# Patient Record
Sex: FEMALE | Race: WHITE | NOT HISPANIC OR LATINO | Employment: OTHER | URBAN - METROPOLITAN AREA
[De-identification: names, ages, dates, MRNs, and addresses within clinical notes are randomized per-mention and may not be internally consistent; named-entity substitution may affect disease eponyms.]

---

## 2017-02-14 ENCOUNTER — APPOINTMENT (OUTPATIENT)
Dept: MEDICAL GROUP | Age: 82
End: 2017-02-14
Payer: MEDICARE

## 2017-02-28 ENCOUNTER — HOSPITAL ENCOUNTER (OUTPATIENT)
Facility: MEDICAL CENTER | Age: 82
End: 2017-02-28
Attending: NURSE PRACTITIONER
Payer: MEDICARE

## 2017-02-28 LAB
APPEARANCE UR: CLEAR
BILIRUB UR QL STRIP.AUTO: NEGATIVE
COLOR UR AUTO: YELLOW
CULTURE IF INDICATED INDCX: NO UA CULTURE
GLUCOSE UR STRIP.AUTO-MCNC: NEGATIVE MG/DL
KETONES UR STRIP.AUTO-MCNC: NEGATIVE MG/DL
LEUKOCYTE ESTERASE UR QL STRIP.AUTO: NEGATIVE
MICRO URNS: NORMAL
NITRITE UR QL STRIP.AUTO: NEGATIVE
PH UR: 6 [PH]
PROT UR QL STRIP: NEGATIVE MG/DL
RBC UR QL AUTO: NEGATIVE
SP GR UR STRIP.AUTO: 1.01

## 2017-02-28 PROCEDURE — 81003 URINALYSIS AUTO W/O SCOPE: CPT

## 2017-03-01 ENCOUNTER — HOSPITAL ENCOUNTER (OUTPATIENT)
Facility: MEDICAL CENTER | Age: 82
End: 2017-03-01
Attending: NURSE PRACTITIONER
Payer: MEDICARE

## 2017-03-01 LAB
G LAMBLIA+C PARVUM AG STL QL RAPID: NORMAL
SIGNIFICANT IND 70042: NORMAL
SOURCE SOURCE: NORMAL

## 2017-03-01 PROCEDURE — 87328 CRYPTOSPORIDIUM AG IA: CPT

## 2017-03-01 PROCEDURE — 87329 GIARDIA AG IA: CPT

## 2017-03-05 ENCOUNTER — RESOLUTE PROFESSIONAL BILLING HOSPITAL PROF FEE (OUTPATIENT)
Dept: HOSPITALIST | Facility: MEDICAL CENTER | Age: 82
End: 2017-03-05
Payer: MEDICARE

## 2017-03-05 ENCOUNTER — HOSPITAL ENCOUNTER (INPATIENT)
Facility: MEDICAL CENTER | Age: 82
LOS: 3 days | DRG: 690 | End: 2017-03-11
Attending: EMERGENCY MEDICINE | Admitting: INTERNAL MEDICINE
Payer: MEDICARE

## 2017-03-05 DIAGNOSIS — N31.9 NEUROGENIC BLADDER: ICD-10-CM

## 2017-03-05 DIAGNOSIS — N39.0 URINARY TRACT INFECTION, SITE UNSPECIFIED: ICD-10-CM

## 2017-03-05 LAB
ALBUMIN SERPL BCP-MCNC: 3 G/DL (ref 3.2–4.9)
ALBUMIN/GLOB SERPL: 1.1 G/DL
ALP SERPL-CCNC: 74 U/L (ref 30–99)
ALT SERPL-CCNC: 20 U/L (ref 2–50)
ANION GAP SERPL CALC-SCNC: 7 MMOL/L (ref 0–11.9)
APPEARANCE UR: ABNORMAL
AST SERPL-CCNC: 24 U/L (ref 12–45)
BACTERIA #/AREA URNS HPF: ABNORMAL /HPF
BASOPHILS # BLD AUTO: 1.3 % (ref 0–1.8)
BASOPHILS # BLD: 0.11 K/UL (ref 0–0.12)
BILIRUB SERPL-MCNC: 0.7 MG/DL (ref 0.1–1.5)
BILIRUB UR QL STRIP.AUTO: NEGATIVE
BUN SERPL-MCNC: 30 MG/DL (ref 8–22)
CALCIUM SERPL-MCNC: 9.1 MG/DL (ref 8.4–10.2)
CHLORIDE SERPL-SCNC: 98 MMOL/L (ref 96–112)
CK MB SERPL-MCNC: 5 NG/ML (ref 0–5)
CO2 SERPL-SCNC: 23 MMOL/L (ref 20–33)
COLOR UR: YELLOW
CREAT SERPL-MCNC: 0.88 MG/DL (ref 0.5–1.4)
CULTURE IF INDICATED INDCX: YES UA CULTURE
EOSINOPHIL # BLD AUTO: 0.09 K/UL (ref 0–0.51)
EOSINOPHIL NFR BLD: 1 % (ref 0–6.9)
EPI CELLS #/AREA URNS HPF: ABNORMAL /HPF
ERYTHROCYTE [DISTWIDTH] IN BLOOD BY AUTOMATED COUNT: 45.9 FL (ref 35.9–50)
GFR SERPL CREATININE-BSD FRML MDRD: >60 ML/MIN/1.73 M 2
GLOBULIN SER CALC-MCNC: 2.8 G/DL (ref 1.9–3.5)
GLUCOSE SERPL-MCNC: 105 MG/DL (ref 65–99)
GLUCOSE UR STRIP.AUTO-MCNC: NEGATIVE MG/DL
HCT VFR BLD AUTO: 41.9 % (ref 37–47)
HGB BLD-MCNC: 14.2 G/DL (ref 12–16)
IMM GRANULOCYTES # BLD AUTO: 0.03 K/UL (ref 0–0.11)
IMM GRANULOCYTES NFR BLD AUTO: 0.3 % (ref 0–0.9)
KETONES UR STRIP.AUTO-MCNC: NEGATIVE MG/DL
LACTATE BLD-SCNC: 1.14 MMOL/L (ref 0.5–2)
LEUKOCYTE ESTERASE UR QL STRIP.AUTO: ABNORMAL
LYMPHOCYTES # BLD AUTO: 1.83 K/UL (ref 1–4.8)
LYMPHOCYTES NFR BLD: 21.1 % (ref 22–41)
MCH RBC QN AUTO: 32.8 PG (ref 27–33)
MCHC RBC AUTO-ENTMCNC: 33.9 G/DL (ref 33.6–35)
MCV RBC AUTO: 96.8 FL (ref 81.4–97.8)
MICRO URNS: ABNORMAL
MONOCYTES # BLD AUTO: 1.06 K/UL (ref 0–0.85)
MONOCYTES NFR BLD AUTO: 12.2 % (ref 0–13.4)
NEUTROPHILS # BLD AUTO: 5.57 K/UL (ref 2–7.15)
NEUTROPHILS NFR BLD: 64.1 % (ref 44–72)
NITRITE UR QL STRIP.AUTO: NEGATIVE
NRBC # BLD AUTO: 0 K/UL
NRBC BLD AUTO-RTO: 0 /100 WBC
PH UR STRIP.AUTO: 6 [PH]
PLATELET # BLD AUTO: 216 K/UL (ref 164–446)
PMV BLD AUTO: 9.3 FL (ref 9–12.9)
POTASSIUM SERPL-SCNC: 3.8 MMOL/L (ref 3.6–5.5)
PROT SERPL-MCNC: 5.8 G/DL (ref 6–8.2)
PROT UR QL STRIP: NEGATIVE MG/DL
RBC # BLD AUTO: 4.33 M/UL (ref 4.2–5.4)
RBC # URNS HPF: ABNORMAL /HPF
RBC UR QL AUTO: ABNORMAL
SODIUM SERPL-SCNC: 128 MMOL/L (ref 135–145)
SP GR UR STRIP.AUTO: 1.01
SPECIMEN DRAWN FROM PATIENT: NORMAL
WBC # BLD AUTO: 8.7 K/UL (ref 4.8–10.8)
WBC #/AREA URNS HPF: ABNORMAL /HPF

## 2017-03-05 PROCEDURE — 87086 URINE CULTURE/COLONY COUNT: CPT

## 2017-03-05 PROCEDURE — 51701 INSERT BLADDER CATHETER: CPT

## 2017-03-05 PROCEDURE — G0378 HOSPITAL OBSERVATION PER HR: HCPCS

## 2017-03-05 PROCEDURE — 99220 PR INITIAL OBSERVATION CARE,LEVL III: CPT | Performed by: INTERNAL MEDICINE

## 2017-03-05 PROCEDURE — 81001 URINALYSIS AUTO W/SCOPE: CPT

## 2017-03-05 PROCEDURE — 87186 SC STD MICRODIL/AGAR DIL: CPT

## 2017-03-05 PROCEDURE — 99285 EMERGENCY DEPT VISIT HI MDM: CPT

## 2017-03-05 PROCEDURE — 96365 THER/PROPH/DIAG IV INF INIT: CPT

## 2017-03-05 PROCEDURE — 700102 HCHG RX REV CODE 250 W/ 637 OVERRIDE(OP): Performed by: INTERNAL MEDICINE

## 2017-03-05 PROCEDURE — 700111 HCHG RX REV CODE 636 W/ 250 OVERRIDE (IP): Performed by: EMERGENCY MEDICINE

## 2017-03-05 PROCEDURE — 83605 ASSAY OF LACTIC ACID: CPT

## 2017-03-05 PROCEDURE — 80053 COMPREHEN METABOLIC PANEL: CPT

## 2017-03-05 PROCEDURE — 87077 CULTURE AEROBIC IDENTIFY: CPT

## 2017-03-05 PROCEDURE — 36415 COLL VENOUS BLD VENIPUNCTURE: CPT

## 2017-03-05 PROCEDURE — 85025 COMPLETE CBC W/AUTO DIFF WBC: CPT

## 2017-03-05 PROCEDURE — 82553 CREATINE MB FRACTION: CPT

## 2017-03-05 PROCEDURE — 700105 HCHG RX REV CODE 258: Performed by: INTERNAL MEDICINE

## 2017-03-05 PROCEDURE — A9270 NON-COVERED ITEM OR SERVICE: HCPCS | Performed by: INTERNAL MEDICINE

## 2017-03-05 RX ORDER — BISACODYL 10 MG
10 SUPPOSITORY, RECTAL RECTAL
Status: DISCONTINUED | OUTPATIENT
Start: 2017-03-05 | End: 2017-03-11 | Stop reason: HOSPADM

## 2017-03-05 RX ORDER — ONDANSETRON 4 MG/1
4 TABLET, ORALLY DISINTEGRATING ORAL EVERY 4 HOURS PRN
Status: DISCONTINUED | OUTPATIENT
Start: 2017-03-05 | End: 2017-03-11 | Stop reason: HOSPADM

## 2017-03-05 RX ORDER — FLUOXETINE HYDROCHLORIDE 20 MG/1
40 CAPSULE ORAL DAILY
Status: DISCONTINUED | OUTPATIENT
Start: 2017-03-06 | End: 2017-03-11 | Stop reason: HOSPADM

## 2017-03-05 RX ORDER — IBUPROFEN 200 MG
400 TABLET ORAL EVERY 6 HOURS PRN
Status: DISCONTINUED | OUTPATIENT
Start: 2017-03-05 | End: 2017-03-11 | Stop reason: HOSPADM

## 2017-03-05 RX ORDER — SODIUM CHLORIDE 9 MG/ML
INJECTION, SOLUTION INTRAVENOUS CONTINUOUS
Status: DISCONTINUED | OUTPATIENT
Start: 2017-03-05 | End: 2017-03-10

## 2017-03-05 RX ORDER — ACETAMINOPHEN 325 MG/1
650 TABLET ORAL EVERY 6 HOURS PRN
Status: DISCONTINUED | OUTPATIENT
Start: 2017-03-05 | End: 2017-03-11 | Stop reason: HOSPADM

## 2017-03-05 RX ORDER — POLYETHYLENE GLYCOL 3350 17 G/17G
1 POWDER, FOR SOLUTION ORAL
Status: DISCONTINUED | OUTPATIENT
Start: 2017-03-05 | End: 2017-03-11 | Stop reason: HOSPADM

## 2017-03-05 RX ORDER — ONDANSETRON 2 MG/ML
4 INJECTION INTRAMUSCULAR; INTRAVENOUS EVERY 4 HOURS PRN
Status: DISCONTINUED | OUTPATIENT
Start: 2017-03-05 | End: 2017-03-11 | Stop reason: HOSPADM

## 2017-03-05 RX ORDER — LABETALOL HYDROCHLORIDE 5 MG/ML
10 INJECTION, SOLUTION INTRAVENOUS EVERY 4 HOURS PRN
Status: DISCONTINUED | OUTPATIENT
Start: 2017-03-05 | End: 2017-03-11 | Stop reason: HOSPADM

## 2017-03-05 RX ORDER — LISINOPRIL 20 MG/1
20 TABLET ORAL DAILY
Status: DISCONTINUED | OUTPATIENT
Start: 2017-03-06 | End: 2017-03-11 | Stop reason: HOSPADM

## 2017-03-05 RX ORDER — AMOXICILLIN 250 MG
2 CAPSULE ORAL 2 TIMES DAILY
Status: DISCONTINUED | OUTPATIENT
Start: 2017-03-05 | End: 2017-03-11 | Stop reason: HOSPADM

## 2017-03-05 RX ORDER — TRAMADOL HYDROCHLORIDE 50 MG/1
50 TABLET ORAL EVERY 6 HOURS PRN
Status: DISCONTINUED | OUTPATIENT
Start: 2017-03-05 | End: 2017-03-11 | Stop reason: HOSPADM

## 2017-03-05 RX ORDER — CEFTRIAXONE 1 G/1
1 INJECTION, POWDER, FOR SOLUTION INTRAMUSCULAR; INTRAVENOUS ONCE
Status: COMPLETED | OUTPATIENT
Start: 2017-03-05 | End: 2017-03-05

## 2017-03-05 RX ORDER — L. ACIDOPHILUS/L.BULGARICUS 100MM CELL
1 GRANULES IN PACKET (EA) ORAL DAILY
Status: DISCONTINUED | OUTPATIENT
Start: 2017-03-06 | End: 2017-03-11 | Stop reason: HOSPADM

## 2017-03-05 RX ADMIN — TRAMADOL HYDROCHLORIDE 50 MG: 50 TABLET, FILM COATED ORAL at 20:44

## 2017-03-05 RX ADMIN — SENNOSIDES AND DOCUSATE SODIUM 2 TABLET: 8.6; 5 TABLET ORAL at 20:44

## 2017-03-05 RX ADMIN — SODIUM CHLORIDE: 9 INJECTION, SOLUTION INTRAVENOUS at 20:44

## 2017-03-05 RX ADMIN — CEFTRIAXONE 1 G: 1 INJECTION, POWDER, FOR SOLUTION INTRAMUSCULAR; INTRAVENOUS at 17:35

## 2017-03-05 ASSESSMENT — LIFESTYLE VARIABLES
HAVE YOU EVER FELT YOU SHOULD CUT DOWN ON YOUR DRINKING: NO
TOTAL SCORE: 0
HOW MANY TIMES IN THE PAST YEAR HAVE YOU HAD 5 OR MORE DRINKS IN A DAY: 0
CONSUMPTION TOTAL: NEGATIVE
EVER FELT BAD OR GUILTY ABOUT YOUR DRINKING: NO
EVER_SMOKED: NEVER
TOTAL SCORE: 0
DO YOU DRINK ALCOHOL: NO
TOTAL SCORE: 0
EVER HAD A DRINK FIRST THING IN THE MORNING TO STEADY YOUR NERVES TO GET RID OF A HANGOVER: NO
ON A TYPICAL DAY WHEN YOU DRINK ALCOHOL HOW MANY DRINKS DO YOU HAVE: 1
AVERAGE NUMBER OF DAYS PER WEEK YOU HAVE A DRINK CONTAINING ALCOHOL: 4
HAVE PEOPLE ANNOYED YOU BY CRITICIZING YOUR DRINKING: NO
ALCOHOL_USE: YES

## 2017-03-05 ASSESSMENT — PAIN SCALES - GENERAL
PAINLEVEL_OUTOF10: 0
PAINLEVEL_OUTOF10: 9
PAINLEVEL_OUTOF10: 7
PAINLEVEL_OUTOF10: 4
PAINLEVEL_OUTOF10: 0

## 2017-03-05 NOTE — ED NOTES
Assumed care of pt at this specific time; no acute exacerbations in condition are noted since last evaluation.  Call light is within reach and pt is strongly encouraged to call for any assistance.   Family members are at bedside.

## 2017-03-05 NOTE — ED NOTES
Expected safety measures such as locked and lowest setting gurney, side-rails up, accessible call light have been properly implemented.  Pt verbalizes understanding of these precautions and is able to identify potential needs.

## 2017-03-05 NOTE — IP AVS SNAPSHOT
VFA Access Code: Activation code not generated  Current VFA Status: Active    [employer]  A secure, online tool to manage your health information     [employer]’s VFA® is a secure, online tool that connects you to your personalized health information from the privacy of your home -- day or night - making it very easy for you to manage your healthcare. Once the activation process is completed, you can even access your medical information using the VFA tess, which is available for free in the Apple Tess store or Google Play store.     VFA provides the following levels of access (as shown below):   My Chart Features   [facility] Primary Care Doctor [facility]  Specialists [facility]  Urgent  Care Non-[facility]  Primary Care  Doctor   Email your healthcare team securely and privately 24/7 X X X X   Manage appointments: schedule your next appointment; view details of past/upcoming appointments X      Request prescription refills. X      View recent personal medical records, including lab and immunizations X X X X   View health record, including health history, allergies, medications X X X X   Read reports about your outpatient visits, procedures, consult and ER notes X X X X   See your discharge summary, which is a recap of your hospital and/or ER visit that includes your diagnosis, lab results, and care plan. X X       How to register for VFA:  1. Go to  [email or web address].  2. Click on the Sign Up Now box, which takes you to the New Member Sign Up page. You will need to provide the following information:  a. Enter your VFA Access Code exactly as it appears at the top of this page. (You will not need to use this code after you’ve completed the sign-up process. If you do not sign up before the expiration date, you must request a new code.)   b. Enter your date of birth.   c. Enter your home email address.   d. Click Submit, and follow the next screen’s instructions.  3. Create a VFA ID. This will  be your [employer] login ID and cannot be changed, so think of one that is secure and easy to remember.  4. Create a [employer] password. You can change your password at any time.  5. Enter your Password Reset Question and Answer. This can be used at a later time if you forget your password.   6. Enter your e-mail address. This allows you to receive e-mail notifications when new information is available in [employer].  7. Click Sign Up. You can now view your health information.    For assistance activating your [employer] account, call [phone]

## 2017-03-05 NOTE — IP AVS SNAPSHOT
3/11/2017          Meron Rice  [address]  [place] [address]    Dear Meron Worley:    [facility] wants to ensure your discharge home is safe and you or your loved ones have had all your questions answered regarding your care after you leave the hospital.    You may receive a telephone call within two days of your discharge.  This call is to make certain you understand your discharge instructions as well as ensure we provided you with the best care possible during your stay with us.     The call will only last approximately 3-5 minutes and will be done by a nurse.    Once again, we want to ensure your discharge home is safe and that you have a clear understanding of any next steps in your care.  If you have any questions or concerns, please do not hesitate to contact us, we are here for you.  Thank you for choosing [facility] for your healthcare needs.    Sincerely,    Jeff Olivia    [facility]

## 2017-03-05 NOTE — IP AVS SNAPSHOT
" Home Care Instructions                                                                                                                  Name:Meron Rice  Medical Record Number:[id number]  CSN: [id number]    YOB: 1932   Age: 84 y.o.  Sex: female  HT:1.753 m (5' 9\") WT: 79.5 kg (175 lb 4.3 oz)          Admit Date: 3/5/2017     Discharge Date:   Today's Date: 3/11/2017  Attending Doctor:  Stanislav Medrano M.D.                  Allergies:  Alendronate-butylpar-propylpar-saccharin            Discharge Instructions       Discharge Instructions    Discharged to group home by medical transportation with escort. Discharged via wheelchair, hospital escort: Yes.  Special equipment needed: Not Applicable    Be sure to schedule a follow-up appointment with your primary care doctor or any specialists as instructed.     Discharge Plan:   Diet Plan: Discussed  Activity Level: Discussed  Confirmed Follow up Appointment: Appointment Scheduled  Confirmed Symptoms Management: Discussed  Medication Reconciliation Updated: Yes  Influenza Vaccine Indication: Patient Refuses    I understand that a diet low in cholesterol, fat, and sodium is recommended for good health. Unless I have been given specific instructions below for another diet, I accept this instruction as my diet prescription.   Other diet: Regular diet    Special Instructions: None    · Is patient discharged on Warfarin / Coumadin?   No     · Is patient Post Blood Transfusion?  No    Depression / Suicide Risk    As you are discharged from this [facility] facility, it is important to learn how to keep safe from harming yourself.    Recognize the warning signs:  · Abrupt changes in personality, positive or negative- including increase in energy   · Giving away possessions  · Change in eating patterns- significant weight changes-  positive or negative  · Change in sleeping patterns- unable to sleep or sleeping all the time   · Unwillingness or inability to " communicate  · Depression  · Unusual sadness, discouragement and loneliness  · Talk of wanting to die  · Neglect of personal appearance   · Rebelliousness- reckless behavior  · Withdrawal from people/activities they love  · Confusion- inability to concentrate     If you or a loved one observes any of these behaviors or has concerns about self-harm, here's what you can do:  · Talk about it- your feelings and reasons for harming yourself  · Remove any means that you might use to hurt yourself (examples: pills, rope, extension cords, firearm)  · Get professional help from the community (Mental Health, Substance Abuse, psychological counseling)  · Do not be alone:Call your Safe Contact- someone whom you trust who will be there for you.  · Call your local CRISIS HOTLINE 008-8859 or 092-871-5334  · Call your local Children's Mobile Crisis Response Team [place] (307) 608-1564 or www.ZenDay  · Call the toll free National Suicide Prevention Hotlines   · National Suicide Prevention Lifeline 743-672-OGXM (2969)  · [place] Hope Line Network 800-SUICIDE (556-3271)    Urinary Tract Infection  Urinary tract infections (UTIs) can develop anywhere along your urinary tract. Your urinary tract is your body's drainage system for removing wastes and extra water. Your urinary tract includes two kidneys, two ureters, a bladder, and a urethra. Your kidneys are a pair of bean-shaped organs. Each kidney is about the size of your fist. They are located below your ribs, one on each side of your spine.  CAUSES  Infections are caused by microbes, which are microscopic organisms, including fungi, viruses, and bacteria. These organisms are so small that they can only be seen through a microscope. Bacteria are the microbes that most commonly cause UTIs.  SYMPTOMS   Symptoms of UTIs may vary by age and gender of the patient and by the location of the infection. Symptoms in young women typically include a frequent and intense urge to  urinate and a painful, burning feeling in the bladder or urethra during urination. Older women and men are more likely to be tired, shaky, and weak and have muscle aches and abdominal pain. A fever may mean the infection is in your kidneys. Other symptoms of a kidney infection include pain in your back or sides below the ribs, nausea, and vomiting.  DIAGNOSIS  To diagnose a UTI, your caregiver will ask you about your symptoms. Your caregiver also will ask to provide a urine sample. The urine sample will be tested for bacteria and white blood cells. White blood cells are made by your body to help fight infection.  TREATMENT   Typically, UTIs can be treated with medication. Because most UTIs are caused by a bacterial infection, they usually can be treated with the use of antibiotics. The choice of antibiotic and length of treatment depend on your symptoms and the type of bacteria causing your infection.  HOME CARE INSTRUCTIONS  · If you were prescribed antibiotics, take them exactly as your caregiver instructs you. Finish the medication even if you feel better after you have only taken some of the medication.  · Drink enough water and fluids to keep your urine clear or pale yellow.  · Avoid caffeine, tea, and carbonated beverages. They tend to irritate your bladder.  · Empty your bladder often. Avoid holding urine for long periods of time.  · Empty your bladder before and after sexual intercourse.  · After a bowel movement, women should cleanse from front to back. Use each tissue only once.  SEEK MEDICAL CARE IF:   2. You have back pain.  3. You develop a fever.  4. Your symptoms do not begin to resolve within 3 days.  SEEK IMMEDIATE MEDICAL CARE IF:   2. You have severe back pain or lower abdominal pain.  3. You develop chills.  4. You have nausea or vomiting.  5. You have continued burning or discomfort with urination.  MAKE SURE YOU:   2. Understand these instructions.  3. Will watch your condition.  4. Will get  help right away if you are not doing well or get worse.     This information is not intended to replace advice given to you by your health care provider. Make sure you discuss any questions you have with your health care provider.     Document Released: 09/27/2006 Document Revised: 01/08/2016 Document Reviewed: 01/25/2013  [employer] Interactive Patient Education ©2016 [employer]      Your appointments     Mar 21, 2017  9:00 AM   New Patient with Sergio Hall PA-C   [facility]    [address]  [address]  [place] [address]   [phone]           Please bring Photo ID, Insurance Cards, All Medication Bottles and copies of any legal documents (such as Living Will, Power of ) If speaking a language besides English please bring an adult . Please arrive 30 minutes prior for check in and registration. You will be receiving a confirmation call a few days before your appointment from our automated call confirmation system.                 Discharge Medication Instructions:    Below are the medications your physician expects you to take upon discharge:    Review all your home medications and newly ordered medications with your doctor and/or pharmacist. Follow medication instructions as directed by your doctor and/or pharmacist.    Please keep your medication list with you and share with your physician.               Medication List      START taking these medications        Instructions    bethanechol 10 MG Tabs   Last time this was given:  20 mg on 3/11/2017  8:48 AM   Commonly known as:  URECHOLINE    Take 1 Tab by mouth 3 times a day.   Dose:  10 mg       sulfamethoxazole-trimethoprim 800-160 MG tablet   Commonly known as:  BACTRIM DS    Take 1 Tab by mouth 2 times a day.   Dose:  1 Tab       tamsulosin 0.4 MG capsule   Last time this was given:  0.4 mg on 3/11/2017  8:47 AM   Commonly known as:  FLOMAX    Take 1 Cap by mouth ONE-HALF HOUR  AFTER BREAKFAST.   Dose:  0.4 mg       tramadol 50 MG Tabs   Last time this was given:  50 mg on 3/11/2017  6:12 AM   Commonly known as:  ULTRAM    Take 1 Tab by mouth every 6 hours as needed for Severe Pain.   Dose:  50 mg         CONTINUE taking these medications        Instructions    aspirin EC 81 MG Tbec   Last time this was given:  81 mg on 3/11/2017  8:49 AM   Commonly known as:  ECOTRIN    Take 1 Tab by mouth every day.   Dose:  81 mg       fluoxetine 40 MG capsule   Last time this was given:  40 mg on 3/11/2017  8:47 AM   Commonly known as:  PROZAC    Doctor's comments:  Replaces 20 mg dose sent earlier today   Take 1 Cap by mouth every day.   Dose:  40 mg       lisinopril 20 MG Tabs   Last time this was given:  20 mg on 3/11/2017  8:48 AM   Commonly known as:  PRINIVIL    Take 1 Tab by mouth every day.   Dose:  20 mg       PROBIOTIC PO    Take 1 Cap by mouth every day.   Dose:  1 Cap               Instructions           Diet / Nutrition:    Follow any diet instructions given to you by your doctor or the dietician, including how much salt (sodium) you are allowed each day.    If you are overweight, talk to your doctor about a weight reduction plan.    Activity:    Remain physically active following your doctor's instructions about exercise and activity.    Rest often.     Any time you become even a little tired or short of breath, SIT DOWN and rest.    Worsening Symptoms:    Report any of the following signs and symptoms to the doctor's office immediately:    *Pain of jaw, arm, or neck  *Chest pain not relieved by medication                               *Dizziness or loss of consciousness  *Difficulty breathing even when at rest   *More tired than usual                                       *Bleeding drainage or swelling of surgical site  *Swelling of feet, ankles, legs or stomach                 *Fever (>100ºF)  *Pink or blood tinged sputum  *Weight gain (3lbs/day or 5lbs /week)           *Shock from  internal defibrillator (if applicable)  *Palpitations or irregular heartbeats                *Cool and/or numb extremities    Stroke Awareness    Common Risk Factors for Stroke include:    Age  Atrial Fibrillation  Carotid Artery Stenosis  Diabetes Mellitus  Excessive alcohol consumption  High blood pressure  Overweight   Physical inactivity  Smoking    Warning signs and symptoms of a stroke include:    *Sudden numbness or weakness of the face, arm or leg (especially on one side of the body).  *Sudden confusion, trouble speaking or understanding.  *Sudden trouble seeing in one or both eyes.  *Sudden trouble walking, dizziness, loss of balance or coordination.Sudden severe headache with no known cause.    It is very important to get treatment quickly when a stroke occurs. If you experience any of the above warning signs, call 911 immediately.                   Disclaimer         Quit Smoking / Tobacco Use:    I understand the use of any tobacco products increases my chance of suffering from future heart disease or stroke and could cause other illnesses which may shorten my life. Quitting the use of tobacco products is the single most important thing I can do to improve my health. For further information on smoking / tobacco cessation call a Toll Free Quit Line at 1-912.754.8558 (*National Cancer [place]) or 1-530.133.6300 (American Lung Association) or you can access the web based program at www.lungusa.org.    [place] Tobacco Users Help Line:  (392) 871-8228       Toll Free: 1-794.624.5341  Quit Tobacco Program [facility] (155)927-5480    Crisis Hotline:    [place] Crisis Hotline:  4-489-NBZECAB or 1-562.276.7498    [place] Crisis Hotline:    1-941.310.9037 or 147-609-6719    Discharge Survey:   Thank you for choosing [facility]. We hope we did everything we could to make your hospital stay a pleasant one. You may be receiving a phone survey and we would appreciate your time and  participation in answering the questions. Your input is very valuable to us in our efforts to improve our service to our patients and their families.        My signature on this form indicates that:    1. I have reviewed and understand the above information.  2. My questions regarding this information have been answered to my satisfaction.  3. I have formulated a plan with my discharge nurse to obtain my prescribed medications for home.                  Disclaimer         __________________________________                     __________       ________                       Patient Signature                                                 Date                    Time

## 2017-03-05 NOTE — ED NOTES
C/o low abdominal pain and urinary frequency x 1 month, saw pmd and had UA done here 2/28/17, has not heard from pmd yet

## 2017-03-05 NOTE — IP AVS SNAPSHOT
" <p align=\"LEFT\"><IMG SRC=\"//[email or web address]$/[email or web address]\" alt=\"Image\" WIDTH=\"50%\" HEIGHT=\"200\" BORDER=\"\"></p>                   Name:Meron Rice  Medical Record Number:[id number]  CSN: [id number]    YOB: 1932   Age: 84 y.o.  Sex: female  HT:1.753 m (5' 9\") WT: 79.5 kg (175 lb 4.3 oz)          Admit Date: 3/5/2017     Discharge Date:   Today's Date: 3/11/2017  Attending Doctor:  Stanislav Medrano M.D.                  Allergies:  Alendronate-butylpar-propylpar-saccharin          Your appointments     Mar 21, 2017  9:00 AM   New Patient with Sergio Hall PA-C   [facility]    [address]  [address]  [place] [id number] [phone]           Please bring Photo ID, Insurance Cards, All Medication Bottles and copies of any legal documents (such as Living Will, Power of ) If speaking a language besides English please bring an adult . Please arrive 30 minutes prior for check in and registration. You will be receiving a confirmation call a few days before your appointment from our automated call confirmation system.                 Medication List      Take these Medications        Instructions    aspirin EC 81 MG Tbec   Commonly known as:  ECOTRIN    Take 1 Tab by mouth every day.   Dose:  81 mg       bethanechol 10 MG Tabs   Commonly known as:  URECHOLINE    Take 1 Tab by mouth 3 times a day.   Dose:  10 mg       fluoxetine 40 MG capsule   Commonly known as:  PROZAC    Doctor's comments:  Replaces 20 mg dose sent earlier today   Take 1 Cap by mouth every day.   Dose:  40 mg       lisinopril 20 MG Tabs   Commonly known as:  PRINIVIL    Take 1 Tab by mouth every day.   Dose:  20 mg       PROBIOTIC PO    Take 1 Cap by mouth every day.   Dose:  1 Cap       sulfamethoxazole-trimethoprim 800-160 MG tablet   Commonly known as:  BACTRIM DS    Take 1 Tab by mouth 2 times a day.   Dose:  1 Tab       tamsulosin 0.4 MG capsule   Commonly " known as:  FLOMAX    Take 1 Cap by mouth ONE-HALF HOUR AFTER BREAKFAST.   Dose:  0.4 mg       tramadol 50 MG Tabs   Commonly known as:  ULTRAM    Take 1 Tab by mouth every 6 hours as needed for Severe Pain.   Dose:  50 mg

## 2017-03-05 NOTE — LETTER
[facility] - [id number]  EHR eReferral Notification Requirements    To be sent by secure email to [email or web address] or   by fax to [phone]       FIELDS ARE REQUIRED TO BE COMPLETED     Patient Name:  Meron Rice  MRN:  [id number]   Account Number: [id number]                YOB: 1932    Date Roomed in ED:    3/5/2017   3:19 PM  Date First Observation Order Placed: 3/5/2017   6:06 PM  Date First Inpatient Order Placed: 3/8/2017   12:09 PM    Date of Previous Admission (Needed For Readmission Reviews Only):     Discharge Date and Time (if applicable): No discharge date for patient encounter.     PLEASE CHECK OFF TYPE OF REVIEW & CURRENT ADMISSION STATUS FROM LISTS BELOW  Type of Review:  Admission review    Dates to be Reviewed: 3/8, 3/9, 3/10        Current Admission Status:  Inpatient    / Contact Number for EHR outcome/recommendation call:    Neela Yoon RN  [phone]     Attending Physician/ Contact Number (if not the same as in electronic record):  Dr. Medrano [phone]     Comments:  none      Additional Information being Emailed or Faxed:  no       Fax Handwritten Supporting Documents to EHR at [phone]      [address] [address]  [phone]  [email or web address]    Updated December 17, 2014

## 2017-03-06 ENCOUNTER — PATIENT OUTREACH (OUTPATIENT)
Dept: HEALTH INFORMATION MANAGEMENT | Facility: OTHER | Age: 82
End: 2017-03-06

## 2017-03-06 ENCOUNTER — APPOINTMENT (OUTPATIENT)
Dept: RADIOLOGY | Facility: MEDICAL CENTER | Age: 82
DRG: 690 | End: 2017-03-06
Attending: INTERNAL MEDICINE
Payer: MEDICARE

## 2017-03-06 PROBLEM — N31.9 NEUROGENIC BLADDER: Status: ACTIVE | Noted: 2017-03-06

## 2017-03-06 LAB
ANION GAP SERPL CALC-SCNC: 6 MMOL/L (ref 0–11.9)
BASOPHILS # BLD AUTO: 1.4 % (ref 0–1.8)
BASOPHILS # BLD: 0.1 K/UL (ref 0–0.12)
BUN SERPL-MCNC: 22 MG/DL (ref 8–22)
CALCIUM SERPL-MCNC: 8.8 MG/DL (ref 8.4–10.2)
CHLORIDE SERPL-SCNC: 105 MMOL/L (ref 96–112)
CO2 SERPL-SCNC: 24 MMOL/L (ref 20–33)
CREAT SERPL-MCNC: 0.75 MG/DL (ref 0.5–1.4)
EOSINOPHIL # BLD AUTO: 0.22 K/UL (ref 0–0.51)
EOSINOPHIL NFR BLD: 3.1 % (ref 0–6.9)
ERYTHROCYTE [DISTWIDTH] IN BLOOD BY AUTOMATED COUNT: 44.5 FL (ref 35.9–50)
GFR SERPL CREATININE-BSD FRML MDRD: >60 ML/MIN/1.73 M 2
GLUCOSE SERPL-MCNC: 108 MG/DL (ref 65–99)
HCT VFR BLD AUTO: 37.3 % (ref 37–47)
HGB BLD-MCNC: 12.8 G/DL (ref 12–16)
IMM GRANULOCYTES # BLD AUTO: 0.01 K/UL (ref 0–0.11)
IMM GRANULOCYTES NFR BLD AUTO: 0.1 % (ref 0–0.9)
LYMPHOCYTES # BLD AUTO: 2.24 K/UL (ref 1–4.8)
LYMPHOCYTES NFR BLD: 31.6 % (ref 22–41)
MCH RBC QN AUTO: 32.2 PG (ref 27–33)
MCHC RBC AUTO-ENTMCNC: 34.3 G/DL (ref 33.6–35)
MCV RBC AUTO: 94 FL (ref 81.4–97.8)
MONOCYTES # BLD AUTO: 0.98 K/UL (ref 0–0.85)
MONOCYTES NFR BLD AUTO: 13.8 % (ref 0–13.4)
NEUTROPHILS # BLD AUTO: 3.53 K/UL (ref 2–7.15)
NEUTROPHILS NFR BLD: 50 % (ref 44–72)
NRBC # BLD AUTO: 0 K/UL
NRBC BLD AUTO-RTO: 0 /100 WBC
PLATELET # BLD AUTO: 197 K/UL (ref 164–446)
PMV BLD AUTO: 9.7 FL (ref 9–12.9)
POTASSIUM SERPL-SCNC: 3.6 MMOL/L (ref 3.6–5.5)
RBC # BLD AUTO: 3.97 M/UL (ref 4.2–5.4)
SODIUM SERPL-SCNC: 135 MMOL/L (ref 135–145)
WBC # BLD AUTO: 7.1 K/UL (ref 4.8–10.8)

## 2017-03-06 PROCEDURE — 700105 HCHG RX REV CODE 258: Performed by: INTERNAL MEDICINE

## 2017-03-06 PROCEDURE — G8987 SELF CARE CURRENT STATUS: HCPCS | Mod: CK

## 2017-03-06 PROCEDURE — G0378 HOSPITAL OBSERVATION PER HR: HCPCS

## 2017-03-06 PROCEDURE — 99226 PR SUBSEQUENT OBSERVATION CARE,LEVEL III: CPT | Performed by: INTERNAL MEDICINE

## 2017-03-06 PROCEDURE — G8988 SELF CARE GOAL STATUS: HCPCS | Mod: CJ

## 2017-03-06 PROCEDURE — 700102 HCHG RX REV CODE 250 W/ 637 OVERRIDE(OP): Performed by: INTERNAL MEDICINE

## 2017-03-06 PROCEDURE — 97162 PT EVAL MOD COMPLEX 30 MIN: CPT

## 2017-03-06 PROCEDURE — 97535 SELF CARE MNGMENT TRAINING: CPT

## 2017-03-06 PROCEDURE — 80048 BASIC METABOLIC PNL TOTAL CA: CPT

## 2017-03-06 PROCEDURE — 97166 OT EVAL MOD COMPLEX 45 MIN: CPT | Mod: XE

## 2017-03-06 PROCEDURE — 74176 CT ABD & PELVIS W/O CONTRAST: CPT

## 2017-03-06 PROCEDURE — G8979 MOBILITY GOAL STATUS: HCPCS | Mod: CI

## 2017-03-06 PROCEDURE — 85025 COMPLETE CBC W/AUTO DIFF WBC: CPT

## 2017-03-06 PROCEDURE — G8978 MOBILITY CURRENT STATUS: HCPCS | Mod: CK

## 2017-03-06 PROCEDURE — 700111 HCHG RX REV CODE 636 W/ 250 OVERRIDE (IP): Performed by: INTERNAL MEDICINE

## 2017-03-06 PROCEDURE — 87086 URINE CULTURE/COLONY COUNT: CPT

## 2017-03-06 PROCEDURE — A9270 NON-COVERED ITEM OR SERVICE: HCPCS | Performed by: INTERNAL MEDICINE

## 2017-03-06 RX ADMIN — TRAMADOL HYDROCHLORIDE 50 MG: 50 TABLET, FILM COATED ORAL at 21:36

## 2017-03-06 RX ADMIN — IBUPROFEN 400 MG: 200 TABLET, FILM COATED ORAL at 14:40

## 2017-03-06 RX ADMIN — TRAMADOL HYDROCHLORIDE 50 MG: 50 TABLET, FILM COATED ORAL at 08:38

## 2017-03-06 RX ADMIN — CEFTRIAXONE SODIUM 1 G: 1 INJECTION, POWDER, FOR SOLUTION INTRAMUSCULAR; INTRAVENOUS at 08:31

## 2017-03-06 RX ADMIN — ASPIRIN 81 MG: 81 TABLET, COATED ORAL at 08:32

## 2017-03-06 RX ADMIN — ENOXAPARIN SODIUM 40 MG: 100 INJECTION SUBCUTANEOUS at 08:31

## 2017-03-06 RX ADMIN — LISINOPRIL 20 MG: 20 TABLET ORAL at 08:32

## 2017-03-06 RX ADMIN — TRAMADOL HYDROCHLORIDE 50 MG: 50 TABLET, FILM COATED ORAL at 15:37

## 2017-03-06 RX ADMIN — IBUPROFEN 400 MG: 200 TABLET, FILM COATED ORAL at 21:36

## 2017-03-06 RX ADMIN — FLUOXETINE 40 MG: 20 CAPSULE ORAL at 08:32

## 2017-03-06 RX ADMIN — SODIUM CHLORIDE: 9 INJECTION, SOLUTION INTRAVENOUS at 08:33

## 2017-03-06 RX ADMIN — IBUPROFEN 400 MG: 200 TABLET, FILM COATED ORAL at 01:11

## 2017-03-06 RX ADMIN — SENNOSIDES AND DOCUSATE SODIUM 2 TABLET: 8.6; 5 TABLET ORAL at 08:32

## 2017-03-06 RX ADMIN — SODIUM CHLORIDE: 9 INJECTION, SOLUTION INTRAVENOUS at 21:41

## 2017-03-06 RX ADMIN — LACTOBACILLUS ACIDOPHILUS / LACTOBACILLUS BULGARICUS 1 PACKET: 100 MILLION CFU STRENGTH GRANULES at 08:31

## 2017-03-06 ASSESSMENT — GAIT ASSESSMENTS
DEVIATION: ANTALGIC;SHUFFLED GAIT
ASSISTIVE DEVICE: FRONT WHEEL WALKER
DISTANCE (FEET): 15
GAIT LEVEL OF ASSIST: MINIMAL ASSIST

## 2017-03-06 ASSESSMENT — PAIN SCALES - GENERAL
PAINLEVEL_OUTOF10: 8
PAINLEVEL_OUTOF10: 7
PAINLEVEL_OUTOF10: 7
PAINLEVEL_OUTOF10: 8

## 2017-03-06 ASSESSMENT — ACTIVITIES OF DAILY LIVING (ADL): TOILETING: INDEPENDENT

## 2017-03-06 NOTE — THERAPY
"Physical Therapy Evaluation completed.   Bed Mobility:  Supine to Sit: Contact Guard Assist  Transfers: Sit to Stand: Contact Guard Assist  Gait: Level Of Assist: Minimal Assist with Front-Wheel Walker 15 ft      Plan of Care: Will benefit from Physical Therapy 7 times per week  Discharge Recommendations: Equipment: Front Wheeled Walker, Post-acute therapy recommended before discharged home, recommend intensive post acute therapy for this pt who has significant weakness and gait disturbance, has a history of L foot drop and currently does not have an AFO that fits properly. Pt is a high fall risk and do not recommend pt ambulate at this time alone until AFO is re-adjusted and pt has further gait training with PT.     See \"Rehab Therapy-Acute\" Patient Summary Report for complete documentation.     "

## 2017-03-06 NOTE — DISCHARGE PLANNING
Patient discussed in morning rounds.  Patient reportedly lives at home with an adult child and the discharge plan is for her to return home when medically able. No current SS needs noted.

## 2017-03-06 NOTE — ED NOTES
Pt has been medicated as prescribed and adhering to strict ED protocols.  Aseptic technique has been observed.

## 2017-03-06 NOTE — PROGRESS NOTES
Assisted to bathroom ,right side weakness and pain, had BM and urinated 600 ml clear and yellow, POC discussed.

## 2017-03-06 NOTE — H&P
CHIEF COMPLAINT:  Abdominal pain and urinary frequency.    HISTORY OF PRESENT ILLNESS:  This is an 84-year-old female with the history of   hypertension and neurogenic bladder who does self catheterization twice a   day, but still urinates on her own.  She had a history of recurrent UTI, with   cultures dating back to 2015 growing E. coli, sensitive to ceftriaxone, in   2013 grew Klebsiella pneumonia, sensitive to ceftriaxone as well and   Enterococcus faecalis.  She also has history of venous thromboembolism during   her childbearing years, along with history of cancer of the uterus and the   left breast in 2009 and 2010.    She was doing well until about several weeks back, when she started noticing   urinary frequency, and foul smelling urine, and noticed her urine to be   cloudy.  She also started to feel weaker than usual.  Last Tuesday, she had a   mechanical fall at home when she fell backwards without any associated   syncope.  She went to her primary care physician and her urinalysis was   checked, but at that time it was negative.  Bone x-rays were also done, which were   negative.  However, the patient continued to  have urinary symptoms, and subsequently   developed hypogastric pain, which she rated at 6/10 in severity, worse with urination.    She denied any nausea, vomiting, fevers, or chills.  She does have chronic diarrhea in the   last 1 month, but did improve with Imodium, probiotics, and fibers, and she did not   have any diarrhea lately.    As she was going to her son's wedding in [place] on Tuesday, she decided to go to   the ED today for further evaluation and management.    EMERGENCY DEPARTMENT COURSE:  The patient was initially evaluated in the   emergency department, was maintaining good hemodynamics, saturating well on   room air, and was afebrile.  Initial blood workup showed impressive CBC   without any leukocytosis.  Urinalysis was packed with wbc, with small leukocyte   esterase without  any nitrite, with moderate bacteria.  Patient was given   ceftriaxone in the ED.  She was subsequently admitted to the hospital service   for further evaluation and management.    REVIEW OF SYSTEMS  A complete review of system was done. All other systems were negative.    PMH/PSH/FMH: I personally reviewed all ancillary histories as noted.    PAST MEDICAL HISTORY:  Past Medical History   Diagnosis Date   • Foot-drop    • Hypertension    • Unspecified urinary incontinence      lazy bladder; doesn't empty completely   • Personal history of venous thrombosis and embolism      in leg during child-bearing years   • Cancer (CMS-[facility]) [id number]     uterus/left breast   • Other specified symptom associated with female genital organs 1998     fibroids   • Unspecified hemorrhagic conditions (CMS-HCC)      post op hyst   • Chronic low back pain    • Heart burn    • Indigestion    • Arthritis      generalized   • ASTHMA      has not needed an inhaler since 2010   • CATARACT      beginning       PAST SURGICAL HISTORY:  Past Surgical History   Procedure Laterality Date   • Hysterectomy, total abdominal  2000   • Vein ligat & strip  1972, 1966     x2 bilateral   • Axillary node dissection  9/15/2009     Performed by DIPAK VARELA at SURGERY SAME DAY [facility] ORS   • Mastectomy  9/30/2009     left   • Other orthopedic surgery  2002     laminectomy   • Gyn surgery  1998     excision of fibroids   • Cath placement  10/28/2009     Performed by DIPAK VARELA at SURGERY SAME DAY [facility] ORS   • Mastectomy  12/15/2010     right   • Cath removal  12/15/2010     Performed by DIPAK VARELA at SURGERY [facility] ORS   • Breast reconstruction  12/15/2010     Performed by JAK ROSALES at SURGERY [facility] ORS   • Latissimus flap  12/15/2010     Performed by JAK ROSALES at SURGERY [facility]   • Appendectomy  1968   • Dilation and curettage  1961   • Breast implant revision  7/11/2011     Performed by JAK ROSALES  at SURGERY [facility]   • Nipple reconstruction  7/11/2011     Performed by JAK MEZA at SURGERY [facility]   • Hip hemiarthroplasty  5/14/2012     left; Performed by KEITH COWART at SURGERY [facility]   • Other surgical procedure  2004     bladder repair   • Breast reconstruction  11/26/2012     Performed by Jak Meza M.D. at SURGERY [facility]   • Wound closure general  4/10/2013     Performed by Nano Riley M.D. at SURGERY SAME DAY [facility]   • Breast implant removal  4/10/2013     Performed by Jak Meza M.D. at SURGERY SAME DAY [facility]   • Femur nailing intramedullary Right 6/3/2015     Procedure: FEMUR NAILING INTRAMEDULLARY;  Surgeon: Deshaun Denis M.D.;  Location: SURGERY [facility];  Service:        PERSONAL/SOCIAL HISTORY:  Social History   Substance Use Topics   • Smoking status: Former Smoker -- 0.10 packs/day for 1 years     Types: Cigarettes     Quit date: 01/01/1964   • Smokeless tobacco: Never Used      Comment: 50 years ago in 1960  SOCIAL    • Alcohol Use: Yes      Comment: 0-1 glasses wine  per day       FAMILY MEDICAL HISTORY:  Family History   Problem Relation Age of Onset   • Diabetes     • Heart Disease     • Stroke     • Cancer     • Hypertension     • Diabetes Mother    • Heart Disease Mother    • Heart Disease Father    • Stroke Father    • Hypertension Father    • Diabetes Sister    • Heart Disease Sister    • Cancer Brother      lung   • Diabetes Other    • Diabetes Child    • Psychiatry Child        ALLERGIES:  Alendronate-butylpar-propylpar-saccharin    HOME MEDICATIONS:  Medication Sig   Probiotic Product (PROBIOTIC PO) Take 1 Cap by mouth every day.   lisinopril (PRINIVIL) 20 MG Tab Take 1 Tab by mouth every day.   fluoxetine (PROZAC) 40 MG capsule Take 1 Cap by mouth every day.   aspirin EC (ECOTRIN) 81 MG TBEC Take 1 Tab by mouth every day.           PHYSICAL EXAMINATION:  VITAL SIGNS:  Blood pressure  139/58, heart rate 68, respiratory rate 18,   oxygen saturation 96% on room air, and temperature is 36.7 degrees Celsius.  CONSTITUTIONAL: (-) diaphoresis, (-) distress  HENT: Normocephalic, atraumatic, (-) tonsillopharyngal congestion or exudate.  EYES: PERRLA, pink conjuctivae, (-) icteric sclerae  NECK: (-) cervical lymphadenopathy, (-) neck rigidity  CARDIOVASCULAR: Distinct heart sounds, RRR, (-) murmurs, rubs, gallops, (-) LE edema  RESPIRATORY: Equal chest expansion, clear breath sounds, (-) crackles/wheezes/rales/rhonchi  GASTROINTESTINAL:  Normoactive bowel sounds, soft, (+) hypogastric tenderness   without any guarding or rebound.  MUSCULOSKELETAL: (-) joint swelling/tenderness, (-) joint deformities, (-) muscle tenderness,   (-) gross limitation of movement of 4 extremities  SKIN: (-) erythema, warmth, rashes, ulcers, open wounds  PSYCHIATRIC: mood, affect, and thought content WNL, behavior age appropriate  NEUROLOGIC: Non-focal, moves all 4 extremities, sensory grossly intact      PERTINENT DIAGNOSTIC RESULTS:  Reviewed, and as mentioned above. Please refer to ED course.      ASSESSMENT:  1.  Complicated urinary tract infection.  2.  Weakness secondary to urinary tract infection.  3.  Neurogenic bladder with self catheterization.  4.  Chronic hypertension.    PLAN:  --- I will admit her to the medical unit.  I will continue her on ceftriaxone   IV for her urinary tract infection, and we will follow the urine cultures sent   in the ED.  We will do a bladder scan every 6 hours and do straight   catheterization if residual is greater than 500 mL.  I will also continue her on gentle   IV fluids - normal saline at 75 mL per hour.  --- Resume her home dose lisinopril, and start her on p.r.n. IV labetalol for   significant hypertension while she is in house.  I will resume her home dose aspirin.  --- For her weakness, I will get PT and OT evaluation to help with discharge planning.      DVT prophylaxis -  Lovenox subcutaneously.  GI prophylaxis - not indicated.  Code status - full code.       ____________________________________     LAW Islas / EVELYN    DD:  03/05/2017 18:12:13  DT:  03/05/2017 18:50:52    D#:  [id number]  Job#:  [id number]

## 2017-03-06 NOTE — PROGRESS NOTES
0100--Pt medicated for pain in right leg. Repositioned with pillows. Ice pack applied. Pt up to commode x2, pt voiding and had one BM.   0200--Bladder scan 551.  0300--Straight cath completed. 700 yellow urine out. Pt repositioned in bed, ice pack applied. Pt states no other needs, will CTM. CLIP. Bed alarm on.

## 2017-03-06 NOTE — CARE PLAN
Problem: Safety  Goal: Will remain free from injury  Outcome: PROGRESSING AS EXPECTED  Intervention: Provide assistance with mobility  Generalized weakness and pain right lower leg, 2 person assist uses walker, call light within reach, bed alarm on at all times.      Problem: Pain Management  Goal: Pain level will decrease to patient’s comfort goal  Outcome: PROGRESSING AS EXPECTED  Intervention: Educate and implement non-pharmacologic comfort measures. Examples: relaxation, distration, play therapy, activity therapy, massage, etc.  Pain assessment q 2 hours, medicated as needed,comfort measures provided.

## 2017-03-06 NOTE — ED NOTES
Pt will be admitted to our facility for further evaluation and management.  Admission orders and room assignment are pending.

## 2017-03-06 NOTE — CARE PLAN
Problem: Safety  Goal: Will remain free from injury  Outcome: PROGRESSING AS EXPECTED  Safety precautions in place. Bed alarm on, in low position. CLIP. Belongings in reach. Pt verbalizes understanding of safety instructions.    Problem: Pain Management  Goal: Pain level will decrease to patient’s comfort goal  Outcome: PROGRESSING AS EXPECTED  Pt uses 0-10 scale appropriately. C/o pain in leg. Medicated per MAR, repositioned in bed. Pt verbalizes understanding of pain management, both pharmacological and non pharmacological comfort measures.

## 2017-03-06 NOTE — ED PROVIDER NOTES
ED Provider Note    CHIEF COMPLAINT  Chief Complaint   Patient presents with   • Abdominal Pain   • Urinary Frequency       HPI  Meron Rice is a 84 y.o. female who presents for evaluation of lower abdominal pain. The patient states she has had urinary frequency for the past month. Currently she has a history of neurogenic bladder and has been using self-catheterization for number of years. She has no vomiting nausea or fever reported. She has had multiple infections of the urine and feels like this is recurrence. Her past medical history is extensive and is reviewed as below. She denies chest pain shortness of breath fever chills    REVIEW OF SYSTEMS  See HPI for further details. All other systems reviewed and negative except as noted above    PAST MEDICAL HISTORY  Past Medical History   Diagnosis Date   • Foot-drop    • Hypertension    • Unspecified urinary incontinence      lazy bladder; doesn't empty completely   • Personal history of venous thrombosis and embolism      in leg during child-bearing years   • Cancer (CMS-HCC) [id number]     uterus/left breast   • Other specified symptom associated with female genital organs 1998     fibroids   • Unspecified hemorrhagic conditions (CMS-HCC)      post op hyst   • Chronic low back pain    • Heart burn    • Indigestion    • Arthritis      generalized   • ASTHMA      has not needed an inhaler since 2010   • CATARACT      beginning       FAMILY HISTORY  Family History   Problem Relation Age of Onset   • Diabetes     • Heart Disease     • Stroke     • Cancer     • Hypertension     • Diabetes Mother    • Heart Disease Mother    • Heart Disease Father    • Stroke Father    • Hypertension Father    • Diabetes Sister    • Heart Disease Sister    • Cancer Brother      lung   • Diabetes Other    • Diabetes Child    • Psychiatry Child        SOCIAL HISTORY  Social History     Social History   • Marital Status:      Spouse Name: N/A   • Number of Children: N/A   •  Years of Education: N/A     Social History Main Topics   • Smoking status: Former Smoker -- 0.10 packs/day for 1 years     Types: Cigarettes     Quit date: 01/01/1964   • Smokeless tobacco: Never Used      Comment: 50 years ago in 1960  SOCIAL    • Alcohol Use: Yes      Comment: 0-1 glasses wine  per day   • Drug Use: No   • Sexual Activity: Not Currently     Other Topics Concern   • None     Social History Narrative       SURGICAL HISTORY  Past Surgical History   Procedure Laterality Date   • Hysterectomy, total abdominal  2000   • Vein ligat & strip  1972, 1966     x2 bilateral   • Axillary node dissection  9/15/2009     Performed by NANO VARELA at SURGERY SAME DAY [facility] ORS   • Mastectomy  9/30/2009     left   • Other orthopedic surgery  2002     laminectomy   • Gyn surgery  1998     excision of fibroids   • Cath placement  10/28/2009     Performed by NANO VARELA at SURGERY SAME DAY [facility] ORS   • Mastectomy  12/15/2010     right   • Cath removal  12/15/2010     Performed by NANO VARELA at SURGERY [facility]   • Breast reconstruction  12/15/2010     Performed by JAK MEZA at SURGERY [facility] ORS   • Latissimus flap  12/15/2010     Performed by JAK MEZA at SURGERY [facility] ORS   • Appendectomy  1968   • Dilation and curettage  1961   • Breast implant revision  7/11/2011     Performed by JAK MEZA at SURGERY [facility]   • Nipple reconstruction  7/11/2011     Performed by JAK MEZA at SURGERY [facility]   • Hip hemiarthroplasty  5/14/2012     left; Performed by KEITH COWART at SURGERY [facility]   • Other surgical procedure  2004     bladder repair   • Breast reconstruction  11/26/2012     Performed by Jak Meza M.D. at SURGERY [facility]   • Wound closure general  4/10/2013     Performed by Nano Varela M.D. at SURGERY SAME DAY [facility]S   • Breast implant removal  4/10/2013     Performed by Jak HACKETT  "LAW Meza at SURGERY SAME DAY [facility] ORS   • Femur nailing intramedullary Right 6/3/2015     Procedure: FEMUR NAILING INTRAMEDULLARY;  Surgeon: Deshaun Denis M.D.;  Location: SURGERY [facility];  Service:        CURRENT MEDICATIONS  Home Medications     Reviewed by Annette Mendez (Pharmacy Tech) on 03/05/17 at 1609  Med List Status: Complete    Medication Last Dose Status    aspirin EC (ECOTRIN) 81 MG TBEC 3/5/2017 Active    fluoxetine (PROZAC) 40 MG capsule 3/5/2017 Active    lisinopril (PRINIVIL) 20 MG Tab 3/5/2017 Active    Probiotic Product (PROBIOTIC PO) 3/5/2017 Active                 ALLERGIES  Allergies   Allergen Reactions   • Alendronate-Butylpar-Propylpar-Saccharin [Fosamax] Diarrhea     .       PHYSICAL EXAM  VITAL SIGNS: /58 mmHg  Pulse 68  Temp(Src) 36.7 °C (98 °F)  Resp 18  Ht 1.753 m (5' 9\")  Wt 77.111 kg (170 lb)  BMI 25.09 kg/m2  SpO2 96%  Constitutional :  Well developed, Well nourished, No acute distress, Non-toxic appearance.   HENT: Head is atraumatic normocephalic moist mucous membranes  Eyes: Normal-appearing nonicteric  Neck: Normal range of motion, No tenderness, Supple, No stridor.   Lymphatic: No cervical adenopathy.   Cardiovascular: Normal heart rate, Normal rhythm, No murmurs, No rubs, No gallops.   Thorax & Lungs: Equal breath sounds bilaterally no rales rhonchi  Skin: Warm, Dry, No erythema, No rash.   Her abdomen is soft nontender no distention no flank tenderness no rebound or guarding is noted  Neurological the patient is awake alert without focal neurological findings  Extremities-no cyanosis or edema   psychiatric the appropriate mood and affect    Results for orders placed or performed during the hospital encounter of 03/05/17   CBC WITH DIFFERENTIAL   Result Value Ref Range    WBC 8.7 4.8 - 10.8 K/uL    RBC 4.33 4.20 - 5.40 M/uL    Hemoglobin 14.2 12.0 - 16.0 g/dL    Hematocrit 41.9 37.0 - 47.0 %    MCV 96.8 81.4 - 97.8 fL    MCH 32.8 27.0 - " 33.0 pg    MCHC 33.9 33.6 - 35.0 g/dL    RDW 45.9 35.9 - 50.0 fL    Platelet Count 216 164 - 446 K/uL    MPV 9.3 9.0 - 12.9 fL    Neutrophils-Polys 64.10 44.00 - 72.00 %    Lymphocytes 21.10 (L) 22.00 - 41.00 %    Monocytes 12.20 0.00 - 13.40 %    Eosinophils 1.00 0.00 - 6.90 %    Basophils 1.30 0.00 - 1.80 %    Immature Granulocytes 0.30 0.00 - 0.90 %    Nucleated RBC 0.00 /100 WBC    Neutrophils (Absolute) 5.57 2.00 - 7.15 K/uL    Lymphs (Absolute) 1.83 1.00 - 4.80 K/uL    Monos (Absolute) 1.06 (H) 0.00 - 0.85 K/uL    Eos (Absolute) 0.09 0.00 - 0.51 K/uL    Baso (Absolute) 0.11 0.00 - 0.12 K/uL    Immature Granulocytes (abs) 0.03 0.00 - 0.11 K/uL    NRBC (Absolute) 0.00 K/uL   CKMB   Result Value Ref Range    CK-Mb 5.0 0.0 - 5.0 ng/mL   URINALYSIS,CULTURE IF INDICATED   Result Value Ref Range    Micro Urine Req Microscopic     Color Yellow     Character Hazy (A)     Specific Gravity 1.010 <1.035    Ph 6.0 5.0-8.0    Glucose Negative Negative mg/dL    Ketones Negative Negative mg/dL    Protein Negative Negative mg/dL    Bilirubin Negative Negative    Nitrite Negative Negative    Leukocyte Esterase Small (A) Negative    Occult Blood Trace (A) Negative    Culture Indicated Yes UA Culture   URINE MICROSCOPIC (W/UA)   Result Value Ref Range    WBC Packed (A) /hpf    RBC 2-5 (A) /hpf    Bacteria Moderate (A) None /hpf    Epithelial Cells Rare Few /hpf   COURSE & MEDICAL DECISION MAKING  Pertinent Labs & Imaging studies reviewed. (See chart for details)  The patient is presenting with lower abdominal pain and is found to have evidence of acute urinary tract infection with packed white cells. Urine culture is pending she has no evidence of sepsis at this time but I'm concerned she may have a resistant organism requiring IV antibiotics. Given her age she is at risk for urosepsis. I discussed case the hospitalist will be admitting. She has no evidence of acute abdominal findings requiring CT imaging at this time.    FINAL  IMPRESSION  1. Acute urinary tract infection  2.   3.      Electronically signed by: Vincent Olmos, 3/5/2017

## 2017-03-06 NOTE — PROGRESS NOTES
No changes in pt status. Bedside report given to Li FOREMAN. POC discussed. IVF infusing. Pt sleeping during report, respirations even and unlabored, safety precautions in place.

## 2017-03-06 NOTE — THERAPY
"Occupational Therapy Evaluation completed.   Functional Status:  A&Ox4. 10/10 pain RLE. L foot drop present. Sup to sit with CGA. STS with Jenelle. ADL transfers with CGA/Jenelle, FWW. Toileting with ModA. LE dressing with ModA. Seated grooming with SBA.   Plan of Care: Will benefit from Occupational Therapy 3 times per week  Discharge Recommendations:  Equipment: TBD.  FWW, RTS with arms, Life Line.  Recommend intensive inpt post-acute therapy before discharged home.  Lives with daughter and grandson. Pt is often home alone. High risk for falls. Needing 24/7 Sup at this time.  See \"Rehab Therapy-Acute\" Patient Summary Report for complete documentation.    "

## 2017-03-06 NOTE — PROGRESS NOTES
Pt up to floor via bridgetrlolita @ 1950. Pt to bathroom, x1 assist with FWW. Back to bed safely. Assessment and admission profile completed @ 2030. Pt medicated per MAR. Pt c/o pain 9/10 in right leg r/t recent fall. Medicated per MAR. Pt repositioned in bed. Provided pt with snacks/fluids. IVF infusing without difficulty. Skin intact. Bladder scan @ 2100 was 459 ml. Will recheck Q6h per order, straight cath >500ml. Pt now resting comfortably in bed, no complaints at this time. Will monitor.

## 2017-03-06 NOTE — ED NOTES
Report has been called to receiving RN.  No changes to address acutely.  Pt is ready to transfer.

## 2017-03-07 PROCEDURE — 700102 HCHG RX REV CODE 250 W/ 637 OVERRIDE(OP): Performed by: INTERNAL MEDICINE

## 2017-03-07 PROCEDURE — 700111 HCHG RX REV CODE 636 W/ 250 OVERRIDE (IP): Performed by: INTERNAL MEDICINE

## 2017-03-07 PROCEDURE — 700105 HCHG RX REV CODE 258: Performed by: INTERNAL MEDICINE

## 2017-03-07 PROCEDURE — 99226 PR SUBSEQUENT OBSERVATION CARE,LEVEL III: CPT | Performed by: INTERNAL MEDICINE

## 2017-03-07 PROCEDURE — 97530 THERAPEUTIC ACTIVITIES: CPT

## 2017-03-07 PROCEDURE — A9270 NON-COVERED ITEM OR SERVICE: HCPCS | Performed by: INTERNAL MEDICINE

## 2017-03-07 PROCEDURE — 97116 GAIT TRAINING THERAPY: CPT | Mod: XU

## 2017-03-07 PROCEDURE — G0378 HOSPITAL OBSERVATION PER HR: HCPCS

## 2017-03-07 RX ORDER — BETHANECHOL CHLORIDE 10 MG/1
10 TABLET ORAL 3 TIMES DAILY
Status: DISCONTINUED | OUTPATIENT
Start: 2017-03-07 | End: 2017-03-10

## 2017-03-07 RX ORDER — TAMSULOSIN HYDROCHLORIDE 0.4 MG/1
0.4 CAPSULE ORAL
Status: DISCONTINUED | OUTPATIENT
Start: 2017-03-07 | End: 2017-03-11 | Stop reason: HOSPADM

## 2017-03-07 RX ADMIN — BETHANECHOL CHLORIDE 10 MG: 10 TABLET ORAL at 20:21

## 2017-03-07 RX ADMIN — IBUPROFEN 400 MG: 200 TABLET, FILM COATED ORAL at 18:09

## 2017-03-07 RX ADMIN — FLUOXETINE 40 MG: 20 CAPSULE ORAL at 10:26

## 2017-03-07 RX ADMIN — CEFTRIAXONE SODIUM 1 G: 1 INJECTION, POWDER, FOR SOLUTION INTRAMUSCULAR; INTRAVENOUS at 10:34

## 2017-03-07 RX ADMIN — TAMSULOSIN HYDROCHLORIDE 0.4 MG: 0.4 CAPSULE ORAL at 14:01

## 2017-03-07 RX ADMIN — LISINOPRIL 20 MG: 20 TABLET ORAL at 10:26

## 2017-03-07 RX ADMIN — LACTOBACILLUS ACIDOPHILUS / LACTOBACILLUS BULGARICUS 1 PACKET: 100 MILLION CFU STRENGTH GRANULES at 10:26

## 2017-03-07 RX ADMIN — BETHANECHOL CHLORIDE 10 MG: 10 TABLET ORAL at 15:44

## 2017-03-07 RX ADMIN — ENOXAPARIN SODIUM 40 MG: 100 INJECTION SUBCUTANEOUS at 10:26

## 2017-03-07 RX ADMIN — ASPIRIN 81 MG: 81 TABLET, COATED ORAL at 10:26

## 2017-03-07 RX ADMIN — IBUPROFEN 400 MG: 200 TABLET, FILM COATED ORAL at 10:51

## 2017-03-07 RX ADMIN — TRAMADOL HYDROCHLORIDE 50 MG: 50 TABLET, FILM COATED ORAL at 10:51

## 2017-03-07 RX ADMIN — TRAMADOL HYDROCHLORIDE 50 MG: 50 TABLET, FILM COATED ORAL at 18:09

## 2017-03-07 RX ADMIN — SODIUM CHLORIDE: 9 INJECTION, SOLUTION INTRAVENOUS at 10:37

## 2017-03-07 ASSESSMENT — ENCOUNTER SYMPTOMS
WEAKNESS: 1
EYES NEGATIVE: 1
COUGH: 0
CARDIOVASCULAR NEGATIVE: 1
MYALGIAS: 1
PSYCHIATRIC NEGATIVE: 1
BRUISES/BLEEDS EASILY: 0
HEADACHES: 0
FEVER: 0
RESPIRATORY NEGATIVE: 1
GASTROINTESTINAL NEGATIVE: 1
BLURRED VISION: 0
DIZZINESS: 0
DEPRESSION: 0
HEARTBURN: 0
NECK PAIN: 0

## 2017-03-07 ASSESSMENT — GAIT ASSESSMENTS
ASSISTIVE DEVICE: FRONT WHEEL WALKER
DISTANCE (FEET): 15
DEVIATION: BRADYKINETIC;DECREASED HEEL STRIKE;DECREASED TOE OFF
GAIT LEVEL OF ASSIST: CONTACT GUARD ASSIST

## 2017-03-07 ASSESSMENT — LIFESTYLE VARIABLES
TOTAL SCORE: 0
EVER HAD A DRINK FIRST THING IN THE MORNING TO STEADY YOUR NERVES TO GET RID OF A HANGOVER: NO
TOTAL SCORE: 0
CONSUMPTION TOTAL: NEGATIVE
EVER FELT BAD OR GUILTY ABOUT YOUR DRINKING: NO
HAVE YOU EVER FELT YOU SHOULD CUT DOWN ON YOUR DRINKING: NO
HAVE PEOPLE ANNOYED YOU BY CRITICIZING YOUR DRINKING: NO
TOTAL SCORE: 0
HOW MANY TIMES IN THE PAST YEAR HAVE YOU HAD 5 OR MORE DRINKS IN A DAY: 0
DO YOU DRINK ALCOHOL: YES
AVERAGE NUMBER OF DAYS PER WEEK YOU HAVE A DRINK CONTAINING ALCOHOL: 4
ON A TYPICAL DAY WHEN YOU DRINK ALCOHOL HOW MANY DRINKS DO YOU HAVE: 1

## 2017-03-07 ASSESSMENT — PAIN SCALES - GENERAL
PAINLEVEL_OUTOF10: 2
PAINLEVEL_OUTOF10: 8

## 2017-03-07 NOTE — PROGRESS NOTES
Assumed care at 0700, bedside report from NOC shift RN. Patient is A&O x 4 with no signs of distress.inital assessment completed, orders reviewed, call light is with in reach, and hourly rounding initiated. POC addressed with patient, no additional questions at this time.

## 2017-03-07 NOTE — PROGRESS NOTES
Pt up to commode to void, back to bed safely. Full linen change completed. Pt repositioned in bed. No other needs/complaints. Bedside report given to Maryjo FOREMAN. POC discussed. Pt on commode, CNA at bedside. IVF infusing. Safety precautions in place.

## 2017-03-07 NOTE — PROGRESS NOTES
2100--Assessment completed. Pt up to commode with CNA, back to bed safely. BMx1. Pt refused senna. Post void bladder scan 452. Pt c/o pain 7/10 in right hip/leg. Pt repositioned, ice pack applied. Pt declines other interventions. Will reassess. No other needs at this time. Will monitor.  2145--Pt medicated for pain per MAR. Provided with snack. No other needs. CLIP. Will CTM.  2245--Pt sleeping, respirations even and unlabored, no apparent distress. Will CTM.

## 2017-03-07 NOTE — PROGRESS NOTES
Bedside report received from Li FOREMAN. Assumed care. POC discussed. IVF infusing. Pt resting in bed, safety precautions in place.

## 2017-03-07 NOTE — PROGRESS NOTES
Hospital Medicine Progress Note, Adult, Complex               Author: Stanislav Medrano Date & Time created: 3/7/2017  2:12 PM     Interval History:  Significant retention, as high as 700cc, PVF of 400.  ?safety of her self cathing? Home with polk?  Long course of this, followed by Dr. Red for years.    Review of Systems:  Review of Systems   Constitutional: Negative for fever.   HENT: Negative.    Eyes: Negative.  Negative for blurred vision.   Respiratory: Negative.  Negative for cough.    Cardiovascular: Negative.  Negative for chest pain.   Gastrointestinal: Negative.  Negative for heartburn.   Genitourinary: Negative.  Negative for dysuria.   Musculoskeletal: Positive for myalgias. Negative for neck pain.   Skin: Negative.  Negative for rash.   Neurological: Positive for weakness. Negative for dizziness and headaches.   Endo/Heme/Allergies: Negative.  Does not bruise/bleed easily.   Psychiatric/Behavioral: Negative.  Negative for depression.       Physical Exam:  Physical Exam  A&Ox3 tired  PERRL EOMI mmm  Supple no jvd bruit or magalie  RRR no mrg  CTAB RAJ  Soft abd ntnd bs+  No edema    Labs:  Recent Labs      03/05/17 1745   ISTATSPEC  Venous     Recent Labs      03/05/17 1536   CKMB  5.0     Recent Labs      03/05/17 1745 03/06/17 0519   SODIUM  128*  135   POTASSIUM  3.8  3.6   CHLORIDE  98  105   CO2  23  24   BUN  30*  22   CREATININE  0.88  0.75   CALCIUM  9.1  8.8     Recent Labs      03/05/17 1745 03/06/17 0519   ALTSGPT  20   --    ASTSGOT  24   --    ALKPHOSPHAT  74   --    TBILIRUBIN  0.7   --    GLUCOSE  105*  108*     Recent Labs      03/05/17 1536 03/06/17 0519   RBC  4.33  3.97*   HEMOGLOBIN  14.2  12.8   HEMATOCRIT  41.9  37.3   PLATELETCT  216  197     Recent Labs      03/05/17 1536 03/05/17 1745 03/06/17 0519   WBC  8.7   --   7.1   NEUTSPOLYS  64.10   --   50.00   LYMPHOCYTES  21.10*   --   31.60   MONOCYTES  12.20   --   13.80*   EOSINOPHILS  1.00   --   3.10    BASOPHILS  1.30   --   1.40   ASTSGOT   --   24   --    ALTSGPT   --   20   --    ALKPHOSPHAT   --   74   --    TBILIRUBIN   --   0.7   --            Hemodynamics:  Temp (24hrs), Av.4 °C (97.6 °F), Min:36.2 °C (97.2 °F), Max:36.6 °C (97.9 °F)  Temperature: 36.6 °C (97.9 °F)  Pulse  Av.9  Min: 65  Max: 88   Blood Pressure : 114/91 mmHg     Respiratory:    Respiration: 18, Pulse Oximetry: 94 %           Fluids:    Intake/Output Summary (Last 24 hours) at 17 1412  Last data filed at 17 0950   Gross per 24 hour   Intake   2250 ml   Output   2780 ml   Net   -530 ml        GI/Nutrition:  Orders Placed This Encounter   Procedures   • Diet Order     Standing Status: Standing      Number of Occurrences: 1      Standing Expiration Date:      Order Specific Question:  Diet:     Answer:  Regular [1]     Medical Decision Making, by Problem:  Active Hospital Problems    Diagnosis   • *UTI (urinary tract infection) [N39.0] - LFGNR, rocephin, IVF, follow closely   • Essential hypertension [I10] - lisinopril   • Neurogenic bladder [N31.9] - bethanachol, flomax   Depression - prozac    EKG reviewed, Labs reviewed, Medications reviewed and Radiology images reviewed  Presley catheter: No Presley      DVT Prophylaxis: Enoxaparin (Lovenox)      Antibiotics: Treating active infection/contamination beyond 24 hours perioperative coverage

## 2017-03-07 NOTE — PROGRESS NOTES
"HOSPITALIST PROGRESS NOTE (SOAP)   Date of Service  3/6  CHIEF COMPLAINT  84 y.o.yo female presented 3/5/2017 with Abdominal Pain and Urinary Frequency    SUBJECTIVE  Chart reviewed. Had reported fever last night. She wants to go home by Wednesday as she has a flight to [place] at 2pm. UCx pending     PHYSICAL EXAM  Filed Vitals:    03/05/17 1909 03/05/17 2000 03/06/17 0212 03/06/17 1048   BP: 134/63 136/78 139/54 144/51   Pulse: 66 68 67 72   Temp: 36.9 °C (98.4 °F) 36.4 °C (97.5 °F) 36.4 °C (97.5 °F) 36.4 °C (97.6 °F)   Resp: 18 18 18 18   Height:  1.753 m (5' 9\")     Weight:  79.5 kg (175 lb 4.3 oz)     SpO2:  98% 94% 94%       Intake/Output Summary (Last 24 hours) at 03/06/17 1801  Last data filed at 03/06/17 1700   Gross per 24 hour   Intake   2305 ml   Output   2750 ml   Net   -445 ml   Vitals reviewed  General/Constitutional: No acute distress.   Head: Normocephalic, atraumatic  ENT: Oral mucosa is moist. No obvious pharyngeal exudates  Eyes: Pink conjunctiva, no scleral icterus  Neck: Supple, no lymphadenopathy  Cardiovascular: Normal rate and regular rhythm. S1,2 noted. No murmurs, gallops or rubs.  Pulmonary: Clear to auscultation bilaterally. No wheezes, rales or rhonchi  Abdominal: Soft, nontender, not distended, bowel sounds normoactive.  Musculoskeletal: No tenderness to palpation of chest wall.  Neurologic: Grossly nonfocal, moving all extremities.  Genitourinary: No gross hematuria  Skin: No obvious rash.  Psychiatric: Pleasant, cooperative.    LABS  Lab Results   Component Value Date/Time    WBC 7.1 03/06/2017 05:19 AM    RBC 3.97* 03/06/2017 05:19 AM    HEMOGLOBIN 12.8 03/06/2017 05:19 AM    HEMATOCRIT 37.3 03/06/2017 05:19 AM    MCV 94.0 03/06/2017 05:19 AM    MCH 32.2 03/06/2017 05:19 AM    MCHC 34.3 03/06/2017 05:19 AM    MPV 9.7 03/06/2017 05:19 AM    NEUTROPHILS-POLYS 50.00 03/06/2017 05:19 AM    LYMPHOCYTES 31.60 03/06/2017 05:19 AM    MONOCYTES 13.80* 03/06/2017 05:19 AM    EOSINOPHILS 3.10 " 03/06/2017 05:19 AM    BASOPHILS 1.40 03/06/2017 05:19 AM    HYPOCHROMIA 1+ 11/19/2012 03:25 PM      Lab Results   Component Value Date/Time    SODIUM 135 03/06/2017 05:19 AM    POTASSIUM 3.6 03/06/2017 05:19 AM    CHLORIDE 105 03/06/2017 05:19 AM    CO2 24 03/06/2017 05:19 AM    GLUCOSE 108* 03/06/2017 05:19 AM    BUN 22 03/06/2017 05:19 AM    CREATININE 0.75 03/06/2017 05:19 AM      Lab Results   Component Value Date/Time    PT 13.8 07/02/2015 03:49 PM    INR 1.06 07/02/2015 03:49 PM      BLOOD CULTURE   Date Value Ref Range Status   07/02/2015 No growth after 5 days of incubation.  Final     Labs reviewed  Imaging reviewed  ASSESSMENT and PLAN    Principal Problem:    UTI (urinary tract infection)  Active Problems:    Neurogenic bladder    Ordered CT renal and that was negative for hydronephrosis, pyelo, stone or abscess. UCx pending. Can discharge tomorrow if she remains afebrile (report was fever last night) and culture data obtained. Follow up with primary care physician and she may have to be referred to Urology    I spent 35 minutes, reviewing the chart, notes, vitals, labs, imaging, ordering labs, evaluating Meron Meir Rice for assessment, enacting the plan above and writing this note. 50% of the time was spent in counseling Meron Rice and answering questions.

## 2017-03-07 NOTE — CARE PLAN
Problem: Safety  Goal: Will remain free from falls  Outcome: PROGRESSING AS EXPECTED  Safety precautions in place. Bed alarm on. CLIP. Pt calls appropriately for assistance and verbalizes understanding of safety instructions/fall precautions.    Problem: Mobility  Goal: Risk for activity intolerance will decrease  Outcome: PROGRESSING AS EXPECTED  PT/OT working with pt. Pt is x1 assist with FWW. Pt provided with rest periods during mobilization. Pt encouraged to ambulate/mobilize with assistance, as tolerated. Pt verbalizes understanding of mobility POC.

## 2017-03-07 NOTE — PROGRESS NOTES
Pt Q6h bladder scan >500. Pt up to commode to void with CNA, back to bed safely. Post void residual still>500. Straight cath performed per MD order, 550 yellow urine out. Perineal care performed. Barrier cream applied. Pt repositioned in bed, ice pack applied to right hip. Pt states no other needs. Will CTM. CLIP. IVF infusing.

## 2017-03-07 NOTE — DISCHARGE PLANNING
Medical Social Work    Referral: Pt discussed at IDT rounds this AM.    Intervention: Per flowsheet, pt lives with spouse and expects to d.c home.  NNAMDI Whitney stated that HH was requested.      Plan: SW met with pt to obtain HH choice ([facility]) and then expressed concerns over missing her grandson's wedding this weekend.  NNAMDI brought pt concerns to RN Manager lGendy.  Later, [facility] RN Johanna notified NNAMDI that SNF might be needed instead and would let SW know.  NNAMDI informed [facility].

## 2017-03-07 NOTE — THERAPY
"Physical Therapy Treatment completed.   Bed Mobility:  Supine to Sit: Contact Guard Assist (with bedrail)  Transfers: Sit to Stand: Contact Guard Assist  Gait: Level Of Assist: Contact Guard Assist with Front-Wheel Walker x15 ft, becomes SOB quickly, fatigues easily, c/o R LE pain     Plan of Care: Will benefit from Physical Therapy 7 times per week  Discharge Recommendations: Equipment: Will Continue to Assess for Equipment Needs.  Intensive post-acute therapy recommended before discharged home.     See \"Rehab Therapy-Acute\" Patient Summary Report for complete documentation.       "

## 2017-03-08 PROBLEM — R33.8 ACUTE URINARY RETENTION: Status: ACTIVE | Noted: 2017-03-08

## 2017-03-08 LAB
BACTERIA UR CULT: ABNORMAL
BACTERIA UR CULT: ABNORMAL
SIGNIFICANT IND 70042: ABNORMAL
SITE SITE: ABNORMAL
SOURCE SOURCE: ABNORMAL

## 2017-03-08 PROCEDURE — 99233 SBSQ HOSP IP/OBS HIGH 50: CPT | Performed by: INTERNAL MEDICINE

## 2017-03-08 PROCEDURE — 700111 HCHG RX REV CODE 636 W/ 250 OVERRIDE (IP): Performed by: INTERNAL MEDICINE

## 2017-03-08 PROCEDURE — 770006 HCHG ROOM/CARE - MED/SURG/GYN SEMI*

## 2017-03-08 PROCEDURE — A9270 NON-COVERED ITEM OR SERVICE: HCPCS | Performed by: INTERNAL MEDICINE

## 2017-03-08 PROCEDURE — 700102 HCHG RX REV CODE 250 W/ 637 OVERRIDE(OP): Performed by: INTERNAL MEDICINE

## 2017-03-08 PROCEDURE — 700105 HCHG RX REV CODE 258: Performed by: INTERNAL MEDICINE

## 2017-03-08 RX ADMIN — TAMSULOSIN HYDROCHLORIDE 0.4 MG: 0.4 CAPSULE ORAL at 09:50

## 2017-03-08 RX ADMIN — SODIUM CHLORIDE: 9 INJECTION, SOLUTION INTRAVENOUS at 04:37

## 2017-03-08 RX ADMIN — CEFTRIAXONE SODIUM 1 G: 1 INJECTION, POWDER, FOR SOLUTION INTRAMUSCULAR; INTRAVENOUS at 09:53

## 2017-03-08 RX ADMIN — LISINOPRIL 20 MG: 20 TABLET ORAL at 09:50

## 2017-03-08 RX ADMIN — BETHANECHOL CHLORIDE 10 MG: 10 TABLET ORAL at 20:51

## 2017-03-08 RX ADMIN — ASPIRIN 81 MG: 81 TABLET, COATED ORAL at 09:50

## 2017-03-08 RX ADMIN — IBUPROFEN 400 MG: 200 TABLET, FILM COATED ORAL at 04:39

## 2017-03-08 RX ADMIN — IBUPROFEN 400 MG: 200 TABLET, FILM COATED ORAL at 18:33

## 2017-03-08 RX ADMIN — TRAMADOL HYDROCHLORIDE 50 MG: 50 TABLET, FILM COATED ORAL at 04:39

## 2017-03-08 RX ADMIN — BETHANECHOL CHLORIDE 10 MG: 10 TABLET ORAL at 09:50

## 2017-03-08 RX ADMIN — LACTOBACILLUS ACIDOPHILUS / LACTOBACILLUS BULGARICUS 1 PACKET: 100 MILLION CFU STRENGTH GRANULES at 09:50

## 2017-03-08 RX ADMIN — IBUPROFEN 400 MG: 200 TABLET, FILM COATED ORAL at 11:17

## 2017-03-08 RX ADMIN — ENOXAPARIN SODIUM 40 MG: 100 INJECTION SUBCUTANEOUS at 09:52

## 2017-03-08 RX ADMIN — SODIUM CHLORIDE: 9 INJECTION, SOLUTION INTRAVENOUS at 20:52

## 2017-03-08 RX ADMIN — TRAMADOL HYDROCHLORIDE 50 MG: 50 TABLET, FILM COATED ORAL at 11:17

## 2017-03-08 RX ADMIN — BETHANECHOL CHLORIDE 10 MG: 10 TABLET ORAL at 15:51

## 2017-03-08 RX ADMIN — TRAMADOL HYDROCHLORIDE 50 MG: 50 TABLET, FILM COATED ORAL at 18:34

## 2017-03-08 RX ADMIN — FLUOXETINE 40 MG: 20 CAPSULE ORAL at 09:50

## 2017-03-08 ASSESSMENT — ENCOUNTER SYMPTOMS
FEVER: 0
WEAKNESS: 1
GASTROINTESTINAL NEGATIVE: 1
PSYCHIATRIC NEGATIVE: 1
EYES NEGATIVE: 1
CARDIOVASCULAR NEGATIVE: 1
COUGH: 0
HEADACHES: 0
BLURRED VISION: 0
RESPIRATORY NEGATIVE: 1
DEPRESSION: 0
NECK PAIN: 0
MYALGIAS: 1
HEARTBURN: 0
BRUISES/BLEEDS EASILY: 0
DIZZINESS: 0

## 2017-03-08 ASSESSMENT — PAIN SCALES - GENERAL
PAINLEVEL_OUTOF10: 4
PAINLEVEL_OUTOF10: 5
PAINLEVEL_OUTOF10: 7
PAINLEVEL_OUTOF10: 6
PAINLEVEL_OUTOF10: 4

## 2017-03-08 NOTE — DISCHARGE PLANNING
Medical Social Work    Referral: Pt discussed at IDT rounds this AM.    Intervention: Per flowsheet, pt lives alone and expects to d.c home.  SW has obtained HH and DME choice if pt d.c home.    Plan: NNAMDI notified after rounds that pt is requesting SNF.  At 1315, Rgoelio with Rehab contacted SW stating that pt is not a good candidate for Rehab but SNF instead.  SW informed [facility].  SW will obtain SNF choice today but will need 3 midnight qualifying stay first.

## 2017-03-08 NOTE — PROGRESS NOTES
Assessment completed--see doc flowsheet. A&Ox4. Meds provided. Up to BSC; voided 400 ml yellow urine. Post void bladder scan done; 450 ml removed via straight cath using sterile procedure. POC discussed, verbalized understanding. Call light and personal possessions within reach, bed in low position, encouraged to call for assistance.

## 2017-03-08 NOTE — PROGRESS NOTES
Bedside report received from Maryjo FOREMAN. Assumed care. POC discussed. Pt resting in bed, IVF infusing. Safety precautions in place.

## 2017-03-08 NOTE — PROGRESS NOTES
Assessment and med pass completed--see doc flowsheet. Pt refused senna. Pt c/o pain 2/10, ice pack applied to right hip. Pt states no needs at this time. IVF infusing. Will monitor.

## 2017-03-08 NOTE — CARE PLAN
Problem: Safety  Goal: Will remain free from injury  Outcome: PROGRESSING AS EXPECTED  Call light and personal possessions within reach. Pt encouraged to call for assistance. Bed in low position, bed alarm in use. Treaded slipper socks on pt. Appropriate signs in place.    Problem: Mobility  Goal: Risk for activity intolerance will decrease  Outcome: PROGRESSING AS EXPECTED  Assess mobility status. PT/OT order placed. Encourage OOB and ambulate and ROM when possible. Medicate for pain prn.

## 2017-03-08 NOTE — PROGRESS NOTES
Bedside report given to Nano FOREMAN. POC discussed. Pt resting comfortably in bed. Safety precautions in place.

## 2017-03-08 NOTE — CARE PLAN
Problem: Safety  Goal: Will remain free from injury  Outcome: PROGRESSING AS EXPECTED  Safety precautions in place. Bed alarm on. CLIP. Belongings in reach. x1 assist with FWW. Pt verbalizes understanding of safety instructions.    Problem: Pain Management  Goal: Pain level will decrease to patient’s comfort goal  Outcome: PROGRESSING AS EXPECTED  Pt reports improvement in pain. Pt medicated per MAR. Ice pack applied to area. Pt repositioned in bed for comfort. Pt verbalizes understanding of pain management.

## 2017-03-08 NOTE — CARE PLAN
Problem: Safety  Goal: Will remain free from injury  Outcome: PROGRESSING AS EXPECTED  Patient agreeable with fall safety measures.    Problem: Mobility  Goal: Risk for activity intolerance will decrease  Outcome: PROGRESSING AS EXPECTED  Patient is active with PT and OT.

## 2017-03-08 NOTE — PROGRESS NOTES
No changes in pt status. Pt sleeping throughout the night, no s/s of distress. Pt up to commode with RN, back to bed safely. Safety precautions in place. Will continue to monitor.

## 2017-03-08 NOTE — PROGRESS NOTES
Pt sleeping throughout the night. Pt up to commode with CNA. Back to bed safely. Bladder scan 415 ml @ 2200. Straight cath not indicated at this time per MD order. No needs/complaints overnight, will CTM.

## 2017-03-08 NOTE — PROGRESS NOTES
Hospital Medicine Progress Note, Adult, Complex               Author: Stanislav Medrano Date & Time created: 3/8/2017  12:08 PM     Interval History:  Long talk.  She is too weak to go home, will assess for rehab. Tolerating abx, but not safe on her feet.    Review of Systems:  Review of Systems   Constitutional: Negative for fever.   HENT: Negative.    Eyes: Negative.  Negative for blurred vision.   Respiratory: Negative.  Negative for cough.    Cardiovascular: Negative.  Negative for chest pain.   Gastrointestinal: Negative.  Negative for heartburn.   Genitourinary: Negative.  Negative for dysuria.   Musculoskeletal: Positive for myalgias. Negative for neck pain.   Skin: Negative.  Negative for rash.   Neurological: Positive for weakness. Negative for dizziness and headaches.   Endo/Heme/Allergies: Negative.  Does not bruise/bleed easily.   Psychiatric/Behavioral: Negative.  Negative for depression.       Physical Exam:  Physical Exam  A&Ox3 tired  PERRL EOMI mmm  Supple no jvd bruit or magalie  RRR no mrg  CTAB RAJ  Soft abd ntnd bs+  No edema    Labs:  Recent Labs      03/05/17 1745   ISTATSPEC  Venous     Recent Labs      03/05/17 1536   CKMB  5.0     Recent Labs      03/05/17 1745 03/06/17 0519   SODIUM  128*  135   POTASSIUM  3.8  3.6   CHLORIDE  98  105   CO2  23  24   BUN  30*  22   CREATININE  0.88  0.75   CALCIUM  9.1  8.8     Recent Labs      03/05/17 1745 03/06/17 0519   ALTSGPT  20   --    ASTSGOT  24   --    ALKPHOSPHAT  74   --    TBILIRUBIN  0.7   --    GLUCOSE  105*  108*     Recent Labs      03/05/17 1536 03/06/17   0519   RBC  4.33  3.97*   HEMOGLOBIN  14.2  12.8   HEMATOCRIT  41.9  37.3   PLATELETCT  216  197     Recent Labs      03/05/17 1536 03/05/17 1745 03/06/17 0519   WBC  8.7   --   7.1   NEUTSPOLYS  64.10   --   50.00   LYMPHOCYTES  21.10*   --   31.60   MONOCYTES  12.20   --   13.80*   EOSINOPHILS  1.00   --   3.10   BASOPHILS  1.30   --   1.40   ASTSGOT   --   24   --     ALTSGPT   --   20   --    ALKPHOSPHAT   --   74   --    TBILIRUBIN   --   0.7   --            Hemodynamics:  Temp (24hrs), Av.6 °C (97.9 °F), Min:36.5 °C (97.7 °F), Max:36.8 °C (98.3 °F)  Temperature: 36.5 °C (97.7 °F)  Pulse  Av.2  Min: 65  Max: 88   Blood Pressure : 128/89 mmHg     Respiratory:    Respiration: 17, Pulse Oximetry: 99 %           Fluids:    Intake/Output Summary (Last 24 hours) at 17 1208  Last data filed at 17 1000   Gross per 24 hour   Intake   1260 ml   Output   2850 ml   Net  -1590 ml        GI/Nutrition:  Orders Placed This Encounter   Procedures   • Diet Order     Standing Status: Standing      Number of Occurrences: 1      Standing Expiration Date:      Order Specific Question:  Diet:     Answer:  Regular [1]     Medical Decision Making, by Problem:  Active Hospital Problems    Diagnosis   • *UTI (urinary tract infection) [N39.0] - ecoli, rocephin, IVF, follow closely   • Essential hypertension [I10] - lisinopril   • Neurogenic bladder [N31.9] - bethanachol, flomax   Depression - prozac  Debility - SNF vs rehab, place consult    EKG reviewed, Labs reviewed, Medications reviewed and Radiology images reviewed  Presley catheter: No Presley      DVT Prophylaxis: Enoxaparin (Lovenox)      Antibiotics: Treating active infection/contamination beyond 24 hours perioperative coverage

## 2017-03-08 NOTE — DISCHARGE PLANNING
IP Consult for physiatry from Dr. Mitchell Davis with acute UTI ongoing IV antibiotic therapy history of urinary retention self cath program at home. CGA for mobility and self care anticipate home with home health versus skilled nursing when medically cleared to discharge.  Current documentation does not support CMS criteria for IRF  Limited medical need therapy need would be barriers to IRF progam. SW aware.   Thank you for the opportunity to participate in this patients care as she prepares to transition to post acute services.

## 2017-03-09 LAB
BACTERIA UR CULT: NORMAL
SIGNIFICANT IND 70042: NORMAL
SITE SITE: NORMAL
SOURCE SOURCE: NORMAL

## 2017-03-09 PROCEDURE — 99232 SBSQ HOSP IP/OBS MODERATE 35: CPT | Performed by: INTERNAL MEDICINE

## 2017-03-09 PROCEDURE — 770006 HCHG ROOM/CARE - MED/SURG/GYN SEMI*

## 2017-03-09 PROCEDURE — A9270 NON-COVERED ITEM OR SERVICE: HCPCS | Performed by: INTERNAL MEDICINE

## 2017-03-09 PROCEDURE — 97535 SELF CARE MNGMENT TRAINING: CPT | Mod: XE

## 2017-03-09 PROCEDURE — 97110 THERAPEUTIC EXERCISES: CPT

## 2017-03-09 PROCEDURE — 97112 NEUROMUSCULAR REEDUCATION: CPT

## 2017-03-09 PROCEDURE — 700102 HCHG RX REV CODE 250 W/ 637 OVERRIDE(OP): Performed by: INTERNAL MEDICINE

## 2017-03-09 PROCEDURE — 97530 THERAPEUTIC ACTIVITIES: CPT

## 2017-03-09 PROCEDURE — 700105 HCHG RX REV CODE 258: Performed by: INTERNAL MEDICINE

## 2017-03-09 PROCEDURE — 700111 HCHG RX REV CODE 636 W/ 250 OVERRIDE (IP): Performed by: INTERNAL MEDICINE

## 2017-03-09 RX ADMIN — SODIUM CHLORIDE: 9 INJECTION, SOLUTION INTRAVENOUS at 09:55

## 2017-03-09 RX ADMIN — ENOXAPARIN SODIUM 40 MG: 100 INJECTION SUBCUTANEOUS at 09:56

## 2017-03-09 RX ADMIN — IBUPROFEN 400 MG: 200 TABLET, FILM COATED ORAL at 14:08

## 2017-03-09 RX ADMIN — FLUOXETINE 40 MG: 20 CAPSULE ORAL at 09:55

## 2017-03-09 RX ADMIN — TRAMADOL HYDROCHLORIDE 50 MG: 50 TABLET, FILM COATED ORAL at 20:10

## 2017-03-09 RX ADMIN — LACTOBACILLUS ACIDOPHILUS / LACTOBACILLUS BULGARICUS 1 PACKET: 100 MILLION CFU STRENGTH GRANULES at 09:56

## 2017-03-09 RX ADMIN — ACETAMINOPHEN 650 MG: 325 TABLET, FILM COATED ORAL at 20:10

## 2017-03-09 RX ADMIN — CEFTRIAXONE SODIUM 1 G: 1 INJECTION, POWDER, FOR SOLUTION INTRAMUSCULAR; INTRAVENOUS at 09:56

## 2017-03-09 RX ADMIN — TRAMADOL HYDROCHLORIDE 50 MG: 50 TABLET, FILM COATED ORAL at 14:08

## 2017-03-09 RX ADMIN — BETHANECHOL CHLORIDE 10 MG: 10 TABLET ORAL at 09:56

## 2017-03-09 RX ADMIN — BETHANECHOL CHLORIDE 10 MG: 10 TABLET ORAL at 20:10

## 2017-03-09 RX ADMIN — BETHANECHOL CHLORIDE 10 MG: 10 TABLET ORAL at 14:08

## 2017-03-09 RX ADMIN — ASPIRIN 81 MG: 81 TABLET, COATED ORAL at 09:56

## 2017-03-09 RX ADMIN — TAMSULOSIN HYDROCHLORIDE 0.4 MG: 0.4 CAPSULE ORAL at 09:55

## 2017-03-09 RX ADMIN — LISINOPRIL 20 MG: 20 TABLET ORAL at 09:55

## 2017-03-09 RX ADMIN — TRAMADOL HYDROCHLORIDE 50 MG: 50 TABLET, FILM COATED ORAL at 03:11

## 2017-03-09 RX ADMIN — IBUPROFEN 200 MG: 200 TABLET, FILM COATED ORAL at 03:11

## 2017-03-09 ASSESSMENT — GAIT ASSESSMENTS
ASSISTIVE DEVICE: FRONT WHEEL WALKER
DISTANCE (FEET): 15
GAIT LEVEL OF ASSIST: CONTACT GUARD ASSIST
DEVIATION: STEP TO;OTHER (COMMENT)

## 2017-03-09 ASSESSMENT — ENCOUNTER SYMPTOMS
RESPIRATORY NEGATIVE: 1
HEADACHES: 0
EYES NEGATIVE: 1
DEPRESSION: 0
GASTROINTESTINAL NEGATIVE: 1
WEAKNESS: 1
MYALGIAS: 1
BLURRED VISION: 0
DIZZINESS: 0
PSYCHIATRIC NEGATIVE: 1
BRUISES/BLEEDS EASILY: 0
NECK PAIN: 0
HEARTBURN: 0
CARDIOVASCULAR NEGATIVE: 1
COUGH: 0
FEVER: 0

## 2017-03-09 ASSESSMENT — PAIN SCALES - GENERAL
PAINLEVEL_OUTOF10: 7
PAINLEVEL_OUTOF10: 7
PAINLEVEL_OUTOF10: 10
PAINLEVEL_OUTOF10: 4
PAINLEVEL_OUTOF10: 3

## 2017-03-09 NOTE — PROGRESS NOTES
Report received. Care assumed. Resting in bed. Alert and oriented. Respirations even and unlabored. Reports relief from pain medication. No c/o at this time. Call light in reach.

## 2017-03-09 NOTE — PROGRESS NOTES
Assessment completed--see doc flowsheet. A&Ox4. Meds provided. POC discussed, verbalized understanding. Call light and personal possessions within reach, bed in low position, encouraged to call for assistance.

## 2017-03-09 NOTE — PROGRESS NOTES
Report given to NOC nurse, Peace FOREMAN. Pt remains calm and cooperative. All lines remain patent. Safety precautions remain in place. POC discussed with pt and RN at the bedside.

## 2017-03-09 NOTE — CARE PLAN
Problem: Mobility  Goal: Risk for activity intolerance will decrease  Outcome: PROGRESSING AS EXPECTED  PT/OT involved. Pt encouraged OOB and ambulate to BR. Provide rest periods as necessary.    Problem: Pain Management  Goal: Pain level will decrease to patient’s comfort goal  Outcome: PROGRESSING AS EXPECTED  Pt encouraged to verbalize experiencing pain. 0-10 pain scale explained, verbalized understanding. Administer pain meds as ordered.

## 2017-03-09 NOTE — PROGRESS NOTES
Bedside report received from [facility] nurse, Peace FOREMAN. Assumed care. Pt resting in bed.

## 2017-03-09 NOTE — PROGRESS NOTES
Due medications given without issue. Respirations even and unlabored. Reports good relief from ice pack, pain now 2/10. 1 person contact assist with FWW to BSC. Pivot transfer well, gait steady. Call light in reach.

## 2017-03-09 NOTE — PROGRESS NOTES
Hospital Medicine Progress Note, Adult, Complex               Author: Stanislav Medrano Date & Time created: 3/9/2017  12:11 PM     Interval History:  Long talk.  Awaiting assessment for rehab. Tolerating abx, but not safe on her feet.    Review of Systems:  Review of Systems   Constitutional: Negative for fever.   HENT: Negative.    Eyes: Negative.  Negative for blurred vision.   Respiratory: Negative.  Negative for cough.    Cardiovascular: Negative.  Negative for chest pain.   Gastrointestinal: Negative.  Negative for heartburn.   Genitourinary: Negative.  Negative for dysuria.   Musculoskeletal: Positive for myalgias. Negative for neck pain.   Skin: Negative.  Negative for rash.   Neurological: Positive for weakness. Negative for dizziness and headaches.   Endo/Heme/Allergies: Negative.  Does not bruise/bleed easily.   Psychiatric/Behavioral: Negative.  Negative for depression.       Physical Exam:  Physical Exam  A&Ox3 tired  PERRL EOMI mmm  Supple no jvd bruit or magalie  RRR no mrg  CTAB RAJ  Soft abd ntnd bs+  No edema    Labs:        Invalid input(s): [id number]      No results for input(s): SODIUM, POTASSIUM, CHLORIDE, CO2, BUN, CREATININE, MAGNESIUM, PHOSPHORUS, CALCIUM in the last 72 hours.  No results for input(s): ALTSGPT, ASTSGOT, ALKPHOSPHAT, TBILIRUBIN, DBILIRUBIN, GAMMAGT, AMYLASE, LIPASE, ALB, PREALBUMIN, GLUCOSE in the last 72 hours.  No results for input(s): RBC, HEMOGLOBIN, HEMATOCRIT, PLATELETCT, PROTHROMBTM, APTT, INR, IRON, FERRITIN, TOTIRONBC in the last 72 hours.            Hemodynamics:  Temp (24hrs), Av.3 °C (97.4 °F), Min:36.2 °C (97.2 °F), Max:36.5 °C (97.7 °F)  Temperature: 36.2 °C (97.2 °F)  Pulse  Av.7  Min: 65  Max: 88   Blood Pressure : 158/88 mmHg     Respiratory:    Respiration: 16, Pulse Oximetry: 92 %        RUL Breath Sounds: Clear, RML Breath Sounds: Clear, RLL Breath Sounds: Diminished, SUE Breath Sounds: Clear, LLL Breath Sounds: Diminished  Fluids:    Intake/Output  Summary (Last 24 hours) at 03/09/17 1211  Last data filed at 03/09/17 1029   Gross per 24 hour   Intake   2130 ml   Output   3400 ml   Net  -1270 ml        GI/Nutrition:  Orders Placed This Encounter   Procedures   • Diet Order     Standing Status: Standing      Number of Occurrences: 1      Standing Expiration Date:      Order Specific Question:  Diet:     Answer:  Regular [1]     Medical Decision Making, by Problem:  Active Hospital Problems    Diagnosis   • *UTI (urinary tract infection) [N39.0] - ecoli, rocephin, IVF, follow closely   • Essential hypertension [I10] - lisinopril   • Neurogenic bladder [N31.9] - bethanachol, flomax   Depression - prozac  Debility - SNF vs rehab, placed consult    EKG reviewed, Labs reviewed, Medications reviewed and Radiology images reviewed  Presley catheter: No Presley      DVT Prophylaxis: Enoxaparin (Lovenox)      Antibiotics: Treating active infection/contamination beyond 24 hours perioperative coverage

## 2017-03-09 NOTE — PROGRESS NOTES
Up to BR x1 assist w/FWW. Pt had large, loose, brown BM. Back to bed safely. Additional bladder scan performed w/325 ml in bladder at this time. Orders for straight cath >500.

## 2017-03-09 NOTE — CARE PLAN
Problem: Knowledge Deficit  Goal: Knowledge of disease process/condition, treatment plan, diagnostic tests, and medications will improve  Outcome: PROGRESSING AS EXPECTED  Plan of care discussed with patient. Opportunity given for questions. States understanding.     Problem: Pain Management  Goal: Pain level will decrease to patient’s comfort goal  Outcome: PROGRESSING AS EXPECTED  Uses 0-10 scale appropriately. Pharmacologic and nonpharmacologic interventions in place.

## 2017-03-09 NOTE — PROGRESS NOTES
Continues to rest. Respirations even and unlabored. Repositions self. IV infusing without issue. Calls as needed for assist to BSC. Call light in reach.

## 2017-03-09 NOTE — PROGRESS NOTES
Bladder scan showed 506ml post void, discussed straight cath, states she would like to try to void again prior to scan and cath if remain over 500ml. Will continue to monitor.

## 2017-03-09 NOTE — PROGRESS NOTES
Resting with eyes closed. Respirations even and unlabored. IV infusing without issue. No further c/o pain. Calls as needed for assist to BSC. Call light in reach.

## 2017-03-09 NOTE — DISCHARGE PLANNING
NNAMDI Whitney met with pt to obtain SNF choice ([facility]) and faxed to [facility] for referral.  Pt needs 3 midnights to qualify.

## 2017-03-09 NOTE — PROGRESS NOTES
Bladder scan post void show 415ml, straight cath not indicated at this time. Resting on and off. Easily aroused to sound or touch. Medicated for right hip and leg pain per MAR, ice pack provided. Call light in reach.

## 2017-03-09 NOTE — DISCHARGE PLANNING
[facility] received a choice form per the choice the referral has been sent to [facility].

## 2017-03-09 NOTE — DISCHARGE PLANNING
Medical Social Work    Referral: Pt discussed at IDT rounds this AM.    Intervention: Referral pending with [facility].    Plan: SW available for any assistance with d.c planning.

## 2017-03-10 PROCEDURE — 97110 THERAPEUTIC EXERCISES: CPT

## 2017-03-10 PROCEDURE — 700105 HCHG RX REV CODE 258: Performed by: INTERNAL MEDICINE

## 2017-03-10 PROCEDURE — A9270 NON-COVERED ITEM OR SERVICE: HCPCS | Performed by: INTERNAL MEDICINE

## 2017-03-10 PROCEDURE — 700102 HCHG RX REV CODE 250 W/ 637 OVERRIDE(OP): Performed by: INTERNAL MEDICINE

## 2017-03-10 PROCEDURE — 770006 HCHG ROOM/CARE - MED/SURG/GYN SEMI*

## 2017-03-10 PROCEDURE — 99239 HOSP IP/OBS DSCHRG MGMT >30: CPT | Performed by: INTERNAL MEDICINE

## 2017-03-10 PROCEDURE — 700111 HCHG RX REV CODE 636 W/ 250 OVERRIDE (IP): Performed by: INTERNAL MEDICINE

## 2017-03-10 PROCEDURE — 97530 THERAPEUTIC ACTIVITIES: CPT

## 2017-03-10 RX ORDER — TAMSULOSIN HYDROCHLORIDE 0.4 MG/1
0.4 CAPSULE ORAL
Qty: 30 CAP | Status: SHIPPED | DISCHARGE
Start: 2017-03-10 | End: 2017-08-15

## 2017-03-10 RX ORDER — BETHANECHOL CHLORIDE 10 MG/1
10 TABLET ORAL 3 TIMES DAILY
Qty: 90 TAB | Status: SHIPPED | DISCHARGE
Start: 2017-03-10 | End: 2017-06-22 | Stop reason: SDUPTHER

## 2017-03-10 RX ORDER — BETHANECHOL CHLORIDE 10 MG/1
20 TABLET ORAL 3 TIMES DAILY
Status: DISCONTINUED | OUTPATIENT
Start: 2017-03-10 | End: 2017-03-11 | Stop reason: HOSPADM

## 2017-03-10 RX ORDER — SULFAMETHOXAZOLE AND TRIMETHOPRIM 800; 160 MG/1; MG/1
1 TABLET ORAL 2 TIMES DAILY
Refills: 0 | Status: SHIPPED | DISCHARGE
Start: 2017-03-10 | End: 2017-05-18

## 2017-03-10 RX ORDER — TRAMADOL HYDROCHLORIDE 50 MG/1
50 TABLET ORAL EVERY 6 HOURS PRN
Qty: 30 TAB | Refills: 0 | Status: SHIPPED | DISCHARGE
Start: 2017-03-10 | End: 2017-08-15

## 2017-03-10 RX ADMIN — BETHANECHOL CHLORIDE 20 MG: 10 TABLET ORAL at 20:13

## 2017-03-10 RX ADMIN — LISINOPRIL 20 MG: 20 TABLET ORAL at 08:32

## 2017-03-10 RX ADMIN — FLUOXETINE 40 MG: 20 CAPSULE ORAL at 08:31

## 2017-03-10 RX ADMIN — TRAMADOL HYDROCHLORIDE 50 MG: 50 TABLET, FILM COATED ORAL at 18:14

## 2017-03-10 RX ADMIN — BETHANECHOL CHLORIDE 20 MG: 10 TABLET ORAL at 15:06

## 2017-03-10 RX ADMIN — CEFTRIAXONE SODIUM 1 G: 1 INJECTION, POWDER, FOR SOLUTION INTRAMUSCULAR; INTRAVENOUS at 08:33

## 2017-03-10 RX ADMIN — TRAMADOL HYDROCHLORIDE 50 MG: 50 TABLET, FILM COATED ORAL at 08:52

## 2017-03-10 RX ADMIN — LACTOBACILLUS ACIDOPHILUS / LACTOBACILLUS BULGARICUS 1 PACKET: 100 MILLION CFU STRENGTH GRANULES at 08:30

## 2017-03-10 RX ADMIN — ASPIRIN 81 MG: 81 TABLET, COATED ORAL at 08:32

## 2017-03-10 RX ADMIN — IBUPROFEN 400 MG: 200 TABLET, FILM COATED ORAL at 08:52

## 2017-03-10 RX ADMIN — BETHANECHOL CHLORIDE 10 MG: 10 TABLET ORAL at 08:31

## 2017-03-10 RX ADMIN — TRAMADOL HYDROCHLORIDE 50 MG: 50 TABLET, FILM COATED ORAL at 02:27

## 2017-03-10 RX ADMIN — IBUPROFEN 400 MG: 200 TABLET, FILM COATED ORAL at 18:14

## 2017-03-10 RX ADMIN — ACETAMINOPHEN 650 MG: 325 TABLET, FILM COATED ORAL at 02:27

## 2017-03-10 RX ADMIN — TAMSULOSIN HYDROCHLORIDE 0.4 MG: 0.4 CAPSULE ORAL at 08:31

## 2017-03-10 RX ADMIN — ENOXAPARIN SODIUM 40 MG: 100 INJECTION SUBCUTANEOUS at 08:33

## 2017-03-10 ASSESSMENT — ENCOUNTER SYMPTOMS
PSYCHIATRIC NEGATIVE: 1
WEAKNESS: 1
HEARTBURN: 0
FEVER: 0
EYES NEGATIVE: 1
BLURRED VISION: 0
CARDIOVASCULAR NEGATIVE: 1
COUGH: 0
BRUISES/BLEEDS EASILY: 0
DIZZINESS: 0
HEADACHES: 0
DEPRESSION: 0
RESPIRATORY NEGATIVE: 1
NECK PAIN: 0
GASTROINTESTINAL NEGATIVE: 1
MYALGIAS: 1

## 2017-03-10 ASSESSMENT — GAIT ASSESSMENTS
GAIT LEVEL OF ASSIST: MINIMAL ASSIST
ASSISTIVE DEVICE: FRONT WHEEL WALKER
DEVIATION: STEP TO
DISTANCE (FEET): 10

## 2017-03-10 ASSESSMENT — PAIN SCALES - GENERAL
PAINLEVEL_OUTOF10: 7
PAINLEVEL_OUTOF10: 7
PAINLEVEL_OUTOF10: 2
PAINLEVEL_OUTOF10: 6

## 2017-03-10 NOTE — PROGRESS NOTES
Resting with eyes closed. Respirations even and unlabored, Repositions self. No c/o. IV infusing without difficulty. Calls as needed for assist to BSC. Call light in reach.

## 2017-03-10 NOTE — DISCHARGE PLANNING
NNAMDI Whitney set up transportation to [facility]  at 1000.  NNAMDI notified [facility] who will inform [facility].    At 1545, NNAMDI called pt's daughter, Nyasia to let her know that pt is going to be picked up at 1000.  Nyasia has an iPad and can do Facetime at [facility] with pt as [facility] has an iPad as well.

## 2017-03-10 NOTE — PROGRESS NOTES
Called for assist to BSC, voided 900 ml. Assist back to bed. Medicated per MAR for 6/10 hip and leg pain. IV infusing without issue. Call light in reach.

## 2017-03-10 NOTE — PROGRESS NOTES
Ambulate to BSC to void, 800 ml out. Bladder scan post void showed 400ml. No c/o. IV infusing without issue. Call light in reach.

## 2017-03-10 NOTE — DISCHARGE SUMMARY
FINAL DIAGNOSES:  1.  Marked debility, requiring skilled nursing and rehabilitation.  2.  Urinary tract infection secondary to Escherichia coli, sensitive to   cephalosporins and fluoroquinolones as well as Macrobid and Bactrim, resistant   to ampicillin.  3.  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 Red, urologist.    Patient subsequently self-catheterizes at home, which likely led to the   urinary tract infection.  4.  History of asthma.  5.  Acid reflux.  6.  Chronic back pain.  7.  History of uterine cancer status post hysterectomy.  8.  Remote history of DVT, many years ago during pregnancy.  9.  Hypertension.  10.  Depression.  11.  Prior bilateral hip surgeries.    HISTORY OF PRESENT ILLNESS:  The patient is an 84-year-old female that   self-catheterizes due to atonic bladder.  She presents with significant   abdominal pain, marked dysuria and weakness.  She took a fall at home and   hence came to the emergency room.    HOSPITAL COURSE:  Urine cultures are growing E. coli, sensitive to Rocephin.    This has improved.  However, she is still quite weak and will require skilled   nursing rehabilitation.  This has been arranged for today.  Of note, the   patient is reluctant to have an indwelling Presley catheter.  She has had   significant residuals noted here during the hospital stay.  Again the risks   and benefits of chronic indwelling Presley catheter versus self-catheterization   at her age were discussed.  At this point, she is still preferring   intermittent catheterization.  Bethanechol and Flomax have been added.    DISCHARGE CONDITION:  Guarded, but stable for skilled nursing rehabilitation.    DISCHARGE DIET:  Heart healthy.    DISCHARGE ACTIVITY:  Per the rehab facility.    DISCHARGE MEDICATIONS:  1.  Aspirin enteric coated 81 mg daily.  2.  Prozac 40 mg daily.  3.  Lisinopril 20 mg daily.  4.  Culturelle 1  tablet daily.  5.  Bethanechol 10 mg 3 times daily.  6.  Flomax 0.4 mg daily.  7.  Ultram 50 mg every 6 hours as needed for pain.  8.  Bactrim double strength 1 tablet twice daily for 4 additional days.    LABORATORY DATA:  Urine cultures growing E. coli, sensitivity as described.    Urinalysis itself is pamela pyuria, it is nitrite negative.  Blood counts   within normal limits.  Sodium initially low at 128, this has normalized at   135.  Otherwise, electrolytes, liver function tests within normal limits.    STUDIES:  CT renal colic notes diverticulosis, atherosclerotic changes, prior   left hip replacement and right hip pinning.    CONSULTS:  None.    PROCEDURES:  None.    FOLLOWUP:  She will need to follow up with Dr. Reina Red's partners   in the next month.    Discharge time today is 35 minutes.       ____________________________________     MD SYED MUSE / EVELYN    DD:  03/10/2017 08:02:34  DT:  03/10/2017 08:57:59    D#:  [id number]  Job#:  [id number]    cc: MAXIM WALLACE MD, PATTI ESQUEDA MD

## 2017-03-10 NOTE — CARE PLAN
Problem: Venous Thromboembolism (VTW)/Deep Vein Thrombosis (DVT) Prevention:  Goal: Patient will participate in Venous Thrombosis (VTE)/Deep Vein Thrombosis (DVT)Prevention Measures  Outcome: PROGRESSING AS EXPECTED  Pt educated on DVT/VTE prophylaxis. Lovenox in use per MAR. Understands importance of early and safe ambulation.        Problem: Pain Management  Goal: Pain level will decrease to patient’s comfort goal  Outcome: PROGRESSING AS EXPECTED  Pt encouraged to verbalize experiencing pain. 0-10 pain scale explained, verbalized understanding. Non-pharm methods of pain relief in use (i.e.-cold pack). Administer prn pain meds as ordered.

## 2017-03-10 NOTE — PROGRESS NOTES
Due medications given without issue. Ice pack and pain medication provided for 6/10 right hip/leg pain per MAR. No other c/o. IV infusing without issue. Call light in reach.

## 2017-03-10 NOTE — THERAPY
"Physical Therapy Treatment completed.   Bed Mobility:  Supine to Sit: Stand by Assist  Transfers: Sit to Stand: Minimal Assist  Gait: Level Of Assist: Minimal Assist with Front-Wheel Walker 8 ft, twice.      Plan of Care: Will benefit from Physical Therapy 5 times per week  Discharge Recommendations: Equipment: Will Continue to Assess for Equipment Needs. Post-acute therapy recommended before discharged home.    Increased R hip pain today resulting in decreased balance during mobility with 1 significant LOB during ambulation w/ PT required mod A to correct. Ice packs applied post PT. Pt rates pain 7/10 at rest and 9-10/10 w/ ambulation. Pt tolerated ther ex well. PT discussed w/ RN and patient possibility of further imaging of R hip, if indicated. Anticipate transfer to SNF tomorrow.      See \"Rehab Therapy-Acute\" Patient Summary Report for complete documentation.       "

## 2017-03-10 NOTE — PROGRESS NOTES
Bedside report received from [facility] nurse, Peace FOREMAN. Assumed care. Pt resting in bed.

## 2017-03-10 NOTE — PROGRESS NOTES
Resting with eyes closed. Respirations even and unlabored. IV infusing without issue. Call light in reach.

## 2017-03-10 NOTE — THERAPY
"Physical Therapy Treatment completed.   Bed Mobility:  Supine to Sit: Stand by Assist  Transfers: Sit to Stand: Contact Guard Assist  Gait: Level Of Assist: Contact Guard Assist with Front-Wheel Walker 15 ft with steppage gait due to L foot drop. Able to clear LLE appropriately.        Plan of Care: Will benefit from Physical Therapy 5 times per week  Discharge Recommendations: Equipment: Will Continue to Assess for Equipment Needs. Post-acute therapy recommended before discharged home.    Will benefit from AFO use on LLE during mobility (has one at home in storage but states poor fitting). Limited by fatigue today after OT earlier and shower task w/ CNA assist but able to ambulate a short distance to hallway. Continue to recommend post acute therapy prior to return to home w/ daughter. RN and CNA updated.      See \"Rehab Therapy-Acute\" Patient Summary Report for complete documentation.       "

## 2017-03-10 NOTE — CARE PLAN
Problem: Discharge Barriers/Planning  Goal: Patient’s continuum of care needs will be met  Outcome: PROGRESSING AS EXPECTED  Discharge plan discussed. Aware of anticipated discharge to skilled facility.     Problem: Fluid Volume:  Goal: Will maintain balanced intake and output  Outcome: PROGRESSING AS EXPECTED  Intake and output monitored every shift. Bladder scanned as needed to determine retention.

## 2017-03-10 NOTE — DISCHARGE PLANNING
NNAMDI Whitney left voicemail for Nyasia Lazcano at [phone] RE: helping pt see wedding live online if they are streaming it.

## 2017-03-10 NOTE — PROGRESS NOTES
Hospital Medicine Progress Note, Adult, Complex               Author: Stanislav Medrano Date & Time created: 3/10/2017  9:28 AM     Interval History:  Working with staff. Tolerating abx.  Long talk.  Still significant urinary retention, will increase bethanachol.     Review of Systems:  Review of Systems   Constitutional: Negative for fever.   HENT: Negative.    Eyes: Negative.  Negative for blurred vision.   Respiratory: Negative.  Negative for cough.    Cardiovascular: Negative.  Negative for chest pain.   Gastrointestinal: Negative.  Negative for heartburn.   Genitourinary: Negative.  Negative for dysuria.   Musculoskeletal: Positive for myalgias. Negative for neck pain.   Skin: Negative.  Negative for rash.   Neurological: Positive for weakness. Negative for dizziness and headaches.   Endo/Heme/Allergies: Negative.  Does not bruise/bleed easily.   Psychiatric/Behavioral: Negative.  Negative for depression.       Physical Exam:  Physical Exam  A&Ox3 tired  PERRL EOMI mmm  Supple no jvd bruit or magalie  RRR no mrg  CTAB RAJ  Soft abd ntnd bs+  No edema    Labs:        Invalid input(s): [id number]      No results for input(s): SODIUM, POTASSIUM, CHLORIDE, CO2, BUN, CREATININE, MAGNESIUM, PHOSPHORUS, CALCIUM in the last 72 hours.  No results for input(s): ALTSGPT, ASTSGOT, ALKPHOSPHAT, TBILIRUBIN, DBILIRUBIN, GAMMAGT, AMYLASE, LIPASE, ALB, PREALBUMIN, GLUCOSE in the last 72 hours.  No results for input(s): RBC, HEMOGLOBIN, HEMATOCRIT, PLATELETCT, PROTHROMBTM, APTT, INR, IRON, FERRITIN, TOTIRONBC in the last 72 hours.            Hemodynamics:  Temp (24hrs), Av.2 °C (97.2 °F), Min:36.2 °C (97.1 °F), Max:36.3 °C (97.4 °F)  Temperature: 36.2 °C (97.1 °F)  Pulse  Av.7  Min: 63  Max: 88   Blood Pressure : (!) 178/64 mmHg (RN aware)     Respiratory:    Respiration: 18, Pulse Oximetry: 95 %        RUL Breath Sounds: Clear, RML Breath Sounds: Clear, RLL Breath Sounds: Diminished, SUE Breath Sounds: Clear, LLL Breath  Sounds: Diminished  Fluids:    Intake/Output Summary (Last 24 hours) at 03/10/17 0928  Last data filed at 03/10/17 0850   Gross per 24 hour   Intake   4270 ml   Output   5550 ml   Net  -1280 ml        GI/Nutrition:  Orders Placed This Encounter   Procedures   • Diet Order     Standing Status: Standing      Number of Occurrences: 1      Standing Expiration Date:      Order Specific Question:  Diet:     Answer:  Regular [1]     Medical Decision Making, by Problem:  Active Hospital Problems    Diagnosis   • *UTI (urinary tract infection) [N39.0] - ecoli, rocephin, IVF, follow closely   • Essential hypertension [I10] - lisinopril   • Neurogenic bladder [N31.9] - increase bethanachol, flomax   Depression - prozac  Debility - SNF vs rehab, placed consult    EKG reviewed, Labs reviewed, Medications reviewed and Radiology images reviewed  Presley catheter: No Presley      DVT Prophylaxis: Enoxaparin (Lovenox)      Antibiotics: Treating active infection/contamination beyond 24 hours perioperative coverage

## 2017-03-10 NOTE — PROGRESS NOTES
Report received. Care assumed. Resting in bed. Alert and oriented. Calls as needed for assist to bathroom. Bladder scan shows 316ml post void. Call light in reach.

## 2017-03-11 VITALS
HEART RATE: 64 BPM | TEMPERATURE: 97.4 F | BODY MASS INDEX: 25.96 KG/M2 | RESPIRATION RATE: 16 BRPM | HEIGHT: 69 IN | OXYGEN SATURATION: 93 % | WEIGHT: 175.27 LBS | SYSTOLIC BLOOD PRESSURE: 159 MMHG | DIASTOLIC BLOOD PRESSURE: 64 MMHG

## 2017-03-11 PROCEDURE — A9270 NON-COVERED ITEM OR SERVICE: HCPCS | Performed by: INTERNAL MEDICINE

## 2017-03-11 PROCEDURE — 700102 HCHG RX REV CODE 250 W/ 637 OVERRIDE(OP): Performed by: INTERNAL MEDICINE

## 2017-03-11 PROCEDURE — 700105 HCHG RX REV CODE 258: Performed by: INTERNAL MEDICINE

## 2017-03-11 PROCEDURE — 700111 HCHG RX REV CODE 636 W/ 250 OVERRIDE (IP): Performed by: INTERNAL MEDICINE

## 2017-03-11 RX ADMIN — CEFTRIAXONE SODIUM 1 G: 1 INJECTION, POWDER, FOR SOLUTION INTRAMUSCULAR; INTRAVENOUS at 08:54

## 2017-03-11 RX ADMIN — ACETAMINOPHEN 650 MG: 325 TABLET, FILM COATED ORAL at 00:09

## 2017-03-11 RX ADMIN — SENNOSIDES AND DOCUSATE SODIUM 2 TABLET: 8.6; 5 TABLET ORAL at 08:47

## 2017-03-11 RX ADMIN — TAMSULOSIN HYDROCHLORIDE 0.4 MG: 0.4 CAPSULE ORAL at 08:47

## 2017-03-11 RX ADMIN — ENOXAPARIN SODIUM 40 MG: 100 INJECTION SUBCUTANEOUS at 08:47

## 2017-03-11 RX ADMIN — BETHANECHOL CHLORIDE 20 MG: 10 TABLET ORAL at 08:48

## 2017-03-11 RX ADMIN — TRAMADOL HYDROCHLORIDE 50 MG: 50 TABLET, FILM COATED ORAL at 00:09

## 2017-03-11 RX ADMIN — LISINOPRIL 20 MG: 20 TABLET ORAL at 08:48

## 2017-03-11 RX ADMIN — ASPIRIN 81 MG: 81 TABLET, COATED ORAL at 08:49

## 2017-03-11 RX ADMIN — LACTOBACILLUS ACIDOPHILUS / LACTOBACILLUS BULGARICUS 1 PACKET: 100 MILLION CFU STRENGTH GRANULES at 08:47

## 2017-03-11 RX ADMIN — TRAMADOL HYDROCHLORIDE 50 MG: 50 TABLET, FILM COATED ORAL at 06:12

## 2017-03-11 RX ADMIN — ACETAMINOPHEN 650 MG: 325 TABLET, FILM COATED ORAL at 06:12

## 2017-03-11 RX ADMIN — FLUOXETINE 40 MG: 20 CAPSULE ORAL at 08:47

## 2017-03-11 ASSESSMENT — LIFESTYLE VARIABLES: EVER_SMOKED: NEVER

## 2017-03-11 ASSESSMENT — PAIN SCALES - GENERAL: PAINLEVEL_OUTOF10: 2

## 2017-03-11 NOTE — PROGRESS NOTES
Resting with eyes closed. Respirations even and unlabored. Repositions self. Call light in reach.

## 2017-03-11 NOTE — CARE PLAN
Problem: Mobility  Goal: Risk for activity intolerance will decrease  Outcome: PROGRESSING AS EXPECTED  Encouraged ambulation as tolerated with staff. Physical therapy working with patient daily.     Problem: Pain Management  Goal: Pain level will decrease to patient’s comfort goal  Outcome: PROGRESSING AS EXPECTED  Uses 0-10 scale appropriately. Pharmacologic and nonpharmacologic interventions in place.

## 2017-03-11 NOTE — PROGRESS NOTES
Bladder scan post void showed over 500ml retained, straight cath, tolerated well. Medicated for right hip and leg pain per MAR.

## 2017-03-11 NOTE — PROGRESS NOTES
Medicated for right hip and leg pain per MAR. Respirations even and unlabored. Resting on and off. Call light in reach.

## 2017-03-11 NOTE — PROGRESS NOTES
Due medication given without issue. Dozing on and off. Reports good relief from pain medication, now 2/10 pain. No other c/o. Assessment completed. Call light in reach.

## 2017-03-11 NOTE — DISCHARGE PLANNING
Medical Social Work    Referral: Pt discussed at IDT rounds this AM.    Intervention: Pt to go to [facility] at 1000, family and pt notified and COBRA on chart.      Plan: 1122 - SW called pt's daughter Nyasia and left a voicemail asking what time the wedding is on Sunday as well as maybe do a test run on Facetime today with [facility] iPad.  SW left [facility] numbers on message as well as SW.    Care Transition Team Assessment    Information Source  Orientation : Oriented x 4  Information Given By: Patient    Readmission Evaluation  Is this a readmission?: No    Elopement Risk  Legal Hold: No  Ambulatory or Self Mobile in Wheelchair: No-Not an Elopement Risk  Elopement Risk: Not at Risk for Elopement    Interdisciplinary Discharge Planning  Does Admitting Nurse Feel This Could be a Complex Discharge?: No  Primary Care Physician: Dr. Dobbs  Lives with - Patient's Self Care Capacity: Adult Children  Patient or legal guardian wants to designate a caregiver (see row info): No  Support Systems: Family Member(s), Children  Housing / Facility: [address]  Do You Take your Prescribed Medications Regularly: Yes  Able to Return to Previous ADL's: Future Time w/Therapy  Mobility Issues: Yes  Prior Services: Meals on Wheels, Skilled Home Health Services  Patient Expects to be Discharged to:: Home  Assistance Needed: Unknown at this Time  Durable Medical Equipment: Walker, Commode, Other - Specify (wheelchair)  DME Provider / Phone: pt unsure    Discharge Preparedness  What is your plan after discharge?: Skilled nursing facility  What are your discharge supports?: Child    Vision / Hearing Impairment  Vision Impairment : Yes  Right Eye Vision: Impaired, Wears Glasses  Left Eye Vision: Impaired, Wears Glasses  Hearing Impairment : No    Values / Beliefs / Concerns  Values / Beliefs Concerns : No  Special Hospitalization Concerns: n/a    Advance Directive  Advance Directive?: DPOA for Health Care, Living Will  Durable Power of   Name and Contact : Nyasia Lazcano [phone]    Domestic Abuse  Have you ever been the victim of abuse or violence?: No    Anticipated Discharge Information  Anticipated discharge disposition: SNF  Discharge Address: [address]   Discharge Contact Phone Number: [phone]

## 2017-03-11 NOTE — DISCHARGE INSTRUCTIONS
Discharge Instructions    Discharged to group home by medical transportation with escort. Discharged via wheelchair, hospital escort: Yes.  Special equipment needed: Not Applicable    Be sure to schedule a follow-up appointment with your primary care doctor or any specialists as instructed.     Discharge Plan:   Diet Plan: Discussed  Activity Level: Discussed  Confirmed Follow up Appointment: Appointment Scheduled  Confirmed Symptoms Management: Discussed  Medication Reconciliation Updated: Yes  Influenza Vaccine Indication: Patient Refuses    I understand that a diet low in cholesterol, fat, and sodium is recommended for good health. Unless I have been given specific instructions below for another diet, I accept this instruction as my diet prescription.   Other diet: Regular diet    Special Instructions: None    · Is patient discharged on Warfarin / Coumadin?   No     · Is patient Post Blood Transfusion?  No    Depression / Suicide Risk    As you are discharged from this [facility], it is important to learn how to keep safe from harming yourself.    Recognize the warning signs:  · Abrupt changes in personality, positive or negative- including increase in energy   · Giving away possessions  · Change in eating patterns- significant weight changes-  positive or negative  · Change in sleeping patterns- unable to sleep or sleeping all the time   · Unwillingness or inability to communicate  · Depression  · Unusual sadness, discouragement and loneliness  · Talk of wanting to die  · Neglect of personal appearance   · Rebelliousness- reckless behavior  · Withdrawal from people/activities they love  · Confusion- inability to concentrate     If you or a loved one observes any of these behaviors or has concerns about self-harm, here's what you can do:  · Talk about it- your feelings and reasons for harming yourself  · Remove any means that you might use to hurt yourself (examples: pills, rope, extension cords,  firearm)  · Get professional help from the community (Mental Health, Substance Abuse, psychological counseling)  · Do not be alone:Call your Safe Contact- someone whom you trust who will be there for you.  · Call your local CRISIS HOTLINE 784-0338 or 892-108-9023  · Call your local Children's Mobile Crisis Response Team [place] (156) 268-8175 or www.[employer]  · Call the toll free National Suicide Prevention Hotlines   · National Suicide Prevention Lifeline 558-448-QMJZ (1764)  · [employer] Line Network 800-SUICIDE (013-3538)    Urinary Tract Infection  Urinary tract infections (UTIs) can develop anywhere along your urinary tract. Your urinary tract is your body's drainage system for removing wastes and extra water. Your urinary tract includes two kidneys, two ureters, a bladder, and a urethra. Your kidneys are a pair of bean-shaped organs. Each kidney is about the size of your fist. They are located below your ribs, one on each side of your spine.  CAUSES  Infections are caused by microbes, which are microscopic organisms, including fungi, viruses, and bacteria. These organisms are so small that they can only be seen through a microscope. Bacteria are the microbes that most commonly cause UTIs.  SYMPTOMS   Symptoms of UTIs may vary by age and gender of the patient and by the location of the infection. Symptoms in young women typically include a frequent and intense urge to urinate and a painful, burning feeling in the bladder or urethra during urination. Older women and men are more likely to be tired, shaky, and weak and have muscle aches and abdominal pain. A fever may mean the infection is in your kidneys. Other symptoms of a kidney infection include pain in your back or sides below the ribs, nausea, and vomiting.  DIAGNOSIS  To diagnose a UTI, your caregiver will ask you about your symptoms. Your caregiver also will ask to provide a urine sample. The urine sample will be tested for bacteria and  white blood cells. White blood cells are made by your body to help fight infection.  TREATMENT   Typically, UTIs can be treated with medication. Because most UTIs are caused by a bacterial infection, they usually can be treated with the use of antibiotics. The choice of antibiotic and length of treatment depend on your symptoms and the type of bacteria causing your infection.  HOME CARE INSTRUCTIONS  · If you were prescribed antibiotics, take them exactly as your caregiver instructs you. Finish the medication even if you feel better after you have only taken some of the medication.  · Drink enough water and fluids to keep your urine clear or pale yellow.  · Avoid caffeine, tea, and carbonated beverages. They tend to irritate your bladder.  · Empty your bladder often. Avoid holding urine for long periods of time.  · Empty your bladder before and after sexual intercourse.  · After a bowel movement, women should cleanse from front to back. Use each tissue only once.  SEEK MEDICAL CARE IF:   2. You have back pain.  3. You develop a fever.  4. Your symptoms do not begin to resolve within 3 days.  SEEK IMMEDIATE MEDICAL CARE IF:   2. You have severe back pain or lower abdominal pain.  3. You develop chills.  4. You have nausea or vomiting.  5. You have continued burning or discomfort with urination.  MAKE SURE YOU:   2. Understand these instructions.  3. Will watch your condition.  4. Will get help right away if you are not doing well or get worse.     This information is not intended to replace advice given to you by your health care provider. Make sure you discuss any questions you have with your health care provider.     Document Released: 09/27/2006 Document Revised: 01/08/2016 Document Reviewed: 01/25/2013  [employer] Patient Education ©2016 [employer]

## 2017-05-12 ENCOUNTER — HOSPITAL ENCOUNTER (OUTPATIENT)
Facility: MEDICAL CENTER | Age: 82
End: 2017-05-12
Attending: INTERNAL MEDICINE
Payer: MEDICARE

## 2017-05-12 LAB
AMORPH CRY #/AREA URNS HPF: PRESENT /HPF
APPEARANCE UR: ABNORMAL
BACTERIA #/AREA URNS HPF: ABNORMAL /HPF
BILIRUB UR QL STRIP.AUTO: NEGATIVE
CAOX CRY #/AREA URNS HPF: ABNORMAL /HPF
COLOR UR: YELLOW
EPI CELLS #/AREA URNS HPF: ABNORMAL /HPF
GLUCOSE UR STRIP.AUTO-MCNC: NEGATIVE MG/DL
KETONES UR STRIP.AUTO-MCNC: NEGATIVE MG/DL
LEUKOCYTE ESTERASE UR QL STRIP.AUTO: ABNORMAL
MICRO URNS: ABNORMAL
NITRITE UR QL STRIP.AUTO: NEGATIVE
PH UR STRIP.AUTO: 7.5 [PH]
PROT UR QL STRIP: NEGATIVE MG/DL
RBC UR QL AUTO: NEGATIVE
SP GR UR STRIP.AUTO: 1.01
TRI-PHOS CRY #/AREA URNS HPF: ABNORMAL /HPF
WBC #/AREA URNS HPF: ABNORMAL /HPF

## 2017-05-12 PROCEDURE — 87186 SC STD MICRODIL/AGAR DIL: CPT

## 2017-05-12 PROCEDURE — 81001 URINALYSIS AUTO W/SCOPE: CPT

## 2017-05-12 PROCEDURE — 87086 URINE CULTURE/COLONY COUNT: CPT

## 2017-05-12 PROCEDURE — 87077 CULTURE AEROBIC IDENTIFY: CPT

## 2017-05-14 LAB
BACTERIA UR CULT: ABNORMAL
BACTERIA UR CULT: ABNORMAL
SIGNIFICANT IND 70042: ABNORMAL
SOURCE SOURCE: ABNORMAL

## 2017-05-15 DIAGNOSIS — N39.0 URINARY TRACT INFECTION WITHOUT HEMATURIA, SITE UNSPECIFIED: ICD-10-CM

## 2017-05-15 RX ORDER — CIPROFLOXACIN 500 MG/1
500 TABLET, FILM COATED ORAL 2 TIMES DAILY
Qty: 20 TAB | Refills: 0 | Status: ON HOLD | OUTPATIENT
Start: 2017-05-15 | End: 2017-05-20

## 2017-05-15 NOTE — PROGRESS NOTES
Phone Number Called: [phone] (home)     Message: Pt notified of her Urine results and that her Rx was sent to the pharmacy.    Left Message for patient to call back: no

## 2017-05-15 NOTE — PROGRESS NOTES
I received urine culture results today showing a UTI due to strep viridans, sensitive to Cipro but resistant to Septra DS and ampicillin. I cannot tell from the chart who ordered the urine culture since there is no encounter now with reference to that. And the order form does not specify.    Nevertheless I have sent a prescription for Cipro 500 mg twice a day for 10 days to her local pharmacy. She'll be contacted regarding this by one of our medical assistants.

## 2017-05-18 ENCOUNTER — APPOINTMENT (OUTPATIENT)
Dept: RADIOLOGY | Facility: MEDICAL CENTER | Age: 82
DRG: 309 | End: 2017-05-18
Attending: EMERGENCY MEDICINE
Payer: MEDICARE

## 2017-05-18 ENCOUNTER — HOSPITAL ENCOUNTER (INPATIENT)
Facility: MEDICAL CENTER | Age: 82
LOS: 2 days | DRG: 309 | End: 2017-05-20
Attending: EMERGENCY MEDICINE | Admitting: FAMILY MEDICINE
Payer: MEDICARE

## 2017-05-18 ENCOUNTER — RESOLUTE PROFESSIONAL BILLING HOSPITAL PROF FEE (OUTPATIENT)
Dept: HOSPITALIST | Facility: MEDICAL CENTER | Age: 82
End: 2017-05-18
Payer: MEDICARE

## 2017-05-18 DIAGNOSIS — G89.29 CHRONIC BILATERAL LOW BACK PAIN WITH SCIATICA, SCIATICA LATERALITY UNSPECIFIED: ICD-10-CM

## 2017-05-18 DIAGNOSIS — N39.0 RECURRENT UTI: ICD-10-CM

## 2017-05-18 DIAGNOSIS — R00.2 PALPITATIONS: ICD-10-CM

## 2017-05-18 DIAGNOSIS — I48.91 ATRIAL FIBRILLATION WITH RVR (HCC): ICD-10-CM

## 2017-05-18 DIAGNOSIS — R00.0 TACHYCARDIA: ICD-10-CM

## 2017-05-18 DIAGNOSIS — M54.40 CHRONIC BILATERAL LOW BACK PAIN WITH SCIATICA, SCIATICA LATERALITY UNSPECIFIED: ICD-10-CM

## 2017-05-18 LAB
ALBUMIN SERPL BCP-MCNC: 3.9 G/DL (ref 3.2–4.9)
ALBUMIN/GLOB SERPL: 1.2 G/DL
ALP SERPL-CCNC: 143 U/L (ref 30–99)
ALT SERPL-CCNC: 20 U/L (ref 2–50)
ANION GAP SERPL CALC-SCNC: 11 MMOL/L (ref 0–11.9)
APTT PPP: 27.1 SEC (ref 24.7–36)
AST SERPL-CCNC: 25 U/L (ref 12–45)
BASOPHILS # BLD AUTO: 1.4 % (ref 0–1.8)
BASOPHILS # BLD: 0.1 K/UL (ref 0–0.12)
BILIRUB SERPL-MCNC: 1 MG/DL (ref 0.1–1.5)
BNP SERPL-MCNC: 165 PG/ML (ref 0–100)
BUN SERPL-MCNC: 19 MG/DL (ref 8–22)
CALCIUM SERPL-MCNC: 9.4 MG/DL (ref 8.4–10.2)
CHLORIDE SERPL-SCNC: 101 MMOL/L (ref 96–112)
CO2 SERPL-SCNC: 21 MMOL/L (ref 20–33)
CREAT SERPL-MCNC: 0.93 MG/DL (ref 0.5–1.4)
EOSINOPHIL # BLD AUTO: 0.19 K/UL (ref 0–0.51)
EOSINOPHIL NFR BLD: 2.7 % (ref 0–6.9)
ERYTHROCYTE [DISTWIDTH] IN BLOOD BY AUTOMATED COUNT: 47.8 FL (ref 35.9–50)
GFR SERPL CREATININE-BSD FRML MDRD: 57 ML/MIN/1.73 M 2
GLOBULIN SER CALC-MCNC: 3.3 G/DL (ref 1.9–3.5)
GLUCOSE SERPL-MCNC: 107 MG/DL (ref 65–99)
HCT VFR BLD AUTO: 45.8 % (ref 37–47)
HGB BLD-MCNC: 15.2 G/DL (ref 12–16)
IMM GRANULOCYTES # BLD AUTO: 0.01 K/UL (ref 0–0.11)
IMM GRANULOCYTES NFR BLD AUTO: 0.1 % (ref 0–0.9)
INR PPP: 1.06 (ref 0.87–1.13)
LIPASE SERPL-CCNC: 16 U/L (ref 7–58)
LYMPHOCYTES # BLD AUTO: 2.34 K/UL (ref 1–4.8)
LYMPHOCYTES NFR BLD: 33 % (ref 22–41)
MCH RBC QN AUTO: 30.8 PG (ref 27–33)
MCHC RBC AUTO-ENTMCNC: 33.2 G/DL (ref 33.6–35)
MCV RBC AUTO: 92.9 FL (ref 81.4–97.8)
MONOCYTES # BLD AUTO: 0.97 K/UL (ref 0–0.85)
MONOCYTES NFR BLD AUTO: 13.7 % (ref 0–13.4)
NEUTROPHILS # BLD AUTO: 3.49 K/UL (ref 2–7.15)
NEUTROPHILS NFR BLD: 49.1 % (ref 44–72)
NRBC # BLD AUTO: 0 K/UL
NRBC BLD AUTO-RTO: 0 /100 WBC
PLATELET # BLD AUTO: 230 K/UL (ref 164–446)
PMV BLD AUTO: 9.2 FL (ref 9–12.9)
POTASSIUM SERPL-SCNC: 3.9 MMOL/L (ref 3.6–5.5)
PROT SERPL-MCNC: 7.2 G/DL (ref 6–8.2)
PROTHROMBIN TIME: 13.6 SEC (ref 12–14.6)
RBC # BLD AUTO: 4.93 M/UL (ref 4.2–5.4)
SODIUM SERPL-SCNC: 133 MMOL/L (ref 135–145)
TROPONIN I SERPL-MCNC: <0.02 NG/ML (ref 0–0.04)
TSH SERPL DL<=0.005 MIU/L-ACNC: 2.59 UIU/ML (ref 0.35–5.5)
WBC # BLD AUTO: 7.1 K/UL (ref 4.8–10.8)

## 2017-05-18 PROCEDURE — 83880 ASSAY OF NATRIURETIC PEPTIDE: CPT

## 2017-05-18 PROCEDURE — 83690 ASSAY OF LIPASE: CPT

## 2017-05-18 PROCEDURE — 85025 COMPLETE CBC W/AUTO DIFF WBC: CPT

## 2017-05-18 PROCEDURE — 85730 THROMBOPLASTIN TIME PARTIAL: CPT

## 2017-05-18 PROCEDURE — 85610 PROTHROMBIN TIME: CPT

## 2017-05-18 PROCEDURE — 84443 ASSAY THYROID STIM HORMONE: CPT

## 2017-05-18 PROCEDURE — 71010 DX-CHEST-PORTABLE (1 VIEW): CPT

## 2017-05-18 PROCEDURE — 36415 COLL VENOUS BLD VENIPUNCTURE: CPT

## 2017-05-18 PROCEDURE — 700105 HCHG RX REV CODE 258: Performed by: FAMILY MEDICINE

## 2017-05-18 PROCEDURE — 770020 HCHG ROOM/CARE - TELE (206)

## 2017-05-18 PROCEDURE — 700105 HCHG RX REV CODE 258: Performed by: EMERGENCY MEDICINE

## 2017-05-18 PROCEDURE — 80053 COMPREHEN METABOLIC PANEL: CPT

## 2017-05-18 PROCEDURE — 84484 ASSAY OF TROPONIN QUANT: CPT

## 2017-05-18 PROCEDURE — 99223 1ST HOSP IP/OBS HIGH 75: CPT | Mod: AI | Performed by: FAMILY MEDICINE

## 2017-05-18 PROCEDURE — 93005 ELECTROCARDIOGRAM TRACING: CPT | Performed by: EMERGENCY MEDICINE

## 2017-05-18 PROCEDURE — A9270 NON-COVERED ITEM OR SERVICE: HCPCS | Performed by: FAMILY MEDICINE

## 2017-05-18 PROCEDURE — 93306 TTE W/DOPPLER COMPLETE: CPT | Mod: 26 | Performed by: INTERNAL MEDICINE

## 2017-05-18 PROCEDURE — 700102 HCHG RX REV CODE 250 W/ 637 OVERRIDE(OP): Performed by: FAMILY MEDICINE

## 2017-05-18 PROCEDURE — 700111 HCHG RX REV CODE 636 W/ 250 OVERRIDE (IP): Performed by: FAMILY MEDICINE

## 2017-05-18 PROCEDURE — 99285 EMERGENCY DEPT VISIT HI MDM: CPT

## 2017-05-18 RX ORDER — AMOXICILLIN 250 MG
2 CAPSULE ORAL 2 TIMES DAILY
Status: DISCONTINUED | OUTPATIENT
Start: 2017-05-18 | End: 2017-05-20 | Stop reason: HOSPADM

## 2017-05-18 RX ORDER — TAMSULOSIN HYDROCHLORIDE 0.4 MG/1
0.4 CAPSULE ORAL
Status: DISCONTINUED | OUTPATIENT
Start: 2017-05-18 | End: 2017-05-20 | Stop reason: HOSPADM

## 2017-05-18 RX ORDER — SODIUM CHLORIDE 9 MG/ML
INJECTION, SOLUTION INTRAVENOUS CONTINUOUS
Status: DISCONTINUED | OUTPATIENT
Start: 2017-05-18 | End: 2017-05-18

## 2017-05-18 RX ORDER — CEFDINIR 300 MG/1
300 CAPSULE ORAL EVERY 12 HOURS
Status: DISCONTINUED | OUTPATIENT
Start: 2017-05-18 | End: 2017-05-20 | Stop reason: HOSPADM

## 2017-05-18 RX ORDER — FLUOXETINE HYDROCHLORIDE 20 MG/1
40 CAPSULE ORAL DAILY
Status: DISCONTINUED | OUTPATIENT
Start: 2017-05-18 | End: 2017-05-20 | Stop reason: HOSPADM

## 2017-05-18 RX ORDER — ONDANSETRON 2 MG/ML
4 INJECTION INTRAMUSCULAR; INTRAVENOUS EVERY 4 HOURS PRN
Status: DISCONTINUED | OUTPATIENT
Start: 2017-05-18 | End: 2017-05-20 | Stop reason: HOSPADM

## 2017-05-18 RX ORDER — TRAMADOL HYDROCHLORIDE 50 MG/1
50 TABLET ORAL EVERY 6 HOURS PRN
Status: DISCONTINUED | OUTPATIENT
Start: 2017-05-18 | End: 2017-05-20 | Stop reason: HOSPADM

## 2017-05-18 RX ORDER — L. ACIDOPHILUS/L.BULGARICUS 100MM CELL
1 GRANULES IN PACKET (EA) ORAL
Status: DISCONTINUED | OUTPATIENT
Start: 2017-05-18 | End: 2017-05-20 | Stop reason: HOSPADM

## 2017-05-18 RX ORDER — POLYETHYLENE GLYCOL 3350 17 G/17G
1 POWDER, FOR SOLUTION ORAL
Status: DISCONTINUED | OUTPATIENT
Start: 2017-05-18 | End: 2017-05-20 | Stop reason: HOSPADM

## 2017-05-18 RX ORDER — BETHANECHOL CHLORIDE 10 MG/1
10 TABLET ORAL 3 TIMES DAILY
Status: DISCONTINUED | OUTPATIENT
Start: 2017-05-18 | End: 2017-05-20 | Stop reason: HOSPADM

## 2017-05-18 RX ORDER — MELOXICAM 7.5 MG/1
7.5 TABLET ORAL DAILY
COMMUNITY
End: 2017-06-22 | Stop reason: SDUPTHER

## 2017-05-18 RX ORDER — CIPROFLOXACIN 500 MG/1
500 TABLET, FILM COATED ORAL 2 TIMES DAILY
Status: DISCONTINUED | OUTPATIENT
Start: 2017-05-18 | End: 2017-05-18

## 2017-05-18 RX ORDER — METOPROLOL TARTRATE 50 MG/1
25 TABLET, FILM COATED ORAL TWICE DAILY
Status: DISCONTINUED | OUTPATIENT
Start: 2017-05-18 | End: 2017-05-18

## 2017-05-18 RX ORDER — ACETAMINOPHEN 325 MG/1
650 TABLET ORAL EVERY 6 HOURS PRN
Status: DISCONTINUED | OUTPATIENT
Start: 2017-05-18 | End: 2017-05-20 | Stop reason: HOSPADM

## 2017-05-18 RX ORDER — BISACODYL 10 MG
10 SUPPOSITORY, RECTAL RECTAL
Status: DISCONTINUED | OUTPATIENT
Start: 2017-05-18 | End: 2017-05-20 | Stop reason: HOSPADM

## 2017-05-18 RX ORDER — ONDANSETRON 4 MG/1
4 TABLET, ORALLY DISINTEGRATING ORAL EVERY 4 HOURS PRN
Status: DISCONTINUED | OUTPATIENT
Start: 2017-05-18 | End: 2017-05-20 | Stop reason: HOSPADM

## 2017-05-18 RX ADMIN — SODIUM CHLORIDE: 9 INJECTION, SOLUTION INTRAVENOUS at 16:56

## 2017-05-18 RX ADMIN — FLUOXETINE 40 MG: 20 CAPSULE ORAL at 15:52

## 2017-05-18 RX ADMIN — TAMSULOSIN HYDROCHLORIDE 0.4 MG: 0.4 CAPSULE ORAL at 15:52

## 2017-05-18 RX ADMIN — BETHANECHOL CHLORIDE 10 MG: 10 TABLET ORAL at 15:52

## 2017-05-18 RX ADMIN — BETHANECHOL CHLORIDE 10 MG: 10 TABLET ORAL at 20:48

## 2017-05-18 RX ADMIN — DOCUSATE SODIUM AND SENNOSIDES 2 TABLET: 8.6; 5 TABLET, FILM COATED ORAL at 15:52

## 2017-05-18 RX ADMIN — METOPROLOL TARTRATE 25 MG: 50 TABLET ORAL at 15:52

## 2017-05-18 RX ADMIN — SODIUM CHLORIDE 1000 ML: 9 INJECTION, SOLUTION INTRAVENOUS at 12:27

## 2017-05-18 RX ADMIN — LACTOBACILLUS ACIDOPHILUS / LACTOBACILLUS BULGARICUS 1 PACKET: 100 MILLION CFU STRENGTH GRANULES at 18:00

## 2017-05-18 RX ADMIN — ASPIRIN 81 MG: 81 TABLET, COATED ORAL at 15:52

## 2017-05-18 RX ADMIN — ENOXAPARIN SODIUM 80 MG: 100 INJECTION SUBCUTANEOUS at 15:53

## 2017-05-18 RX ADMIN — TRAMADOL HYDROCHLORIDE 50 MG: 50 TABLET, FILM COATED ORAL at 20:44

## 2017-05-18 RX ADMIN — CEFDINIR 300 MG: 300 CAPSULE ORAL at 15:52

## 2017-05-18 ASSESSMENT — LIFESTYLE VARIABLES
ON A TYPICAL DAY WHEN YOU DRINK ALCOHOL HOW MANY DRINKS DO YOU HAVE: 2
TOTAL SCORE: 0
ALCOHOL_USE: YES
EVER HAD A DRINK FIRST THING IN THE MORNING TO STEADY YOUR NERVES TO GET RID OF A HANGOVER: NO
EVER_SMOKED: NEVER
HOW MANY TIMES IN THE PAST YEAR HAVE YOU HAD 5 OR MORE DRINKS IN A DAY: 0
CONSUMPTION TOTAL: NEGATIVE
AVERAGE NUMBER OF DAYS PER WEEK YOU HAVE A DRINK CONTAINING ALCOHOL: 3
HAVE PEOPLE ANNOYED YOU BY CRITICIZING YOUR DRINKING: NO
TOTAL SCORE: 0
HAVE YOU EVER FELT YOU SHOULD CUT DOWN ON YOUR DRINKING: NO
EVER FELT BAD OR GUILTY ABOUT YOUR DRINKING: NO
TOTAL SCORE: 0

## 2017-05-18 ASSESSMENT — PAIN SCALES - GENERAL
PAINLEVEL_OUTOF10: 0
PAINLEVEL_OUTOF10: 5

## 2017-05-18 NOTE — ED PROVIDER NOTES
ED Provider Note    CHIEF COMPLAINT  Chief Complaint   Patient presents with   • Irregular Heart Beat     x 2 weeks   • Shortness of Breath       HPI  Meron Rice is a 84 y.o. female who presents with elevated heart rate, for the last 2 weeks, no chest pain. No nausea no vomiting and occasional diarrhea but none in the last week. No fever no chills does have increasing shortness of breath no PND and orthopnea but does have some dyspnea on exertion. No chest pain or heaviness but is aware of her heart beating fast and occasionally irregular.. There has been no chest pain.    REVIEW OF SYSTEMS  See HPI for further details. History of hip fracture ORIF hypertension breast cancer and uterine cancer and mastectomy. Cardiac stress test 20 years ago that was negative history of urinary tract infection recently, admitted here for a week for the urinary tract infection history of arthritis cataracts footdrop DVT Denies other G.I., G.U.. endrocine, cardiovascular, respriatory or neurological problems. All other systems are negative.     PAST MEDICAL HISTORY  Past Medical History   Diagnosis Date   • Foot-drop    • Hypertension    • Unspecified urinary incontinence      lazy bladder; doesn't empty completely   • Personal history of venous thrombosis and embolism      in leg during child-bearing years   • Cancer (CMS-HCC) [id number]     uterus/left breast   • Other specified symptom associated with female genital organs 1998     fibroids   • Unspecified hemorrhagic conditions      post op hyst   • Chronic low back pain    • Heart burn    • Indigestion    • Arthritis      generalized   • ASTHMA      has not needed an inhaler since 2010   • CATARACT      beginning       FAMILY HISTORY  Family History   Problem Relation Age of Onset   • Diabetes     • Heart Disease     • Stroke     • Cancer     • Hypertension     • Diabetes Mother    • Heart Disease Mother    • Heart Disease Father    • Stroke Father    • Hypertension Father     • Diabetes Sister    • Heart Disease Sister    • Cancer Brother      lung   • Diabetes Other    • Diabetes Child    • Psychiatry Child        SOCIAL HISTORY  Social History     Social History   • Marital Status:      Spouse Name: N/A   • Number of Children: N/A   • Years of Education: N/A     Social History Main Topics   • Smoking status: Former Smoker -- 0.10 packs/day for 1 years     Types: Cigarettes     Quit date: 01/01/1964   • Smokeless tobacco: Never Used      Comment: 50 years ago in 1960  SOCIAL    • Alcohol Use: Yes   • Drug Use: No   • Sexual Activity: Not Currently     Other Topics Concern   • None     Social History Narrative       SURGICAL HISTORY  Past Surgical History   Procedure Laterality Date   • Hysterectomy, total abdominal  2000   • Vein ligat & strip  1972, 1966     x2 bilateral   • Axillary node dissection  9/15/2009     Performed by DIPAK VARELA at SURGERY SAME DAY [facility] ORS   • Mastectomy  9/30/2009     left   • Other orthopedic surgery  2002     laminectomy   • Gyn surgery  1998     excision of fibroids   • Cath placement  10/28/2009     Performed by DIPAK VARELA at SURGERY SAME DAY [facility] ORS   • Mastectomy  12/15/2010     right   • Cath removal  12/15/2010     Performed by DIPAK VARELA at SURGERY [facility] ORS   • Breast reconstruction  12/15/2010     Performed by JAK ROSALES at SURGERY [facility] ORS   • Latissimus flap  12/15/2010     Performed by JAK ROSALES at SURGERY [facility] ORS   • Appendectomy  1968   • Dilation and curettage  1961   • Breast implant revision  7/11/2011     Performed by JAK ROSALES at SURGERY [facility] ORS   • Nipple reconstruction  7/11/2011     Performed by JAK ROSALES at SURGERY [facility] ORS   • Hip hemiarthroplasty  5/14/2012     left; Performed by KEITH COWART at SURGERY [facility] ORS   • Other surgical procedure  2004     bladder repair   • Breast reconstruction  11/26/2012     Performed  "by Ele Meza M.D. at SURGERY [facility] ORS   • Wound closure general  4/10/2013     Performed by Nano Riley M.D. at SURGERY SAME DAY [facility] ORS   • Breast implant removal  4/10/2013     Performed by Ele Meza M.D. at SURGERY SAME DAY [facility] ORS   • Femur nailing intramedullary Right 6/3/2015     Procedure: FEMUR NAILING INTRAMEDULLARY;  Surgeon: Deshaun Denis M.D.;  Location: SURGERY [facility] ORS;  Service:        CURRENT MEDICATIONS  Home Medications     **Home medications have not yet been reviewed for this encounter**          ALLERGIES  Allergies   Allergen Reactions   • Alendronate-Butylpar-Propylpar-Saccharin [Fosamax] Diarrhea     .       PHYSICAL EXAM  VITAL SIGNS: /91 mmHg  Pulse 106  Temp(Src) 36.6 °C (97.9 °F)  Ht 1.727 m (5' 7.99\")  Wt 79.7 kg (175 lb 11.3 oz)  BMI 26.72 kg/m2  SpO2 97%  Constitutional: Well developed, Well nourished, No acute distress, Non-toxic appearance.   HENT: Normocephalic, Atraumatic, Bilateral external ears normal, Oropharynx moist, No oral exudates, Nose normal.   Eyes: PERRL, EOMI, Conjunctiva normal, No discharge.   Neck: Normal range of motion, No tenderness, Supple, No stridor.   Lymphatic: No lymphadenopathy noted.   Cardiovascular: Heart rate regular with occasional irregularity, rapid rate No murmurs, No rubs, No gallops.   Thorax & Lungs: Normal breath sounds, No respiratory distress, No wheezing, No chest tenderness.   Abdomen:  No tenderness, no guarding no rigidity and the abdomen is soft.  No masses, No pulsatile masses.  Skin: Warm, Dry, No erythema, No rash.   Back: No tenderness, No CVA tenderness.   Extremities: Intact distal pulses, No edema, No tenderness, No cyanosis, No clubbing.   Musculoskeletal: Good range of motion in all major joints. No tenderness to palpation or major deformities noted.   Neurologic: Alert & oriented x 3, Normal motor function, Normal sensory function, No focal deficits noted. "   Psychiatric: Affect normal, Judgment normal, Mood normal.   EKG Interpretation    Interpreted by me    Rhythm: Atrial flutter  Rate: 125     Axis: -20 Ectopy: none  Conduction: normal  ST Segments: ST depression in inferior, anterior and lateral leads. T Waves: no acute change  Q Waves: none    Clinical Impression: I do an old EKG to compare to. Previous EKG demonstrates sinus rhythm, rate of 56, the ST depression is new as is the atrial flutter.    RADIOLOGY/PROCEDURES  DX-CHEST-PORTABLE (1 VIEW)   Final Result      No radiographic evidence of acute cardiopulmonary process.            COURSE & MEDICAL DECISION MAKING  Pertinent Labs & Imaging studies reviewed. (See chart for details) BNP, normal, electrolytes normal, renal function and liver function, lipase normal troponin normal white count and hematocrit. Platelets are normal      Concerta today complaining of rapid heart rate, feeling off for the last 2 weeks. EKG demonstrates possible atrial flutter. She is given IV normal saline for hydration.  Heart rate goes anywhere from 106 up to 130. Possibly atrial flutter at 106 hour that would be an unusual right. I will talk to cardiology about consultation hospitalist about admission.   I will talk with cardiology about further disposition  FINAL IMPRESSION  1.   1. Tachycardia    2. Palpitations      2.   3.     Disposition    Electronically signed by: Timmy Power, 5/18/2017 11:37 AM

## 2017-05-18 NOTE — ED NOTES
Med Rec completed per patient  Allergies reviewed    Patient is currently on Cipro for a UTI she in on day 4  Was discharged from [facility] 2 weeks ago

## 2017-05-18 NOTE — IP AVS SNAPSHOT
" <p align=\"LEFT\"><IMG SRC=\"//[email or web address]$/[email or web address]\" alt=\"Image\" WIDTH=\"50%\" HEIGHT=\"200\" BORDER=\"\"></p>                   Name:Meron Rice  Medical Record Number:[id number]  CSN: [id number]    YOB: 1932   Age: 84 y.o.  Sex: female  HT:1.727 m (5' 7.99\") WT: 81 kg (178 lb 9.2 oz)          Admit Date: 5/18/2017     Discharge Date:   Today's Date: 5/20/2017  Attending Doctor:  Diony Weston M.D.                  Allergies:  Alendronate-butylpar-propylpar-saccharin          Your appointments     May 25, 2017 10:45 AM   [facility]ham MD,[facility]    [address]  [address]  [place] [id number] [phone]            Sep 21, 2017  2:00 PM   New Patient with Estella Larkin M.D.   [facility] (--)    [address]  [address]  [place] [id number] [phone]           Please bring Photo ID, Insurance Cards, All Medication Bottles and copies of any legal documents (such as Living Will, Power of ) If speaking a language besides English please bring an adult . Please arrive 30 minutes prior for check in and registration. You will be receiving a confirmation call a few days before your appointment from our automated call confirmation system.                 Medication List      Take these Medications        Instructions    apixaban 5mg Tabs   Commonly known as:  ELIQUIS    Take 1 Tab by mouth 2 Times a Day.   Dose:  5 mg       aspirin EC 81 MG Tbec   Commonly known as:  ECOTRIN    Take 1 Tab by mouth every day.   Dose:  81 mg       bethanechol 10 MG Tabs   Commonly known as:  URECHOLINE    Take 1 Tab by mouth 3 times a day.   Dose:  10 mg       cefdinir 300 MG Caps   Commonly known as:  OMNICEF    Take 1 Cap by mouth every 12 hours for 5 days.   Dose:  300 mg       digoxin 125 MCG Tabs   Commonly known as:  LANOXIN    Take 1 Tab by mouth every day " at 6 PM.   Dose:  125 mcg       fluoxetine 40 MG capsule   Commonly known as:  PROZAC    Doctor's comments:  Replaces 20 mg dose sent earlier today   Take 1 Cap by mouth every day.   Dose:  40 mg       lactobacillus granules Pack    Take 1 Packet by mouth 3 times a day, with meals for 5 days.   Dose:  1 Packet       meloxicam 7.5 MG Tabs   Commonly known as:  MOBIC    Take 7.5 mg by mouth every day.   Dose:  7.5 mg       metoprolol 50 MG Tabs   Commonly known as:  LOPRESSOR    Take 1 Tab by mouth 2 Times a Day.   Dose:  50 mg       PROBIOTIC PO    Take 1 Cap by mouth every day.   Dose:  1 Cap       tamsulosin 0.4 MG capsule   Commonly known as:  FLOMAX    Take 1 Cap by mouth ONE-HALF HOUR AFTER BREAKFAST.   Dose:  0.4 mg       tramadol 50 MG Tabs   Commonly known as:  ULTRAM    Take 1 Tab by mouth every 6 hours as needed for Severe Pain.   Dose:  50 mg

## 2017-05-18 NOTE — IP AVS SNAPSHOT
5/20/2017    Meron Rice  [address]  [place] [address]    Dear Meron Worley:    [facility] wants to ensure your discharge home is safe and you or your loved ones have had all of your questions answered regarding your care after you leave the hospital.    Below is a list of resources and contact information should you have any questions regarding your hospital stay, follow-up instructions, or active medical symptoms.    Questions or Concerns Regarding… Contact   Medical Questions Related to Your Discharge  (7 days a week, 8am-5pm) Contact a Nurse Care Coordinator   [phone]   Medical Questions Not Related to Your Discharge  (24 hours a day / 7 days a week)  Contact the Nurse Health Line   [phone]    Medications or Discharge Instructions Refer to your discharge packet   or contact your [facility] Primary Care Provider   [phone]   Follow-up Appointment(s) Schedule your appointment via [employer]   or contact Scheduling [phone]   Billing Review your statement via [employer]  or contact Billing [phone]   Medical Records Review your records via [employer]   or contact Medical Records [phone]     You may receive a telephone call within two days of discharge. This call is to make certain you understand your discharge instructions and have the opportunity to have any questions answered. You can also easily access your medical information, test results and upcoming appointments via the [employer] free online health management tool. You can learn more and sign up at [employer]. For assistance setting up your [employer] account, please call [phone].    Once again, we want to ensure your discharge home is safe and that you have a clear understanding of any next steps in your care. If you have any questions or concerns, please do not hesitate to contact us, we are here for you. Thank you for choosing [facility] for your healthcare needs.    Sincerely,    Your [facility] Healthcare Team

## 2017-05-18 NOTE — ED NOTES
Seen and evaluated by erp-orders recvd. Iv started, bld to lab, aware of pending cxr. In no apparent distress, despite hr 100's-120 on moniter

## 2017-05-18 NOTE — PROGRESS NOTES
Pt sitting in bed, semi-fowlers, eating lunch tray, no s/s of acute distress, safety precautions in place.

## 2017-05-18 NOTE — IP AVS SNAPSHOT
" Home Care Instructions                                                                                                                  Name:Meron Rice  Medical Record Number:[id number]  CSN: [id number]    YOB: 1932   Age: 84 y.o.  Sex: female  HT:1.727 m (5' 7.99\") WT: 81 kg (178 lb 9.2 oz)          Admit Date: 5/18/2017     Discharge Date:   Today's Date: 5/20/2017  Attending Doctor:  Diony Weston M.D.                  Allergies:  Alendronate-butylpar-propylpar-saccharin            Discharge Instructions       Discharge Instructions    Discharged to home by car with relative. Discharged via wheelchair, hospital escort: Yes.  Special equipment needed: Walker    Be sure to schedule a follow-up appointment with your primary care doctor or any specialists as instructed.     Discharge Plan:   Influenza Vaccine Indication: Not indicated: Previously immunized this influenza season and > 8 years of age    I understand that a diet low in cholesterol, fat, and sodium is recommended for good health. Unless I have been given specific instructions below for another diet, I accept this instruction as my diet prescription.   Other diet: None     Special Instructions: Cardiac     · Is patient discharged on Warfarin / Coumadin?   No     · Is patient Post Blood Transfusion?  No    Depression / Suicide Risk    As you are discharged from this [facility] facility, it is important to learn how to keep safe from harming yourself.    Recognize the warning signs:  · Abrupt changes in personality, positive or negative- including increase in energy   · Giving away possessions  · Change in eating patterns- significant weight changes-  positive or negative  · Change in sleeping patterns- unable to sleep or sleeping all the time   · Unwillingness or inability to communicate  · Depression  · Unusual sadness, discouragement and loneliness  · Talk of wanting to die  · Neglect of personal appearance   · Rebelliousness- " reckless behavior  · Withdrawal from people/activities they love  · Confusion- inability to concentrate     If you or a loved one observes any of these behaviors or has concerns about self-harm, here's what you can do:  · Talk about it- your feelings and reasons for harming yourself  · Remove any means that you might use to hurt yourself (examples: pills, rope, extension cords, firearm)  · Get professional help from the community (Mental Health, Substance Abuse, psychological counseling)  · Do not be alone:Call your Safe Contact- someone whom you trust who will be there for you.  · Call your local CRISIS HOTLINE 849-4371 or 788-259-0689  · Call your local Children's Mobile Crisis Response Team [place] (997) 690-4482 or www.Mindflash  · Call the toll free National Suicide Prevention Hotlines   · National Suicide Prevention Lifeline 332-751-KKMX (7227)  · [employer] Line Network 800-KNPSJWA (692-5892)    Atrial Fibrillation  Atrial fibrillation is a type of irregular heart rhythm (arrhythmia). During atrial fibrillation, the upper chambers of the heart (atria) quiver continuously in a chaotic pattern. This causes an irregular and often rapid heart rate.   Atrial fibrillation is the result of the heart becoming overloaded with disorganized signals that tell it to beat. These signals are normally released one at a time by a part of the right atrium called the sinoatrial node. They then travel from the atria to the lower chambers of the heart (ventricles), causing the atria and ventricles to contract and pump blood as they pass. In atrial fibrillation, parts of the atria outside of the sinoatrial node also release these signals. This results in two problems. First, the atria receive so many signals that they do not have time to fully contract. Second, the ventricles, which can only receive one signal at a time, beat irregularly and out of rhythm with the atria.   There are three types of atrial  fibrillation:   · Paroxysmal. Paroxysmal atrial fibrillation starts suddenly and stops on its own within a week.  · Persistent. Persistent atrial fibrillation lasts for more than a week. It may stop on its own or with treatment.  · Permanent. Permanent atrial fibrillation does not go away. Episodes of atrial fibrillation may lead to permanent atrial fibrillation.  Atrial fibrillation can prevent your heart from pumping blood normally. It increases your risk of stroke and can lead to heart failure.   CAUSES   · Heart conditions, including a heart attack, heart failure, coronary artery disease, and heart valve conditions.    · Inflammation of the sac that surrounds the heart (pericarditis).  · Blockage of an artery in the lungs (pulmonary embolism).  · Pneumonia or other infections.  · Chronic lung disease.  · Thyroid problems, especially if the thyroid is overactive (hyperthyroidism).  · Caffeine, excessive alcohol use, and use of some illegal drugs.    · Use of some medicines, including certain decongestants and diet pills.  · Heart surgery.    · Birth defects.    Sometimes, no cause can be found. When this happens, the atrial fibrillation is called lone atrial fibrillation. The risk of complications from atrial fibrillation increases if you have lone atrial fibrillation and you are age 60 years or older.  RISK FACTORS  · Heart failure.  · Coronary artery disease.  · Diabetes mellitus.    · High blood pressure (hypertension).    · Obesity.    · Other arrhythmias.    · Increased age.  SIGNS AND SYMPTOMS   · A feeling that your heart is beating rapidly or irregularly.    · A feeling of discomfort or pain in your chest.    · Shortness of breath.    · Sudden light-headedness or weakness.    · Getting tired easily when exercising.    · Urinating more often than normal (mainly when atrial fibrillation first begins).    In paroxysmal atrial fibrillation, symptoms may start and suddenly stop.  DIAGNOSIS   Your health care  provider may be able to detect atrial fibrillation when taking your pulse. Your health care provider may have you take a test called an ambulatory electrocardiogram (ECG). An ECG records your heartbeat patterns over a 24-hour period. You may also have other tests, such as:  · Transthoracic echocardiogram (TTE). During echocardiography, sound waves are used to evaluate how blood flows through your heart.  · Transesophageal echocardiogram (UNA).  · Stress test. There is more than one type of stress test. If a stress test is needed, ask your health care provider about which type is best for you.  · Chest X-ray exam.  · Blood tests.  · Computed tomography (CT).  TREATMENT   Treatment may include:  · Treating any underlying conditions. For example, if you have an overactive thyroid, treating the condition may correct atrial fibrillation.  · Taking medicine. Medicines may be given to control a rapid heart rate or to prevent blood clots, heart failure, or a stroke.  · Having a procedure to correct the rhythm of the heart:  ¨ Electrical cardioversion. During electrical cardioversion, a controlled, low-energy shock is delivered to the heart through your skin. If you have chest pain, very low blood pressure, or sudden heart failure, this procedure may need to be done as an emergency.  ¨ Catheter ablation. During this procedure, heart tissues that send the signals that cause atrial fibrillation are destroyed.  ¨ Surgical ablation. During this surgery, thin lines of heart tissue that carry the abnormal signals are destroyed. This procedure can either be an open-heart surgery or a minimally invasive surgery. With the minimally invasive surgery, small cuts are made to access the heart instead of a large opening.  ¨ Pulmonary venous isolation. During this surgery, tissue around the veins that carry blood from the lungs (pulmonary veins) is destroyed. This tissue is thought to carry the abnormal signals.  HOME CARE INSTRUCTIONS    · Take medicines only as directed by your health care provider. Some medicines can make atrial fibrillation worse or recur.  · If blood thinners were prescribed by your health care provider, take them exactly as directed. Too much blood-thinning medicine can cause bleeding. If you take too little, you will not have the needed protection against stroke and other problems.  · Perform blood tests at home if directed by your health care provider. Perform blood tests exactly as directed.  · Quit smoking if you smoke.  · Do not drink alcohol.  · Do not drink caffeinated beverages such as coffee, soda, and some teas. You may drink decaffeinated coffee, soda, or tea.    · Maintain a healthy weight. Do not use diet pills unless your health care provider approves. They may make heart problems worse.    · Follow diet instructions as directed by your health care provider.  · Exercise regularly as directed by your health care provider.  · Keep all follow-up visits as directed by your health care provider. This is important.  PREVENTION   The following substances can cause atrial fibrillation to recur:   · Caffeinated beverages.  · Alcohol.  · Certain medicines, especially those used for breathing problems.  · Certain herbs and herbal medicines, such as those containing ephedra or ginseng.  · Illegal drugs, such as cocaine and amphetamines.  Sometimes medicines are given to prevent atrial fibrillation from recurring. Proper treatment of any underlying condition is also important in helping prevent recurrence.   SEEK MEDICAL CARE IF:  · You notice a change in the rate, rhythm, or strength of your heartbeat.  · You suddenly begin urinating more frequently.  · You tire more easily when exerting yourself or exercising.  SEEK IMMEDIATE MEDICAL CARE IF:   · You have chest pain, abdominal pain, sweating, or weakness.  · You feel nauseous.  · You have shortness of breath.  · You suddenly have swollen feet and ankles.  · You feel  dizzy.  · Your face or limbs feel numb or weak.  · You have a change in your vision or speech.  MAKE SURE YOU:   · Understand these instructions.  · Will watch your condition.  · Will get help right away if you are not doing well or get worse.     This information is not intended to replace advice given to you by your health care provider. Make sure you discuss any questions you have with your health care provider.     Document Released: 12/18/2006 Document Revised: 01/08/2016 Document Reviewed: 04/13/2016  [employer] Interactive Patient Education ©2016 Elsevier Inc.    Apixaban oral tablets  What is this medicine?  APIXABAN is an anticoagulant (blood thinner). It is used to lower the chance of stroke in people with a medical condition called atrial fibrillation. It is also used after knee or hip surgeries to prevent blood clots.  This medicine may be used for other purposes; ask your health care provider or pharmacist if you have questions.  COMMON BRAND NAME(S): Sherrylázaro  What should I tell my health care provider before I take this medicine?  They need to know if you have any of these conditions:  -bleeding disorders  -bleeding in the brain  -blood in your stools (black or tarry stools) or if you have blood in your vomit  -history of stomach bleeding  -kidney disease  -liver disease  -mechanical heart valve  -an unusual or allergic reaction to apixaban, other medicines, foods, dyes, or preservatives  -pregnant or trying to get pregnant  -breast-feeding  How should I use this medicine?  Take this medicine by mouth with a glass of water. Follow the directions on the prescription label. You can take it with or without food. If it upsets your stomach, take it with food. Take your medicine at regular intervals. Do not take it more often than directed. Do not stop taking except on your doctor's advice.  Talk to your pediatrician regarding the use of this medicine in children. Special care may be needed.  Overdosage: If  you think you've taken too much of this medicine contact a poison control center or emergency room at once.  Overdosage: If you think you have taken too much of this medicine contact a poison control center or emergency room at once.  NOTE: This medicine is only for you. Do not share this medicine with others.  What if I miss a dose?  If you miss a dose, take it as soon as you can. If it is almost time for your next dose, take only that dose. Do not take double or extra doses.  What may interact with this medicine?  This medicine may interact with the following:  -aspirin and aspirin-like medicines  -certain medicines for fungal infections like ketoconazole and itraconazole  -certain medicines for seizures like carbamazepine and phenytoin  -certain medicines that treat or prevent blood clots like warfarin, enoxaparin, and dalteparin  -clarithromycin  -NSAIDs, medicines for pain and inflammation, like ibuprofen or naproxen  -rifampin  -ritonavir  -[place] wort  This list may not describe all possible interactions. Give your health care provider a list of all the medicines, herbs, non-prescription drugs, or dietary supplements you use. Also tell them if you smoke, drink alcohol, or use illegal drugs. Some items may interact with your medicine.  What should I watch for while using this medicine?  Do not stop taking this medicine without first talking to your doctor. Stopping this medicine may increase your risk of having a stroke. Be sure to refill your prescription before you run out of medicine.  This medicine may increase your risk to bruise or bleed. Call your doctor or health care professional if you notice any unusual bleeding.  Be careful brushing and flossing your teeth or using a toothpick because you may bleed more easily. If you have any dental work done, tell your dentist you are receiving this medicine.  What side effects may I notice from receiving this medicine?  Side effects that you should report to  your doctor or health care professional as soon as possible:  -allergic reactions like skin rash, itching or hives, swelling of the face, lips, or tongue  -bloody or black, tarry stools  -changes in vision  -confusion, trouble speaking or understanding  -red or dark-brown urine  -red spots on the skin  -severe headaches  -spitting up blood or brown material that looks like coffee grounds  -sudden numbness or weakness of the face, arm or leg  -trouble walking, dizziness, loss of balance or coordination  -unusual bruising or bleeding from the eye, gums, or nose  This list may not describe all possible side effects. Call your doctor for medical advice about side effects. You may report side effects to FDA at 0-280-FLG-8746.  Where should I keep my medicine?  Keep out of the reach of children.  Store at room temperature between 20 and 25 degrees C (68 and 77 degrees F). Throw away any unused medicine after the expiration date.  NOTE: This sheet is a summary. It may not cover all possible information. If you have questions about this medicine, talk to your doctor, pharmacist, or health care provider.  © 2014, Elsevier/Gold Standard. (3/17/2014 3:30:42 PM)          Your appointments     May 25, 2017 10:45 AM   [facility] ([facility]    [address]  [address]  [facility] [id number] [phone]            Sep 21, 2017  2:00 PM   New Patient with Estella Larkin M.D.   [facility] (--)    [address]  [address]  [facility] [address] [phone]           Please bring Photo ID, Insurance Cards, All Medication Bottles and copies of any legal documents (such as Living Will, Power of ) If speaking a language besides English please bring an adult . Please arrive 30 minutes prior for check in and registration. You will be receiving a confirmation call a few days before  your appointment from our automated call confirmation system.                 Discharge Medication Instructions:    Below are the medications your physician expects you to take upon discharge:    Review all your home medications and newly ordered medications with your doctor and/or pharmacist. Follow medication instructions as directed by your doctor and/or pharmacist.    Please keep your medication list with you and share with your physician.               Medication List      START taking these medications        Instructions    Morning Afternoon Evening Bedtime    apixaban 5mg Tabs   Last time this was given:  5 mg on 5/20/2017  8:00 AM   Commonly known as:  ELIQUIS        Take 1 Tab by mouth 2 Times a Day.   Dose:  5 mg                        cefdinir 300 MG Caps   Last time this was given:  300 mg on 5/20/2017  8:01 AM   Commonly known as:  OMNICEF        Take 1 Cap by mouth every 12 hours for 5 days.   Dose:  300 mg                        digoxin 125 MCG Tabs   Last time this was given:  125 mcg on 5/20/2017  6:27 PM   Commonly known as:  LANOXIN        Take 1 Tab by mouth every day at 6 PM.   Dose:  125 mcg                        lactobacillus granules Pack   Last time this was given:  1 Packet on 5/20/2017  6:26 PM        Take 1 Packet by mouth 3 times a day, with meals for 5 days.   Dose:  1 Packet                        metoprolol 50 MG Tabs   Last time this was given:  50 mg on 5/20/2017  8:02 AM   Commonly known as:  LOPRESSOR        Take 1 Tab by mouth 2 Times a Day.   Dose:  50 mg                          CONTINUE taking these medications        Instructions    Morning Afternoon Evening Bedtime    aspirin EC 81 MG Tbec   Last time this was given:  81 mg on 5/18/2017  3:52 PM   Commonly known as:  ECOTRIN        Take 1 Tab by mouth every day.   Dose:  81 mg                        bethanechol 10 MG Tabs   Last time this was given:  10 mg on 5/20/2017  4:11 PM   Commonly known as:  URECHOLINE        Take  1 Tab by mouth 3 times a day.   Dose:  10 mg                        fluoxetine 40 MG capsule   Last time this was given:  40 mg on 5/20/2017  8:00 AM   Commonly known as:  PROZAC        Doctor's comments:  Replaces 20 mg dose sent earlier today   Take 1 Cap by mouth every day.   Dose:  40 mg                        meloxicam 7.5 MG Tabs   Commonly known as:  MOBIC        Take 7.5 mg by mouth every day.   Dose:  7.5 mg                        PROBIOTIC PO        Take 1 Cap by mouth every day.   Dose:  1 Cap                        tamsulosin 0.4 MG capsule   Last time this was given:  0.4 mg on 5/20/2017  8:02 AM   Commonly known as:  FLOMAX        Take 1 Cap by mouth ONE-HALF HOUR AFTER BREAKFAST.   Dose:  0.4 mg                        tramadol 50 MG Tabs   Last time this was given:  50 mg on 5/20/2017  4:11 PM   Commonly known as:  ULTRAM        Take 1 Tab by mouth every 6 hours as needed for Severe Pain.   Dose:  50 mg                          STOP taking these medications     ciprofloxacin 500 MG Tabs   Commonly known as:  CIPRO               lisinopril 20 MG Tabs   Commonly known as:  PRINIVIL                    Where to Get Your Medications      These medications were sent to [facility] [address] [place] [address] (), [place] [address]  [address] [place] [address]) [place] [address]     Phone:  [phone]    - apixaban 5mg Tabs  - cefdinir 300 MG Caps  - digoxin 125 MCG Tabs  - lactobacillus granules Pack  - metoprolol 50 MG Tabs            Orders for after discharge     REFERRAL TO HOME HEALTH    Complete by:  As directed    Home health will create and establish a plan of care             Instructions           Diet / Nutrition:    Follow any diet instructions given to you by your doctor or the dietician, including how much salt (sodium) you are allowed each day.    If you are overweight, talk to your doctor about a weight reduction plan.    Activity:    Remain physically active following your doctor's instructions  about exercise and activity.    Rest often.     Any time you become even a little tired or short of breath, SIT DOWN and rest.    Worsening Symptoms:    Report any of the following signs and symptoms to the doctor's office immediately:    *Pain of jaw, arm, or neck  *Chest pain not relieved by medication                               *Dizziness or loss of consciousness  *Difficulty breathing even when at rest   *More tired than usual                                       *Bleeding drainage or swelling of surgical site  *Swelling of feet, ankles, legs or stomach                 *Fever (>100ºF)  *Pink or blood tinged sputum  *Weight gain (3lbs/day or 5lbs /week)           *Shock from internal defibrillator (if applicable)  *Palpitations or irregular heartbeats                *Cool and/or numb extremities    Stroke Awareness    Common Risk Factors for Stroke include:    Age  Atrial Fibrillation  Carotid Artery Stenosis  Diabetes Mellitus  Excessive alcohol consumption  High blood pressure  Overweight   Physical inactivity  Smoking    Warning signs and symptoms of a stroke include:    *Sudden numbness or weakness of the face, arm or leg (especially on one side of the body).  *Sudden confusion, trouble speaking or understanding.  *Sudden trouble seeing in one or both eyes.  *Sudden trouble walking, dizziness, loss of balance or coordination.Sudden severe headache with no known cause.    It is very important to get treatment quickly when a stroke occurs. If you experience any of the above warning signs, call 911 immediately.                   Disclaimer         Quit Smoking / Tobacco Use:    I understand the use of any tobacco products increases my chance of suffering from future heart disease or stroke and could cause other illnesses which may shorten my life. Quitting the use of tobacco products is the single most important thing I can do to improve my health. For further information on smoking / tobacco cessation call  a Toll Free Quit Line at 1-690.981.2237 (*National [employer]) or 1-598.886.6619 (American Lung Association) or you can access the web based program at www.lungusa.org.    [place] Tobacco Users Help Line:  (270) 691-5491       Toll Free: 1-196.430.7516  Quit Tobacco Program [facility] (021)912-5428    Crisis Hotline:    [place] Crisis Hotline:  8-721-UDZQOHH or 1-899.675.1811    [place] Crisis Hotline:    1-280.108.1839 or 986-868-8824    Discharge Survey:   Thank you for choosing [facility]. We hope we did everything we could to make your hospital stay a pleasant one. You may be receiving a phone survey and we would appreciate your time and participation in answering the questions. Your input is very valuable to us in our efforts to improve our service to our patients and their families.        My signature on this form indicates that:    1. I have reviewed and understand the above information.  2. My questions regarding this information have been answered to my satisfaction.  3. I have formulated a plan with my discharge nurse to obtain my prescribed medications for home.                  Disclaimer         __________________________________                     __________       ________                       Patient Signature                                                 Date                    Time

## 2017-05-18 NOTE — PROGRESS NOTES
2 RN skin assessment done; skin not WDL. See Wound flowsheet.    BLE edema, skin is red/tight, multiple dry scabs (no drainage) on r leg (shin), red buttocks w/some blanching.   No need for wnd care.    2nd RN check done by AHSAN Ortiz.

## 2017-05-18 NOTE — ED NOTES
"Chief Complaint   Patient presents with   • Irregular Heart Beat     x 2 weeks   • Shortness of Breath     Ht 1.727 m (5' 7.99\")  Wt 79.7 kg (175 lb 11.3 oz)  BMI 26.72 kg/m2    Pt currently denies c/o CP, abd pain or back pain, states has felt \"heart fluttering for about two weeks,\" c/o feeling increasingly short of breath worse w/ activity  "

## 2017-05-18 NOTE — IP AVS SNAPSHOT
"[employer] Access Code: Activation code not generated  Current "[employer] Status: Active    [employer]  A secure, online tool to manage your health information     [employer]’s "[employer]® is a secure, online tool that connects you to your personalized health information from the privacy of your home -- day or night - making it very easy for you to manage your healthcare. Once the activation process is completed, you can even access your medical information using the "[employer]." tess, which is available for free in the Apple Tess store or Google Play store.     "[employer] provides the following levels of access (as shown below):   My Chart Features   [facility] Primary Care Doctor [facility]  Specialists [facility]  Urgent  Care Non-[facility]  Primary Care  Doctor   Email your healthcare team securely and privately 24/7 X X X X   Manage appointments: schedule your next appointment; view details of past/upcoming appointments X      Request prescription refills. X      View recent personal medical records, including lab and immunizations X X X X   View health record, including health history, allergies, medications X X X X   Read reports about your outpatient visits, procedures, consult and ER notes X X X X   See your discharge summary, which is a recap of your hospital and/or ER visit that includes your diagnosis, lab results, and care plan. X X       How to register for "[employer]:  1. Go to  [email or web address].  2. Click on the Sign Up Now box, which takes you to the New Member Sign Up page. You will need to provide the following information:  a. Enter your "[employer] Access Code exactly as it appears at the top of this page. (You will not need to use this code after you’ve completed the sign-up process. If you do not sign up before the expiration date, you must request a new code.)   b. Enter your date of birth.   c. Enter your home email address.   d. Click Submit, and follow the next screen’s instructions.  3. Create a "[employer]." ID. This will  be your [employer] login ID and cannot be changed, so think of one that is secure and easy to remember.  4. Create a [employer] password. You can change your password at any time.  5. Enter your Password Reset Question and Answer. This can be used at a later time if you forget your password.   6. Enter your e-mail address. This allows you to receive e-mail notifications when new information is available in [employer].  7. Click Sign Up. You can now view your health information.    For assistance activating your [employer] account, call [phone]

## 2017-05-18 NOTE — H&P
PRIMARY CARE PHYSICIAN:  Lorenzo Allen MD    CHIEF COMPLAINT:  Palpitations.    HISTORY OF PRESENT ILLNESS:  Patient is an 84-year-old white female who states   for the past 2 weeks she has been having palpitations, she currently has home   health after being discharged from a skilled nursing facility.  Apparently,   she has not been able to get physical therapy, occupational therapy now for   the past 2 weeks secondary to the palpitations and tachycardia.  She was   scheduled to see cardiologist; however, with her increasing palpitations, she   proceeded to the emergency room.  She denied having any chest pains or   shortness of breath or diaphoresis.  Denies any recent fever, chills, cough,   congestion, abdominal pain, nausea, vomiting, diarrhea, or dysuria.  She has   history of a neurogenic bladder and again has another urinary tract infection.    Her urine culture on 5/12/2017, shows Streptococcus viridans as well as E.   coli.  She was then started again on Cipro, which she only started today.    Heart rate is in the 130s initially was found to be in atrial flutter.  She   could be coming in and out of it.  She appears to may be now more in atrial   fibrillation with rapid ventricular rate.  Cardiology has been consulted by ER   physician.  Patient will be admitted for further evaluation and management.    PAST MEDICAL HISTORY:  1.  Recurrent urinary tract infections.  2.  Neurogenic bladder.  3.  History of breast cancer.  4.  History of uterine cancer.  5.  Chronic back pain.  6.  Chronic hip pain.  7.  Foot drop.  8.  History of deep venous thrombosis.  9.  Depression.  10.  Hypertension.    PAST SURGICAL HISTORY:  1.  D and C.  2.  Breast implant revision.  3.  Breast implant removal.  4.  Breast reconstruction.  5.  Hip hemiarthroplasty.  6.  Status post IM nailing of right femur.  7.  Hysterectomy.    FAMILY HISTORY:  Mother with history of diabetes and heart disease.  Father   with history of stroke  and hypertension.    SOCIAL HISTORY:  Patient is a previous smoker only 3-4 years, quit back in   1964, occasional alcohol drinker, no illicit drug use.    ALLERGIES:  ALENDRONATE.    CURRENT MEDICATIONS:  1.  Aspirin 81 mg per day.  2.  Urecholine 1 tablet two times a day.  3.  Ciprofloxacin 500 mg twice a day.  4.  Prozac 40 mg per day.  5.  Lisinopril 20 mg per day.  6.  Probiotic 1 tab per day.  7.  Flomax 0.4 mg a day.  8.  Ultram 50 mg every 6 hours as needed for pain.    REVIEW OF SYSTEMS:  Negative except for those stated above, reviewed per AMA   criteria.    PHYSICAL EXAMINATION:  VITAL SIGNS:  Blood pressure of 140/90, pulse rate of 116, respiratory rate   20, temperature of 97.9 degrees Fahrenheit, O2 sat of 98% on room air.  GENERAL:  Elderly white female lying in bed in no acute distress, alert and   oriented x3.  HEENT:  Normocephalic, atraumatic.   Pupils equally reactive, responds to   light.  EOMs intact.  Oropharynx is moist and clear.  NECK:  Shows no thyromegaly, lymphadenopathy, or carotid bruits.  CHEST:  Symmetrical chest expansion.  Clear to auscultation bilaterally.  CARDIOVASCULAR:  Tachycardic, irregular rate and rhythm.  S1, S2 distinct.    There is no S3, no murmurs appreciated.  ABDOMEN:  Normoactive bowel sounds, soft, nontender, nondistended.  No rebound   or rigidity.  EXTREMITIES:  Show no clubbing, cyanosis, or edema.  Pulses +2.  NEUROLOGIC:  Cranial nerves II-XII intact.  No gross motor or sensory   deficits.    LABORATORY DATA:  WBC 7.1, hemoglobin 15.2, hematocrit 45.8, platelet count   230.  Sodium 130, potassium 3.9, glucose 107, creatinine 0.93.  Troponin 0.02.    Lipase 216.  BNP is 165.    IMAGING:  Chest x-ray is clear.  EKG shows atrial fibrillation, rapid   ventricular rate.    ASSESSMENT AND PLAN:  1.  Atrial fibrillation with rapid ventricular rate.  Place the patient on a   beta blocker which is metoprolol.  Also give IV metoprolol if staying heart   rate more than  130, we will place the patient on Lovenox.  She does not want   to be on anticoagulation with Coumadin, but would prefer the newer agents.  We   will check an echocardiogram.  Cardiology, Dr. Bradshaw has been consulted by   the ER physician.  2.  Urinary tract infection.  I reviewed the urine culture results with strep   Viridans as well as E. coli.  We will place on Omnicef.  3.  Renal insufficiency.  We will cautiously hydrate patient.  4.  Elevated BNP.  Again, we will check an echocardiogram.  5.  Hypertension.  We will change the lisinopril to metoprolol for rate   control as well as blood pressure control.  7.  Chronic hip and back pain.  Continue patient's tramadol.  8.  Depression.  Continue patient's Prozac.  9.  Neurogenic bladder.  Continue patient's urecholine as well as Flomax.  9.  Prophylaxis.  Patient is already on Lovenox.  10.  Code status is full.       ____________________________________     MD GIBSON SHINE / EVELYN    DD:  05/18/2017 13:40:26  DT:  05/18/2017 16:55:45    D#:  [id number]  Job#:  [id number]

## 2017-05-19 PROBLEM — F32.A DEPRESSION: Status: ACTIVE | Noted: 2017-05-19

## 2017-05-19 PROBLEM — I10 HYPERTENSION: Status: ACTIVE | Noted: 2017-05-19

## 2017-05-19 PROBLEM — M25.559 CHRONIC HIP PAIN: Status: ACTIVE | Noted: 2017-05-19

## 2017-05-19 PROBLEM — G89.29 CHRONIC HIP PAIN: Status: ACTIVE | Noted: 2017-05-19

## 2017-05-19 PROBLEM — N39.0 RECURRENT UTI: Status: ACTIVE | Noted: 2017-05-19

## 2017-05-19 PROBLEM — E87.70 HYPERVOLEMIA: Status: ACTIVE | Noted: 2017-05-19

## 2017-05-19 PROBLEM — N17.9 AKI (ACUTE KIDNEY INJURY) (HCC): Status: ACTIVE | Noted: 2017-05-19

## 2017-05-19 LAB
ALBUMIN SERPL BCP-MCNC: 3 G/DL (ref 3.2–4.9)
ALBUMIN/GLOB SERPL: 1.1 G/DL
ALP SERPL-CCNC: 119 U/L (ref 30–99)
ALT SERPL-CCNC: 23 U/L (ref 2–50)
ANION GAP SERPL CALC-SCNC: 7 MMOL/L (ref 0–11.9)
AST SERPL-CCNC: 29 U/L (ref 12–45)
BASOPHILS # BLD AUTO: 1.8 % (ref 0–1.8)
BASOPHILS # BLD: 0.12 K/UL (ref 0–0.12)
BILIRUB SERPL-MCNC: 0.7 MG/DL (ref 0.1–1.5)
BUN SERPL-MCNC: 18 MG/DL (ref 8–22)
CALCIUM SERPL-MCNC: 8.7 MG/DL (ref 8.4–10.2)
CHLORIDE SERPL-SCNC: 103 MMOL/L (ref 96–112)
CO2 SERPL-SCNC: 20 MMOL/L (ref 20–33)
CREAT SERPL-MCNC: 0.9 MG/DL (ref 0.5–1.4)
EOSINOPHIL # BLD AUTO: 0.52 K/UL (ref 0–0.51)
EOSINOPHIL NFR BLD: 7.8 % (ref 0–6.9)
ERYTHROCYTE [DISTWIDTH] IN BLOOD BY AUTOMATED COUNT: 47.1 FL (ref 35.9–50)
GFR SERPL CREATININE-BSD FRML MDRD: 60 ML/MIN/1.73 M 2
GLOBULIN SER CALC-MCNC: 2.8 G/DL (ref 1.9–3.5)
GLUCOSE SERPL-MCNC: 117 MG/DL (ref 65–99)
HCT VFR BLD AUTO: 39.1 % (ref 37–47)
HGB BLD-MCNC: 13.2 G/DL (ref 12–16)
IMM GRANULOCYTES # BLD AUTO: 0.02 K/UL (ref 0–0.11)
IMM GRANULOCYTES NFR BLD AUTO: 0.3 % (ref 0–0.9)
LV EJECT FRACT  99904: 55
LV EJECT FRACT MOD 2C 99903: 56.93
LV EJECT FRACT MOD 4C 99902: 59.06
LV EJECT FRACT MOD BP 99901: 59.86
LYMPHOCYTES # BLD AUTO: 2.52 K/UL (ref 1–4.8)
LYMPHOCYTES NFR BLD: 37.7 % (ref 22–41)
MCH RBC QN AUTO: 30.4 PG (ref 27–33)
MCHC RBC AUTO-ENTMCNC: 33.8 G/DL (ref 33.6–35)
MCV RBC AUTO: 90.1 FL (ref 81.4–97.8)
MONOCYTES # BLD AUTO: 0.94 K/UL (ref 0–0.85)
MONOCYTES NFR BLD AUTO: 14.1 % (ref 0–13.4)
NEUTROPHILS # BLD AUTO: 2.57 K/UL (ref 2–7.15)
NEUTROPHILS NFR BLD: 38.3 % (ref 44–72)
NRBC # BLD AUTO: 0 K/UL
NRBC BLD AUTO-RTO: 0 /100 WBC
PLATELET # BLD AUTO: 213 K/UL (ref 164–446)
PMV BLD AUTO: 9.7 FL (ref 9–12.9)
POTASSIUM SERPL-SCNC: 3.8 MMOL/L (ref 3.6–5.5)
PROT SERPL-MCNC: 5.8 G/DL (ref 6–8.2)
RBC # BLD AUTO: 4.34 M/UL (ref 4.2–5.4)
SODIUM SERPL-SCNC: 130 MMOL/L (ref 135–145)
WBC # BLD AUTO: 6.7 K/UL (ref 4.8–10.8)

## 2017-05-19 PROCEDURE — A9270 NON-COVERED ITEM OR SERVICE: HCPCS | Performed by: FAMILY MEDICINE

## 2017-05-19 PROCEDURE — A9270 NON-COVERED ITEM OR SERVICE: HCPCS | Performed by: INTERNAL MEDICINE

## 2017-05-19 PROCEDURE — 700102 HCHG RX REV CODE 250 W/ 637 OVERRIDE(OP): Performed by: FAMILY MEDICINE

## 2017-05-19 PROCEDURE — 99233 SBSQ HOSP IP/OBS HIGH 50: CPT | Performed by: INTERNAL MEDICINE

## 2017-05-19 PROCEDURE — 700102 HCHG RX REV CODE 250 W/ 637 OVERRIDE(OP): Performed by: INTERNAL MEDICINE

## 2017-05-19 PROCEDURE — 93306 TTE W/DOPPLER COMPLETE: CPT

## 2017-05-19 PROCEDURE — 80053 COMPREHEN METABOLIC PANEL: CPT

## 2017-05-19 PROCEDURE — 770020 HCHG ROOM/CARE - TELE (206)

## 2017-05-19 PROCEDURE — 85025 COMPLETE CBC W/AUTO DIFF WBC: CPT

## 2017-05-19 RX ORDER — METOPROLOL TARTRATE 50 MG/1
50 TABLET, FILM COATED ORAL TWICE DAILY
Status: DISCONTINUED | OUTPATIENT
Start: 2017-05-19 | End: 2017-05-20 | Stop reason: HOSPADM

## 2017-05-19 RX ADMIN — APIXABAN 5 MG: 5 TABLET, FILM COATED ORAL at 10:37

## 2017-05-19 RX ADMIN — BETHANECHOL CHLORIDE 10 MG: 10 TABLET ORAL at 20:43

## 2017-05-19 RX ADMIN — METOPROLOL TARTRATE 50 MG: 50 TABLET ORAL at 20:43

## 2017-05-19 RX ADMIN — CEFDINIR 300 MG: 300 CAPSULE ORAL at 20:43

## 2017-05-19 RX ADMIN — TRAMADOL HYDROCHLORIDE 50 MG: 50 TABLET, FILM COATED ORAL at 08:31

## 2017-05-19 RX ADMIN — LACTOBACILLUS ACIDOPHILUS / LACTOBACILLUS BULGARICUS 1 PACKET: 100 MILLION CFU STRENGTH GRANULES at 12:27

## 2017-05-19 RX ADMIN — BETHANECHOL CHLORIDE 10 MG: 10 TABLET ORAL at 14:15

## 2017-05-19 RX ADMIN — BETHANECHOL CHLORIDE 10 MG: 10 TABLET ORAL at 08:31

## 2017-05-19 RX ADMIN — CEFDINIR 300 MG: 300 CAPSULE ORAL at 08:30

## 2017-05-19 RX ADMIN — METOPROLOL TARTRATE 25 MG: 25 TABLET ORAL at 08:31

## 2017-05-19 RX ADMIN — TAMSULOSIN HYDROCHLORIDE 0.4 MG: 0.4 CAPSULE ORAL at 08:30

## 2017-05-19 RX ADMIN — FLUOXETINE 40 MG: 20 CAPSULE ORAL at 08:30

## 2017-05-19 RX ADMIN — LACTOBACILLUS ACIDOPHILUS / LACTOBACILLUS BULGARICUS 1 PACKET: 100 MILLION CFU STRENGTH GRANULES at 18:14

## 2017-05-19 RX ADMIN — LACTOBACILLUS ACIDOPHILUS / LACTOBACILLUS BULGARICUS 1 PACKET: 100 MILLION CFU STRENGTH GRANULES at 08:30

## 2017-05-19 RX ADMIN — TRAMADOL HYDROCHLORIDE 50 MG: 50 TABLET, FILM COATED ORAL at 20:42

## 2017-05-19 RX ADMIN — APIXABAN 5 MG: 5 TABLET, FILM COATED ORAL at 20:43

## 2017-05-19 ASSESSMENT — PAIN SCALES - GENERAL
PAINLEVEL_OUTOF10: 6
PAINLEVEL_OUTOF10: 3

## 2017-05-19 ASSESSMENT — ENCOUNTER SYMPTOMS
CARDIOVASCULAR NEGATIVE: 1
MUSCULOSKELETAL NEGATIVE: 1
NEUROLOGICAL NEGATIVE: 1
EYES NEGATIVE: 1
GASTROINTESTINAL NEGATIVE: 1
RESPIRATORY NEGATIVE: 1
CONSTITUTIONAL NEGATIVE: 1

## 2017-05-19 NOTE — PROGRESS NOTES
Pt's tele summary: A-Fib/A-Flutter w/occasional PVC's.            SC interval --  QRS complex 0.08  QT interval ---

## 2017-05-19 NOTE — PROGRESS NOTES
Received bedside report from NOC nurseNanette.  Discussed POC.  Pt resting in bed, semi-fowlers, no s/s of acute distress, calm and cooperative, personal belongings w/in reach, safety precautions in place, assumed pt care, will CTM.

## 2017-05-19 NOTE — PROGRESS NOTES
"Interval History:  Not able to feel if in A Flutter; Aims for rate control for now; Currently on BB; Rate  (with activity)  Long discussion about strategy for treatment and anticoagulation  Echo is pending    Physical Exam   Blood pressure 152/97, pulse 109, temperature 36.3 °C (97.4 °F), resp. rate 18, height 1.727 m (5' 7.99\"), weight 81 kg (178 lb 9.2 oz), SpO2 96 %.    Constitutional:  SHe appears well-developed.   HENT: Normocephalic and atraumatic. No scleral icterus.   Neck: No JVD present.   Cardiovascular: Normal rate. IRRR; Exam reveals no gallop and no friction rub. No murmur heard.   Pulmonary/Chest: CTAB coarse   Abdominal: S/NT/ND BS+   Musculoskeletal: He exhibits no edema. Pulses present.  Skin: Skin is warm and dry.       Intake/Output Summary (Last 24 hours) at 05/19/17 0739  Last data filed at 05/18/17 1800   Gross per 24 hour   Intake    660 ml   Output      0 ml   Net    660 ml       LABS:  Lab Results   Component Value Date/Time    CHOLESTEROL, 02/06/2013 12:44 PM    * 02/06/2013 12:44 PM    HDL 58 02/06/2013 12:44 PM    TRIGLYCERIDES 91 02/06/2013 12:44 PM       Lab Results   Component Value Date/Time    WBC 6.7 05/19/2017 04:14 AM    RBC 4.34 05/19/2017 04:14 AM    HEMOGLOBIN 13.2 05/19/2017 04:14 AM    HEMATOCRIT 39.1 05/19/2017 04:14 AM    MCV 90.1 05/19/2017 04:14 AM    NEUTROPHILS-POLYS 38.30* 05/19/2017 04:14 AM    LYMPHOCYTES 37.70 05/19/2017 04:14 AM    MONOCYTES 14.10* 05/19/2017 04:14 AM    EOSINOPHILS 7.80* 05/19/2017 04:14 AM    BASOPHILS 1.80 05/19/2017 04:14 AM    HYPOCHROMIA 1+ 11/19/2012 03:25 PM     Lab Results   Component Value Date/Time    SODIUM 130* 05/19/2017 04:14 AM    POTASSIUM 3.8 05/19/2017 04:14 AM    CHLORIDE 103 05/19/2017 04:14 AM    CO2 20 05/19/2017 04:14 AM    GLUCOSE 117* 05/19/2017 04:14 AM    BUN 18 05/19/2017 04:14 AM    CREATININE 0.90 05/19/2017 04:14 AM         Lab Results   Component Value Date/Time    ALKALINE PHOSPHATASE 119* " 05/19/2017 04:14 AM    AST(SGOT) 29 05/19/2017 04:14 AM    ALT(SGPT) 23 05/19/2017 04:14 AM    TOTAL BILIRUBIN 0.7 05/19/2017 04:14 AM      Lab Results   Component Value Date/Time    B NATRIURETIC PEPTIDE 165* 05/18/2017 11:52 AM      No results found for: TSH  Lab Results   Component Value Date/Time    PT 13.6 05/18/2017 11:52 AM    INR 1.06 05/18/2017 11:52 AM        Medications reviewed    Imaging reviewed    ECHO(pdg):    Impressions:  A Flutter with RVR  HTN    Recommendations:  Change to Eliquis from Lovenox  She may go home with BB for rate control, pdg Echo result today  Appt in 2 wks as outpatient to arrnge for elective cardioversion  Case discussed with attending  Thx

## 2017-05-19 NOTE — PROGRESS NOTES
Hospital Medicine Progress Note, Adult, Complex               Author: Diony Weston Date & Time created: 5/19/2017  8:18 AM     ID: 83yo F with PMHx of recurrent UTI with neurogenic bladder and HTN was admitted for atrial fibrillation/flutter with RVR.     Interval History:  Pt denies any palpitations, SOB this morning.    Review of Systems:  Review of Systems   Constitutional: Negative.    HENT: Negative.    Eyes: Negative.    Respiratory: Negative.    Cardiovascular: Negative.    Gastrointestinal: Negative.    Musculoskeletal: Negative.    Skin: Negative.    Neurological: Negative.        Physical Exam:  Physical Exam   Constitutional: She is oriented to person, place, and time. She appears well-developed and well-nourished. No distress.   HENT:   Head: Normocephalic and atraumatic.   Right Ear: External ear normal.   Left Ear: External ear normal.   Nose: Nose normal.   Mouth/Throat: No oropharyngeal exudate.   Eyes: Conjunctivae and EOM are normal. Pupils are equal, round, and reactive to light. No scleral icterus.   Neck: Normal range of motion. Neck supple.   Cardiovascular: An irregularly irregular rhythm present. Tachycardia present.  Exam reveals no gallop and no friction rub.    No murmur heard.  Pulmonary/Chest: Effort normal and breath sounds normal. No respiratory distress.   Abdominal: Soft. Bowel sounds are normal. She exhibits no distension. There is no tenderness.   Neurological: She is alert and oriented to person, place, and time. She has normal reflexes.   Skin: Skin is warm and dry. She is not diaphoretic.   Psychiatric: She has a normal mood and affect.   Nursing note and vitals reviewed.      Labs:        Invalid input(s): [id number]  Recent Labs      05/18/17   1152   TROPONINI  <0.02   BNPBTYPENAT  165*     Recent Labs      05/18/17   1152  05/19/17   0414   SODIUM  133*  130*   POTASSIUM  3.9  3.8   CHLORIDE  101  103   CO2  21  20   BUN  19  18   CREATININE  0.93  0.90   CALCIUM  9.4   8.7     Recent Labs      17   1152  17   0414   ALTSGPT  20  23   ASTSGOT  25  29   ALKPHOSPHAT  143*  119*   TBILIRUBIN  1.0  0.7   LIPASE  16   --    GLUCOSE  107*  117*     Recent Labs      17   1152  17   0414   RBC  4.93  4.34   HEMOGLOBIN  15.2  13.2   HEMATOCRIT  45.8  39.1   PLATELETCT  230  213   PROTHROMBTM  13.6   --    APTT  27.1   --    INR  1.06   --      Recent Labs      17   1152  17   0414   WBC  7.1  6.7   NEUTSPOLYS  49.10  38.30*   LYMPHOCYTES  33.00  37.70   MONOCYTES  13.70*  14.10*   EOSINOPHILS  2.70  7.80*   BASOPHILS  1.40  1.80   ASTSGOT  25  29   ALTSGPT  20  23   ALKPHOSPHAT  143*  119*   TBILIRUBIN  1.0  0.7           Hemodynamics:  Temp (24hrs), Av.4 °C (97.5 °F), Min:35.7 °C (96.2 °F), Max:36.9 °C (98.4 °F)  Temperature: 36.3 °C (97.4 °F)  Pulse  Av.6  Min: 90  Max: 129Heart Rate (Monitored): (!) 105  Blood Pressure : 152/97 mmHg, NIBP: 158/97 mmHg     Respiratory:    Respiration: 18, Pulse Oximetry: 96 %        RUL Breath Sounds: Clear, RML Breath Sounds: Diminished, RLL Breath Sounds: Diminished, SUE Breath Sounds: Clear, LLL Breath Sounds: Diminished  Fluids:    Intake/Output Summary (Last 24 hours) at 17 0818  Last data filed at 17 1800   Gross per 24 hour   Intake    660 ml   Output      0 ml   Net    660 ml     Weight: 81 kg (178 lb 9.2 oz)  GI/Nutrition:  Orders Placed This Encounter   Procedures   • Diet Order     Standing Status: Standing      Number of Occurrences: 1      Standing Expiration Date:      Order Specific Question:  Diet:     Answer:  Regular [1]     Medical Decision Making, by Problem:  Active Hospital Problems    Diagnosis   • *Atrial fibrillation with RVR (CMS-HCC) [I48.91]  - started on Metoprolol and wt based Lovenox  - Cards recs appreciated; increased frequency of Metoprolol; switched Lovenox to Eliquis  - will change Metoprolol to BID dosing and monitor  - ECHO pending; TSH wnl; appreciate Cards  recs     • Hypervolemia [E87.70]  - holding IVFs  - awaiting ECHO results; mild BNP elevation noted     • Chronic hip pain [M25.559, G89.29]   • Depression [F32.9]   • Chronic back pain [M54.9, G89.29]  - cont outpt meds and monitor     • Recurrent UTI [N39.0]  - on Omnicef with lactinex ppx     • Hypertension [I10]  - d/c'ed Lisinopril; on Metoprolol as above  - monitoring     • JAMES (acute kidney injury) (CMS-HCC) [N17.9]  - Cr stable; monitoring         Core Measures

## 2017-05-19 NOTE — DISCHARGE PLANNING
Care Transition Team Assessment    Met with pt at bedside. Pt currently on services with [facility] health. Pt states she will discharge with continued services. Pt lives with daughter.     Information Source  Orientation : Oriented x 4  Information Given By: Patient  Informant's Name: Meron Rice  Who is responsible for making decisions for patient? : Patient    Readmission Evaluation  Is this a readmission?: No    Elopement Risk  Legal Hold: No  Ambulatory or Self Mobile in Wheelchair: Yes  Disoriented: No  Psychiatric Symptoms: None  History of Wandering: No  Elopement this Admit: No  Vocalizing Wanting to Leave: No  Displays Behaviors, Body Language Wanting to Leave: No-Not at Risk for Elopement  Elopement Risk: Not at Risk for Elopement    Interdisciplinary Discharge Planning  Does Admitting Nurse Feel This Could be a Complex Discharge?: No  Primary Care Physician: Lorenzo Stephenson  Lives with - Patient's Self Care Capacity: Adult Children  Patient or legal guardian wants to designate a caregiver (see row info): No  Support Systems: Family Member(s)  Housing / Facility: [address]  Do You Take your Prescribed Medications Regularly: Yes  Able to Return to Previous ADL's: Yes  Mobility Issues: Yes (walker, wheelchair)  Patient Expects to be Discharged to:: home    Discharge Preparedness  What is your plan after discharge?: Home with help, Home health care  What are your discharge supports?: Child, Other (comment) (Grandson)  Prior Functional Level: Ambulatory, Independent with Activities of Daily Living, Independent with Medication Management  Difficulity with ADLs: None  Difficulity with IADLs: Driving  Difficulity with IADL Comments: Pt not currently driving, daughter drives her    Functional Assesment  Prior Functional Level: Ambulatory, Independent with Activities of Daily Living, Independent with Medication Management    Finances  Financial Barriers to Discharge: No  Prescription Coverage: Yes  (Pharmacy: [facility], [address])    Vision / Hearing Impairment  Vision Impairment : Yes  Right Eye Vision: Impaired, Wears Glasses  Left Eye Vision: Impaired, Wears Glasses  Hearing Impairment : No    Values / Beliefs / Concerns  Values / Beliefs Concerns : No    Advance Directive  Advance Directive?: DPOA for Health Care  Durable Power of  Name and Contact : Nyasia Lazcano [phone]    Domestic Abuse  Have you ever been the victim of abuse or violence?: No    Psychological Assessment  History of Substance Abuse: None  History of Psychiatric Problems: No  Non-compliant with Treatment: No  Newly Diagnosed Illness: No    Discharge Risks or Barriers  Discharge risks or barriers?: No    Anticipated Discharge Information  Anticipated discharge disposition: [facility], Home  Discharge Address: [address] [place] [address] [place] [address]

## 2017-05-19 NOTE — PROGRESS NOTES
Report received. Pt sitting up in bed, content at time of shift change. Pt later reports arthritic pain in her left shoulder, tramadol provided. Per AISHA Stack, pt is A-fib/A-flutter, HR 70s-90s. 2100 dose of metoprolol held due to significant decrease in BP (see flow sheets) following previous administration, HR controlled at this time. Needs met at this time.

## 2017-05-19 NOTE — CONSULTS
DATE OF SERVICE:  05/18/2017    HISTORY OF PRESENT ILLNESS:  This is an 84-year-old lady seen at the request   of Dr. Timmy Power for atrial flutter.  This patient was hospitalized on   March 5th for a ground-level fall and bladder infection and was treated and   improved for a while she was in a skilled nursing unit and has been getting   home physical therapy.  For the last 2 weeks, physical therapist and nurse   have been telling her that her heart is very fast.  The patient is somewhat   unaware, but she gets a little short of breath with what activity she can do,   but she sleeps well and now without orthopnea or PND.  She has had no edema.    She has no chest pressure.  She is not particularly aware of rapid heart rate.    Here in the emergency room earlier today, there was evidence of atrial   flutter with predominantly 2:1 AV block with no other EKG abnormalities.  TSH   is normal.  There is no prior history of this except for about 20 years ago   she tried to get blood and was told her heart rate was too fast and irregular.    She states she saw a cardiologist and a full evaluation was done and   reportedly nothing of any serious nature was found.    There is no history to suggest lung disease.    PAST MEDICAL HISTORY:  Significant that she has had bilateral hip fractures   requiring surgery.  She has had breast cancer in the left breast and uterine   cancer.  Chronic footdrop left leg, hypertension, recurrent bladder infections   requiring self-catheterization for presumptively neurogenic or atonic   bladder.     OUTPATIENT MEDICATIONS:  Lisinopril, fluoxetine and enteric-coated aspirin 81   mg today.    ALLERGIES:  SHE IS ALLERGIC TO ALENDRONATE, ____ FOSAMAX.    FAMILY HISTORY:  Negative for premature coronary heart disease.    SOCIAL HISTORY:  She apparently lives alone.  Quit tobacco many years ago.    Infrequent alcohol.  No illicit drugs.  She uses a walker to ambulate.    REVIEW OF SYSTEMS:  She  has never had a stroke, dizziness, TIAs or seizures.    No history of heart attack or hypertension or palpitations except as noted   above.  No history of thyroid disease and TSH here today is normal.  No   history of pulmonary disease.  Apparently appetite good.  No change in bowel   habits, weight stable.  Significant arthralgias every day with left footdrop.    Review of systems otherwise negative.    PHYSICAL EXAMINATION:  GENERAL:  Reveals a very pleasant vivacious lady in no distress.  VITAL SIGNS:  Blood pressure is 150/100, heart rate is at times 150, sometimes   lower depending on AV block, but in general above 100.  SKIN:  Warm and dry.  HEENT:  Extraocular muscles are normal.  No exophthalmus.  NECK:  Veins appear to be definitely elevated at 30-40 degrees up to the angle   of the jaw.  No carotid bruits.  LUNGS:  However clear to auscultation and no evidence of respiratory distress.  HEART:  Variable S1, single S2.  No murmurs, rubs or gallops.  PMI not   palpable.  ABDOMEN:  Without mass, tenderness or organomegaly.  EXTREMITIES:  Reveal footdrop, left foot.  She has barely palpable pedal   pulses and good radial pulses as mentioned.    As mentioned EKG demonstrates atrial flutter with predominantly 2:1 AV block   with no other abnormalities.    DIAGNOSTIC DATA:  Chest x-ray is read as unremarkable; however, on my review,   there appears to be some increased pulmonary vascular markings; however,   beta-natriuretic peptide is normal.    LABORATORY DATA:  BUN and creatinine appeared normal.  Based on her data I did   do a creatinine clearance which is 45.  Electrolytes are normal except for   potassium of 133.  CBC normal.  Normal platelet counts.  TSH normal.    IMPRESSION:  1.  Atrial flutter of 2 weeks duration.  2.  Some evidence of volume overload.  3.  Recurrent bladder infections.  4.  Hypertension.    RECOMMENDATIONS:  I am going to withhold the IV, which has been running at 150   mL an hour.  As  I am worried she has some degree of mild volume overload at   this time, I am going to increase the frequency of metoprolol and await   results of echocardiogram.  She has been placed on full-dose subcutaneous   Lovenox, certainly could be switched to Eliquis tomorrow.  I would probably   use Eliquis rather than Xarelto based on creatinine clearance of 45.    Thank you for this consult.       ____________________________________     MD DAKOTAH Herron / EVELYN    DD:  05/18/2017 17:35:16  DT:  05/18/2017 21:52:46    D#:  [id number]  Job#:  [id number]

## 2017-05-19 NOTE — DISCHARGE PLANNING
NNAMDI Whitney called [facility] pharmacy at [phone] and spoke to Ezekiel.  NNAMDI was informed that the cost is $306.64.  NNAMDI Whitney informed bs RN Veronika of cost.      NNAMDI checked to see if there were any Eliquis discount cards in NNAMDI office but there aren't.

## 2017-05-19 NOTE — PROGRESS NOTES
Pt's tele summary: A-Flutter/tachy w/frequent PVC's, frequent couplets, and occasional trigeminy.          AK interval --  QRS complex 0.08  QT interval ---      Pt is asymptomatic for tachy, will CTM.

## 2017-05-19 NOTE — CARE PLAN
Problem: Safety  Goal: Will remain free from falls  Outcome: PROGRESSING AS EXPECTED  Pt remains free of accidental falls.  Pt uses call light approprietly.  Safety precautions in place: anti slip socks on, bed, in low position, personal belongings/call light w/in reach, hourly rounding.    Problem: Knowledge Deficit  Goal: Knowledge of disease process/condition, treatment plan, diagnostic tests, and medications will improve  Outcome: PROGRESSING AS EXPECTED  Discussed POC, tx, and meds.  Pt verbalizes understanding of disease process.  Encouraged pt to ask questions.

## 2017-05-19 NOTE — DISCHARGE PLANNING
NNAMDI Whitney made a copy of Eliquis RX and faxed to [facility] pharmacy on [address].  NNAMDI will call [phone] by [phone] to obtain cost or if prior auth is needed.

## 2017-05-19 NOTE — DISCHARGE PLANNING
Medical Social Work    Referral: Pt discussed at IDT rounds this AM.    Intervention: Per flowsheet, pt lives with adult daughter and expects to d.c home.  Pt started on Eliquis so SW requested RX so she can run it through pt's pharmacy for prior auth.    Plan: SW will run coverage of Eliquis once RX has been received.

## 2017-05-20 ENCOUNTER — TELEPHONE (OUTPATIENT)
Dept: CARDIOLOGY | Facility: MEDICAL CENTER | Age: 82
End: 2017-05-20

## 2017-05-20 VITALS
BODY MASS INDEX: 27.06 KG/M2 | OXYGEN SATURATION: 99 % | TEMPERATURE: 97.6 F | WEIGHT: 178.57 LBS | SYSTOLIC BLOOD PRESSURE: 112 MMHG | RESPIRATION RATE: 18 BRPM | DIASTOLIC BLOOD PRESSURE: 85 MMHG | HEIGHT: 68 IN | HEART RATE: 90 BPM

## 2017-05-20 PROBLEM — N17.9 AKI (ACUTE KIDNEY INJURY) (HCC): Status: RESOLVED | Noted: 2017-05-19 | Resolved: 2017-05-20

## 2017-05-20 PROBLEM — E87.70 HYPERVOLEMIA: Status: RESOLVED | Noted: 2017-05-19 | Resolved: 2017-05-20

## 2017-05-20 PROBLEM — I48.91 ATRIAL FIBRILLATION WITH RVR (HCC): Status: RESOLVED | Noted: 2017-05-18 | Resolved: 2017-05-20

## 2017-05-20 PROCEDURE — 700102 HCHG RX REV CODE 250 W/ 637 OVERRIDE(OP): Performed by: INTERNAL MEDICINE

## 2017-05-20 PROCEDURE — 700102 HCHG RX REV CODE 250 W/ 637 OVERRIDE(OP): Performed by: FAMILY MEDICINE

## 2017-05-20 PROCEDURE — A9270 NON-COVERED ITEM OR SERVICE: HCPCS | Performed by: INTERNAL MEDICINE

## 2017-05-20 PROCEDURE — A9270 NON-COVERED ITEM OR SERVICE: HCPCS | Performed by: FAMILY MEDICINE

## 2017-05-20 PROCEDURE — 99239 HOSP IP/OBS DSCHRG MGMT >30: CPT | Performed by: INTERNAL MEDICINE

## 2017-05-20 RX ORDER — DIGOXIN 125 MCG
125 TABLET ORAL DAILY
Status: DISCONTINUED | OUTPATIENT
Start: 2017-05-20 | End: 2017-05-20 | Stop reason: HOSPADM

## 2017-05-20 RX ORDER — L. ACIDOPHILUS/L.BULGARICUS 100MM CELL
1 GRANULES IN PACKET (EA) ORAL
Qty: 15 PACKET | Refills: 0 | Status: SHIPPED | OUTPATIENT
Start: 2017-05-20 | End: 2017-05-25

## 2017-05-20 RX ORDER — DIGOXIN 125 MCG
125 TABLET ORAL DAILY
Qty: 30 TAB | Refills: 0 | Status: SHIPPED | OUTPATIENT
Start: 2017-05-20 | End: 2017-05-31 | Stop reason: SDUPTHER

## 2017-05-20 RX ORDER — METOPROLOL TARTRATE 50 MG/1
50 TABLET, FILM COATED ORAL 2 TIMES DAILY
Qty: 60 TAB | Refills: 0 | Status: SHIPPED | OUTPATIENT
Start: 2017-05-20 | End: 2017-06-05 | Stop reason: SDUPTHER

## 2017-05-20 RX ORDER — CEFDINIR 300 MG/1
300 CAPSULE ORAL EVERY 12 HOURS
Qty: 10 CAP | Refills: 0 | Status: SHIPPED | OUTPATIENT
Start: 2017-05-20 | End: 2017-05-25

## 2017-05-20 RX ADMIN — LACTOBACILLUS ACIDOPHILUS / LACTOBACILLUS BULGARICUS 1 PACKET: 100 MILLION CFU STRENGTH GRANULES at 18:26

## 2017-05-20 RX ADMIN — BETHANECHOL CHLORIDE 10 MG: 10 TABLET ORAL at 08:02

## 2017-05-20 RX ADMIN — TRAMADOL HYDROCHLORIDE 50 MG: 50 TABLET, FILM COATED ORAL at 16:11

## 2017-05-20 RX ADMIN — BETHANECHOL CHLORIDE 10 MG: 10 TABLET ORAL at 16:11

## 2017-05-20 RX ADMIN — FLUOXETINE 40 MG: 20 CAPSULE ORAL at 08:00

## 2017-05-20 RX ADMIN — LACTOBACILLUS ACIDOPHILUS / LACTOBACILLUS BULGARICUS 1 PACKET: 100 MILLION CFU STRENGTH GRANULES at 11:56

## 2017-05-20 RX ADMIN — DIGOXIN 125 MCG: 125 TABLET ORAL at 18:27

## 2017-05-20 RX ADMIN — METOPROLOL TARTRATE 50 MG: 50 TABLET ORAL at 08:02

## 2017-05-20 RX ADMIN — LACTOBACILLUS ACIDOPHILUS / LACTOBACILLUS BULGARICUS 1 PACKET: 100 MILLION CFU STRENGTH GRANULES at 07:58

## 2017-05-20 RX ADMIN — APIXABAN 5 MG: 5 TABLET, FILM COATED ORAL at 19:39

## 2017-05-20 RX ADMIN — TRAMADOL HYDROCHLORIDE 50 MG: 50 TABLET, FILM COATED ORAL at 07:57

## 2017-05-20 RX ADMIN — CEFDINIR 300 MG: 300 CAPSULE ORAL at 19:39

## 2017-05-20 RX ADMIN — TAMSULOSIN HYDROCHLORIDE 0.4 MG: 0.4 CAPSULE ORAL at 08:02

## 2017-05-20 RX ADMIN — CEFDINIR 300 MG: 300 CAPSULE ORAL at 08:01

## 2017-05-20 RX ADMIN — METOPROLOL TARTRATE 50 MG: 50 TABLET ORAL at 19:39

## 2017-05-20 RX ADMIN — APIXABAN 5 MG: 5 TABLET, FILM COATED ORAL at 08:00

## 2017-05-20 ASSESSMENT — CHA2DS2 SCORE
AGE 75 OR GREATER: YES
SEX: FEMALE
CHF OR LEFT VENTRICULAR DYSFUNCTION: NO
AGE 65 TO 74: NO
VASCULAR DISEASE: NO
PRIOR STROKE OR TIA OR THROMBOEMBOLISM: NO
DIABETES: NO
CHA2DS2 VASC SCORE: 4
HYPERTENSION: YES

## 2017-05-20 ASSESSMENT — PAIN SCALES - GENERAL: PAINLEVEL_OUTOF10: 6

## 2017-05-20 NOTE — PROGRESS NOTES
Tele Note    Rhythm aflutter/afib  Rate   TN   QRS 0.08  QT 0.36  Ectopy frequent pvc, rare big/coup

## 2017-05-20 NOTE — DISCHARGE PLANNING
NNAMDI Whitney received a call from Kal stating he has the key to the pt's home but is in [facility] flying standby.  He will call SW at 1500 if he does NOT get the standby flight.  If he does get on the flight, he will not call SW and will call the RN station when he lands and will  pt between 7:30 - 8:30.  NNAMDI informed bs AHSAN Mariee.

## 2017-05-20 NOTE — DISCHARGE SUMMARY
DISCHARGE SUMMARY     ADMIT DATE:  5/18/2017         DISCHARGE DATE:  5/20/2017    PATIENT ID:  Name: Meron Rice     YOB: 1932  Age: 84 y.o. female   MRN: [id number]  Address: [address] [place] [address]  [place] [address]  Phone: [phone] (home)    DISCHARGE DIAGNOSIS:  Atrial Fibrillation with RVR  Hypervolemia  Recurrent UTI  Chronic Hip and Back Pain  Depression  Hypertension  Acute Kidney Injury    CONSULTANTS:  Cardiology ([facility])    CONDITION:Stable    DISPOSITION:Home    DIET: Regular    ACTIVITY: As tolerated     HPI/HOSPITAL COURSE:  Please see H&P for details regarding initial hospital presentation.  In summary, 83yo F with PMHx of chronic back pain, HTN, recurrent UTI with neurogenic bladder was admitted for atrial fibrillation with RVR.  Patient was started on oral metoprolol and anticoagulated with Eliquis with no difficulty.  Patient refused to be anticoagulated with Warfarin.  Cardiology was also consulted and added Digoxin for control of heart rate.  Patient had no complications with heart rate control.  ECHO was performed and evaluated by Cardiology.  ECHO results revealed severe TR, moderate MR with bilateral atrial enlargement.  Cardiology recommended outpatient follow up to attempt cardioversion which will be scheduled by them.  Patient had evidence of UTI and was started on Cefdinir.  She will continue antibiotics for 5 days.  JAMES was stabilized with IVFs upon admission.  All other chronic conditions remained stable.  Patient will be discharged home with home health care.       Physical exam:  Filed Vitals:    05/19/17 2335 05/20/17 0433 05/20/17 0725 05/20/17 1143   BP: 126/84 124/93 121/82 114/65   Pulse: 80 49 96 100   Temp: 37.1 °C (98.7 °F) 36.5 °C (97.7 °F) 36.4 °C (97.6 °F) 36.3 °C (97.3 °F)   Resp: 18 18 18 18   Height:       Weight:       SpO2: 92% 93% 91% 95%     Weight/BMI: Body mass index is 27.16 kg/(m^2).  Pulse Oximetry: 95 %, O2 (LPM): 0, O2 Delivery: None  (Room Air)     Constitutional: She is oriented to person, place, and time. She appears well-developed and well-nourished. No distress.   HENT:    Head: Normocephalic and atraumatic.   Right Ear: External ear normal.   Left Ear: External ear normal.    Nose: Nose normal.    Mouth/Throat: No oropharyngeal exudate.   Eyes: Conjunctivae and EOM are normal. Pupils are equal, round, and reactive to light. No scleral icterus.   Neck: Normal range of motion. Neck supple.   Cardiovascular: An irregularly irregular rhythm present.  Exam reveals no gallop and no friction rub.     No murmur heard.  Pulmonary/Chest: Effort normal and breath sounds normal. No respiratory distress.   Abdominal: Soft. Bowel sounds are normal. She exhibits no distension. There is no tenderness.   Neurological: She is alert and oriented to person, place, and time. She has normal reflexes.   Skin: Skin is warm and dry. She is not diaphoretic.   Psychiatric: She has a normal mood and affect.     PROCEDURES:  NONE    RADIOLOGY:  ECHOCARDIOGRAM COMP W/O CONT   Final Result      DX-CHEST-PORTABLE (1 VIEW)   Final Result      No radiographic evidence of acute cardiopulmonary process.          DISCHARGE LABS:    Recent Labs      05/18/17   1152  05/19/17   0414   WBC  7.1  6.7   RBC  4.93  4.34   HEMOGLOBIN  15.2  13.2   HEMATOCRIT  45.8  39.1   MCV  92.9  90.1   MCH  30.8  30.4   RDW  47.8  47.1   PLATELETCT  230  213   MPV  9.2  9.7   NEUTSPOLYS  49.10  38.30*   LYMPHOCYTES  33.00  37.70   MONOCYTES  13.70*  14.10*   EOSINOPHILS  2.70  7.80*   BASOPHILS  1.40  1.80     Lab Results   Component Value Date    GRVEQSKJ29 608 02/06/2013    WKVXGNTP75 483 04/30/2012    FOLATE >24.0 02/06/2013    FERRITIN 52.2 02/06/2013    IRON 60 02/06/2013    TOTIRONBC 287 02/06/2013       Estimated GFR/CRCL = Estimated Creatinine Clearance: 51.9 mL/min (by C-G formula based on Cr of 0.9).  Recent Labs      05/18/17   1152  05/19/17   0414   SODIUM  133*  130*   POTASSIUM   3.9  3.8   CHLORIDE  101  103   CO2  21  20   BUN  19  18   CREATININE  0.93  0.90   CALCIUM  9.4  8.7   ALBUMIN  3.9  3.0*       Recent Labs      05/18/17   1152  05/19/17   0414   ALTSGPT  20  23   ASTSGOT  25  29   ALKPHOSPHAT  143*  119*   TBILIRUBIN  1.0  0.7   LIPASE  16   --    ALBUMIN  3.9  3.0*   GLOBULIN  3.3  2.8   INR  1.06   --        @PNLABNT(GLUCOSE:3,POCGLUCOSE:3)  )  Lab Results   Component Value Date    HBA1C 5.6 02/06/2013    HBA1C 6.0 05/09/2009    FREET4 1.06 02/06/2013    FREET4 1.00 04/30/2012    FREET4 1.11 05/08/2009       DISCHARGE MEDICATIONS:  (X)  Medication Reconciliation Completed   Meron Rice   Home Medication Instructions JOSE MANUEL:[id number]    Printed on:05/20/17 1223   Medication Information                      apixaban (ELIQUIS) 5mg Tab  Take 1 Tab by mouth 2 Times a Day.             aspirin EC (ECOTRIN) 81 MG TBEC  Take 1 Tab by mouth every day.             bethanechol (URECHOLINE) 10 MG Tab  Take 1 Tab by mouth 3 times a day.             cefdinir (OMNICEF) 300 MG Cap  Take 1 Cap by mouth every 12 hours for 5 days.             digoxin (LANOXIN) 125 MCG Tab  Take 1 Tab by mouth every day at 6 PM.             fluoxetine (PROZAC) 40 MG capsule  Take 1 Cap by mouth every day.             lactobacillus granules (LACTINEX/FLORANEX) Pack  Take 1 Packet by mouth 3 times a day, with meals for 5 days.             lisinopril (PRINIVIL) 20 MG Tab  Take 1 Tab by mouth every day.             meloxicam (MOBIC) 7.5 MG Tab  Take 7.5 mg by mouth every day.             metoprolol (LOPRESSOR) 50 MG Tab  Take 1 Tab by mouth 2 Times a Day.             Probiotic Product (PROBIOTIC PO)  Take 1 Cap by mouth every day.             tamsulosin (FLOMAX) 0.4 MG capsule  Take 1 Cap by mouth ONE-HALF HOUR AFTER BREAKFAST.             tramadol (ULTRAM) 50 MG Tab  Take 1 Tab by mouth every 6 hours as needed for Severe Pain.                 INSTRUCTIONS:   The patient was instructed to return to the ER in the  event of worsening symptoms. I have counseled the patient on the importance of compliance and the patient has agreed to proceed with all medical recommendations and follow up plan indicated above.   The patient understands that all medications come with benefits and risks. Risks may include permanent injury or death and these risks can be minimized with close reassessment and monitoring.        Follow up appointment details :     F/U with Cardiology ([facility]) in 1-2 weeks.  [facility] to schedule appointment  F/U with PCP in one week    PENDING STUDIES:  NONE    Primary Care Provider: Lorenzo Allen MD      Time spent on discharge day patient visit, preparing discharge paperwork and arranging for patient follow up 43 mins.

## 2017-05-20 NOTE — DISCHARGE PLANNING
NNAMDI Whitney spoke to bs RN Jaylene.  Since NNAMDI Whitney did not hear from Kal Rice, the plan is that he is on a flight back to [place] and will call the nurse's station when he lands.  NNAMDI gave him pt's room number and nurse's station number.  [facility] is pending but they are closed on the weekends so that shouldn't be a reason to delay discharge.  Kal has keys to the clients home and will get her home safe.  He should be here to  pt between 7:30 pm and 8:30 pm.  His number is [phone].

## 2017-05-20 NOTE — PROGRESS NOTES
Due medications given without issue. Assist to bathroom using walker, gait unsteady at times. Calls for staff assist, bed alarm on for patient safety. C/o generalized arthritis pain, medicated per MAR. No other c/o. Call light in reach.

## 2017-05-20 NOTE — DISCHARGE PLANNING
NNAMDI Whitney faxed choice form for [facility] to [facility] for referral.  Plan is for pt to d.c today.

## 2017-05-20 NOTE — PROGRESS NOTES
Assessment completed, medicated per MAR. Medicated for pain, (See MAR). Denies nausea at this time. Plan of care discussed. Fall protocol in place. CLIP.

## 2017-05-20 NOTE — DISCHARGE INSTRUCTIONS
Discharge Instructions    Discharged to home by car with relative. Discharged via wheelchair, hospital escort: Yes.  Special equipment needed: Walker    Be sure to schedule a follow-up appointment with your primary care doctor or any specialists as instructed.     Discharge Plan:   Influenza Vaccine Indication: Not indicated: Previously immunized this influenza season and > 8 years of age    I understand that a diet low in cholesterol, fat, and sodium is recommended for good health. Unless I have been given specific instructions below for another diet, I accept this instruction as my diet prescription.   Other diet: None     Special Instructions: Cardiac     · Is patient discharged on Warfarin / Coumadin?   No     · Is patient Post Blood Transfusion?  No    Depression / Suicide Risk    As you are discharged from this [facility] facility, it is important to learn how to keep safe from harming yourself.    Recognize the warning signs:  · Abrupt changes in personality, positive or negative- including increase in energy   · Giving away possessions  · Change in eating patterns- significant weight changes-  positive or negative  · Change in sleeping patterns- unable to sleep or sleeping all the time   · Unwillingness or inability to communicate  · Depression  · Unusual sadness, discouragement and loneliness  · Talk of wanting to die  · Neglect of personal appearance   · Rebelliousness- reckless behavior  · Withdrawal from people/activities they love  · Confusion- inability to concentrate     If you or a loved one observes any of these behaviors or has concerns about self-harm, here's what you can do:  · Talk about it- your feelings and reasons for harming yourself  · Remove any means that you might use to hurt yourself (examples: pills, rope, extension cords, firearm)  · Get professional help from the community (Mental Health, Substance Abuse, psychological counseling)  · Do not be alone:Call your Safe Contact- someone  whom you trust who will be there for you.  · Call your local CRISIS HOTLINE 707-7432 or 343-238-9425  · Call your local Children's Mobile Crisis Response Team [place] (213) 439-1452 or www.[employer]  · Call the toll free National Suicide Prevention Hotlines   · National Suicide Prevention Lifeline 515-831-ATPR (1861)  · [employer] Hope Line Network 800-SUICIDE (939-6118)    Atrial Fibrillation  Atrial fibrillation is a type of irregular heart rhythm (arrhythmia). During atrial fibrillation, the upper chambers of the heart (atria) quiver continuously in a chaotic pattern. This causes an irregular and often rapid heart rate.   Atrial fibrillation is the result of the heart becoming overloaded with disorganized signals that tell it to beat. These signals are normally released one at a time by a part of the right atrium called the sinoatrial node. They then travel from the atria to the lower chambers of the heart (ventricles), causing the atria and ventricles to contract and pump blood as they pass. In atrial fibrillation, parts of the atria outside of the sinoatrial node also release these signals. This results in two problems. First, the atria receive so many signals that they do not have time to fully contract. Second, the ventricles, which can only receive one signal at a time, beat irregularly and out of rhythm with the atria.   There are three types of atrial fibrillation:   · Paroxysmal. Paroxysmal atrial fibrillation starts suddenly and stops on its own within a week.  · Persistent. Persistent atrial fibrillation lasts for more than a week. It may stop on its own or with treatment.  · Permanent. Permanent atrial fibrillation does not go away. Episodes of atrial fibrillation may lead to permanent atrial fibrillation.  Atrial fibrillation can prevent your heart from pumping blood normally. It increases your risk of stroke and can lead to heart failure.   CAUSES   · Heart conditions, including a heart  attack, heart failure, coronary artery disease, and heart valve conditions.    · Inflammation of the sac that surrounds the heart (pericarditis).  · Blockage of an artery in the lungs (pulmonary embolism).  · Pneumonia or other infections.  · Chronic lung disease.  · Thyroid problems, especially if the thyroid is overactive (hyperthyroidism).  · Caffeine, excessive alcohol use, and use of some illegal drugs.    · Use of some medicines, including certain decongestants and diet pills.  · Heart surgery.    · Birth defects.    Sometimes, no cause can be found. When this happens, the atrial fibrillation is called lone atrial fibrillation. The risk of complications from atrial fibrillation increases if you have lone atrial fibrillation and you are age 60 years or older.  RISK FACTORS  · Heart failure.  · Coronary artery disease.  · Diabetes mellitus.    · High blood pressure (hypertension).    · Obesity.    · Other arrhythmias.    · Increased age.  SIGNS AND SYMPTOMS   · A feeling that your heart is beating rapidly or irregularly.    · A feeling of discomfort or pain in your chest.    · Shortness of breath.    · Sudden light-headedness or weakness.    · Getting tired easily when exercising.    · Urinating more often than normal (mainly when atrial fibrillation first begins).    In paroxysmal atrial fibrillation, symptoms may start and suddenly stop.  DIAGNOSIS   Your health care provider may be able to detect atrial fibrillation when taking your pulse. Your health care provider may have you take a test called an ambulatory electrocardiogram (ECG). An ECG records your heartbeat patterns over a 24-hour period. You may also have other tests, such as:  · Transthoracic echocardiogram (TTE). During echocardiography, sound waves are used to evaluate how blood flows through your heart.  · Transesophageal echocardiogram (UNA).  · Stress test. There is more than one type of stress test. If a stress test is needed, ask your health  care provider about which type is best for you.  · Chest X-ray exam.  · Blood tests.  · Computed tomography (CT).  TREATMENT   Treatment may include:  · Treating any underlying conditions. For example, if you have an overactive thyroid, treating the condition may correct atrial fibrillation.  · Taking medicine. Medicines may be given to control a rapid heart rate or to prevent blood clots, heart failure, or a stroke.  · Having a procedure to correct the rhythm of the heart:  ¨ Electrical cardioversion. During electrical cardioversion, a controlled, low-energy shock is delivered to the heart through your skin. If you have chest pain, very low blood pressure, or sudden heart failure, this procedure may need to be done as an emergency.  ¨ Catheter ablation. During this procedure, heart tissues that send the signals that cause atrial fibrillation are destroyed.  ¨ Surgical ablation. During this surgery, thin lines of heart tissue that carry the abnormal signals are destroyed. This procedure can either be an open-heart surgery or a minimally invasive surgery. With the minimally invasive surgery, small cuts are made to access the heart instead of a large opening.  ¨ Pulmonary venous isolation. During this surgery, tissue around the veins that carry blood from the lungs (pulmonary veins) is destroyed. This tissue is thought to carry the abnormal signals.  HOME CARE INSTRUCTIONS   · Take medicines only as directed by your health care provider. Some medicines can make atrial fibrillation worse or recur.  · If blood thinners were prescribed by your health care provider, take them exactly as directed. Too much blood-thinning medicine can cause bleeding. If you take too little, you will not have the needed protection against stroke and other problems.  · Perform blood tests at home if directed by your health care provider. Perform blood tests exactly as directed.  · Quit smoking if you smoke.  · Do not drink alcohol.  · Do not  drink caffeinated beverages such as coffee, soda, and some teas. You may drink decaffeinated coffee, soda, or tea.    · Maintain a healthy weight. Do not use diet pills unless your health care provider approves. They may make heart problems worse.    · Follow diet instructions as directed by your health care provider.  · Exercise regularly as directed by your health care provider.  · Keep all follow-up visits as directed by your health care provider. This is important.  PREVENTION   The following substances can cause atrial fibrillation to recur:   · Caffeinated beverages.  · Alcohol.  · Certain medicines, especially those used for breathing problems.  · Certain herbs and herbal medicines, such as those containing ephedra or ginseng.  · Illegal drugs, such as cocaine and amphetamines.  Sometimes medicines are given to prevent atrial fibrillation from recurring. Proper treatment of any underlying condition is also important in helping prevent recurrence.   SEEK MEDICAL CARE IF:  · You notice a change in the rate, rhythm, or strength of your heartbeat.  · You suddenly begin urinating more frequently.  · You tire more easily when exerting yourself or exercising.  SEEK IMMEDIATE MEDICAL CARE IF:   · You have chest pain, abdominal pain, sweating, or weakness.  · You feel nauseous.  · You have shortness of breath.  · You suddenly have swollen feet and ankles.  · You feel dizzy.  · Your face or limbs feel numb or weak.  · You have a change in your vision or speech.  MAKE SURE YOU:   · Understand these instructions.  · Will watch your condition.  · Will get help right away if you are not doing well or get worse.     This information is not intended to replace advice given to you by your health care provider. Make sure you discuss any questions you have with your health care provider.     Document Released: 12/18/2006 Document Revised: 01/08/2016 Document Reviewed: 04/13/2016  Elsevier Interactive Patient Education ©2016  Elsevier Inc.    Apixaban oral tablets  What is this medicine?  APIXABAN is an anticoagulant (blood thinner). It is used to lower the chance of stroke in people with a medical condition called atrial fibrillation. It is also used after knee or hip surgeries to prevent blood clots.  This medicine may be used for other purposes; ask your health care provider or pharmacist if you have questions.  COMMON BRAND NAME(S): Eliquis  What should I tell my health care provider before I take this medicine?  They need to know if you have any of these conditions:  -bleeding disorders  -bleeding in the brain  -blood in your stools (black or tarry stools) or if you have blood in your vomit  -history of stomach bleeding  -kidney disease  -liver disease  -mechanical heart valve  -an unusual or allergic reaction to apixaban, other medicines, foods, dyes, or preservatives  -pregnant or trying to get pregnant  -breast-feeding  How should I use this medicine?  Take this medicine by mouth with a glass of water. Follow the directions on the prescription label. You can take it with or without food. If it upsets your stomach, take it with food. Take your medicine at regular intervals. Do not take it more often than directed. Do not stop taking except on your doctor's advice.  Talk to your pediatrician regarding the use of this medicine in children. Special care may be needed.  Overdosage: If you think you've taken too much of this medicine contact a poison control center or emergency room at once.  Overdosage: If you think you have taken too much of this medicine contact a poison control center or emergency room at once.  NOTE: This medicine is only for you. Do not share this medicine with others.  What if I miss a dose?  If you miss a dose, take it as soon as you can. If it is almost time for your next dose, take only that dose. Do not take double or extra doses.  What may interact with this medicine?  This medicine may interact with the  following:  -aspirin and aspirin-like medicines  -certain medicines for fungal infections like ketoconazole and itraconazole  -certain medicines for seizures like carbamazepine and phenytoin  -certain medicines that treat or prevent blood clots like warfarin, enoxaparin, and dalteparin  -clarithromycin  -NSAIDs, medicines for pain and inflammation, like ibuprofen or naproxen  -rifampin  -ritonavir  -Khushboo's wort  This list may not describe all possible interactions. Give your health care provider a list of all the medicines, herbs, non-prescription drugs, or dietary supplements you use. Also tell them if you smoke, drink alcohol, or use illegal drugs. Some items may interact with your medicine.  What should I watch for while using this medicine?  Do not stop taking this medicine without first talking to your doctor. Stopping this medicine may increase your risk of having a stroke. Be sure to refill your prescription before you run out of medicine.  This medicine may increase your risk to bruise or bleed. Call your doctor or health care professional if you notice any unusual bleeding.  Be careful brushing and flossing your teeth or using a toothpick because you may bleed more easily. If you have any dental work done, tell your dentist you are receiving this medicine.  What side effects may I notice from receiving this medicine?  Side effects that you should report to your doctor or health care professional as soon as possible:  -allergic reactions like skin rash, itching or hives, swelling of the face, lips, or tongue  -bloody or black, tarry stools  -changes in vision  -confusion, trouble speaking or understanding  -red or dark-brown urine  -red spots on the skin  -severe headaches  -spitting up blood or brown material that looks like coffee grounds  -sudden numbness or weakness of the face, arm or leg  -trouble walking, dizziness, loss of balance or coordination  -unusual bruising or bleeding from the eye, gums,  or nose  This list may not describe all possible side effects. Call your doctor for medical advice about side effects. You may report side effects to FDA at 9-362-RMB-5209.  Where should I keep my medicine?  Keep out of the reach of children.  Store at room temperature between 20 and 25 degrees C (68 and 77 degrees F). Throw away any unused medicine after the expiration date.  NOTE: This sheet is a summary. It may not cover all possible information. If you have questions about this medicine, talk to your doctor, pharmacist, or health care provider.  © 2014, Elsevier/Gold Standard. (3/17/2014 3:30:42 PM)

## 2017-05-20 NOTE — PROGRESS NOTES
Pt resting in bed with eyes closed, respirations even and unlabored. Bed in low position, bed alarm on. CLIP.

## 2017-05-20 NOTE — CARE PLAN
Problem: Safety  Goal: Will remain free from falls  Intervention: Implement fall precautions    05/19/17 0830 05/20/17 0800   OTHER   Environmental Precautions --  Treaded Slipper Socks on Patient;Personal Belongings, Wastebasket, Call Bell etc. in Easy Reach;Transferred to Stronger Side;Report Given to Other Health Care Providers Regarding Fall Risk;Bed in Low Position;Communication Sign for Patients & Families;Mobility Assessed & Appropriate Sign Placed   IV Pole on Same Side of Bed as Bathroom (SL) --    Bedrails --  Bedrails Closest to Bathroom Down   Chair/Bed Strip Alarm --  Yes - Alarm On   Bed Alarm (Built in - for ICU ONLY) Yes - Alarm On --      Pt uses front wheel walker to get up with one person assist.       Problem: Pain Management  Goal: Pain level will decrease to patient’s comfort goal  Outcome: PROGRESSING AS EXPECTED    05/19/17 0830 05/20/17 0612 05/20/17 0800   OTHER   Nurse Pain Scale 0 - 10  --  --  6   Non Verbal Scale  --  Calm;Unlabored Breathing --    Comfort Goal Comfort at Rest;Comfort with Movement;Sleep Comfortably --  --      Pt medicated per MAR, resting in bed with eyes closed at this time.

## 2017-05-20 NOTE — PROGRESS NOTES
Resting with eyes closed. Respirations even and unlabored. Repositions self. Calls as needed for assist to bathroom. Call light in reach.

## 2017-05-20 NOTE — TELEPHONE ENCOUNTER
Please call her to schedule  A flutter RFA in next one to two weeks with [facility]NA, hold eliquis one dose.

## 2017-05-20 NOTE — FACE TO FACE
Face to Face Supporting Documentation - Home Health    The encounter with this patient was in whole or in part the primary reason for home health admission.    Date of encounter:   Patient:                    MRN:                       YOB: 2017  Meron Rice  [id number]  11/18/1932     Home health to see patient for:  Skilled Nursing care for assessment, interventions & education and Home health aide    Skilled need for:  Medication Management Atrial Fibrillation, UTI    Skilled nursing interventions to include:  Comment: medication management    Homebound status evidenced by:  Needs the assistance of another person in order to leave the home. Leaving home requires a considerable and taxing effort. There is a normal inability to leave the home.    Community Physician to provide follow up care: Lorenzo Allen M.D.     Optional Interventions? No      I certify the face to face encounter for this home health care referral meets the CMS requirements and the encounter/clinical assessment with the patient was, in whole, or in part, for the medical condition(s) listed above, which is the primary reason for home health care. Based on my clinical findings: the service(s) are medically necessary, support the need for home health care, and the homebound criteria are met.  I certify that this patient has had a face to face encounter by myself.  Diony Weston M.D. - NPI: [id number]

## 2017-05-20 NOTE — CARE PLAN
Problem: Safety  Goal: Will remain free from injury  Outcome: PROGRESSING AS EXPECTED  Oriented to room and equipment. Call light instructions given, in reach at all times. Slipper socks in place. Floor dry and uncluttered. Bed locked and in lowest position.

## 2017-05-20 NOTE — PROGRESS NOTES
SW in room with pt, pt resting in bed at this time. Respirations even and unlabored. CLIP. Fall protocol in place.

## 2017-05-20 NOTE — PROGRESS NOTES
Cardiology Progress Note               Author: Stanislav Garrett Date & Time created: 2017  7:36 AM     Interval History:  Patient with new onset flutter. Overall healthy. Some mild difficulty with rate control. No chest pain or SOB. No prvious MI or CHF. Nl EF on echo.    ROS    Physical Exam   Constitutional: She is oriented to person, place, and time. She appears well-developed and well-nourished. No distress.   HENT:   Mouth/Throat: Oropharynx is clear and moist.   Eyes: Conjunctivae and EOM are normal.   Neck: Neck supple. No JVD present. No thyroid mass present.   Cardiovascular: Normal rate, S1 normal, S2 normal and normal pulses.  A regularly irregular rhythm present. PMI is not displaced.  Exam reveals no gallop.    No murmur heard.  Pulses:       Carotid pulses are 2+ on the right side, and 2+ on the left side.       Radial pulses are 2+ on the right side, and 2+ on the left side.        Femoral pulses are 2+ on the right side, and 2+ on the left side.       Dorsalis pedis pulses are 2+ on the right side, and 2+ on the left side.   No peripheral edema.   Pulmonary/Chest: Effort normal and breath sounds normal.   Abdominal: Soft. Normal appearance. She exhibits no abdominal bruit and no mass. There is no hepatosplenomegaly. There is no tenderness.   Musculoskeletal: Normal range of motion. She exhibits no edema.        Lumbar back: She exhibits no tenderness and no spasm.   Neurological: She is alert and oriented to person, place, and time. She has normal strength.   Skin: Skin is warm and dry. No rash noted. No cyanosis. Nails show no clubbing.   Psychiatric: She has a normal mood and affect.       Hemodynamics:  Temp (24hrs), Av.6 °C (97.8 °F), Min:35.7 °C (96.2 °F), Max:37.1 °C (98.7 °F)  Temperature: 36.5 °C (97.7 °F)  Pulse  Av.8  Min: 39  Max: 129  Blood Pressure : 124/93 mmHg     Respiratory:    Respiration: 18, Pulse Oximetry: 93 %        RUL Breath Sounds: Clear, RML Breath Sounds:  Diminished, RLL Breath Sounds: Diminished, SUE Breath Sounds: Clear, LLL Breath Sounds: Diminished  Fluids:        GI/Nutrition:  Orders Placed This Encounter   Procedures   • Diet Order     Standing Status: Standing      Number of Occurrences: 1      Standing Expiration Date:      Order Specific Question:  Diet:     Answer:  Regular [1]     Lab Results:  Recent Labs      05/18/17   1152  05/19/17   0414   WBC  7.1  6.7   RBC  4.93  4.34   HEMOGLOBIN  15.2  13.2   HEMATOCRIT  45.8  39.1   MCV  92.9  90.1   MCH  30.8  30.4   MCHC  33.2*  33.8   RDW  47.8  47.1   PLATELETCT  230  213   MPV  9.2  9.7     Recent Labs      05/18/17   1152  05/19/17   0414   SODIUM  133*  130*   POTASSIUM  3.9  3.8   CHLORIDE  101  103   CO2  21  20   GLUCOSE  107*  117*   BUN  19  18   CREATININE  0.93  0.90   CALCIUM  9.4  8.7     Recent Labs      05/18/17   1152   APTT  27.1   INR  1.06     Recent Labs      05/18/17   1152   BNPBTYPENAT  165*     Recent Labs      05/18/17   1152   TROPONINI  <0.02   BNPBTYPENAT  165*             Medical Decision Making, by Problem:  Active Hospital Problems    Diagnosis   • *Atrial fibrillation with RVR (CMS-[facility]) [I48.91]   • Hypervolemia [E87.70]   • Chronic hip pain [M25.559, G89.29]   • Depression [F32.9]   • Chronic back pain [M54.9, G89.29]   • Recurrent UTI [N39.0]   • Hypertension [I10]   • JAMES (acute kidney injury) (CMS-[facility]) [N17.9]       Plan:  1. Atrial flutter ok for DC home. I will call her to schedule ablation  on Monday. Echo with severe TR, moderate MR, ANA ROSA preserved LV function.  2. Continue rate control.  3. Continue anticoagulation.  The risks, benefits, and alternatives to atrial flutter ablation were discussed in great detail, specific risks mentioned including bleeding, infection, arteriovenous fistula related to  sheath placement, cardiac perforation with possible tamponade requiring pericardiocentesis or possibly open heart surgery. In addition, pneumothorax and hemothorax were  mentioned with right internal jugular venous access. I also discussed the  possibility of permanent pacemaker placement due to to inadvertent AV block. In addition the risk of death, stroke and myocardial infarction were discussed. The patient verbalized understanding of these potential complications and wishes to proceed with this procedure.      Core Measures

## 2017-05-20 NOTE — CARE PLAN
Problem: Discharge Barriers/Planning  Goal: Patient’s continuum of care needs will be met  Outcome: PROGRESSING AS EXPECTED  Plan of care discussed with patient. Opportunity given for questions. States understanding.

## 2017-05-20 NOTE — PROGRESS NOTES
Resting with eyes closed. Respirations even and unlabored. No c/o. Repositions self. Calls as needed for assist to bathroom. Call light in reach.

## 2017-05-20 NOTE — PROGRESS NOTES
Continues to rest. Respirations even and unlabored. Bed alarm on for patient safety. Repositions self. Call light in reach.     Tele Report:   A Fib/ Flutter  Frequent PVC/ Occ Couplet  HR   WV -  QRS 0.08  QT -

## 2017-05-20 NOTE — PROGRESS NOTES
Received bedside report from Peace FOREMAN. Assumed care of pt who is resting in bed RR even/unlabored. Fall protocol in place. CLIP. Will continue to monitor.

## 2017-05-20 NOTE — DISCHARGE PLANNING
Pt stated that her daughter is on a flight to [place] and has the house keys.  Pt states she has several friends that we can call Paula Aldana [phone] which keeps giving busy signal.  NNAMDI then called Kal Rice a relative [phone] and left a voicemail.  NNAMDI then called pt's daughter, Nyasia [phone] and left voicemail.  Pt also wishes to resume with [facility].  NNAMDI notified Dr. Weston and requested HH order.  NNAMDI will fax choice form once HH order is received.

## 2017-05-20 NOTE — PROGRESS NOTES
Gave bedside report to NOC nursePeace.  Discussed POC.  Pt resting in bed, semi-fowlers, no s/s of acute distress, personal belongings w/in reach, safety precautions in place.

## 2017-05-21 LAB — EKG IMPRESSION: NORMAL

## 2017-05-21 NOTE — PROGRESS NOTES
Report received. Pt is to be discharged, waiting for her son, Kal, to transfer her home. RN Ivette Millard assisted pt with personal hygiene and packing. Medications administered per MAR. Pt A-flutter on the monitor, HR 90s. Discharge instructions discussed with pt while her son was in the room. IV and telemetry monitor removed. FRED Romeo assisted pt downstairs in her own wheelchair with all of her belongings.

## 2017-05-23 NOTE — TELEPHONE ENCOUNTER
Spoke with patient. She would like to wait until her daughter gets home to schedule the ablation. She will call me tomorrow to schedule.

## 2017-05-24 ENCOUNTER — PATIENT OUTREACH (OUTPATIENT)
Dept: HEALTH INFORMATION MANAGEMENT | Facility: OTHER | Age: 82
End: 2017-05-24

## 2017-05-25 NOTE — TELEPHONE ENCOUNTER
Called and spoke with patient. She would like to schedule an appointment to talk to Dr. Garrett in the office before scheduling the ablation. She feels her and her daughter have some question that they would like answered before. Patient scheduled to see Dr. Garrett on 6-16-17.

## 2017-05-31 DIAGNOSIS — I48.92 ATRIAL FLUTTER, UNSPECIFIED TYPE (HCC): ICD-10-CM

## 2017-05-31 DIAGNOSIS — Z01.812 PRE-OPERATIVE LABORATORY EXAMINATION: ICD-10-CM

## 2017-05-31 LAB
ALBUMIN SERPL BCP-MCNC: 3.9 G/DL (ref 3.2–4.9)
ALBUMIN/GLOB SERPL: 1.1 G/DL
ALP SERPL-CCNC: 134 U/L (ref 30–99)
ALT SERPL-CCNC: 32 U/L (ref 2–50)
ANION GAP SERPL CALC-SCNC: 9 MMOL/L (ref 0–11.9)
AST SERPL-CCNC: 29 U/L (ref 12–45)
BASOPHILS # BLD AUTO: 1.6 % (ref 0–1.8)
BASOPHILS # BLD: 0.12 K/UL (ref 0–0.12)
BILIRUB SERPL-MCNC: 0.7 MG/DL (ref 0.1–1.5)
BUN SERPL-MCNC: 16 MG/DL (ref 8–22)
CALCIUM SERPL-MCNC: 9.6 MG/DL (ref 8.5–10.5)
CHLORIDE SERPL-SCNC: 99 MMOL/L (ref 96–112)
CO2 SERPL-SCNC: 23 MMOL/L (ref 20–33)
CREAT SERPL-MCNC: 0.79 MG/DL (ref 0.5–1.4)
EOSINOPHIL # BLD AUTO: 0.14 K/UL (ref 0–0.51)
EOSINOPHIL NFR BLD: 1.9 % (ref 0–6.9)
ERYTHROCYTE [DISTWIDTH] IN BLOOD BY AUTOMATED COUNT: 46.1 FL (ref 35.9–50)
GFR SERPL CREATININE-BSD FRML MDRD: >60 ML/MIN/1.73 M 2
GLOBULIN SER CALC-MCNC: 3.5 G/DL (ref 1.9–3.5)
GLUCOSE SERPL-MCNC: 106 MG/DL (ref 65–99)
HCT VFR BLD AUTO: 46.3 % (ref 37–47)
HGB BLD-MCNC: 15.1 G/DL (ref 12–16)
IMM GRANULOCYTES # BLD AUTO: 0.02 K/UL (ref 0–0.11)
IMM GRANULOCYTES NFR BLD AUTO: 0.3 % (ref 0–0.9)
INR PPP: 1.32 (ref 0.87–1.13)
LYMPHOCYTES # BLD AUTO: 2.32 K/UL (ref 1–4.8)
LYMPHOCYTES NFR BLD: 30.7 % (ref 22–41)
MCH RBC QN AUTO: 29.7 PG (ref 27–33)
MCHC RBC AUTO-ENTMCNC: 32.6 G/DL (ref 33.6–35)
MCV RBC AUTO: 91.1 FL (ref 81.4–97.8)
MONOCYTES # BLD AUTO: 0.79 K/UL (ref 0–0.85)
MONOCYTES NFR BLD AUTO: 10.5 % (ref 0–13.4)
NEUTROPHILS # BLD AUTO: 4.16 K/UL (ref 2–7.15)
NEUTROPHILS NFR BLD: 55 % (ref 44–72)
NRBC # BLD AUTO: 0 K/UL
NRBC BLD AUTO-RTO: 0 /100 WBC
PLATELET # BLD AUTO: 268 K/UL (ref 164–446)
PMV BLD AUTO: 8.9 FL (ref 9–12.9)
POTASSIUM SERPL-SCNC: 4 MMOL/L (ref 3.6–5.5)
PROT SERPL-MCNC: 7.4 G/DL (ref 6–8.2)
PROTHROMBIN TIME: 16.8 SEC (ref 12–14.6)
RBC # BLD AUTO: 5.08 M/UL (ref 4.2–5.4)
SODIUM SERPL-SCNC: 131 MMOL/L (ref 135–145)
WBC # BLD AUTO: 7.6 K/UL (ref 4.8–10.8)

## 2017-05-31 PROCEDURE — 80053 COMPREHEN METABOLIC PANEL: CPT

## 2017-05-31 PROCEDURE — 85610 PROTHROMBIN TIME: CPT

## 2017-05-31 PROCEDURE — 36415 COLL VENOUS BLD VENIPUNCTURE: CPT

## 2017-05-31 PROCEDURE — 85025 COMPLETE CBC W/AUTO DIFF WBC: CPT

## 2017-05-31 RX ORDER — DIGOXIN 125 MCG
125 TABLET ORAL DAILY
Qty: 30 TAB | Refills: 3 | Status: ON HOLD | OUTPATIENT
Start: 2017-05-31 | End: 2017-06-02

## 2017-06-01 ENCOUNTER — APPOINTMENT (OUTPATIENT)
Dept: RADIOLOGY | Facility: MEDICAL CENTER | Age: 82
End: 2017-06-01
Attending: INTERNAL MEDICINE
Payer: MEDICARE

## 2017-06-01 ENCOUNTER — HOSPITAL ENCOUNTER (OUTPATIENT)
Facility: MEDICAL CENTER | Age: 82
End: 2017-06-02
Attending: INTERNAL MEDICINE | Admitting: INTERNAL MEDICINE
Payer: MEDICARE

## 2017-06-01 PROBLEM — I48.92 ATRIAL FLUTTER (HCC): Status: ACTIVE | Noted: 2017-06-01

## 2017-06-01 LAB
EKG IMPRESSION: NORMAL
EKG IMPRESSION: NORMAL

## 2017-06-01 PROCEDURE — 93005 ELECTROCARDIOGRAM TRACING: CPT | Performed by: INTERNAL MEDICINE

## 2017-06-01 PROCEDURE — 93312 ECHO TRANSESOPHAGEAL: CPT | Mod: XU

## 2017-06-01 PROCEDURE — C1892 INTRO/SHEATH,FIXED,PEEL-AWAY: HCPCS

## 2017-06-01 PROCEDURE — 93613 INTRACARDIAC EPHYS 3D MAPG: CPT

## 2017-06-01 PROCEDURE — 304952 HCHG R 2 PADS

## 2017-06-01 PROCEDURE — 93010 ELECTROCARDIOGRAM REPORT: CPT | Mod: 76 | Performed by: INTERNAL MEDICINE

## 2017-06-01 PROCEDURE — C1732 CATH, EP, DIAG/ABL, 3D/VECT: HCPCS

## 2017-06-01 PROCEDURE — 700111 HCHG RX REV CODE 636 W/ 250 OVERRIDE (IP)

## 2017-06-01 PROCEDURE — C1730 CATH, EP, 19 OR FEW ELECT: HCPCS

## 2017-06-01 PROCEDURE — 92960 CARDIOVERSION ELECTRIC EXT: CPT | Mod: XU

## 2017-06-01 PROCEDURE — 305387 HCHG SUTURES

## 2017-06-01 PROCEDURE — 33208 INSRT HEART PM ATRIAL & VENT: CPT

## 2017-06-01 PROCEDURE — 700101 HCHG RX REV CODE 250: Mod: JW

## 2017-06-01 PROCEDURE — A9270 NON-COVERED ITEM OR SERVICE: HCPCS | Performed by: INTERNAL MEDICINE

## 2017-06-01 PROCEDURE — 700101 HCHG RX REV CODE 250

## 2017-06-01 PROCEDURE — 71010 DX-CHEST-PORTABLE (1 VIEW): CPT

## 2017-06-01 PROCEDURE — C1785 PMKR, DUAL, RATE-RESP: HCPCS

## 2017-06-01 PROCEDURE — 700111 HCHG RX REV CODE 636 W/ 250 OVERRIDE (IP): Performed by: INTERNAL MEDICINE

## 2017-06-01 PROCEDURE — 304853 HCHG PPM TEST CABLE

## 2017-06-01 PROCEDURE — 93653 COMPRE EP EVAL TX SVT: CPT

## 2017-06-01 PROCEDURE — 00537 ANES CARDIAC EP PROCEDURES: CPT

## 2017-06-01 PROCEDURE — 305383 HCHG CARTO3 REF PATCH

## 2017-06-01 PROCEDURE — 700102 HCHG RX REV CODE 250 W/ 637 OVERRIDE(OP): Performed by: INTERNAL MEDICINE

## 2017-06-01 PROCEDURE — C1894 INTRO/SHEATH, NON-LASER: HCPCS

## 2017-06-01 PROCEDURE — G0378 HOSPITAL OBSERVATION PER HR: HCPCS

## 2017-06-01 PROCEDURE — 00530 ANES PERM TRANSVNS PM INSJ: CPT

## 2017-06-01 PROCEDURE — C1731 CATH, EP, 20 OR MORE ELEC: HCPCS

## 2017-06-01 PROCEDURE — 700105 HCHG RX REV CODE 258: Performed by: INTERNAL MEDICINE

## 2017-06-01 PROCEDURE — C1898 LEAD, PMKR, OTHER THAN TRANS: HCPCS

## 2017-06-01 PROCEDURE — 93325 DOPPLER ECHO COLOR FLOW MAPG: CPT

## 2017-06-01 RX ORDER — METOPROLOL TARTRATE 50 MG/1
50 TABLET, FILM COATED ORAL 2 TIMES DAILY
Status: DISCONTINUED | OUTPATIENT
Start: 2017-06-01 | End: 2017-06-02 | Stop reason: HOSPADM

## 2017-06-01 RX ORDER — TRAMADOL HYDROCHLORIDE 50 MG/1
50 TABLET ORAL EVERY 6 HOURS PRN
Status: DISCONTINUED | OUTPATIENT
Start: 2017-06-01 | End: 2017-06-02 | Stop reason: HOSPADM

## 2017-06-01 RX ORDER — LIDOCAINE HYDROCHLORIDE 20 MG/ML
INJECTION, SOLUTION INFILTRATION; PERINEURAL
Status: COMPLETED
Start: 2017-06-01 | End: 2017-06-01

## 2017-06-01 RX ORDER — OXYCODONE HYDROCHLORIDE AND ACETAMINOPHEN 5; 325 MG/1; MG/1
1-2 TABLET ORAL EVERY 4 HOURS PRN
Status: DISCONTINUED | OUTPATIENT
Start: 2017-06-01 | End: 2017-06-02 | Stop reason: HOSPADM

## 2017-06-01 RX ORDER — TAMSULOSIN HYDROCHLORIDE 0.4 MG/1
0.4 CAPSULE ORAL
Status: DISCONTINUED | OUTPATIENT
Start: 2017-06-02 | End: 2017-06-02 | Stop reason: HOSPADM

## 2017-06-01 RX ORDER — MELOXICAM 7.5 MG/1
7.5 TABLET ORAL
Status: DISCONTINUED | OUTPATIENT
Start: 2017-06-01 | End: 2017-06-02 | Stop reason: HOSPADM

## 2017-06-01 RX ORDER — FLUOXETINE HYDROCHLORIDE 20 MG/1
40 CAPSULE ORAL DAILY
Status: DISCONTINUED | OUTPATIENT
Start: 2017-06-02 | End: 2017-06-02 | Stop reason: HOSPADM

## 2017-06-01 RX ORDER — HEPARIN SODIUM,PORCINE 1000/ML
VIAL (ML) INJECTION
Status: COMPLETED
Start: 2017-06-01 | End: 2017-06-01

## 2017-06-01 RX ORDER — BETHANECHOL CHLORIDE 10 MG/1
10 TABLET ORAL 3 TIMES DAILY
Status: DISCONTINUED | OUTPATIENT
Start: 2017-06-01 | End: 2017-06-02 | Stop reason: HOSPADM

## 2017-06-01 RX ORDER — ACETAMINOPHEN 325 MG/1
650 TABLET ORAL EVERY 4 HOURS PRN
Status: DISCONTINUED | OUTPATIENT
Start: 2017-06-01 | End: 2017-06-02 | Stop reason: HOSPADM

## 2017-06-01 RX ORDER — CEFAZOLIN SODIUM 1 G/3ML
INJECTION, POWDER, FOR SOLUTION INTRAMUSCULAR; INTRAVENOUS
Status: COMPLETED
Start: 2017-06-01 | End: 2017-06-01

## 2017-06-01 RX ORDER — SODIUM CHLORIDE, SODIUM LACTATE, POTASSIUM CHLORIDE, CALCIUM CHLORIDE 600; 310; 30; 20 MG/100ML; MG/100ML; MG/100ML; MG/100ML
INJECTION, SOLUTION INTRAVENOUS CONTINUOUS
Status: DISCONTINUED | OUTPATIENT
Start: 2017-06-01 | End: 2017-06-02 | Stop reason: HOSPADM

## 2017-06-01 RX ADMIN — HEPARIN SODIUM 2000 UNITS: 200 INJECTION, SOLUTION INTRAVENOUS at 11:34

## 2017-06-01 RX ADMIN — CEFAZOLIN 3000 MG: 1 INJECTION, POWDER, FOR SOLUTION INTRAVENOUS at 13:00

## 2017-06-01 RX ADMIN — OXYCODONE HYDROCHLORIDE AND ACETAMINOPHEN 1 TABLET: 5; 325 TABLET ORAL at 22:14

## 2017-06-01 RX ADMIN — BETHANECHOL CHLORIDE 10 MG: 10 TABLET ORAL at 16:34

## 2017-06-01 RX ADMIN — OXYCODONE HYDROCHLORIDE AND ACETAMINOPHEN 1 TABLET: 5; 325 TABLET ORAL at 18:06

## 2017-06-01 RX ADMIN — LIDOCAINE HYDROCHLORIDE: 20 INJECTION, SOLUTION INFILTRATION; PERINEURAL at 13:00

## 2017-06-01 RX ADMIN — BETHANECHOL CHLORIDE 10 MG: 10 TABLET ORAL at 22:14

## 2017-06-01 RX ADMIN — SODIUM CHLORIDE, POTASSIUM CHLORIDE, SODIUM LACTATE AND CALCIUM CHLORIDE: 600; 310; 30; 20 INJECTION, SOLUTION INTRAVENOUS at 20:16

## 2017-06-01 RX ADMIN — LIDOCAINE HYDROCHLORIDE: 20 INJECTION, SOLUTION INFILTRATION; PERINEURAL at 11:34

## 2017-06-01 RX ADMIN — DRONEDARONE 400 MG: 400 TABLET, FILM COATED ORAL at 18:06

## 2017-06-01 ASSESSMENT — PAIN SCALES - GENERAL
PAINLEVEL_OUTOF10: 0
PAINLEVEL_OUTOF10: 0
PAINLEVEL_OUTOF10: 2
PAINLEVEL_OUTOF10: 5
PAINLEVEL_OUTOF10: 0
PAINLEVEL_OUTOF10: 0
PAINLEVEL_OUTOF10: 2
PAINLEVEL_OUTOF10: 0

## 2017-06-01 ASSESSMENT — LIFESTYLE VARIABLES
TOTAL SCORE: 0
TOTAL SCORE: 0
DO YOU DRINK ALCOHOL: YES
HAVE YOU EVER FELT YOU SHOULD CUT DOWN ON YOUR DRINKING: NO
ON A TYPICAL DAY WHEN YOU DRINK ALCOHOL HOW MANY DRINKS DO YOU HAVE: 0
HAVE PEOPLE ANNOYED YOU BY CRITICIZING YOUR DRINKING: NO
EVER HAD A DRINK FIRST THING IN THE MORNING TO STEADY YOUR NERVES TO GET RID OF A HANGOVER: NO
EVER_SMOKED: NEVER
CONSUMPTION TOTAL: NEGATIVE
AVERAGE NUMBER OF DAYS PER WEEK YOU HAVE A DRINK CONTAINING ALCOHOL: 1
HOW MANY TIMES IN THE PAST YEAR HAVE YOU HAD 5 OR MORE DRINKS IN A DAY: 0
TOTAL SCORE: 0
EVER FELT BAD OR GUILTY ABOUT YOUR DRINKING: NO

## 2017-06-01 NOTE — IP AVS SNAPSHOT
" <p align=\"LEFT\"><IMG SRC=\"//[email or web address]/blob$/[email or web address]\" alt=\"Image\" WIDTH=\"50%\" HEIGHT=\"200\" BORDER=\"\"></p>                   Name:Meron Rice  Medical Record Number:[id number]  CSN: [id number]    YOB: 1932   Age: 84 y.o.  Sex: female  HT:1.753 m (5' 9\") WT: 79.8 kg (175 lb 14.8 oz)          Admit Date: 6/1/2017     Discharge Date:   Today's Date: 6/2/2017  Attending Doctor:  Stanislav Garrett M.D.                  Allergies:  Alendronate-butylpar-propylpar-saccharin          Your appointments     Jun 12, 2017 10:30 AM   PACER CHECK ONLY with PACER CHECK-[facility]   [facility] (--)    [address] [place] [id number] [phone]            Jun 16, 2017  3:40 PM   FOLLOW UP with Stanislav Garrett M.D.   [facility] (--)    [address] [place] [address] [address]  [facility] [id number] [phone]            Sep 21, 2017  2:00 PM   New Patient with Estella Larkin M.D.   [facility] (--)    [address] [place] [address] [address]  [facility] [id number]   [phone]           Please bring Photo ID, Insurance Cards, All Medication Bottles and copies of any legal documents (such as Living Will, Power of ) If speaking a language besides English please bring an adult . Please arrive 30 minutes prior for check in and registration. You will be receiving a confirmation call a few days before your appointment from our automated call confirmation system.                 Medication List      Take these Medications        Instructions    apixaban 5mg Tabs   Commonly known as:  ELIQUIS    Take 1 Tab by mouth 2 Times a Day.   Dose:  5 mg       aspirin EC 81 MG Tbec   Commonly known as:  ECOTRIN    Take 1 Tab by mouth every day.   Dose:  81 mg       bethanechol 10 MG Tabs   Commonly known as:  URECHOLINE    Take 1 Tab by mouth 3 times a day.   Dose:  10 mg       dronedarone 400 MG Tabs   Commonly known " as:  MULTAQ    Take 1 Tab by mouth 2 times a day, with meals.   Dose:  400 mg       fluoxetine 40 MG capsule   Commonly known as:  PROZAC    Doctor's comments:  Replaces 20 mg dose sent earlier today   Take 1 Cap by mouth every day.   Dose:  40 mg       meloxicam 7.5 MG Tabs   Commonly known as:  MOBIC    Take 7.5 mg by mouth every day.   Dose:  7.5 mg       metoprolol 50 MG Tabs   Commonly known as:  LOPRESSOR    Take 1 Tab by mouth 2 Times a Day.   Dose:  50 mg       PROBIOTIC PO    Take 1 Cap by mouth every day.   Dose:  1 Cap       tamsulosin 0.4 MG capsule   Commonly known as:  FLOMAX    Take 1 Cap by mouth ONE-HALF HOUR AFTER BREAKFAST.   Dose:  0.4 mg       tramadol 50 MG Tabs   Commonly known as:  ULTRAM    Take 1 Tab by mouth every 6 hours as needed for Severe Pain.   Dose:  50 mg       VICODIN PO    Take  by mouth as needed.

## 2017-06-01 NOTE — PROCEDURES
DATE OF SERVICE:  06/01/2017    PROCEDURE PERFORMED:  1.  Electrophysiology study with radiofrequency ablation of SVT.  2.  Left atrial pacing, sensing and stimulation protocol.  3.  Advanced mapping using CARTO system.  4.  Transesophageal echocardiogram.  5.  DC cardioversion.    PHYSICIAN:  Stanislav Garrett MD    ASSISTANT:  No assistant used.    ANESTHESIA:  General anesthesia.    ANESTHESIOLOGIST:  Saleem Kate DO    INDICATION:  Persistent atrial fibrillation, atrial flutter with symptoms and   bradycardia.     POSTPROCEDURE DIAGNOSES:  Successful direct current cardioversion with flutter   ablation.  Recommendation is for permanent pacemaker.    COMPLICATIONS:  None.    PREPROCEDURE EKG:  Atrial fibrillation.    POSTPROCEDURE EKG:  Sinus bradycardia with first-degree AV block.    NARRATIVE:  After risks and benefits explained to the patient, patient gave   informed consent.  The patient was brought to the electrophysiology lab in   fasting state.  Patient prepped and draped in usual sterile manner.  General   anesthesia was given to the patient as per Dr. Kate.  A transesophageal   echocardiogram was performed by me, which showed no evidence of left atrial   clot or left atrial appendage clot.  Patient was in underlying atrial   fibrillation.  Subsequently, the patient received DC cardioversion back into   sinus rhythm.  Next, 2% lidocaine was infiltrated subcutaneously over the   right femoral region and a 6-Ghanaian, 7-Ghanaian, 8-Ghanaian sheath were placed in   the right femoral vein using modified Seldinger technique.  Next, a steerable   decapolar catheter was advanced to coronary sinus position.  Following this, a   halo catheter was advanced along the tricuspid valve annulus from medial to   lateral.  Baseline intervals and baseline measurements were performed.  Pacing   was performed both on the medial side and lateral side of the isthmus with   evidence of isthmus conduction.  Subsequently,  ablation was performed with the   F/J tip ThermoCool catheter, advanced mapping was performed with a His cloud   performed.  Ablation was performed along the isthmus until we had bilateral   isthmus block, which was sustained.  Subsequently, programmed stimulation was   performed with A1, A2, A3 stimuli that were non-inducible.  Straight atrial   pacing induced atrial fibrillation, which required cardioversion.  The   patient's underlying rhythm in sinus rhythm was sinus bradycardia with a long   first-degree AV block.  HV interval was fine.  Sinus bradycardia was   approximately 40-45 beats per minute.  At the conclusion of procedure, the   sheaths were removed and hemostasis was obtained with direct manual   compression.    RESULTS:  1.  Baseline electrocardiogram showed atrial fibrillation with controlled   ventricular response.  2.  Baseline interval showed an AH interval of 124 milliseconds, HV interval   of 45 milliseconds, MA interval of 245 milliseconds, QRS duration of 95   milliseconds, QT interval of 380 milliseconds.  3.  Baseline mapping.  Mapping was performed with the F/J tip ablation   catheter, which was a ThermoCool catheter.  Ablation was performed during CS   pacing until we had evidence of bilateral isthmus block, we waited 10 minutes.    Patient continued to be non-inducible for atrial flutter and continued to   have bilateral isthmus block.  We did induce atrial fibrillation, which   required cardioversion.  Please note, the patient's corrected sinus node   recovery time was greater than 600 milliseconds.  Total ablations were 6 with   ablation time of 598 seconds.  Total fluoro time was 4.1 minutes.    CONCLUSION:  1.  Baseline atrial fibrillation.  2.  Discontinue cardioversion.  3.  Isthmus ablation.  4.  Sick sinus syndrome.  5.  Recommendations for permanent pacemaker and antiarrhythmic medications.       ____________________________________     LASHAE GERARD MD    [facility] / [facility]    DD:   06/01/2017 12:46:49  DT:  06/01/2017 13:26:06    D#:  [id number]  Job#:  [id number]

## 2017-06-01 NOTE — H&P
Physician H&P    Patient ID:  Meron Rice  [id number]  84 y.o. female  11/18/1932    History:  Primary Diagnosis: Patient with atrial flutter with RVR for flutter RFA    HPI:  Fatigue and palps. In a fib now. Severe TR and MR preserved LV function. Low energy. No syncope. Weakness    Past Medical History:  has a past medical history of Foot-drop; Hypertension; Unspecified urinary incontinence; Personal history of venous thrombosis and embolism; Cancer (CMS-HCC) (6853-3712); Other specified symptom associated with female genital organs (1998); Arthritis; ASTHMA; CATARACT; Depression (5/19/2017); Arrhythmia; Urinary bladder disorder; Breath shortness; Anemia; Unspecified hemorrhagic conditions; Pneumonia (2017); Bowel habit changes; Chronic low back pain; Pain; UTI (urinary tract infection); Bronchitis (2017); and Fall (05/2017). She also has no past medical history of CAD (coronary artery disease), Liver disease, or COPD.  Past Surgical History:  has past surgical history that includes hysterectomy, total abdominal (2000); vein ligat & strip (1972, 1966); axillary node dissection (9/15/2009); mastectomy (9/30/2009); other orthopedic surgery (2002); gyn surgery (1998); cath placement (10/28/2009); mastectomy (12/15/2010); cath removal (12/15/2010); breast reconstruction (12/15/2010); latissimus flap (12/15/2010); appendectomy (1968); dilation and curettage (1961); breast implant revision (7/11/2011); nipple reconstruction (7/11/2011); hip hemiarthroplasty (5/14/2012); other surgical procedure (2004); breast reconstruction (11/26/2012); wound closure general (4/10/2013); breast implant removal (4/10/2013); and femur nailing intramedullary (Right, 6/3/2015).  Past Social History:  reports that she quit smoking about 53 years ago. Her smoking use included Cigarettes. She has a .1 pack-year smoking history. She has never used smokeless tobacco. She reports that she drinks alcohol. She reports that she does not use  "illicit drugs.  Past Family History:   Family History   Problem Relation Age of Onset   • Diabetes     • Heart Disease     • Stroke     • Cancer     • Hypertension     • Diabetes Mother    • Heart Disease Mother    • Heart Disease Father    • Stroke Father    • Hypertension Father    • Diabetes Sister    • Heart Disease Sister    • Cancer Brother      lung   • Diabetes Other    • Diabetes Child    • Psychiatry Child      Allergies: Alendronate-butylpar-propylpar-saccharin    Current Medications:  Prior to Admission medications    Medication Sig Start Date End Date Taking? Authorizing Provider   Hydrocodone-Acetaminophen (VICODIN PO) Take  by mouth as needed.   Yes Not In System Provider   digoxin (LANOXIN) 125 MCG Tab Take 1 Tab by mouth every day at 6 PM. 5/31/17   Stanislav Garrett M.D.   apixaban (ELIQUIS) 5mg Tab Take 1 Tab by mouth 2 Times a Day. 5/20/17   Diony Weston M.D.   metoprolol (LOPRESSOR) 50 MG Tab Take 1 Tab by mouth 2 Times a Day. 5/20/17   Diony Weston M.D.   meloxicam (MOBIC) 7.5 MG Tab Take 7.5 mg by mouth every day.    Not In System Provider   bethanechol (URECHOLINE) 10 MG Tab Take 1 Tab by mouth 3 times a day. 3/10/17   Stanislav Medrano M.D.   tamsulosin (FLOMAX) 0.4 MG capsule Take 1 Cap by mouth ONE-HALF HOUR AFTER BREAKFAST. 3/10/17   Stanislav Medrano M.D.   tramadol (ULTRAM) 50 MG Tab Take 1 Tab by mouth every 6 hours as needed for Severe Pain. 3/10/17   Stanislav Medrano M.D.   Probiotic Product (PROBIOTIC PO) Take 1 Cap by mouth every day.    Not In System Provider   fluoxetine (PROZAC) 40 MG capsule Take 1 Cap by mouth every day. 8/18/16   Lorenzo Allen M.D.   aspirin EC (ECOTRIN) 81 MG TBEC Take 1 Tab by mouth every day. 6/27/15   Jose A Walker M.D.       Review of Systems:  ROS  Blood pressure 133/90, pulse 57, temperature 36.1 °C (97 °F), resp. rate 16, height 1.549 m (5' 1\"), weight 80.74 kg (178 lb), SpO2 96 %.    Physical Examination:  Physical Exam   Constitutional: She " is oriented to person, place, and time. She appears well-developed and well-nourished. No distress.   HENT:   Mouth/Throat: Oropharynx is clear and moist.   Eyes: Conjunctivae and EOM are normal.   Neck: Neck supple. No JVD present. No thyroid mass present.   Cardiovascular: Normal rate, S1 normal, S2 normal and normal pulses.  An irregularly irregular rhythm present. PMI is not displaced.  Exam reveals no gallop.    Murmur heard.  Pulses:       Carotid pulses are 2+ on the right side, and 2+ on the left side.       Radial pulses are 2+ on the right side, and 2+ on the left side.        Femoral pulses are 2+ on the right side, and 2+ on the left side.       Dorsalis pedis pulses are 2+ on the right side, and 2+ on the left side.   No peripheral edema.   Pulmonary/Chest: Effort normal and breath sounds normal.   Abdominal: Soft. Normal appearance. She exhibits no abdominal bruit and no mass. There is no hepatosplenomegaly. There is no tenderness.   Musculoskeletal: Normal range of motion. She exhibits no edema.        Lumbar back: She exhibits no tenderness and no spasm.   Neurological: She is alert and oriented to person, place, and time. She has normal strength.   Skin: Skin is warm and dry. No rash noted. No cyanosis. Nails show no clubbing.   Psychiatric: She has a normal mood and affect.       Impression:  1. A fib and flutter discussed rate control vs rhythm control. Patient feels poorly and leaky valves. Wants to try rhythm control. If needs PPM with SR and AA meds she wishes to proceed.  The risks, benefits, and alternatives to electrical cardioversion were discussed in great detail. We discussed that conversion of atrial fibrillation to normal rhythm, at least transiently, is successful in 90 to 95% of patients. However, maintaining a normal rhythm depends on a number of factors, including underlying heart disease and antiarrhythmic medications. Atrial fibrillation often recurs with time and other treatments  may be necessary. Risks of  cardioversion are low as long as anticoagulation issues are handled appropriately. There is a small (less than 1%) risk of embolic events, including stroke. Risks of electrical shock include mild muscle soreness and mild skin burning at the site of electrode placement. There is also a risk that cardioversion can stimulate more dangerous arrhythmias. The patient verbalized understanding of these potential complications and wishes to proceed with this procedure.  The risks, benefits, and alternatives to transesophageal echocardiogram with IV sedation were discussed with the patient in specific detail, including oropharyngeal and esophageal traumas including hoarseness and dysphagia after the procedure. Rare cases demonstrating serious or fatal complications associated with transesophageal echocardiogram have been reported in the adult population, including cardiac, pulmonary and bleeding complications in less than 1% of people. Patients with an identified intracardiac thrombus are at increased risk for embolic events and this appears to be reduced with anticoagulant therapy. The patient verbalized understandings about these  possible complications and wishes to proceed with this procedure  The risks, benefits, and alternatives to atrial flutter ablation were discussed in great detail, specific risks mentioned including bleeding, infection, arteriovenous fistula related to  sheath placement, cardiac perforation with possible tamponade requiring pericardiocentesis or possibly open heart surgery. In addition, pneumothorax and hemothorax were mentioned with right internal jugular venous access. I also discussed the  possibility of permanent pacemaker placement due to to inadvertent AV block. In addition the risk of death, stroke and myocardial infarction were discussed. The patient verbalized understanding of these potential complications and wishes to proceed with this procedure.  The risks,  benefits, and alternatives to permanent pacemaker placement were discussed in great detail.  Specific risks mentioned to the patient including bleeding, cardiac perforation with possible tamponade possibly requiring pericardiocentesis or open heart surgery.  In addition the possibility of lead dislodgment (2-3%), pneumothorax (3%), hemothorax, infection were discussed.  Also, mentioned were the risk of death, stroke, and myocardial infarction.  The patient verbalized understanding of the potential complications and wishes to proceed with the procedure.          Pre Procedure Assessment:  Pre-Procedure Assessment - Applicable for patients receiving sedation by non-anesthesia practitioner.  Previous drug history, other anesthetic experiences, potential anesthetic problems and choice of anesthesia assessed, discussed with patient.     No prior complications.  Results of relevant diagnostic studies reviewed.    Plan of Sedation:  Patient assessed immediately prior to sedationPatient deemed appropriate candidate for conscious sedation options and plans discussed with patient / family    ASA 3 - Patient with severe systemic disease

## 2017-06-01 NOTE — IP AVS SNAPSHOT
" Home Care Instructions                                                                                                                  Name:Meron Rice  Medical Record Number:[id number]  CSN: [id number]    YOB: 1932   Age: 84 y.o.  Sex: female  HT:1.753 m (5' 9\") WT: 79.8 kg (175 lb 14.8 oz)          Admit Date: 6/1/2017     Discharge Date:   Today's Date: 6/2/2017  Attending Doctor:  Stanislav Garrett M.D.                  Allergies:  Alendronate-butylpar-propylpar-saccharin            Discharge Instructions       Discharge Instructions    Discharged to home by car with relative. Discharged via wheelchair, hospital escort: Yes.  Special equipment needed: Not Applicable    Be sure to schedule a follow-up appointment with your primary care doctor or any specialists as instructed.     Discharge Plan:   Diet Plan: Discussed  Activity Level: Discussed  Confirmed Follow up Appointment: Appointment Scheduled  Confirmed Symptoms Management: Discussed  Medication Reconciliation Updated: Yes  Influenza Vaccine Indication: Patient Refuses    I understand that a diet low in cholesterol, fat, and sodium is recommended for good health. Unless I have been given specific instructions below for another diet, I accept this instruction as my diet prescription.   Other diet: cardiac low salt         · Is patient discharged on Warfarin / Coumadin?   No     · Is patient Post Blood Transfusion?  No    : Pacer restrictions reviewed with patient. Do not raise affected arm above head or behind back for six weeks. May remove arm sling after 24 hrs, please wear if trouble remembering arm limitation and prn at night. No heavy lifting/pushing with Left arm for six weeks. No driving for first week. No showers first week. Keep dressing on, clean, and dry until seen follow up. Wound care reviewed. Instructed to report s/s of infection such as warmth/redness/drainage/swelling at site or fever/chills.      Post ablation instructions: No " lifting > 10 lbs x 1 week.  No baths or hot tubs x 1 week.   take off dressings on Saturday.  Continue to monitor sites daily for warmth, redness, discolored drainage   Please walk and take deep breaths after discharge.   After discharge if if experiences chest pain, shortness of breath, hemoptysis, neurological changes, high fever, pre syncope/syncope, trouble with catheter sites needs to be seen in the emergency dept.    Depression / Suicide Risk    As [facility] are [facility], it is important to learn how to keep safe from harming yourself.    Recognize the warning signs:  · Abrupt changes in personality, positive or negative- including increase in energy   · Giving away possessions  · Change in eating patterns- significant weight changes-  positive or negative  · Change in sleeping patterns- unable to sleep or sleeping all the time   · Unwillingness or inability to communicate  · Depression  · Unusual sadness, discouragement and loneliness  · Talk of wanting to die  · Neglect of personal appearance   · Rebelliousness- reckless behavior  · Withdrawal from people/activities they love  · Confusion- inability to concentrate     If you or a loved one observes any of these behaviors or has concerns about self-harm, here's what you can do:  · Talk about it- your feelings and reasons for harming yourself  · Remove any means that you might use to hurt yourself (examples: pills, rope, extension cords, firearm)  · Get professional help from the community (Mental Health, Substance Abuse, psychological counseling)  · Do not be alone:Call your Safe Contact- someone whom you trust who will be there for you.  · Call your local CRISIS HOTLINE 615-0555 or 020-542-3119  · Call your local Children's Mobile Crisis Response Team [place] (964) 405-6536 or www.JoySports  · Call the toll free National Suicide Prevention Hotlines   · National Suicide Prevention Lifeline 951-489-VCAP  (2199)  · [facility] 800-SUICIDE (627-6218)      Biventricular Pacemaker Implantation, Care After  Refer to this sheet in the next few weeks. These instructions provide you with information on caring for yourself after your procedure. Your health care provider may also give you more specific instructions. Your treatment has been planned according to current medical practices, but problems sometimes occur. Call your health care provider if you have any problems or questions after your procedure.  WHAT TO EXPECT AFTER THE PROCEDURE  · You may feel pain. Some pain is normal. It may last a few days.  · A slight bump may be seen over the skin where the device was placed. Sometimes, it is possible to feel the device under the skin. This is normal.  · In the months and years afterward, your health care provider will check the device, the leads, and the battery every few months. Eventually, when the battery is low, the device will be replaced.  HOME CARE INSTRUCTIONS  Medicines  · Take medicines only as directed by your health care provider.  · If you were prescribed an antibiotic medicine, finish it all even if you start to feel better.    · Do not take any other medicines without asking your health care provider first. Some medicines, including certain painkillers, can cause bleeding in your stomach after surgery.  Wound Care  · Do not remove the bandage on your chest until directed to do so by your health care provider.  · After your bandage is removed, you may see pieces of tape called skin adhesive strips over the area where the cut was made (incision site). Let them fall off on their own.    · Check the incision site every day to make sure it is not infected, bleeding, or starting to pull apart.  · Do not use lotions or ointments near the incision site unless directed to do so.    · Keep the incision area clean and dry for 2-3 days after the procedure or as directed by your health care provider. It  takes several weeks for the incision site to completely heal.    · Do not take baths, swim, or use a hot tub until your health care provider approves.  Activities  · Try to walk a little every day. Exercising is important after this procedure. It is also important to use your shoulder on the side of the pacemaker in daily tasks that do not require exaggerated motion.  · Avoid sudden jerking, pulling, or chopping movements that pull your upper arm far away from your body for at least 6 weeks.    · Do not lift your upper arm above your shoulders for at least 6 weeks. This means no tennis, golf, or swimming for this period of time. If you sleep with the arm above your head, use a restraint to prevent this from happening as you sleep.  · You may go back to work when your health care provider says it is okay. Check with your health care provider before you start to drive or play sports.  Other Instructions  · Follow diet instructions if they were provided. You should be able to eat what you usually do right away, but you may need to limit your salt intake.    · Weigh yourself every day. If you suddenly gain weight, fluid may be building up in your body.    · Always carry your pacemaker identification card with you. The card should list the implant date, device model, and . Consider wearing a medical alert bracelet or necklace.    · Tell all health care providers that you have a pacemaker. This may prevent them from giving you a magnetic resource imaging scan (MRI) because of the strong magnets used during that test.    · If you must pass through a metal detector, quickly walk through it. Do not stop under the detector or stand near it.    · Avoid places or objects with a strong electric or magnetic field, including:    ¨ Airport security peck. When at the airport, let officials know you have a pacemaker. Your ID card will let you be checked in a way that is safe for you and that will not damage your  pacemaker. Also, do not let a security person wave a magnetic wand near your pacemaker. That can make it stop working.    ¨ Power plants.    ¨ Large electrical generators.    ¨ Radiofrequency transmission towers, such as cell phone and radio towers.    · Do not use amateur (ham) radio equipment or electric (arc) welding torches. Some devices are safe to use if held at least 1 foot from your pacemaker. These include power tools, lawn mowers, and speakers. If you are unsure of whether something is safe to use, ask your health care provider.    · You may safely use electric blankets, heating pads, computers, and microwave ovens.    · When using your cell phone, hold it to the ear opposite the pacemaker. Do not leave your cell phone in a pocket over the pacemaker.    · Keep all follow-up visits as directed by your health care provider. This is how your health care provider makes sure your chest is healing the way it should. Ask your health care provider when you should come back to have your stitches or staples taken out.    · Have your pacemaker checked every 3-6 months or as directed by your health care provider. Most pacemakers last for 4-8 years before a new one is needed.    SEEK MEDICAL CARE IF:  · You gain weight suddenly.    · Your legs or feet swell more than they have before.    · It feels like your heart is fluttering or skipping beats (heart palpitations).  · You have a fever.  SEEK IMMEDIATE MEDICAL CARE IF:   · You have chest pain.    · You feel more short of breath than you have felt before.    · You feel more light-headed than you have felt before.    · You have problems with your incision site, such as swelling or bleeding, or it starts to open up.    · You notice signs of infection around your incision site. Watch for:    ¨ Warmth.    ¨ Redness.    ¨ Worsening pain.    ¨ Swelling.    ¨ Fluid leaking from the incision site.       This information is not intended to replace advice given to you by your  health care provider. Make sure you discuss any questions you have with your health care provider.     Document Released: 09/11/2013 Document Revised: 01/08/2016 Document Reviewed: 09/11/2013  [employer] Interactive Patient Education ©2016 [employer]  Cardiac Ablation  Cardiac ablation is a procedure to disable a small amount of heart tissue in very specific places. The heart has many electrical connections. Sometimes these connections are abnormal and can cause the heart to beat very fast or irregularly. By disabling some of the problem areas, heart rhythm can be improved or made normal. Ablation is done for people who:   · Have Reshma-Parkinson-White syndrome.    · Have other fast heart rhythms (tachycardia).    · Have taken medicines for an abnormal heart rhythm (arrhythmia) that resulted in:    · No success.    · Side effects.    · May have a high-risk heartbeat that could result in death.    LET YOUR HEALTH CARE PROVIDER KNOW ABOUT:   · Any allergies you have or any previous reactions you have had to X-ray dye, food (such as seafood), medicine, or tape.    · All medicines you are taking, including vitamins, herbs, eye drops, creams, and over-the-counter medicines.    · Previous problems you or members of your family have had with the use of anesthetics.    · Any blood disorders you have.    · Previous surgeries or procedures (such as a kidney transplant) you have had.    · Medical conditions you have (such as kidney failure).    RISKS AND COMPLICATIONS  Generally, cardiac ablation is a safe procedure. However, problems can occur and include:   · Increased risk of cancer. Depending on how long it takes to do the ablation, the dose of radiation can be high.   · Bruising and bleeding where a thin, flexible tube (catheter) was inserted during the procedure.    · Bleeding into the chest, especially into the sac that surrounds the heart (serious).  · Need for a permanent pacemaker if the normal electrical system is  "damaged.    · The procedure may not be fully effective, and this may not be recognized for months. Repeat ablation procedures are sometimes required.  BEFORE THE PROCEDURE   · Follow any instructions from your health care provider regarding eating and drinking before the procedure.    · Take your medicines as directed at regular times with water, unless instructed otherwise by your health care provider. If you are taking diabetes medicine, including insulin, ask how you are to take it and if there are any special instructions you should follow. It is common to adjust insulin dosing the day of the ablation.    PROCEDURE  · An ablation is usually performed in a catheterization laboratory with the guidance of fluoroscopy. Fluoroscopy is a type of X-ray that helps your health care provider see images of your heart during the procedure.    · An ablation is a minimally invasive procedure. This means a small cut (incision) is made in either your neck or groin. Your health care provider will decide where to make the incision based on your medical history and physical exam.  · An IV tube will be started before the procedure begins. You will be given an anesthetic or medicine to help you relax (sedative).  · The skin on your neck or groin will be numbed. A needle will be inserted into a large vein in your neck or groin and catheters will be threaded to your heart.  · A special dye that shows up on fluoroscopy pictures may be injected through the catheter. The dye helps your health care provider see the area of the heart that needs treatment.  · The catheter has electrodes on the tip. When the area of heart tissue that is causing the arrhythmia is found, the catheter tip will send an electrical current to the area and \"scar\" the tissue.  Three types of energy can be used to ablate the heart tissue:    ¨ Heat (radiofrequency energy).    ¨ Laser energy.    ¨ Extreme cold (cryoablation).    · When the area of the heart has been " ablated, the catheter will be taken out. Pressure will be held on the insertion site. This will help the insertion site clot and keep it from bleeding. A bandage will be placed on the insertion site.    AFTER THE PROCEDURE   · After the procedure, you will be taken to a recovery area where your vital signs (blood pressure, heart rate, and breathing) will be monitored. The insertion site will also be monitored for bleeding.    · You will need to lie still for 4-6 hours. This is to ensure you do not bleed from the catheter insertion site.       This information is not intended to replace advice given to you by your health care provider. Make sure you discuss any questions you have with your health care provider.     Document Released: 05/06/2010 Document Revised: 01/08/2016 Document Reviewed: 05/12/2014  [employer] Interactive Patient Education ©2016 [employer]        Your appointments     Jun 12, 2017 10:30 AM   PACER CHECK ONLY with PACER CHECK-[facility]   [facility] (--)    [address] [place] [address] [address]  [facility] [address]   [phone]            Jun 16, 2017  3:40 PM   FOLLOW UP with Stanislav Garrett M.D.   [facility] (--)    [address] [place] [address] [address]  [facility] [address]   [phone]            Sep 21, 2017  2:00 PM   New Patient with Estella Larkin M.D.   [facility] (--)    [address] [place] [address]  [address]  [facility] [address]   [phone]           Please bring Photo ID, Insurance Cards, All Medication Bottles and copies of any legal documents (such as Living Will, Power of ) If speaking a language besides English please bring an adult . Please arrive 30 minutes prior for check in and registration. You will be receiving a confirmation call a few days before your appointment from our automated call confirmation system.                 Discharge Medication Instructions:    Below are the  medications your physician expects you to take upon discharge:    Review all your home medications and newly ordered medications with your doctor and/or pharmacist. Follow medication instructions as directed by your doctor and/or pharmacist.    Please keep your medication list with you and share with your physician.               Medication List      START taking these medications        Instructions    Morning Afternoon Evening Bedtime    dronedarone 400 MG Tabs   Last time this was given:  400 mg on 6/2/2017  7:30 AM   Commonly known as:  MULTAQ        Take 1 Tab by mouth 2 times a day, with meals.   Dose:  400 mg                          CONTINUE taking these medications        Instructions    Morning Afternoon Evening Bedtime    apixaban 5mg Tabs   Commonly known as:  ELIQUIS        Take 1 Tab by mouth 2 Times a Day.   Dose:  5 mg                        aspirin EC 81 MG Tbec   Commonly known as:  ECOTRIN        Take 1 Tab by mouth every day.   Dose:  81 mg                        bethanechol 10 MG Tabs   Last time this was given:  10 mg on 6/2/2017  7:29 AM   Commonly known as:  URECHOLINE        Take 1 Tab by mouth 3 times a day.   Dose:  10 mg                        fluoxetine 40 MG capsule   Last time this was given:  40 mg on 6/2/2017  7:27 AM   Commonly known as:  PROZAC        Doctor's comments:  Replaces 20 mg dose sent earlier today   Take 1 Cap by mouth every day.   Dose:  40 mg                        meloxicam 7.5 MG Tabs   Last time this was given:  7.5 mg on 6/2/2017  7:29 AM   Commonly known as:  MOBIC        Take 7.5 mg by mouth every day.   Dose:  7.5 mg                        metoprolol 50 MG Tabs   Last time this was given:  50 mg on 6/2/2017  7:28 AM   Commonly known as:  LOPRESSOR        Take 1 Tab by mouth 2 Times a Day.   Dose:  50 mg                        PROBIOTIC PO        Take 1 Cap by mouth every day.   Dose:  1 Cap                        tamsulosin 0.4 MG capsule   Last time this was  given:  0.4 mg on 6/2/2017  7:28 AM   Commonly known as:  FLOMAX        Take 1 Cap by mouth ONE-HALF HOUR AFTER BREAKFAST.   Dose:  0.4 mg                        tramadol 50 MG Tabs   Commonly known as:  ULTRAM        Take 1 Tab by mouth every 6 hours as needed for Severe Pain.   Dose:  50 mg                        VICODIN PO        Take  by mouth as needed.                          STOP taking these medications     digoxin 125 MCG Tabs   Commonly known as:  LANOXIN                    Where to Get Your Medications      Information about where to get these medications is not yet available     ! Ask your nurse or doctor about these medications    - dronedarone 400 MG Tabs            Instructions           Diet / Nutrition:    Follow any diet instructions given to you by your doctor or the dietician, including how much salt (sodium) you are allowed each day.    If you are overweight, talk to your doctor about a weight reduction plan.    Activity:    Remain physically active following your doctor's instructions about exercise and activity.    Rest often.     Any time you become even a little tired or short of breath, SIT DOWN and rest.    Worsening Symptoms:    Report any of the following signs and symptoms to the doctor's office immediately:    *Pain of jaw, arm, or neck  *Chest pain not relieved by medication                               *Dizziness or loss of consciousness  *Difficulty breathing even when at rest   *More tired than usual                                       *Bleeding drainage or swelling of surgical site  *Swelling of feet, ankles, legs or stomach                 *Fever (>100ºF)  *Pink or blood tinged sputum  *Weight gain (3lbs/day or 5lbs /week)           *Shock from internal defibrillator (if applicable)  *Palpitations or irregular heartbeats                *Cool and/or numb extremities    Stroke Awareness    Common Risk Factors for Stroke include:    Age  Atrial Fibrillation  Carotid Artery  Stenosis  Diabetes Mellitus  Excessive alcohol consumption  High blood pressure  Overweight   Physical inactivity  Smoking    Warning signs and symptoms of a stroke include:    *Sudden numbness or weakness of the face, arm or leg (especially on one side of the body).  *Sudden confusion, trouble speaking or understanding.  *Sudden trouble seeing in one or both eyes.  *Sudden trouble walking, dizziness, loss of balance or coordination.Sudden severe headache with no known cause.    It is very important to get treatment quickly when a stroke occurs. If you experience any of the above warning signs, call 911 immediately.                   Disclaimer         Quit Smoking / Tobacco Use:    I understand the use of any tobacco products increases my chance of suffering from future heart disease or stroke and could cause other illnesses which may shorten my life. Quitting the use of tobacco products is the single most important thing I can do to improve my health. For further information on smoking / tobacco cessation call a Toll Free Quit Line at 1-206.672.9792 (*[employer] [place]) or 1-829.279.7389 (American Lung Association) or you can access the web based program at www.lungCentral[place].com.org.    [place] Tobacco Users Help Line:  (414) 846-9402       Toll Free: 1-124.154.5593  Quit Tobacco Program [facility] (259)506-2958    Crisis Hotline:    [place] Crisis Hotline:  0-187-QSYAYQR or 1-941.423.4782    [place] Crisis Hotline:    1-527.493.6708 or 386-071-6924    Discharge Survey:   Thank you for choosing [facility]. We hope we did everything we could to make your hospital stay a pleasant one. You may be receiving a phone survey and we would appreciate your time and participation in answering the questions. Your input is very valuable to us in our efforts to improve our service to our patients and their families.        My signature on this form indicates that:    1. I have reviewed and understand the  above information.  2. My questions regarding this information have been answered to my satisfaction.  3. I have formulated a plan with my discharge nurse to obtain my prescribed medications for home.                  Disclaimer         __________________________________                     __________       ________                       Patient Signature                                                 Date                    Time

## 2017-06-01 NOTE — OR SURGEON
Immediate Post-Operative Note    PreOp Diagnosis: SSS    PostOp Diagnosis: PPM    Surgeon(s):  Stanislav Garrett M.D.    Type of Anesthesia: General    Specimen: None    Estimated Blood Loss: 5 cc's    Findings: RV and RA less than 1 V    Complications: none      Stanislav Garrett M.D.  6/1/2017 1:50 PM

## 2017-06-01 NOTE — IP AVS SNAPSHOT
6/2/2017    Meron Rice  [address]  [place] [address]    Dear Meron Worley:    [facility] wants to ensure your discharge home is safe and you or your loved ones have had all of your questions answered regarding your care after you leave the hospital.    Below is a list of resources and contact information should you have any questions regarding your hospital stay, follow-up instructions, or active medical symptoms.    Questions or Concerns Regarding… Contact   Medical Questions Related to Your Discharge  (7 days a week, 8am-5pm) Contact a Nurse Care Coordinator   [phone]   Medical Questions Not Related to Your Discharge  (24 hours a day / 7 days a week)  Contact the Nurse Health Line   [phone]    Medications or Discharge Instructions Refer to your discharge packet   or contact your [facility] Primary Care Provider   [phone]   Follow-up Appointment(s) Schedule your appointment via [employer]   or contact Scheduling [phone]   Billing Review your statement via [employer]  or contact Billing [phone]   Medical Records Review your records via [employer]   or contact Medical Records [phone]     You may receive a telephone call within two days of discharge. This call is to make certain you understand your discharge instructions and have the opportunity to have any questions answered. You can also easily access your medical information, test results and upcoming appointments via the [employer] free online health management tool. You can learn more and sign up at [employer]. For assistance setting up your [employer] account, please call [phone].    Once again, we want to ensure your discharge home is safe and that you have a clear understanding of any next steps in your care. If you have any questions or concerns, please do not hesitate to contact us, we are here for you. Thank you for choosing [facility] for your healthcare needs.    Sincerely,    Your [facility] Healthcare Team

## 2017-06-01 NOTE — OP REPORT
DATE OF SERVICE:  06/01/2017    OPERATION:  Placement of dual chamber pacemaker.    PREOPERATIVE DIAGNOSIS:  Sick sinus syndrome.    POSTOPERATIVE DIAGNOSIS:  Successful placement of dual chamber pacemaker.    INDICATIONS:  Sick sinus syndrome.    SURGEON:  Stanislav Garrett MD    ASSISTANT:  No assistant used.    ANESTHESIA:  General anesthesia.    ANESTHESIOLOGIST:  Saleem Kate DO    COMPLICATIONS:  None.    ESTIMATED BLOOD LOSS:  10 mL.    Preprocedure EKG, sinus bradycardia.  Post-procedure EKG, atrial pacing.    NARRATIVE:  After risks and benefits explained to the patient, patient gave   informed consent.  The patient was brought to the cardiac catheterization lab   in fasting state.  Patient prepped and draped in usual sterile manner.    Lidocaine 2% was infiltrated subcutaneously over the left infraclavicular   region.  A 5-cm incision made with #15 blade.  Blunt dissection was performed   down to the pectoralis major muscle and a pacemaker pocket was then performed.    Next, using an 18-gauge thin-wall needle, left subclavian was cannulated x2.    Next, a tear-away sheath was placed over the first guidewire.  Through this   tear away sheath, we advanced the right ventricular lead in the right atrium.    Then, using a J stylet, lead was advanced to the right ventricular outflow   tract.  Then using a straight stylet, leads advanced to right ventricular   apex.  Next, it was fixated normal mechanism.  Next, it was tested out fine.    Next, it was sewn down to the pectoralis major muscle using 2 nonabsorbable   sutures.  Next, a second tear-away sheath was placed over a second guidewire.    Next, the right atrial lead was placed in the right atrium via this tear-away   sheath.  Then using a J stylet, it was fixated with normal mechanism.  Next,   it was tested, it tested out fine.  Next sewn down to pectoralis major muscle   using 2 nonabsorbable sutures.  Next, all bleeders were bovied off.  Pocket   was  irrigated out with antibiotic solution.  Leads were tested the pacemaker   pulse generator placed in the pocket.  Incision was closed with 3-0 Vicryl in   deep layer, 4-0 Vicryl subcutaneous layer, 4-0 Vicryl in the skin, and a   dressing was applied.    RESULTS:  Pacemaker is a [employer] model L311, serial #[id number].  The   right atrial lead is a [employer] model #7740, serial #[id number].  RV lead   is a [employer] model 7741, serial #[id number].    THRESHOLD INFORMATION:  In the right atrium, the P wave was 3.9 mV, threshold   was 1.1 volts, impedance was 711 ohms.  RV, the R wave was 8 mV, threshold 0.5   V, impedance of 956 ohms.    SEDATION GIVEN:  IV Versed and fentanyl.  Patient received IV antibiotics   prior to procedure.    CONCLUSION:  1.  Successful placement of dual chamber pacemaker.  2.  Plan is to monitor patient.  3.  Antibiotics.  4.  Start antiarrhythmic medication.  5.  Restart Elliquis in a day or two.  6.  Pressure dressings.       ____________________________________     LASHAE GERARD MD    [facility] / [facility]    DD:  06/01/2017 13:53:48  DT:  06/01/2017 16:55:28    D#:  [id number]  Job#:  [id number]

## 2017-06-01 NOTE — IP AVS SNAPSHOT
PeerApp Access Code: Activation code not generated  Current PeerApp Status: Active    [employer]  A secure, online tool to manage your health information     [employer]App® is a secure, online tool that connects you to your personalized health information from the privacy of your home -- day or night - making it very easy for you to manage your healthcare. Once the activation process is completed, you can even access your medical information using the PeerApp tess, which is available for free in the Apple Tess store or Google Play store.     PeerApp provides the following levels of access (as shown below):   My Chart Features   [facility] Primary Care Doctor [facility]  Specialists [facility]  Urgent  Care Non-[facility]  Primary Care  Doctor   Email your healthcare team securely and privately 24/7 X X X X   Manage appointments: schedule your next appointment; view details of past/upcoming appointments X      Request prescription refills. X      View recent personal medical records, including lab and immunizations X X X X   View health record, including health history, allergies, medications X X X X   Read reports about your outpatient visits, procedures, consult and ER notes X X X X   See your discharge summary, which is a recap of your hospital and/or ER visit that includes your diagnosis, lab results, and care plan. X X       How to register for PeerApp:  1. Go to  [email or web address].  2. Click on the Sign Up Now box, which takes you to the New Member Sign Up page. You will need to provide the following information:  a. Enter your PeerApp Access Code exactly as it appears at the top of this page. (You will not need to use this code after you’ve completed the sign-up process. If you do not sign up before the expiration date, you must request a new code.)   b. Enter your date of birth.   c. Enter your home email address.   d. Click Submit, and follow the next screen’s instructions.  3. Create a PeerApp ID. This will  be your [employer] login ID and cannot be changed, so think of one that is secure and easy to remember.  4. Create a [employer] password. You can change your password at any time.  5. Enter your Password Reset Question and Answer. This can be used at a later time if you forget your password.   6. Enter your e-mail address. This allows you to receive e-mail notifications when new information is available in [employer].  7. Click Sign Up. You can now view your health information.    For assistance activating your [employer] account, call [phone]

## 2017-06-01 NOTE — OR SURGEON
Immediate Post-Operative Note    PreOp Diagnosis: A fib and flutter    PostOp Diagnosis: DC cardioversion and RA isthmus RFA, UNA.  Surgeon(s):  Stanislav Garrett M.D.    Type of Anesthesia: General    Specimen: None    Estimated Blood Loss: 5 cc's    Findings: a fib on ECG today previous flutter, UNA no clot, DC cardioversion, isthmus RFA. SB at 40 BPM with first degree.      Complications: none      Stanislav Garrett M.D.  6/1/2017 12:29 PM

## 2017-06-02 ENCOUNTER — APPOINTMENT (OUTPATIENT)
Dept: RADIOLOGY | Facility: MEDICAL CENTER | Age: 82
End: 2017-06-02
Attending: INTERNAL MEDICINE
Payer: MEDICARE

## 2017-06-02 VITALS
SYSTOLIC BLOOD PRESSURE: 93 MMHG | WEIGHT: 175.93 LBS | HEIGHT: 69 IN | BODY MASS INDEX: 26.06 KG/M2 | OXYGEN SATURATION: 92 % | DIASTOLIC BLOOD PRESSURE: 45 MMHG | TEMPERATURE: 97 F | RESPIRATION RATE: 20 BRPM | HEART RATE: 64 BPM

## 2017-06-02 PROBLEM — Z95.0 CARDIAC PACEMAKER IN SITU: Status: ACTIVE | Noted: 2017-06-02

## 2017-06-02 PROBLEM — I49.5 SICK SINUS SYNDROME (HCC): Status: ACTIVE | Noted: 2017-06-02

## 2017-06-02 LAB — EKG IMPRESSION: NORMAL

## 2017-06-02 PROCEDURE — 96365 THER/PROPH/DIAG IV INF INIT: CPT

## 2017-06-02 PROCEDURE — 93010 ELECTROCARDIOGRAM REPORT: CPT | Performed by: INTERNAL MEDICINE

## 2017-06-02 PROCEDURE — 700102 HCHG RX REV CODE 250 W/ 637 OVERRIDE(OP): Performed by: INTERNAL MEDICINE

## 2017-06-02 PROCEDURE — A9270 NON-COVERED ITEM OR SERVICE: HCPCS | Performed by: INTERNAL MEDICINE

## 2017-06-02 PROCEDURE — 93005 ELECTROCARDIOGRAM TRACING: CPT | Performed by: INTERNAL MEDICINE

## 2017-06-02 PROCEDURE — 71010 DX-CHEST-PORTABLE (1 VIEW): CPT

## 2017-06-02 PROCEDURE — G0378 HOSPITAL OBSERVATION PER HR: HCPCS

## 2017-06-02 RX ADMIN — DRONEDARONE 400 MG: 400 TABLET, FILM COATED ORAL at 07:30

## 2017-06-02 RX ADMIN — FLUOXETINE HYDROCHLORIDE 40 MG: 20 CAPSULE ORAL at 07:27

## 2017-06-02 RX ADMIN — MELOXICAM 7.5 MG: 7.5 TABLET ORAL at 07:29

## 2017-06-02 RX ADMIN — CEFAZOLIN SODIUM 1 G: 1 INJECTION, SOLUTION INTRAVENOUS at 00:05

## 2017-06-02 RX ADMIN — OXYCODONE HYDROCHLORIDE AND ACETAMINOPHEN 1 TABLET: 5; 325 TABLET ORAL at 14:18

## 2017-06-02 RX ADMIN — OXYCODONE HYDROCHLORIDE AND ACETAMINOPHEN 1 TABLET: 5; 325 TABLET ORAL at 07:27

## 2017-06-02 RX ADMIN — METOPROLOL TARTRATE 50 MG: 50 TABLET, FILM COATED ORAL at 07:28

## 2017-06-02 RX ADMIN — TAMSULOSIN HYDROCHLORIDE 0.4 MG: 0.4 CAPSULE ORAL at 07:28

## 2017-06-02 RX ADMIN — BETHANECHOL CHLORIDE 10 MG: 10 TABLET ORAL at 07:29

## 2017-06-02 ASSESSMENT — PAIN SCALES - GENERAL
PAINLEVEL_OUTOF10: 0
PAINLEVEL_OUTOF10: 6
PAINLEVEL_OUTOF10: 0

## 2017-06-02 ASSESSMENT — LIFESTYLE VARIABLES: EVER_SMOKED: NEVER

## 2017-06-02 NOTE — DISCHARGE SUMMARY
CHIEF COMPLAINT ON ADMISSION  Fatigue and Palpitations     CODE STATUS  Prior    HPI & HOSPITAL COURSE  This is a 84 y.o. female here with outpatient scheduled cardioversion and RA isthmus RFA with UNA. Dr Garrett performed Isthmus ablation with discontinue cardioversion on 6/1/2017. Postoperatively patient developed sick sinus syndrome in which she required permanent pacemaker and antiarrhythmic medications. Then patient got successful placement of dual chamber pacemaker with antibiotics.     Procedure as noted by Dr. Garrett:   RESULTS:  Pacemaker is a [employer] model L311, serial #[id number].  The    right atrial lead is a [employer] model #7740, serial #[id number].  RV lead   is a [employer] model 7741, serial #[id number].    THRESHOLD INFORMATION:  In the right atrium, the P wave was 3.9 mV, threshold    was 1.1 volts, impedance was 711 ohms.  RV, the R wave was 8 mV, threshold 0.5   V, impedance of 956 ohms.    CONCLUSION:  1.  Successful placement of dual chamber pacemaker.  2.  Plan is to monitor patient.  3.  Antibiotics.  4.  Start antiarrhythmic medication.  5.  Restart Elliquis in a day or two.  6.  Pressure dressings.    6/2/2017: Patient was monitored overnight. No event on the cardiac monitor. Interrogation this morning was good: P waves 3.2, Impedence 676, threshold 0.4. RV 8.6, impedence 764, Paced 57%. No RV pacing and no afib last night. Device site uncomplicated. Dressing changed. Scant sanguinous drainage, no swelling or ecchymosis noted. Right groin site is clean, dry, and soft. CXR no pneumothorax. Continue Multaq 400mg BID and restart Eliquis 5mg BID tonight. Stop digoxin 125 mcg qd.     Post ablation instructions: No lifting > 10 lbs x 1 week.  No baths or hot tubs x 1 week.  May shower on Saturday and take off dressings.  Continue to monitor sites daily for warmth, redness, discolored drainage. Please walk and take deep breaths after discharge. After discharge if if experiences chest  pain, shortness of breath, hemoptysis, neurological changes, high fever, pre syncope/syncope, trouble with catheter sites needs to be seen in the emergency dept.       Pacer restrictions reviewed with patient. Do not raise affected arm above head or behind back for six weeks. May remove arm sling after 24 hrs, please wear if trouble remembering arm limitation and prn at night. No heavy lifting/pushing with L arm for six weeks. No driving for first week. No showers first week. Keep dressing on, clean, and dry until seen follow up. Wound care reviewed. Instructed to report s/s of infection such as warmth/redness/drainage/swelling at site or fever/chills.  Patient verbalizes understanding.    Therefore, she is discharged home with her daughter. No complaints. Follow up 1 week with pacer clinic.         DISCHARGE PROBLEM LIST  Active Problems:    Atrial flutter (CMS-HCC) POA: Yes    Sick sinus syndrome (CMS-HCC) POA: No    Cardiac pacemaker in situ POA: No  Resolved Problems:    * No resolved hospital problems. *      FOLLOW UP  Future Appointments  Date Time Provider Department Center   6/12/2017 10:30 AM PACER CHECK-[facility] None   6/16/2017 3:40 PM Stanislav Garrett M.D. [facility] None   9/21/2017 2:00 PM Estella Larkin M.D. [facility] None     No follow-up provider specified.    MEDICATIONS ON DISCHARGE   Meron Rice   Home Medication Instructions JOSE MANUEL:[id number]    Printed on:06/02/17 0914   Medication Information                      apixaban (ELIQUIS) 5mg Tab  Take 1 Tab by mouth 2 Times a Day.             aspirin EC (ECOTRIN) 81 MG TBEC  Take 1 Tab by mouth every day.             bethanechol (URECHOLINE) 10 MG Tab  Take 1 Tab by mouth 3 times a day.             dronedarone (MULTAQ) 400 MG Tab  Take 1 Tab by mouth 2 times a day, with meals.             fluoxetine (PROZAC) 40 MG capsule  Take 1 Cap by mouth every day.             Hydrocodone-Acetaminophen (VICODIN PO)  Take  by mouth as needed.             meloxicam  (MOBIC) 7.5 MG Tab  Take 7.5 mg by mouth every day.             metoprolol (LOPRESSOR) 50 MG Tab  Take 1 Tab by mouth 2 Times a Day.             Probiotic Product (PROBIOTIC PO)  Take 1 Cap by mouth every day.             tamsulosin (FLOMAX) 0.4 MG capsule  Take 1 Cap by mouth ONE-HALF HOUR AFTER BREAKFAST.             tramadol (ULTRAM) 50 MG Tab  Take 1 Tab by mouth every 6 hours as needed for Severe Pain.                 DIET  Orders Placed This Encounter   Procedures   • Diet Order     Standing Status: Standing      Number of Occurrences: 1      Standing Expiration Date:      Order Specific Question:  Diet:     Answer:  Cardiac [6]       PROCEDURES  Dr. Stanislav Garrett (6/1/2017)  Placement of dual chamber pacemaker.     Dr. Stanislav Garrett (6/1/2017)  PROCEDURE PERFORMED:   1. Electrophysiology study with radiofrequency ablation of SVT.   2. Left atrial pacing, sensing and stimulation protocol.   3. Advanced mapping using CARTO system.   4. Transesophageal echocardiogram.   5. DC cardioversion.      LABORATORY  Lab Results   Component Value Date/Time    SODIUM 131* 05/31/2017 04:05 PM    POTASSIUM 4.0 05/31/2017 04:05 PM    CHLORIDE 99 05/31/2017 04:05 PM    CO2 23 05/31/2017 04:05 PM    GLUCOSE 106* 05/31/2017 04:05 PM    BUN 16 05/31/2017 04:05 PM    CREATININE 0.79 05/31/2017 04:05 PM        Lab Results   Component Value Date/Time    WBC 7.6 05/31/2017 04:05 PM    HEMOGLOBIN 15.1 05/31/2017 04:05 PM    HEMATOCRIT 46.3 05/31/2017 04:05 PM    PLATELET COUNT 268 05/31/2017 04:05 PM

## 2017-06-02 NOTE — CARE PLAN
Problem: Pain Management  Goal: Pain level will decrease to patient’s comfort goal  Outcome: PROGRESSING AS EXPECTED  Pain control with prn pain medication

## 2017-06-02 NOTE — DISCHARGE INSTRUCTIONS
Discharge Instructions    Discharged to home by car with relative. Discharged via wheelchair, hospital escort: Yes.  Special equipment needed: Not Applicable    Be sure to schedule a follow-up appointment with your primary care doctor or any specialists as instructed.     Discharge Plan:   Diet Plan: Discussed  Activity Level: Discussed  Confirmed Follow up Appointment: Appointment Scheduled  Confirmed Symptoms Management: Discussed  Medication Reconciliation Updated: Yes  Influenza Vaccine Indication: Patient Refuses    I understand that a diet low in cholesterol, fat, and sodium is recommended for good health. Unless I have been given specific instructions below for another diet, I accept this instruction as my diet prescription.   Other diet: cardiac low salt         · Is patient discharged on Warfarin / Coumadin?   No     · Is patient Post Blood Transfusion?  No    : Pacer restrictions reviewed with patient. Do not raise affected arm above head or behind back for six weeks. May remove arm sling after 24 hrs, please wear if trouble remembering arm limitation and prn at night. No heavy lifting/pushing with Left arm for six weeks. No driving for first week. No showers first week. Keep dressing on, clean, and dry until seen follow up. Wound care reviewed. Instructed to report s/s of infection such as warmth/redness/drainage/swelling at site or fever/chills.      Post ablation instructions: No lifting > 10 lbs x 1 week.  No baths or hot tubs x 1 week.   take off dressings on Saturday.  Continue to monitor sites daily for warmth, redness, discolored drainage   Please walk and take deep breaths after discharge.   After discharge if if experiences chest pain, shortness of breath, hemoptysis, neurological changes, high fever, pre syncope/syncope, trouble with catheter sites needs to be seen in the emergency dept.    Depression / Suicide Risk    As you are discharged from this [facility] facility, it is important to  learn how to keep safe from harming yourself.    Recognize the warning signs:  · Abrupt changes in personality, positive or negative- including increase in energy   · Giving away possessions  · Change in eating patterns- significant weight changes-  positive or negative  · Change in sleeping patterns- unable to sleep or sleeping all the time   · Unwillingness or inability to communicate  · Depression  · Unusual sadness, discouragement and loneliness  · Talk of wanting to die  · Neglect of personal appearance   · Rebelliousness- reckless behavior  · Withdrawal from people/activities they love  · Confusion- inability to concentrate     If you or a loved one observes any of these behaviors or has concerns about self-harm, here's what you can do:  · Talk about it- your feelings and reasons for harming yourself  · Remove any means that you might use to hurt yourself (examples: pills, rope, extension cords, firearm)  · Get professional help from the community (Mental Health, Substance Abuse, psychological counseling)  · Do not be alone:Call your Safe Contact- someone whom you trust who will be there for you.  · Call your local CRISIS HOTLINE 400-1282 or 037-914-0143  · Call your local Children's Mobile Crisis Response Team [place] (524) 721-9218 or www.Xignite  · Call the toll free National Suicide Prevention Hotlines   · National Suicide Prevention Lifeline 975-703-EIOW (0562)  · National Hope Line Network 800-SUICIDE (350-9152)      Biventricular Pacemaker Implantation, Care After  Refer to this sheet in the next few weeks. These instructions provide you with information on caring for yourself after your procedure. Your health care provider may also give you more specific instructions. Your treatment has been planned according to current medical practices, but problems sometimes occur. Call your health care provider if you have any problems or questions after your procedure.  WHAT TO EXPECT AFTER THE  PROCEDURE  · You may feel pain. Some pain is normal. It may last a few days.  · A slight bump may be seen over the skin where the device was placed. Sometimes, it is possible to feel the device under the skin. This is normal.  · In the months and years afterward, your health care provider will check the device, the leads, and the battery every few months. Eventually, when the battery is low, the device will be replaced.  HOME CARE INSTRUCTIONS  Medicines  · Take medicines only as directed by your health care provider.  · If you were prescribed an antibiotic medicine, finish it all even if you start to feel better.    · Do not take any other medicines without asking your health care provider first. Some medicines, including certain painkillers, can cause bleeding in your stomach after surgery.  Wound Care  · Do not remove the bandage on your chest until directed to do so by your health care provider.  · After your bandage is removed, you may see pieces of tape called skin adhesive strips over the area where the cut was made (incision site). Let them fall off on their own.    · Check the incision site every day to make sure it is not infected, bleeding, or starting to pull apart.  · Do not use lotions or ointments near the incision site unless directed to do so.    · Keep the incision area clean and dry for 2-3 days after the procedure or as directed by your health care provider. It takes several weeks for the incision site to completely heal.    · Do not take baths, swim, or use a hot tub until your health care provider approves.  Activities  · Try to walk a little every day. Exercising is important after this procedure. It is also important to use your shoulder on the side of the pacemaker in daily tasks that do not require exaggerated motion.  · Avoid sudden jerking, pulling, or chopping movements that pull your upper arm far away from your body for at least 6 weeks.    · Do not lift your upper arm above your  shoulders for at least 6 weeks. This means no tennis, golf, or swimming for this period of time. If you sleep with the arm above your head, use a restraint to prevent this from happening as you sleep.  · You may go back to work when your health care provider says it is okay. Check with your health care provider before you start to drive or play sports.  Other Instructions  · Follow diet instructions if they were provided. You should be able to eat what you usually do right away, but you may need to limit your salt intake.    · Weigh yourself every day. If you suddenly gain weight, fluid may be building up in your body.    · Always carry your pacemaker identification card with you. The card should list the implant date, device model, and . Consider wearing a medical alert bracelet or necklace.    · Tell all health care providers that you have a pacemaker. This may prevent them from giving you a magnetic resource imaging scan (MRI) because of the strong magnets used during that test.    · If you must pass through a metal detector, quickly walk through it. Do not stop under the detector or stand near it.    · Avoid places or objects with a strong electric or magnetic field, including:    ¨ Airport security peck. When at the airport, let officials know you have a pacemaker. Your ID card will let you be checked in a way that is safe for you and that will not damage your pacemaker. Also, do not let a security person wave a magnetic wand near your pacemaker. That can make it stop working.    ¨ Power plants.    ¨ Large electrical generators.    ¨ Radiofrequency transmission towers, such as cell phone and radio towers.    · Do not use amateur (ham) radio equipment or electric (arc) welding torches. Some devices are safe to use if held at least 1 foot from your pacemaker. These include power tools, lawn mowers, and speakers. If you are unsure of whether something is safe to use, ask your health care provider.     · You may safely use electric blankets, heating pads, computers, and microwave ovens.    · When using your cell phone, hold it to the ear opposite the pacemaker. Do not leave your cell phone in a pocket over the pacemaker.    · Keep all follow-up visits as directed by your health care provider. This is how your health care provider makes sure your chest is healing the way it should. Ask your health care provider when you should come back to have your stitches or staples taken out.    · Have your pacemaker checked every 3-6 months or as directed by your health care provider. Most pacemakers last for 4-8 years before a new one is needed.    SEEK MEDICAL CARE IF:  · You gain weight suddenly.    · Your legs or feet swell more than they have before.    · It feels like your heart is fluttering or skipping beats (heart palpitations).  · You have a fever.  SEEK IMMEDIATE MEDICAL CARE IF:   · You have chest pain.    · You feel more short of breath than you have felt before.    · You feel more light-headed than you have felt before.    · You have problems with your incision site, such as swelling or bleeding, or it starts to open up.    · You notice signs of infection around your incision site. Watch for:    ¨ Warmth.    ¨ Redness.    ¨ Worsening pain.    ¨ Swelling.    ¨ Fluid leaking from the incision site.       This information is not intended to replace advice given to you by your health care provider. Make sure you discuss any questions you have with your health care provider.     Document Released: 09/11/2013 Document Revised: 01/08/2016 Document Reviewed: 09/11/2013  [employer] Interactive Patient Education ©2016 [employer].  Cardiac Ablation  Cardiac ablation is a procedure to disable a small amount of heart tissue in very specific places. The heart has many electrical connections. Sometimes these connections are abnormal and can cause the heart to beat very fast or irregularly. By disabling some of the problem  areas, heart rhythm can be improved or made normal. Ablation is done for people who:   · Have Reshma-Parkinson-White syndrome.    · Have other fast heart rhythms (tachycardia).    · Have taken medicines for an abnormal heart rhythm (arrhythmia) that resulted in:    · No success.    · Side effects.    · May have a high-risk heartbeat that could result in death.    LET YOUR HEALTH CARE PROVIDER KNOW ABOUT:   · Any allergies you have or any previous reactions you have had to X-ray dye, food (such as seafood), medicine, or tape.    · All medicines you are taking, including vitamins, herbs, eye drops, creams, and over-the-counter medicines.    · Previous problems you or members of your family have had with the use of anesthetics.    · Any blood disorders you have.    · Previous surgeries or procedures (such as a kidney transplant) you have had.    · Medical conditions you have (such as kidney failure).    RISKS AND COMPLICATIONS  Generally, cardiac ablation is a safe procedure. However, problems can occur and include:   · Increased risk of cancer. Depending on how long it takes to do the ablation, the dose of radiation can be high.   · Bruising and bleeding where a thin, flexible tube (catheter) was inserted during the procedure.    · Bleeding into the chest, especially into the sac that surrounds the heart (serious).  · Need for a permanent pacemaker if the normal electrical system is damaged.    · The procedure may not be fully effective, and this may not be recognized for months. Repeat ablation procedures are sometimes required.  BEFORE THE PROCEDURE   · Follow any instructions from your health care provider regarding eating and drinking before the procedure.    · Take your medicines as directed at regular times with water, unless instructed otherwise by your health care provider. If you are taking diabetes medicine, including insulin, ask how you are to take it and if there are any special instructions you should  "follow. It is common to adjust insulin dosing the day of the ablation.    PROCEDURE  · An ablation is usually performed in a catheterization laboratory with the guidance of fluoroscopy. Fluoroscopy is a type of X-ray that helps your health care provider see images of your heart during the procedure.    · An ablation is a minimally invasive procedure. This means a small cut (incision) is made in either your neck or groin. Your health care provider will decide where to make the incision based on your medical history and physical exam.  · An IV tube will be started before the procedure begins. You will be given an anesthetic or medicine to help you relax (sedative).  · The skin on your neck or groin will be numbed. A needle will be inserted into a large vein in your neck or groin and catheters will be threaded to your heart.  · A special dye that shows up on fluoroscopy pictures may be injected through the catheter. The dye helps your health care provider see the area of the heart that needs treatment.  · The catheter has electrodes on the tip. When the area of heart tissue that is causing the arrhythmia is found, the catheter tip will send an electrical current to the area and \"scar\" the tissue.  Three types of energy can be used to ablate the heart tissue:    ¨ Heat (radiofrequency energy).    ¨ Laser energy.    ¨ Extreme cold (cryoablation).    · When the area of the heart has been ablated, the catheter will be taken out. Pressure will be held on the insertion site. This will help the insertion site clot and keep it from bleeding. A bandage will be placed on the insertion site.    AFTER THE PROCEDURE   · After the procedure, you will be taken to a recovery area where your vital signs (blood pressure, heart rate, and breathing) will be monitored. The insertion site will also be monitored for bleeding.    · You will need to lie still for 4-6 hours. This is to ensure you do not bleed from the catheter insertion site. "       This information is not intended to replace advice given to you by your health care provider. Make sure you discuss any questions you have with your health care provider.     Document Released: 05/06/2010 Document Revised: 01/08/2016 Document Reviewed: 05/12/2014  Elsevier Interactive Patient Education ©2016 [email or web address]

## 2017-06-02 NOTE — PROGRESS NOTES
Pt pacer book and card never came up from [facility], called the unit to request a search, Autumn from [employer], was unable to locate either, she reassured the pt that a new card will be mailed to her home. Pt and daughter Nyasia aware and agreeable.

## 2017-06-02 NOTE — PROGRESS NOTES
Patient's BP taken before giving metoprolol, Metoprolol held due to BP being low. BP 95/48 HR 59.

## 2017-06-02 NOTE — CARE PLAN
Problem: Safety  Goal: Will remain free from injury  Outcome: PROGRESSING AS EXPECTED  Patient alert and oriented, bed alarm in use, call bell within reach

## 2017-06-02 NOTE — PROGRESS NOTES
Bedside report taken on patient, Assume care of patient. Patient is alert and oriented, Assessment done on patient, patient BP low, MD(TO) page to update on low BP. Patient denies any pain, slight discomfort from pacemaker site, LR order from central supply to hung on patient, patient denies any more need at this time.

## 2017-06-02 NOTE — PROGRESS NOTES
1730: pt arrived to unit, rt groin dressing intact, clean no oozing or bruising. Lt shoulder pressure dressing in place. No other open skin areas. Multiple older scars from bilat mastectomy 2009.

## 2017-06-02 NOTE — DISCHARGE SUMMARY
CHIEF COMPLAINT ON ADMISSION  Fatigue and Palpitations     CODE STATUS  Prior    HPI & HOSPITAL COURSE  This is a 84 y.o. female here with outpatient scheduled cardioversion and RA isthmus RFA with [facility]. Dr Garrett performed Isthmus ablation with discontinue cardioversion on 6/1/2017. Postoperatively patient developed sick sinus syndrome in which she required permanent pacemaker and antiarrhythmic medications. Then patient got successful placement of dual chamber pacemaker with antibiotics.     Procedure as noted by Dr. Garrett:   RESULTS:  Pacemaker is a [employer] model L311, serial #[id number].  The    right atrial lead is a [employer] model #7740, serial #[id number].  RV lead   is a [employer] model 7741, serial #[id number].    THRESHOLD INFORMATION:  In the right atrium, the P wave was 3.9 mV, threshold    was 1.1 volts, impedance was 711 ohms.  RV, the R wave was 8 mV, threshold 0.5   V, impedance of 956 ohms.    CONCLUSION:  1.  Successful placement of dual chamber pacemaker.  2.  Plan is to monitor patient.  3.  Antibiotics.  4.  Start antiarrhythmic medication.  5.  Restart Elliquis in a day or two.  6.  Pressure dressings.    6/2/2017: Patient was monitored overnight. No event on the cardiac monitor. Interrogation this morning was good: P waves 3.2, Impedence 676, threshold 0.4. RV 8.6, impedence 764, Paced 57%. No RV pacing and no afib last night. Device site uncomplicated. Dressing changed. Scant sanguinous drainage, no swelling or ecchymosis noted. Right groin site is clean, dry, and soft. CXR no pneumothorax. Continue Multaq 400mg BID and restart Eliquis 5mg BID tonight. Stop digoxin 125 mcg qd.     Post ablation instructions: No lifting > 10 lbs x 1 week.  No baths or hot tubs x 1 week.  May shower on Saturday and take off dressings.  Continue to monitor sites daily for warmth, redness, discolored drainage. Please walk and take deep breaths after discharge. After discharge if if experiences chest  pain, shortness of breath, hemoptysis, neurological changes, high fever, pre syncope/syncope, trouble with catheter sites needs to be seen in the emergency dept.       Pacer restrictions reviewed with patient. Do not raise affected arm above head or behind back for six weeks. May remove arm sling after 24 hrs, please wear if trouble remembering arm limitation and prn at night. No heavy lifting/pushing with L arm for six weeks. No driving for first week. No showers first week. Keep dressing on, clean, and dry until seen follow up. Wound care reviewed. Instructed to report s/s of infection such as warmth/redness/drainage/swelling at site or fever/chills.  Patient verbalizes understanding.    Therefore, she is discharged home with her daughter. No complaints. Follow up 1 week with pacer clinic.         DISCHARGE PROBLEM LIST  Active Problems:    Atrial flutter (CMS-HCC) POA: Yes    Sick sinus syndrome (CMS-HCC) POA: No    Cardiac pacemaker in situ POA: No  Resolved Problems:    * No resolved hospital problems. *      FOLLOW UP  Future Appointments  Date Time Provider Department Center   6/12/2017 10:30 AM PACER CHECK-[facility] None   6/16/2017 3:40 PM Stanislav Garrett M.D. [facility] None   9/21/2017 2:00 PM Estella Larkin M.D. [facility] None     No follow-up provider specified.    MEDICATIONS ON DISCHARGE   Meron Rice   Home Medication Instructions JOSE MANUEL:[id number]    Printed on:06/02/17 0914   Medication Information                      apixaban (ELIQUIS) 5mg Tab  Take 1 Tab by mouth 2 Times a Day.             aspirin EC (ECOTRIN) 81 MG TBEC  Take 1 Tab by mouth every day.             bethanechol (URECHOLINE) 10 MG Tab  Take 1 Tab by mouth 3 times a day.             dronedarone (MULTAQ) 400 MG Tab  Take 1 Tab by mouth 2 times a day, with meals.             fluoxetine (PROZAC) 40 MG capsule  Take 1 Cap by mouth every day.             Hydrocodone-Acetaminophen (VICODIN PO)  Take  by mouth as needed.             meloxicam  (MOBIC) 7.5 MG Tab  Take 7.5 mg by mouth every day.             metoprolol (LOPRESSOR) 50 MG Tab  Take 1 Tab by mouth 2 Times a Day.             Probiotic Product (PROBIOTIC PO)  Take 1 Cap by mouth every day.             tamsulosin (FLOMAX) 0.4 MG capsule  Take 1 Cap by mouth ONE-HALF HOUR AFTER BREAKFAST.             tramadol (ULTRAM) 50 MG Tab  Take 1 Tab by mouth every 6 hours as needed for Severe Pain.                 DIET  Orders Placed This Encounter   Procedures   • Diet Order     Standing Status: Standing      Number of Occurrences: 1      Standing Expiration Date:      Order Specific Question:  Diet:     Answer:  Cardiac [6]       PROCEDURES  Dr. Stanislav Garrett (6/1/2017)  Placement of dual chamber pacemaker.     Dr. Stanislav Garrett (6/1/2017)  PROCEDURE PERFORMED:   1. Electrophysiology study with radiofrequency ablation of SVT.   2. Left atrial pacing, sensing and stimulation protocol.   3. Advanced mapping using CARTO system.   4. Transesophageal echocardiogram.   5. DC cardioversion.      LABORATORY  Lab Results   Component Value Date/Time    SODIUM 131* 05/31/2017 04:05 PM    POTASSIUM 4.0 05/31/2017 04:05 PM    CHLORIDE 99 05/31/2017 04:05 PM    CO2 23 05/31/2017 04:05 PM    GLUCOSE 106* 05/31/2017 04:05 PM    BUN 16 05/31/2017 04:05 PM    CREATININE 0.79 05/31/2017 04:05 PM        Lab Results   Component Value Date/Time    WBC 7.6 05/31/2017 04:05 PM    HEMOGLOBIN 15.1 05/31/2017 04:05 PM    HEMATOCRIT 46.3 05/31/2017 04:05 PM    PLATELET COUNT 268 05/31/2017 04:05 PM

## 2017-06-05 DIAGNOSIS — I10 ESSENTIAL HYPERTENSION: ICD-10-CM

## 2017-06-05 RX ORDER — METOPROLOL TARTRATE 50 MG/1
50 TABLET, FILM COATED ORAL 2 TIMES DAILY
Qty: 60 TAB | Refills: 2 | OUTPATIENT
Start: 2017-06-05 | End: 2017-06-21 | Stop reason: SDUPTHER

## 2017-06-16 ENCOUNTER — OFFICE VISIT (OUTPATIENT)
Dept: CARDIOLOGY | Facility: MEDICAL CENTER | Age: 82
End: 2017-06-16
Payer: MEDICARE

## 2017-06-16 VITALS
BODY MASS INDEX: 25.18 KG/M2 | HEIGHT: 69 IN | HEART RATE: 61 BPM | OXYGEN SATURATION: 95 % | SYSTOLIC BLOOD PRESSURE: 122 MMHG | DIASTOLIC BLOOD PRESSURE: 70 MMHG | WEIGHT: 170 LBS

## 2017-06-16 DIAGNOSIS — I48.19 PERSISTENT ATRIAL FIBRILLATION (HCC): ICD-10-CM

## 2017-06-16 DIAGNOSIS — Z86.79 STATUS POST ABLATION OF ATRIAL FIBRILLATION: ICD-10-CM

## 2017-06-16 DIAGNOSIS — I48.3 TYPICAL ATRIAL FLUTTER (HCC): ICD-10-CM

## 2017-06-16 DIAGNOSIS — I49.5 SSS (SICK SINUS SYNDROME) (HCC): ICD-10-CM

## 2017-06-16 DIAGNOSIS — Z98.890 STATUS POST ABLATION OF ATRIAL FIBRILLATION: ICD-10-CM

## 2017-06-16 DIAGNOSIS — Z95.0 CARDIAC PACEMAKER IN SITU: ICD-10-CM

## 2017-06-16 DIAGNOSIS — Z79.01 ANTICOAGULANT LONG-TERM USE: ICD-10-CM

## 2017-06-16 DIAGNOSIS — I49.5 SICK SINUS SYNDROME (HCC): ICD-10-CM

## 2017-06-16 PROBLEM — R33.8 ACUTE URINARY RETENTION: Status: RESOLVED | Noted: 2017-03-08 | Resolved: 2017-06-16

## 2017-06-16 LAB — EKG IMPRESSION: NORMAL

## 2017-06-16 PROCEDURE — 99214 OFFICE O/P EST MOD 30 MIN: CPT | Mod: 24 | Performed by: INTERNAL MEDICINE

## 2017-06-16 PROCEDURE — 93000 ELECTROCARDIOGRAM COMPLETE: CPT | Performed by: INTERNAL MEDICINE

## 2017-06-16 PROCEDURE — 93280 PM DEVICE PROGR EVAL DUAL: CPT | Performed by: INTERNAL MEDICINE

## 2017-06-16 NOTE — Clinical Note
[facility]   [address]  [place] [address]  Phone: [phone]  Fax: [phone]              Meron Rice  11/18/1932    Encounter Date: 6/16/2017    Stanislav Garrett M.D.          PROGRESS NOTE:  Subjective:   Meron Rice is a 84 y.o. female who presents today s/p cardioversion and flutter RFA and PM. Maintaining SR. She never started her Multaq. Has not had a fib. Back on NOAC    Past Medical History   Diagnosis Date   • Foot-drop    • Hypertension    • Unspecified urinary incontinence      lazy bladder; doesn't empty completely   • Personal history of venous thrombosis and embolism      in leg during child-bearing years   • Cancer (CMS-HCC) [id number]     uterus/left breast   • Other specified symptom associated with female genital organs 1998     fibroids   • Arthritis      generalized   • ASTHMA      has not needed an inhaler since 2010   • CATARACT      beginning   • Depression 5/19/2017   • Arrhythmia    • Urinary bladder disorder    • Breath shortness    • Anemia    • Unspecified hemorrhagic conditions      post op hyst. Currently on Eliquis    • Pneumonia 2017   • Bowel habit changes      irreg   • Chronic low back pain    • Pain      left shoulder    • UTI (urinary tract infection)      frequent    • Bronchitis 2017   • Fall 05/2017     Past Surgical History   Procedure Laterality Date   • Hysterectomy, total abdominal  2000   • Vein ligat & strip  1972, 1966     x2 bilateral   • Axillary node dissection  9/15/2009     Performed by DIPAK VARELA at SURGERY SAME DAY [facility] ORS   • Mastectomy  9/30/2009     left   • Other orthopedic surgery  2002     laminectomy   • Gyn surgery  1998     excision of fibroids   • Cath placement  10/28/2009     Performed by DIPAK VARELA at SURGERY SAME DAY [facility] ORS   • Mastectomy  12/15/2010     right   • Cath removal  12/15/2010     Performed by DIPAK VARELA at SURGERY [facility] ORS   • Breast  reconstruction  12/15/2010     Performed by JAK MEZA at SURGERY [facility]   • Latissimus flap  12/15/2010     Performed by JAK MEZA at SURGERY [facility]   • Appendectomy  1968   • Dilation and curettage  1961   • Breast implant revision  7/11/2011     Performed by JAK MEZA at SURGERY [facility]   • Nipple reconstruction  7/11/2011     Performed by JAK MEZA at SURGERY [facility]   • Hip hemiarthroplasty  5/14/2012     left; Performed by KEITH COWART at SURGERY [facility]   • Other surgical procedure  2004     bladder repair   • Breast reconstruction  11/26/2012     Performed by Jak Meza M.D. at SURGERY [facility]   • Wound closure general  4/10/2013     Performed by Nano Riley M.D. at SURGERY SAME DAY [facility]   • Breast implant removal  4/10/2013     Performed by Jak Meza M.D. at SURGERY SAME DAY [facility] ORS   • Femur nailing intramedullary Right 6/3/2015     Procedure: FEMUR NAILING INTRAMEDULLARY;  Surgeon: Deshaun Denis M.D.;  Location: SURGERY [facility];  Service:      Family History   Problem Relation Age of Onset   • Diabetes     • Heart Disease     • Stroke     • Cancer     • Hypertension     • Diabetes Mother    • Heart Disease Mother    • Heart Disease Father    • Stroke Father    • Hypertension Father    • Diabetes Sister    • Heart Disease Sister    • Cancer Brother      lung   • Diabetes Other    • Diabetes Child    • Psychiatry Child      History   Smoking status   • Former Smoker -- 0.10 packs/day for 1 years   • Types: Cigarettes   • Quit date: 01/01/1964   Smokeless tobacco   • Never Used     Comment: 50 years ago in 1960  SOCIAL      Allergies   Allergen Reactions   • Alendronate-Butylpar-Propylpar-Saccharin [Fosamax] Diarrhea     .     Outpatient Encounter Prescriptions as of 6/16/2017   Medication Sig Dispense Refill   • metoprolol (LOPRESSOR) 50 MG Tab Take 1 Tab by mouth 2 Times  "a Day. 60 Tab 2   • Hydrocodone-Acetaminophen (VICODIN PO) Take  by mouth as needed.     • apixaban (ELIQUIS) 5mg Tab Take 1 Tab by mouth 2 Times a Day. 60 Tab 0   • meloxicam (MOBIC) 7.5 MG Tab Take 7.5 mg by mouth every day.     • bethanechol (URECHOLINE) 10 MG Tab Take 1 Tab by mouth 3 times a day. 90 Tab    • tamsulosin (FLOMAX) 0.4 MG capsule Take 1 Cap by mouth ONE-HALF HOUR AFTER BREAKFAST. 30 Cap    • tramadol (ULTRAM) 50 MG Tab Take 1 Tab by mouth every 6 hours as needed for Severe Pain. 30 Tab 0   • Probiotic Product (PROBIOTIC PO) Take 1 Cap by mouth every day.     • fluoxetine (PROZAC) 40 MG capsule Take 1 Cap by mouth every day. 90 Cap 4   • aspirin EC (ECOTRIN) 81 MG TBEC Take 1 Tab by mouth every day. 30 Tab 0   • [DISCONTINUED] dronedarone (MULTAQ) 400 MG Tab Take 1 Tab by mouth 2 times a day, with meals. 60 Tab 3     No facility-administered encounter medications on file as of 6/16/2017.     ROS     Objective:   /70 mmHg  Pulse 61  Ht 1.753 m (5' 9.02\")  Wt 77.111 kg (170 lb)  BMI 25.09 kg/m2  SpO2 95%    Physical Exam   Constitutional: She is oriented to person, place, and time. She appears well-developed and well-nourished. No distress.   HENT:   Mouth/Throat: Oropharynx is clear and moist.   Eyes: Conjunctivae and EOM are normal.   Neck: Neck supple. No JVD present. No thyroid mass present.   Cardiovascular: Normal rate, regular rhythm, S1 normal, S2 normal and normal pulses.  PMI is not displaced.  Exam reveals no gallop.    No murmur heard.  Pulses:       Carotid pulses are 2+ on the right side, and 2+ on the left side.       Radial pulses are 2+ on the right side, and 2+ on the left side.        Femoral pulses are 2+ on the right side, and 2+ on the left side.       Dorsalis pedis pulses are 2+ on the right side, and 2+ on the left side.   No peripheral edema.   Pulmonary/Chest: Effort normal and breath sounds normal.   Pacer site ok   Abdominal: Soft. Normal appearance. She " exhibits no abdominal bruit and no mass. There is no hepatosplenomegaly. There is no tenderness.   Musculoskeletal: Normal range of motion. She exhibits no edema.        Lumbar back: She exhibits no tenderness and no spasm.   Neurological: She is alert and oriented to person, place, and time. She has normal strength.   Skin: Skin is warm and dry. No rash noted. No cyanosis. Nails show no clubbing.   Psychiatric: She has a normal mood and affect.       Assessment:     1. Status post ablation of atrial fibrillation  EKG   2. SSS (sick sinus syndrome) (CMS-HCC)  EKG   3. Persistent atrial fibrillation (CMS-HCC)     4. Cardiac pacemaker in situ     5. Typical atrial flutter (CMS-HCC)     6. Sick sinus syndrome (CMS-HCC)     7. Anticoagulant long-term use         Medical Decision Making:  Today's Assessment / Status / Plan:     1. Atrial flutter post RFAS.  2. Fib post cardioversion. Not on AA meds she did not fill script. Will hold off for now.  3. PPM nl function.  4. F/U in 6 weeks.      Lorenzo Allen M.D.  [address]  [place] [address]  VIA In Basket

## 2017-06-16 NOTE — MR AVS SNAPSHOT
"        Meron Rice   2017 3:40 PM   Office Visit   MRN: [id number]    Department:  [facility]   Dept Phone:  [phone]    Description:  Female : 1932   Provider:  Stanislav Garrett M.D.           Reason for Visit     Follow-Up           Allergies as of 2017     Allergen Noted Reactions    Alendronate-Butylpar-Propylpar-Saccharin [Fosamax] 2017   Diarrhea    .      You were diagnosed with     Status post ablation of atrial fibrillation   [560849]       SSS (sick sinus syndrome) (CMS-[facility])   [593356]         Vital Signs     Blood Pressure Pulse Height Weight Body Mass Index Oxygen Saturation    122/70 mmHg 61 1.753 m (5' 9.02\") 77.111 kg (170 lb) 25.09 kg/m2 95%    Smoking Status                   Former Smoker           Basic Information     Date Of Birth Sex Race Ethnicity Preferred Language    1932 Female White Non- English      Your appointments     Sep 21, 2017  2:00 PM   New Patient with Estella Larkin M.D.   [facility] - Internal Medicine (--)    [address] [id number] [phone]           Please bring Photo ID, Insurance Cards, All Medication Bottles and copies of any legal documents (such as Living Will, Power of ) If speaking a language besides English please bring an adult . Please arrive 30 minutes prior for check in and registration. You will be receiving a confirmation call a few days before your appointment from our automated call confirmation system.              Problem List              ICD-10-CM Priority Class Noted - Resolved    Essential hypertension I10 Medium  2012 - Present    Hx of breast cancer Z85.3 Low  2012 - Present    Hx of cancer of uterus Z85.42 Low  2012 - Present    GERD (gastroesophageal reflux disease) K21.9 Low  2012 - Present    Chronic back pain M54.9, G89.29 Low  2012 - Present    Osteoporosis M81.0 Medium  2014 - Present    Insomnia " G47.00   2/4/2014 - Present    Anxiety F41.9 Low  2/4/2014 - Present    Anemia, normocytic normochromic D64.9 Medium Acute 6/26/2015 - Present    Nerve pain M79.2   2/4/2016 - Present    Alkaline phosphatase elevation R74.8   2/4/2016 - Present    Hypercalcemia E83.52   2/4/2016 - Present    UTI (urinary tract infection) N39.0   3/5/2017 - Present    Neurogenic bladder N31.9   3/6/2017 - Present    Acute urinary retention R33.8   3/8/2017 - Present    Recurrent UTI N39.0   5/19/2017 - Present    Hypertension I10   5/19/2017 - Present    Chronic hip pain M25.559, G89.29 Low  5/19/2017 - Present    Depression F32.9 Low  5/19/2017 - Present    Atrial flutter (CMS-HCC) I48.92 High  6/1/2017 - Present    Sick sinus syndrome (CMS-[facility]) I49.5 High  6/2/2017 - Present    Cardiac pacemaker in situ Z95.0 High  6/2/2017 - Present      Health Maintenance        Date Due Completion Dates    IMM DTaP/Tdap/Td Vaccine (1 - Tdap) 11/18/1951 ---    IMM ZOSTER VACCINE 11/18/1992 ---    BONE DENSITY 8/22/2017 8/22/2012            Results       Current Immunizations     13-VALENT PCV PREVNAR 2/4/2016  4:05 PM    Influenza TIV (IM) 9/30/2015, 12/4/2013, 11/1/2011    Influenza Vaccine Adult HD 10/30/2014    Pneumococcal polysaccharide vaccine (PPSV-23) 12/1/2008      Below and/or attached are the medications your provider expects you to take. Review all of your home medications and newly ordered medications with your provider and/or pharmacist. Follow medication instructions as directed by your provider and/or pharmacist. Please keep your medication list with you and share with your provider. Update the information when medications are discontinued, doses are changed, or new medications (including over-the-counter products) are added; and carry medication information at all times in the event of emergency situations     Allergies:  ALENDRONATE-BUTYLPAR-PROPYLPAR-SACCHARIN - Diarrhea               Medications  Valid as of: June 16, 2017 -   4:18 PM    Generic Name Brand Name Tablet Size Instructions for use    Apixaban (Tab) ELIQUIS 5mg Take 1 Tab by mouth 2 Times a Day.        Aspirin (Tablet Delayed Response) ECOTRIN 81 MG Take 1 Tab by mouth every day.        Bethanechol Chloride (Tab) URECHOLINE 10 MG Take 1 Tab by mouth 3 times a day.        FLUoxetine HCl (Cap) PROZAC 40 MG Take 1 Cap by mouth every day.        Hydrocodone-Acetaminophen   Take  by mouth as needed.        Meloxicam (Tab) MOBIC 7.5 MG Take 7.5 mg by mouth every day.        Metoprolol Tartrate (Tab) LOPRESSOR 50 MG Take 1 Tab by mouth 2 Times a Day.        Probiotic Product   Take 1 Cap by mouth every day.        Tamsulosin HCl (Cap) FLOMAX 0.4 MG Take 1 Cap by mouth ONE-HALF HOUR AFTER BREAKFAST.        TraMADol HCl (Tab) ULTRAM 50 MG Take 1 Tab by mouth every 6 hours as needed for Severe Pain.        .                 Medicines prescribed today were sent to:     [address] (), [place]  [address] () [place] [address]    Phone: [phone] Fax: [phone]    Open 24 Hours?: No      Medication refill instructions:       If your prescription bottle indicates you have medication refills left, it is not necessary to call your provider’s office. Please contact your pharmacy and they will refill your medication.    If your prescription bottle indicates you do not have any refills left, you may request refills at any time through one of the following ways: The online Viki system (except Urgent Care), by calling your provider’s office, or by asking your pharmacy to contact your provider’s office with a refill request. Medication refills are processed only during regular business hours and may not be available until the next business day. Your provider may request additional information or to have a follow-up visit with you prior to refilling your medication.   *Please Note: Medication refills are assigned a new Rx number when refilled  electronically. Your pharmacy may indicate that no refills were authorized even though a new prescription for the same medication is available at the pharmacy. Please request the medicine by name with the pharmacy before contacting your provider for a refill.           Ztoryt Access Code: Activation code not generated  Current ESCAPESwithYOU Status: Active

## 2017-06-17 NOTE — PROGRESS NOTES
Subjective:   Meron Rice is a 84 y.o. female who presents today s/p cardioversion and flutter RFA and PM. Maintaining SR. She never started her Multaq. Has not had a fib. Back on NOAC    Past Medical History   Diagnosis Date   • Foot-drop    • Hypertension    • Unspecified urinary incontinence      lazy bladder; doesn't empty completely   • Personal history of venous thrombosis and embolism      in leg during child-bearing years   • Cancer (CMS-HCC) [id number]     uterus/left breast   • Other specified symptom associated with female genital organs 1998     fibroids   • Arthritis      generalized   • ASTHMA      has not needed an inhaler since 2010   • CATARACT      beginning   • Depression 5/19/2017   • Arrhythmia    • Urinary bladder disorder    • Breath shortness    • Anemia    • Unspecified hemorrhagic conditions      post op hyst. Currently on Eliquis    • Pneumonia 2017   • Bowel habit changes      irreg   • Chronic low back pain    • Pain      left shoulder    • UTI (urinary tract infection)      frequent    • Bronchitis 2017   • Fall 05/2017     Past Surgical History   Procedure Laterality Date   • Hysterectomy, total abdominal  2000   • Vein ligat & strip  1972, 1966     x2 bilateral   • Axillary node dissection  9/15/2009     Performed by DIPAK VARELA at SURGERY SAME DAY [facility] ORS   • Mastectomy  9/30/2009     left   • Other orthopedic surgery  2002     laminectomy   • Gyn surgery  1998     excision of fibroids   • Cath placement  10/28/2009     Performed by DIPAK VARELA at SURGERY SAME DAY [facility] ORS   • Mastectomy  12/15/2010     right   • Cath removal  12/15/2010     Performed by DIPAK VARELA at SURGERY [facility] ORS   • Breast reconstruction  12/15/2010     Performed by JAK ROSALES at SURGERY [facility] ORS   • Latissimus flap  12/15/2010     Performed by JAK ROSALES at SURGERY [facility] ORS   • Appendectomy  1968   • Dilation and curettage  1961   • Breast implant  revision  7/11/2011     Performed by JAK MEZA at SURGERY [facility]   • Nipple reconstruction  7/11/2011     Performed by JAK MEZA at SURGERY [facility]   • Hip hemiarthroplasty  5/14/2012     left; Performed by KEITH COWART at SURGERY [facility]   • Other surgical procedure  2004     bladder repair   • Breast reconstruction  11/26/2012     Performed by Jak Meza M.D. at SURGERY [facility]   • Wound closure general  4/10/2013     Performed by Nano Riley M.D. at SURGERY SAME DAY [facility] ORS   • Breast implant removal  4/10/2013     Performed by Jak Meza M.D. at SURGERY SAME DAY [facility] ORS   • Femur nailing intramedullary Right 6/3/2015     Procedure: FEMUR NAILING INTRAMEDULLARY;  Surgeon: Deshaun Denis M.D.;  Location: SURGERY [facility];  Service:      Family History   Problem Relation Age of Onset   • Diabetes     • Heart Disease     • Stroke     • Cancer     • Hypertension     • Diabetes Mother    • Heart Disease Mother    • Heart Disease Father    • Stroke Father    • Hypertension Father    • Diabetes Sister    • Heart Disease Sister    • Cancer Brother      lung   • Diabetes Other    • Diabetes Child    • Psychiatry Child      History   Smoking status   • Former Smoker -- 0.10 packs/day for 1 years   • Types: Cigarettes   • Quit date: 01/01/1964   Smokeless tobacco   • Never Used     Comment: 50 years ago in 1960  SOCIAL      Allergies   Allergen Reactions   • Alendronate-Butylpar-Propylpar-Saccharin [Fosamax] Diarrhea     .     Outpatient Encounter Prescriptions as of 6/16/2017   Medication Sig Dispense Refill   • metoprolol (LOPRESSOR) 50 MG Tab Take 1 Tab by mouth 2 Times a Day. 60 Tab 2   • Hydrocodone-Acetaminophen (VICODIN PO) Take  by mouth as needed.     • apixaban (ELIQUIS) 5mg Tab Take 1 Tab by mouth 2 Times a Day. 60 Tab 0   • meloxicam (MOBIC) 7.5 MG Tab Take 7.5 mg by mouth every day.     • bethanechol (URECHOLINE)  "10 MG Tab Take 1 Tab by mouth 3 times a day. 90 Tab    • tamsulosin (FLOMAX) 0.4 MG capsule Take 1 Cap by mouth ONE-HALF HOUR AFTER BREAKFAST. 30 Cap    • tramadol (ULTRAM) 50 MG Tab Take 1 Tab by mouth every 6 hours as needed for Severe Pain. 30 Tab 0   • Probiotic Product (PROBIOTIC PO) Take 1 Cap by mouth every day.     • fluoxetine (PROZAC) 40 MG capsule Take 1 Cap by mouth every day. 90 Cap 4   • aspirin EC (ECOTRIN) 81 MG TBEC Take 1 Tab by mouth every day. 30 Tab 0   • [DISCONTINUED] dronedarone (MULTAQ) 400 MG Tab Take 1 Tab by mouth 2 times a day, with meals. 60 Tab 3     No facility-administered encounter medications on file as of 6/16/2017.     ROS     Objective:   /70 mmHg  Pulse 61  Ht 1.753 m (5' 9.02\")  Wt 77.111 kg (170 lb)  BMI 25.09 kg/m2  SpO2 95%    Physical Exam   Constitutional: She is oriented to person, place, and time. She appears well-developed and well-nourished. No distress.   HENT:   Mouth/Throat: Oropharynx is clear and moist.   Eyes: Conjunctivae and EOM are normal.   Neck: Neck supple. No JVD present. No thyroid mass present.   Cardiovascular: Normal rate, regular rhythm, S1 normal, S2 normal and normal pulses.  PMI is not displaced.  Exam reveals no gallop.    No murmur heard.  Pulses:       Carotid pulses are 2+ on the right side, and 2+ on the left side.       Radial pulses are 2+ on the right side, and 2+ on the left side.        Femoral pulses are 2+ on the right side, and 2+ on the left side.       Dorsalis pedis pulses are 2+ on the right side, and 2+ on the left side.   No peripheral edema.   Pulmonary/Chest: Effort normal and breath sounds normal.   Pacer site ok   Abdominal: Soft. Normal appearance. She exhibits no abdominal bruit and no mass. There is no hepatosplenomegaly. There is no tenderness.   Musculoskeletal: Normal range of motion. She exhibits no edema.        Lumbar back: She exhibits no tenderness and no spasm.   Neurological: She is alert and oriented " to person, place, and time. She has normal strength.   Skin: Skin is warm and dry. No rash noted. No cyanosis. Nails show no clubbing.   Psychiatric: She has a normal mood and affect.       Assessment:     1. Status post ablation of atrial fibrillation  EKG   2. SSS (sick sinus syndrome) (CMS-HCC)  EKG   3. Persistent atrial fibrillation (CMS-[facility])     4. Cardiac pacemaker in situ     5. Typical atrial flutter (CMS-[facility])     6. Sick sinus syndrome (CMS-[facility])     7. Anticoagulant long-term use         Medical Decision Making:  Today's Assessment / Status / Plan:     1. Atrial flutter post RFAS.  2. Fib post cardioversion. Not on AA meds she did not fill script. Will hold off for now.  3. PPM nl function.  4. F/U in 6 weeks.

## 2017-06-21 DIAGNOSIS — I10 ESSENTIAL HYPERTENSION: ICD-10-CM

## 2017-06-21 DIAGNOSIS — I48.19 PERSISTENT ATRIAL FIBRILLATION (HCC): ICD-10-CM

## 2017-06-21 RX ORDER — METOPROLOL TARTRATE 50 MG/1
50 TABLET, FILM COATED ORAL 2 TIMES DAILY
Qty: 180 TAB | Refills: 3 | Status: SHIPPED | OUTPATIENT
Start: 2017-06-21 | End: 2017-10-31

## 2017-06-22 ENCOUNTER — PATIENT OUTREACH (OUTPATIENT)
Dept: HEALTH INFORMATION MANAGEMENT | Facility: OTHER | Age: 82
End: 2017-06-22

## 2017-06-22 RX ORDER — BETHANECHOL CHLORIDE 10 MG/1
10 TABLET ORAL 3 TIMES DAILY
Qty: 90 TAB | Refills: 4 | Status: ON HOLD | OUTPATIENT
Start: 2017-06-22 | End: 2017-08-16

## 2017-06-22 RX ORDER — TRAMADOL HYDROCHLORIDE 50 MG/1
50 TABLET ORAL EVERY 6 HOURS PRN
Qty: 30 TAB | Refills: 0
Start: 2017-06-22

## 2017-06-22 RX ORDER — MELOXICAM 7.5 MG/1
7.5 TABLET ORAL DAILY
Qty: 90 TAB | Refills: 4 | Status: SHIPPED | OUTPATIENT
Start: 2017-06-22 | End: 2017-06-26

## 2017-06-23 NOTE — TELEPHONE ENCOUNTER
Phone Number Called: [phone] (home)       Message: Spoke to patient regarding note below.    Left Message for patient to call back: no

## 2017-06-23 NOTE — TELEPHONE ENCOUNTER
Phone Number Called: [phone] (home)     Message: called pt notified of answer below, pt said to disregard refill for tramadol.     Left Message for patient to call back: no

## 2017-06-26 ENCOUNTER — TELEPHONE (OUTPATIENT)
Dept: MEDICAL GROUP | Age: 82
End: 2017-06-26

## 2017-06-26 DIAGNOSIS — G89.29 CHRONIC MIDLINE LOW BACK PAIN WITHOUT SCIATICA: ICD-10-CM

## 2017-06-26 DIAGNOSIS — M54.50 CHRONIC MIDLINE LOW BACK PAIN WITHOUT SCIATICA: ICD-10-CM

## 2017-06-26 RX ORDER — CYCLOBENZAPRINE HCL 10 MG
10 TABLET ORAL 3 TIMES DAILY PRN
Qty: 30 TAB | Refills: 0 | Status: SHIPPED | OUTPATIENT
Start: 2017-06-26 | End: 2017-08-15

## 2017-06-26 NOTE — TELEPHONE ENCOUNTER
No. Discontinue the meloxicam. There is no alternative for that other than plain Tylenol or referral to pain management.. Please let me know if she needs a referral. Or she could try muscle relaxant such as Flexeril. Prescription for Flexeril sent to pharmacy.

## 2017-06-26 NOTE — TELEPHONE ENCOUNTER
1. Caller Name:   [facility] [address] [place] [address] (), [place] - [address]  [address] [place] [address] () [place] [address]  Phone: [phone] Fax: [phone]                                           Pharmacy fax states that the pt is being prescribed Eliquis by Dr. Stanislav Garrett. There is a drug interaction between the Eliquis and meloxicam. Pharmacy would like to know if you still want to continue with the Meloxicam prescription.

## 2017-06-28 NOTE — TELEPHONE ENCOUNTER
Phone Number Called: [phone] (home)       Message: Informed pt of message below      Left Message for patient to call back: no

## 2017-06-29 NOTE — PROGRESS NOTES
Patient Meron Rice discharged from [facility] on 5/20/17 with a LACE+ score of 24. The patient was ordered to follow up with followed up with Cardiologist and PCP. The patient kept both appointments. The patient was ordered home health skilled nursing services through [facility] for skilled nursing. The patient had no barriers to picking up her medications after discharge and the patient receives transportation to and from her medical appointments. Please see patient appointments:  - Cardiologist Dr. Garrett scheduled 5/31/17 - Kept  - PCP Dr. Allen scheduled 6/1/17 - Kept  - PCP Dr. Larkin scheduled 9/21/17  - Home Health services started 4/30/17

## 2017-08-15 ENCOUNTER — TELEPHONE (OUTPATIENT)
Dept: MEDICAL GROUP | Age: 82
End: 2017-08-15

## 2017-08-15 ENCOUNTER — OFFICE VISIT (OUTPATIENT)
Dept: URGENT CARE | Facility: CLINIC | Age: 82
End: 2017-08-15
Payer: MEDICARE

## 2017-08-15 ENCOUNTER — HOSPITAL ENCOUNTER (EMERGENCY)
Facility: MEDICAL CENTER | Age: 82
End: 2017-08-15
Payer: MEDICARE

## 2017-08-15 ENCOUNTER — TELEPHONE (OUTPATIENT)
Dept: HOSPITALIST | Facility: MEDICAL CENTER | Age: 82
End: 2017-08-15

## 2017-08-15 VITALS
HEART RATE: 60 BPM | SYSTOLIC BLOOD PRESSURE: 118 MMHG | BODY MASS INDEX: 25.18 KG/M2 | OXYGEN SATURATION: 96 % | WEIGHT: 170 LBS | HEIGHT: 69 IN | TEMPERATURE: 98.5 F | DIASTOLIC BLOOD PRESSURE: 70 MMHG

## 2017-08-15 VITALS
TEMPERATURE: 98.9 F | HEIGHT: 69 IN | WEIGHT: 170 LBS | BODY MASS INDEX: 25.18 KG/M2 | SYSTOLIC BLOOD PRESSURE: 149 MMHG | DIASTOLIC BLOOD PRESSURE: 65 MMHG | HEART RATE: 62 BPM | RESPIRATION RATE: 16 BRPM | OXYGEN SATURATION: 96 %

## 2017-08-15 DIAGNOSIS — L03.313 CELLULITIS OF CHEST WALL: ICD-10-CM

## 2017-08-15 PROCEDURE — 302449 STATCHG TRIAGE ONLY (STATISTIC)

## 2017-08-15 PROCEDURE — 99213 OFFICE O/P EST LOW 20 MIN: CPT | Performed by: NURSE PRACTITIONER

## 2017-08-15 ASSESSMENT — ENCOUNTER SYMPTOMS
WEAKNESS: 0
FEVER: 1
DIAPHORESIS: 0
WEIGHT LOSS: 0
PSYCHIATRIC NEGATIVE: 1
FOCAL WEAKNESS: 0
CHILLS: 1
NAUSEA: 1
RESPIRATORY NEGATIVE: 1
ABDOMINAL PAIN: 0
SENSORY CHANGE: 0
HEADACHES: 1
TINGLING: 0
MYALGIAS: 1
FALLS: 0
CARDIOVASCULAR NEGATIVE: 1
VOMITING: 0

## 2017-08-15 ASSESSMENT — PAIN SCALES - GENERAL: PAINLEVEL_OUTOF10: 5

## 2017-08-15 NOTE — TELEPHONE ENCOUNTER
Meron [place]  [phone] (home)     Pt's insurance will no longer cover her catheter unless she has a Dx of permanent impairment of urination. Her insurance called asking if you would be willing to addendum the chronic UTI for office note dated on 02/04/2016 due to her being too sick to come in for an appt. Fax number to send it to would be [phone].

## 2017-08-15 NOTE — MR AVS SNAPSHOT
"        Meron Rice   8/15/2017 4:00 PM   Office Visit   MRN: [id number]    Department:  [facility]   Dept Phone:  [phone]    Description:  Female : 1932   Provider:  ARIANNA Soto           Reason for Visit     Other swelling in chest where pace maker is,pain around the shoulder,arm,very red      Allergies as of 8/15/2017     Allergen Noted Reactions    Alendronate-Butylpar-Propylpar-Saccharin [Fosamax] 2017   Diarrhea    .      You were diagnosed with     Cellulitis of chest wall   [046202]         Vital Signs     Blood Pressure Pulse Temperature Height Weight Body Mass Index    118/70 mmHg 60 36.9 °C (98.5 °F) 1.753 m (5' 9.02\") 77.111 kg (170 lb) 25.09 kg/m2    Oxygen Saturation Smoking Status                96% Former Smoker          Basic Information     Date Of Birth Sex Race Ethnicity Preferred Language    1932 Female White Non- English      Your appointments     Sep 08, 2017  1:20 PM   FOLLOW UP with Stanislav Garrett M.D.   [facility] (--)    [address] [place] [address] [address]  [facility] [id number] [phone]            Sep 22, 2017  1:00 PM   New Patient with Jillian Avalos M.D.   [facility] - [facility] (--)    [address] [place] [address]  [address]  [facility] [id number] [phone]           Please bring Photo ID, Insurance Cards, All Medication Bottles and copies of any legal documents (such as Living Will, Power of ) If speaking a language besides English please bring an adult . Please arrive 30 minutes prior for check in and registration. You will be receiving a confirmation call a few days before your appointment from our automated call confirmation system.              Problem List              ICD-10-CM Priority Class Noted - Resolved    Essential hypertension I10 Medium  2012 - Present    Hx of breast cancer Z85.3 Low  2012 - Present    Hx of cancer of uterus Z85.42 " Low  5/14/2012 - Present    GERD (gastroesophageal reflux disease) K21.9 Low  5/14/2012 - Present    Chronic midline low back pain without sciatica M54.5, G89.29 Low  5/14/2012 - Present    Osteoporosis M81.0 Medium  2/4/2014 - Present    Insomnia G47.00   2/4/2014 - Present    Anxiety F41.9 Low  2/4/2014 - Present    Anemia, normocytic normochromic D64.9 Medium Acute 6/26/2015 - Present    Nerve pain M79.2   2/4/2016 - Present    Alkaline phosphatase elevation R74.8   2/4/2016 - Present    Hypercalcemia E83.52   2/4/2016 - Present    UTI (urinary tract infection) N39.0   3/5/2017 - Present    Neurogenic bladder N31.9   3/6/2017 - Present    Recurrent UTI N39.0   5/19/2017 - Present    Hypertension I10   5/19/2017 - Present    Chronic hip pain M25.559, G89.29 Low  5/19/2017 - Present    Depression F32.9 Low  5/19/2017 - Present    Atrial flutter (CMS-[facility]) I48.92 High  6/1/2017 - Present    Sick sinus syndrome (CMS-[facility]) I49.5 High  6/2/2017 - Present    Cardiac pacemaker in situ Z95.0 High  6/2/2017 - Present    Persistent atrial fibrillation (CMS-[facility]) I48.1   6/16/2017 - Present    Anticoagulant long-term use Z79.01   6/16/2017 - Present      Health Maintenance        Date Due Completion Dates    IMM DTaP/Tdap/Td Vaccine (1 - Tdap) 11/18/1951 ---    IMM ZOSTER VACCINE 11/18/1992 ---    BONE DENSITY 8/22/2017 8/22/2012    IMM INFLUENZA (1) 9/1/2017 9/30/2015, 10/30/2014, 12/4/2013, 11/1/2011            Current Immunizations     13-VALENT PCV PREVNAR 2/4/2016  4:05 PM    Influenza TIV (IM) 9/30/2015, 12/4/2013, 11/1/2011    Influenza Vaccine Adult HD 10/30/2014    Pneumococcal polysaccharide vaccine (PPSV-23) 12/1/2008      Below and/or attached are the medications your provider expects you to take. Review all of your home medications and newly ordered medications with your provider and/or pharmacist. Follow medication instructions as directed by your provider and/or pharmacist. Please keep your medication list with  you and share with your provider. Update the information when medications are discontinued, doses are changed, or new medications (including over-the-counter products) are added; and carry medication information at all times in the event of emergency situations     Allergies:  ALENDRONATE-BUTYLPAR-PROPYLPAR-SACCHARIN - Diarrhea               Medications  Valid as of: August 15, 2017 -  4:54 PM    Generic Name Brand Name Tablet Size Instructions for use    Apixaban (Tab) ELIQUIS 5mg Take 1 Tab by mouth 2 Times a Day.        Aspirin (Tablet Delayed Response) ECOTRIN 81 MG Take 1 Tab by mouth every day.        Bethanechol Chloride (Tab) URECHOLINE 10 MG Take 1 Tab by mouth 3 times a day.        FLUoxetine HCl (Cap) PROZAC 40 MG Take 1 Cap by mouth every day.        Metoprolol Tartrate (Tab) LOPRESSOR 50 MG Take 1 Tab by mouth 2 Times a Day.        .                 Medicines prescribed today were sent to:     [facility] [address] [place] [address] (), [place] - [id number] () [place] [address]    Phone: [phone] Fax: [phone]    Open 24 Hours?: No      Medication refill instructions:       If your prescription bottle indicates you have medication refills left, it is not necessary to call your provider’s office. Please contact your pharmacy and they will refill your medication.    If your prescription bottle indicates you do not have any refills left, you may request refills at any time through one of the following ways: The online [employer] system (except Urgent Care), by calling your provider’s office, or by asking your pharmacy to contact your provider’s office with a refill request. Medication refills are processed only during regular business hours and may not be available until the next business day. Your provider may request additional information or to have a follow-up visit with you prior to refilling your medication.   *Please Note: Medication refills are assigned a new Rx number when  refilled electronically. Your pharmacy may indicate that no refills were authorized even though a new prescription for the same medication is available at the pharmacy. Please request the medicine by name with the pharmacy before contacting your provider for a refill.           [employer] Access Code: Activation code not generated  Current tic Status: Active

## 2017-08-15 NOTE — TELEPHONE ENCOUNTER
I made an addendum to the office note of August 18, 2016 when I last saw her. On I'm unable to go back any further in Epic to make addendum's. I believe one year is the cutoff.

## 2017-08-15 NOTE — PROGRESS NOTES
Subjective:      Meron Rice is a 84 y.o. female who presents with Other          HPI     The patient presents to urgent care today with complaints of left upper chest and arm pain, swelling and redness since yesterday. Admits to associated fever, headache, nausea, malaise, and fatigue. States she rested in bed all day yesterday not feeling well. She denies any recent injury or trauma to the site. She admits to history of left-sided mastectomy with lymphadenectomy (2009) as well as a pacemaker insertion in June 2017. Denies any complications from pacemaker insertion. Was supposed to follow up with her cardiologist recently but accidentally missed her appointment. She also admits to a wound to her LLE for the past two weeks and has been seeing a home health nurse for treatment. She has not taken anything today for symptom relief.       Past Medical History   Diagnosis Date   • Foot-drop    • Hypertension    • Unspecified urinary incontinence      lazy bladder; doesn't empty completely   • Personal history of venous thrombosis and embolism      in leg during child-bearing years   • Cancer (CMS-HCC) [id number]     uterus/left breast   • Other specified symptom associated with female genital organs 1998     fibroids   • Arthritis      generalized   • ASTHMA      has not needed an inhaler since 2010   • CATARACT      beginning   • Depression 5/19/2017   • Arrhythmia    • Urinary bladder disorder    • Breath shortness    • Anemia    • Unspecified hemorrhagic conditions      post op hyst. Currently on Eliquis    • Pneumonia 2017   • Bowel habit changes      irreg   • Chronic low back pain    • Pain      left shoulder    • UTI (urinary tract infection)      frequent    • Bronchitis 2017   • Fall 05/2017     Past Surgical History   Procedure Laterality Date   • Hysterectomy, total abdominal  2000   • Vein ligat & strip  1972, 1966     x2 bilateral   • Axillary node dissection  9/15/2009     Performed by DIPAK VARELA  SURGERY SAME DAY [facility] ORS   • Mastectomy  9/30/2009     left   • Other orthopedic surgery  2002     laminectomy   • Gyn surgery  1998     excision of fibroids   • Cath placement  10/28/2009     Performed by NANO VARELA at SURGERY SAME DAY [facility]   • Mastectomy  12/15/2010     right   • Cath removal  12/15/2010     Performed by NANO VARELA at SURGERY [facility]   • Breast reconstruction  12/15/2010     Performed by JAK MEZA at SURGERY [facility] ORS   • Latissimus flap  12/15/2010     Performed by JAK MEZA at SURGERY [facility]   • Appendectomy  1968   • Dilation and curettage  1961   • Breast implant revision  7/11/2011     Performed by JAK MEZA at SURGERY [facility]   • Nipple reconstruction  7/11/2011     Performed by JAK MEZA at [facility]   • Hip hemiarthroplasty  5/14/2012     left; Performed by KEITH COWART at SURGERY [facility]   • Other surgical procedure  2004     bladder repair   • Breast reconstruction  11/26/2012     Performed by Jak Meza M.D. at SURGERY [facility]   • Wound closure general  4/10/2013     Performed by Nano Varela M.D. at SURGERY SAME DAY [facility]   • Breast implant removal  4/10/2013     Performed by Jak Meza M.D. at SURGERY SAME DAY [facility] ORS   • Femur nailing intramedullary Right 6/3/2015     Procedure: FEMUR NAILING INTRAMEDULLARY;  Surgeon: Deshaun Denis M.D.;  Location: SURGERY [facility];  Service:      Current Outpatient Prescriptions on File Prior to Visit   Medication Sig Dispense Refill   • apixaban (ELIQUIS) 5mg Tab Take 1 Tab by mouth 2 Times a Day. 180 Tab 3   • metoprolol (LOPRESSOR) 50 MG Tab Take 1 Tab by mouth 2 Times a Day. 180 Tab 3   • fluoxetine (PROZAC) 40 MG capsule Take 1 Cap by mouth every day. 90 Cap 4   • aspirin EC (ECOTRIN) 81 MG TBEC Take 1 Tab by mouth every day. 30 Tab 0   • bethanechol (URECHOLINE) 10 MG Tab Take 1  "Tab by mouth 3 times a day. 90 Tab 4     No current facility-administered medications on file prior to visit.     ALL: Alendronate-butylpar-propylpar-saccharin       Review of Systems   Constitutional: Positive for fever, chills and malaise/fatigue. Negative for weight loss and diaphoresis.   Respiratory: Negative.    Cardiovascular: Negative.    Gastrointestinal: Positive for nausea. Negative for vomiting and abdominal pain.   Musculoskeletal: Positive for myalgias. Negative for falls.   Skin:        Swelling and erythema to left upper chest wall    Neurological: Positive for headaches. Negative for tingling, sensory change, focal weakness and weakness.   Endo/Heme/Allergies: Negative.    Psychiatric/Behavioral: Negative.           Objective:     /70 mmHg  Pulse 60  Temp(Src) 36.9 °C (98.5 °F)  Ht 1.753 m (5' 9.02\")  Wt 77.111 kg (170 lb)  BMI 25.09 kg/m2  SpO2 96%     Physical Exam   Constitutional: She is oriented to person, place, and time. Vital signs are normal. She appears well-developed and well-nourished. She does not have a sickly appearance. She does not appear ill. No distress.   HENT:   Head: Normocephalic and atraumatic.   Right Ear: External ear normal.   Left Ear: External ear normal.   Nose: Nose normal.   Mouth/Throat: Oropharynx is clear and moist.   Eyes: Conjunctivae are normal. Pupils are equal, round, and reactive to light. Right eye exhibits no discharge. Left eye exhibits no discharge.   Neck: Normal range of motion. Neck supple.   Cardiovascular: Normal rate, regular rhythm, normal heart sounds and intact distal pulses.    Pulmonary/Chest: Effort normal and breath sounds normal. No respiratory distress.   Musculoskeletal: She exhibits edema and tenderness.   Wound to anterior LLE   Neurological: She is alert and oriented to person, place, and time. She has normal strength. No cranial nerve deficit or sensory deficit. She exhibits normal muscle tone. Coordination and gait normal. " GCS eye subscore is 4. GCS verbal subscore is 5. GCS motor subscore is 6.   Skin: Skin is warm and dry. No abrasion, no bruising, no ecchymosis, no laceration and no rash noted. She is not diaphoretic. There is erythema. No pallor.        Vitals reviewed.              Assessment/Plan:     1. Cellulitis of chest wall           The patient is an 84 year old female with sudden onset of swelling and erythema to her left chest wall with nausea, malaise, fatigue, and subjective fever. She has a fairly new pacemaker insertion to the area as well as a wound to her LLE in which I am concerned about. Therefore, I feel the patient requires a higher level of care including closer monitoring, stat lab work and/or imaging for further evaluation. This has been discussed with the patient and they state agreement and understanding. The patient has yet to decide which ED she will be going to but will go without delay. The patient is stable to leave [facility] at this time and will be taken directly to the ED by her daughter.       GOOD Soto.

## 2017-08-16 ENCOUNTER — NON-PROVIDER VISIT (OUTPATIENT)
Dept: CARDIOLOGY | Facility: MEDICAL CENTER | Age: 82
End: 2017-08-16
Payer: MEDICARE

## 2017-08-16 ENCOUNTER — HOSPITAL ENCOUNTER (OUTPATIENT)
Facility: MEDICAL CENTER | Age: 82
End: 2017-08-16
Payer: MEDICARE

## 2017-08-16 ENCOUNTER — OFFICE VISIT (OUTPATIENT)
Dept: CARDIOLOGY | Facility: MEDICAL CENTER | Age: 82
End: 2017-08-16
Payer: MEDICARE

## 2017-08-16 ENCOUNTER — RESOLUTE PROFESSIONAL BILLING HOSPITAL PROF FEE (OUTPATIENT)
Dept: HOSPITALIST | Facility: MEDICAL CENTER | Age: 82
End: 2017-08-16
Payer: MEDICARE

## 2017-08-16 ENCOUNTER — HOSPITAL ENCOUNTER (INPATIENT)
Facility: MEDICAL CENTER | Age: 82
LOS: 4 days | DRG: 603 | End: 2017-08-20
Attending: HOSPITALIST | Admitting: HOSPITALIST
Payer: MEDICARE

## 2017-08-16 DIAGNOSIS — L03.114 CELLULITIS OF LEFT UPPER EXTREMITY: ICD-10-CM

## 2017-08-16 DIAGNOSIS — Z85.3 HX OF BREAST CANCER: ICD-10-CM

## 2017-08-16 DIAGNOSIS — Z95.0 CARDIAC PACEMAKER IN SITU: ICD-10-CM

## 2017-08-16 DIAGNOSIS — Z98.890 S/P ABLATION OF ATRIAL FLUTTER: ICD-10-CM

## 2017-08-16 DIAGNOSIS — I49.5 SICK SINUS SYNDROME (HCC): ICD-10-CM

## 2017-08-16 DIAGNOSIS — I48.3 TYPICAL ATRIAL FLUTTER (HCC): ICD-10-CM

## 2017-08-16 DIAGNOSIS — I48.19 PERSISTENT ATRIAL FIBRILLATION (HCC): ICD-10-CM

## 2017-08-16 DIAGNOSIS — Z79.01 ANTICOAGULANT LONG-TERM USE: ICD-10-CM

## 2017-08-16 DIAGNOSIS — Z86.79 S/P ABLATION OF ATRIAL FLUTTER: ICD-10-CM

## 2017-08-16 PROBLEM — L03.119: Status: ACTIVE | Noted: 2017-08-16

## 2017-08-16 PROBLEM — L03.90 CELLULITIS: Status: ACTIVE | Noted: 2017-08-16

## 2017-08-16 LAB
ALBUMIN SERPL BCP-MCNC: 3.1 G/DL (ref 3.2–4.9)
ALBUMIN/GLOB SERPL: 1.1 G/DL
ALP SERPL-CCNC: 97 U/L (ref 30–99)
ALT SERPL-CCNC: 11 U/L (ref 2–50)
ANION GAP SERPL CALC-SCNC: 8 MMOL/L (ref 0–11.9)
AST SERPL-CCNC: 17 U/L (ref 12–45)
BASOPHILS # BLD AUTO: 1.1 % (ref 0–1.8)
BASOPHILS # BLD: 0.08 K/UL (ref 0–0.12)
BILIRUB SERPL-MCNC: 0.7 MG/DL (ref 0.1–1.5)
BUN SERPL-MCNC: 26 MG/DL (ref 8–22)
CALCIUM SERPL-MCNC: 9 MG/DL (ref 8.5–10.5)
CHLORIDE SERPL-SCNC: 106 MMOL/L (ref 96–112)
CO2 SERPL-SCNC: 20 MMOL/L (ref 20–33)
CREAT SERPL-MCNC: 0.71 MG/DL (ref 0.5–1.4)
EKG IMPRESSION: NORMAL
EOSINOPHIL # BLD AUTO: 0.18 K/UL (ref 0–0.51)
EOSINOPHIL NFR BLD: 2.4 % (ref 0–6.9)
ERYTHROCYTE [DISTWIDTH] IN BLOOD BY AUTOMATED COUNT: 63.3 FL (ref 35.9–50)
GFR SERPL CREATININE-BSD FRML MDRD: >60 ML/MIN/1.73 M 2
GLOBULIN SER CALC-MCNC: 2.9 G/DL (ref 1.9–3.5)
GLUCOSE SERPL-MCNC: 88 MG/DL (ref 65–99)
HCT VFR BLD AUTO: 40.2 % (ref 37–47)
HGB BLD-MCNC: 13.2 G/DL (ref 12–16)
IMM GRANULOCYTES # BLD AUTO: 0.03 K/UL (ref 0–0.11)
IMM GRANULOCYTES NFR BLD AUTO: 0.4 % (ref 0–0.9)
LACTATE BLD-SCNC: 1.4 MMOL/L (ref 0.5–2)
LYMPHOCYTES # BLD AUTO: 2.24 K/UL (ref 1–4.8)
LYMPHOCYTES NFR BLD: 29.9 % (ref 22–41)
MCH RBC QN AUTO: 31 PG (ref 27–33)
MCHC RBC AUTO-ENTMCNC: 32.8 G/DL (ref 33.6–35)
MCV RBC AUTO: 94.4 FL (ref 81.4–97.8)
MONOCYTES # BLD AUTO: 1.12 K/UL (ref 0–0.85)
MONOCYTES NFR BLD AUTO: 14.9 % (ref 0–13.4)
NEUTROPHILS # BLD AUTO: 3.85 K/UL (ref 2–7.15)
NEUTROPHILS NFR BLD: 51.3 % (ref 44–72)
NRBC # BLD AUTO: 0 K/UL
NRBC BLD AUTO-RTO: 0 /100 WBC
PLATELET # BLD AUTO: 182 K/UL (ref 164–446)
PMV BLD AUTO: 10 FL (ref 9–12.9)
POTASSIUM SERPL-SCNC: 3.9 MMOL/L (ref 3.6–5.5)
PROT SERPL-MCNC: 6 G/DL (ref 6–8.2)
RBC # BLD AUTO: 4.26 M/UL (ref 4.2–5.4)
SODIUM SERPL-SCNC: 134 MMOL/L (ref 135–145)
WBC # BLD AUTO: 7.5 K/UL (ref 4.8–10.8)

## 2017-08-16 PROCEDURE — 99223 1ST HOSP IP/OBS HIGH 75: CPT | Performed by: HOSPITALIST

## 2017-08-16 PROCEDURE — 93005 ELECTROCARDIOGRAM TRACING: CPT | Performed by: INTERNAL MEDICINE

## 2017-08-16 PROCEDURE — A9270 NON-COVERED ITEM OR SERVICE: HCPCS | Performed by: HOSPITALIST

## 2017-08-16 PROCEDURE — 770020 HCHG ROOM/CARE - TELE (206)

## 2017-08-16 PROCEDURE — 99214 OFFICE O/P EST MOD 30 MIN: CPT | Mod: 24,25 | Performed by: INTERNAL MEDICINE

## 2017-08-16 PROCEDURE — 700102 HCHG RX REV CODE 250 W/ 637 OVERRIDE(OP): Performed by: HOSPITALIST

## 2017-08-16 PROCEDURE — 700105 HCHG RX REV CODE 258: Performed by: HOSPITALIST

## 2017-08-16 PROCEDURE — 93010 ELECTROCARDIOGRAM REPORT: CPT | Performed by: INTERNAL MEDICINE

## 2017-08-16 PROCEDURE — 700111 HCHG RX REV CODE 636 W/ 250 OVERRIDE (IP): Performed by: HOSPITALIST

## 2017-08-16 PROCEDURE — 83605 ASSAY OF LACTIC ACID: CPT

## 2017-08-16 PROCEDURE — 85025 COMPLETE CBC W/AUTO DIFF WBC: CPT

## 2017-08-16 PROCEDURE — 80053 COMPREHEN METABOLIC PANEL: CPT

## 2017-08-16 PROCEDURE — 36415 COLL VENOUS BLD VENIPUNCTURE: CPT

## 2017-08-16 PROCEDURE — 87040 BLOOD CULTURE FOR BACTERIA: CPT | Mod: 91

## 2017-08-16 PROCEDURE — 93280 PM DEVICE PROGR EVAL DUAL: CPT | Performed by: INTERNAL MEDICINE

## 2017-08-16 RX ORDER — BISACODYL 10 MG
10 SUPPOSITORY, RECTAL RECTAL
Status: DISCONTINUED | OUTPATIENT
Start: 2017-08-16 | End: 2017-08-20 | Stop reason: HOSPADM

## 2017-08-16 RX ORDER — AMOXICILLIN 250 MG
2 CAPSULE ORAL 2 TIMES DAILY
Status: DISCONTINUED | OUTPATIENT
Start: 2017-08-16 | End: 2017-08-20 | Stop reason: HOSPADM

## 2017-08-16 RX ORDER — FLUOXETINE HYDROCHLORIDE 20 MG/1
40 CAPSULE ORAL DAILY
Status: DISCONTINUED | OUTPATIENT
Start: 2017-08-17 | End: 2017-08-20 | Stop reason: HOSPADM

## 2017-08-16 RX ORDER — M-VIT,TX,IRON,MINS/CALC/FOLIC 27MG-0.4MG
1 TABLET ORAL EVERY MORNING
COMMUNITY
End: 2020-01-01

## 2017-08-16 RX ORDER — HALOPERIDOL 5 MG/ML
5 INJECTION INTRAMUSCULAR EVERY 4 HOURS PRN
Status: DISCONTINUED | OUTPATIENT
Start: 2017-08-16 | End: 2017-08-20 | Stop reason: HOSPADM

## 2017-08-16 RX ORDER — TRAMADOL HYDROCHLORIDE 50 MG/1
50 TABLET ORAL EVERY 4 HOURS PRN
Status: DISCONTINUED | OUTPATIENT
Start: 2017-08-16 | End: 2017-08-20 | Stop reason: HOSPADM

## 2017-08-16 RX ORDER — POLYETHYLENE GLYCOL 3350 17 G/17G
1 POWDER, FOR SOLUTION ORAL
Status: DISCONTINUED | OUTPATIENT
Start: 2017-08-16 | End: 2017-08-20 | Stop reason: HOSPADM

## 2017-08-16 RX ORDER — ONDANSETRON 4 MG/1
4 TABLET, ORALLY DISINTEGRATING ORAL EVERY 4 HOURS PRN
Status: DISCONTINUED | OUTPATIENT
Start: 2017-08-16 | End: 2017-08-20 | Stop reason: HOSPADM

## 2017-08-16 RX ORDER — SODIUM CHLORIDE 9 MG/ML
INJECTION, SOLUTION INTRAVENOUS CONTINUOUS
Status: DISCONTINUED | OUTPATIENT
Start: 2017-08-16 | End: 2017-08-19

## 2017-08-16 RX ORDER — METOPROLOL TARTRATE 50 MG/1
50 TABLET, FILM COATED ORAL 2 TIMES DAILY
Status: DISCONTINUED | OUTPATIENT
Start: 2017-08-16 | End: 2017-08-20 | Stop reason: HOSPADM

## 2017-08-16 RX ORDER — ONDANSETRON 2 MG/ML
4 INJECTION INTRAMUSCULAR; INTRAVENOUS EVERY 4 HOURS PRN
Status: DISCONTINUED | OUTPATIENT
Start: 2017-08-16 | End: 2017-08-20 | Stop reason: HOSPADM

## 2017-08-16 RX ORDER — TRAMADOL HYDROCHLORIDE 50 MG/1
50 TABLET ORAL EVERY 4 HOURS PRN
COMMUNITY
End: 2017-10-31

## 2017-08-16 RX ORDER — ACETAMINOPHEN 325 MG/1
650 TABLET ORAL EVERY 6 HOURS PRN
Status: DISCONTINUED | OUTPATIENT
Start: 2017-08-16 | End: 2017-08-20 | Stop reason: HOSPADM

## 2017-08-16 RX ADMIN — AMPICILLIN SODIUM AND SULBACTAM SODIUM 3 G: 2; 1 INJECTION, POWDER, FOR SOLUTION INTRAMUSCULAR; INTRAVENOUS at 23:40

## 2017-08-16 RX ADMIN — TRAMADOL HYDROCHLORIDE 50 MG: 50 TABLET, COATED ORAL at 23:39

## 2017-08-16 RX ADMIN — SODIUM CHLORIDE: 9 INJECTION, SOLUTION INTRAVENOUS at 20:55

## 2017-08-16 RX ADMIN — METOPROLOL TARTRATE 50 MG: 50 TABLET, FILM COATED ORAL at 20:54

## 2017-08-16 RX ADMIN — STANDARDIZED SENNA CONCENTRATE AND DOCUSATE SODIUM 2 TABLET: 8.6; 5 TABLET, FILM COATED ORAL at 20:55

## 2017-08-16 ASSESSMENT — PAIN SCALES - GENERAL
PAINLEVEL_OUTOF10: 0
PAINLEVEL_OUTOF10: 0
PAINLEVEL_OUTOF10: 6
PAINLEVEL_OUTOF10: 0

## 2017-08-16 ASSESSMENT — CHA2DS2 SCORE
CHA2DS2 VASC SCORE: 4
CHF OR LEFT VENTRICULAR DYSFUNCTION: NO
VASCULAR DISEASE: NO
AGE 65 TO 74: NO
DIABETES: NO
SEX: FEMALE
AGE 75 OR GREATER: YES
HYPERTENSION: YES
PRIOR STROKE OR TIA OR THROMBOEMBOLISM: NO

## 2017-08-16 ASSESSMENT — LIFESTYLE VARIABLES
EVER_SMOKED: NEVER
DO YOU DRINK ALCOHOL: NO

## 2017-08-16 ASSESSMENT — COPD QUESTIONNAIRES
DO YOU EVER COUGH UP ANY MUCUS OR PHLEGM?: NO/ONLY WITH OCCASIONAL COLDS OR INFECTIONS
COPD SCREENING SCORE: 2
DURING THE PAST 4 WEEKS HOW MUCH DID YOU FEEL SHORT OF BREATH: NONE/LITTLE OF THE TIME
HAVE YOU SMOKED AT LEAST 100 CIGARETTES IN YOUR ENTIRE LIFE: NO/DON'T KNOW

## 2017-08-16 ASSESSMENT — PATIENT HEALTH QUESTIONNAIRE - PHQ9
SUM OF ALL RESPONSES TO PHQ QUESTIONS 1-9: 0
2. FEELING DOWN, DEPRESSED, IRRITABLE, OR HOPELESS: NOT AT ALL
SUM OF ALL RESPONSES TO PHQ9 QUESTIONS 1 AND 2: 0
1. LITTLE INTEREST OR PLEASURE IN DOING THINGS: NOT AT ALL

## 2017-08-16 NOTE — MR AVS SNAPSHOT
Meron Rice   2017 9:00 AM   Appointment   MRN: [id number]    Department:  [facility]   Dept Phone:  [phone]    Description:  Female : 1932   Provider:  Stanislav Garrett M.D.           Allergies as of 2017     Allergen Noted Reactions    Alendronate-Butylpar-Propylpar-Saccharin [Fosamax] 2017   Diarrhea    .      Vital Signs     Smoking Status                   Former Smoker           Basic Information     Date Of Birth Sex Race Ethnicity Preferred Language    1932 Female White Non- English      Your appointments     Sep 08, 2017  1:20 PM   FOLLOW UP with Stanislav Garrett M.D.   [facility] (--)    [address]  [facility] [id number] [phone]            Sep 22, 2017  1:00 PM   New Patient with Jillian Avalos M.D.   [facility] - Internal Medicine (--)    [address]  [address]  [place] [id number] [phone]           Please bring Photo ID, Insurance Cards, All Medication Bottles and copies of any legal documents (such as Living Will, Power of ) If speaking a language besides English please bring an adult . Please arrive 30 minutes prior for check in and registration. You will be receiving a confirmation call a few days before your appointment from our automated call confirmation system.              Problem List              ICD-10-CM Priority Class Noted - Resolved    Essential hypertension I10 Medium  2012 - Present    Hx of breast cancer Z85.3 Low  2012 - Present    Hx of cancer of uterus Z85.42 Low  2012 - Present    GERD (gastroesophageal reflux disease) K21.9 Low  2012 - Present    Chronic midline low back pain without sciatica M54.5, G89.29 Low  2012 - Present    Osteoporosis M81.0 Medium  2014 - Present    Insomnia G47.00   2014 - Present    Anxiety F41.9 Low  2014 - Present    Anemia, normocytic normochromic D64.9  Medium Acute 6/26/2015 - Present    Nerve pain M79.2   2/4/2016 - Present    Alkaline phosphatase elevation R74.8   2/4/2016 - Present    Hypercalcemia E83.52   2/4/2016 - Present    UTI (urinary tract infection) N39.0   3/5/2017 - Present    Neurogenic bladder N31.9   3/6/2017 - Present    Recurrent UTI N39.0   5/19/2017 - Present    Hypertension I10   5/19/2017 - Present    Chronic hip pain M25.559, G89.29 Low  5/19/2017 - Present    Depression F32.9 Low  5/19/2017 - Present    Atrial flutter (CMS-[facility]) I48.92 High  6/1/2017 - Present    Sick sinus syndrome (CMS-HCC) I49.5 High  6/2/2017 - Present    Cardiac pacemaker in situ Z95.0 High  6/2/2017 - Present    Persistent atrial fibrillation (CMS-HCC) I48.1   6/16/2017 - Present    Anticoagulant long-term use Z79.01   6/16/2017 - Present      Health Maintenance        Date Due Completion Dates    IMM DTaP/Tdap/Td Vaccine (1 - Tdap) 11/18/1951 ---    IMM ZOSTER VACCINE 11/18/1992 ---    BONE DENSITY 8/22/2017 8/22/2012    IMM INFLUENZA (1) 9/1/2017 9/30/2015, 10/30/2014, 12/4/2013, 11/1/2011            Current Immunizations     13-VALENT PCV PREVNAR 2/4/2016  4:05 PM    Influenza TIV (IM) 9/30/2015, 12/4/2013, 11/1/2011    Influenza Vaccine Adult HD 10/30/2014    Pneumococcal polysaccharide vaccine (PPSV-23) 12/1/2008      Below and/or attached are the medications your provider expects you to take. Review all of your home medications and newly ordered medications with your provider and/or pharmacist. Follow medication instructions as directed by your provider and/or pharmacist. Please keep your medication list with you and share with your provider. Update the information when medications are discontinued, doses are changed, or new medications (including over-the-counter products) are added; and carry medication information at all times in the event of emergency situations     Allergies:  ALENDRONATE-BUTYLPAR-PROPYLPAR-SACCHARIN - Diarrhea               Medications   Valid as of: August 16, 2017 - 11:16 AM    Generic Name Brand Name Tablet Size Instructions for use    Apixaban (Tab) ELIQUIS 5mg Take 1 Tab by mouth 2 Times a Day.        Aspirin (Tablet Delayed Response) ECOTRIN 81 MG Take 1 Tab by mouth every day.        Bethanechol Chloride (Tab) URECHOLINE 10 MG Take 1 Tab by mouth 3 times a day.        FLUoxetine HCl (Cap) PROZAC 40 MG Take 1 Cap by mouth every day.        Metoprolol Tartrate (Tab) LOPRESSOR 50 MG Take 1 Tab by mouth 2 Times a Day.        .                 Medicines prescribed today were sent to:     [facility] [address] [place] [address] (), [place] - [address]    [address] () [place] [address]    Phone: [phone] Fax: [phone]    Open 24 Hours?: No      Medication refill instructions:       If your prescription bottle indicates you have medication refills left, it is not necessary to call your provider’s office. Please contact your pharmacy and they will refill your medication.    If your prescription bottle indicates you do not have any refills left, you may request refills at any time through one of the following ways: The online [employer] system (except Urgent Care), by calling your provider’s office, or by asking your pharmacy to contact your provider’s office with a refill request. Medication refills are processed only during regular business hours and may not be available until the next business day. Your provider may request additional information or to have a follow-up visit with you prior to refilling your medication.   *Please Note: Medication refills are assigned a new Rx number when refilled electronically. Your pharmacy may indicate that no refills were authorized even though a new prescription for the same medication is available at the pharmacy. Please request the medicine by name with the pharmacy before contacting your provider for a refill.           [employer] Access Code: Activation code not generated  Current [employer] Status:  Active

## 2017-08-16 NOTE — TELEPHONE ENCOUNTER
Received a call from the patient and her family member.    She went to the ER with concern for cellulitis at the pacemaker site. Unfortunately due to long wait times, she has decided to leave without being evaluated. I tried multiple times to convince the patient and the family member to stay as infection at the pacemaker site could potentially extend to the leads and may require long-term antibiotics and sometimes pacemaker removal with extended hospital stays. However despite my explanation, patient and her family member did not want to stay in the ER.    I will forward this message to Dr. Garrett and his nurse.    Deanne Ricketts MD  Cardiologist  [facility]

## 2017-08-16 NOTE — PROGRESS NOTES
Received patient, direct admit.  Pt denies chest pain or SOB.  VSS.  Pt resting comfortably.  Tele box on.  Patient oriented to room and floor.    All needs met.  Bed low and locked, call light in reach.  Admitting MD paged.

## 2017-08-16 NOTE — PROGRESS NOTES
Direct admit from [facility], Dr. Garrett, [phone].  Accepted by Dr. Damon for Cellulitis of left arm/shoulder.  ADT signed & held @ [phone], needs to be released upon pt arrival.  No written orders received.  Pt coming by private vehicle.

## 2017-08-16 NOTE — PROGRESS NOTES
Subjective:   Meron Rice is a 84 y.o. female who presents today seen today as an add on. Two day h/o redness over left chest and left arm. Started with pain and redness in left arm. Bilateral mastectomy. Previous PPM in June. No redness on pacer site until last two days. Has felt nauseous. Mild chills. Previous a fib and flutter. Previous flutter RFA.    Past Medical History   Diagnosis Date   • Foot-drop    • Hypertension    • Unspecified urinary incontinence      lazy bladder; doesn't empty completely   • Personal history of venous thrombosis and embolism      in leg during child-bearing years   • Cancer (CMS-HCC) [id number]     uterus/left breast   • Other specified symptom associated with female genital organs 1998     fibroids   • Arthritis      generalized   • ASTHMA      has not needed an inhaler since 2010   • CATARACT      beginning   • Depression 5/19/2017   • Arrhythmia    • Urinary bladder disorder    • Breath shortness    • Anemia    • Unspecified hemorrhagic conditions      post op hyst. Currently on Eliquis    • Pneumonia 2017   • Bowel habit changes      irreg   • Chronic low back pain    • Pain      left shoulder    • UTI (urinary tract infection)      frequent    • Bronchitis 2017   • Fall 05/2017     Past Surgical History   Procedure Laterality Date   • Hysterectomy, total abdominal  2000   • Vein ligat & strip  1972, 1966     x2 bilateral   • Axillary node dissection  9/15/2009     Performed by DIPAK VARELA at SURGERY SAME DAY [facility]S   • Mastectomy  9/30/2009     left   • Other orthopedic surgery  2002     laminectomy   • Gyn surgery  1998     excision of fibroids   • Cath placement  10/28/2009     Performed by DIPAK VARELA at SURGERY SAME DAY [facility]   • Mastectomy  12/15/2010     right   • Cath removal  12/15/2010     Performed by DIPAK VARELA at SURGERY [facility]S   • Breast reconstruction  12/15/2010     Performed by JAK ROSALES at SURGERY [facility] ORS    • Latissimus flap  12/15/2010     Performed by JAK MEZA at SURGERY [facility]   • Appendectomy  1968   • Dilation and curettage  1961   • Breast implant revision  7/11/2011     Performed by JAK MEZA at SURGERY [facility]   • Nipple reconstruction  7/11/2011     Performed by JAK MEZA at SURGERY [facility]   • Hip hemiarthroplasty  5/14/2012     left; Performed by KEITH COWART at SURGERY [facility]   • Other surgical procedure  2004     bladder repair   • Breast reconstruction  11/26/2012     Performed by Jak Meza M.D. at SURGERY [facility]   • Wound closure general  4/10/2013     Performed by Nano Riley M.D. at SURGERY SAME DAY [facility]   • Breast implant removal  4/10/2013     Performed by Jak Meza M.D. at SURGERY SAME DAY [facility] ORS   • Femur nailing intramedullary Right 6/3/2015     Procedure: FEMUR NAILING INTRAMEDULLARY;  Surgeon: Deshaun Denis M.D.;  Location: SURGERY [facility];  Service:      Family History   Problem Relation Age of Onset   • Diabetes     • Heart Disease     • Stroke     • Cancer     • Hypertension     • Diabetes Mother    • Heart Disease Mother    • Heart Disease Father    • Stroke Father    • Hypertension Father    • Diabetes Sister    • Heart Disease Sister    • Cancer Brother      lung   • Diabetes Other    • Diabetes Child    • Psychiatry Child      History   Smoking status   • Former Smoker -- 0.10 packs/day for 1 years   • Types: Cigarettes   • Quit date: 01/01/1964   Smokeless tobacco   • Never Used     Comment: 50 years ago in 1960  SOCIAL      Allergies   Allergen Reactions   • Alendronate-Butylpar-Propylpar-Saccharin [Fosamax] Diarrhea     .     Outpatient Encounter Prescriptions as of 8/16/2017   Medication Sig Dispense Refill   • bethanechol (URECHOLINE) 10 MG Tab Take 1 Tab by mouth 3 times a day. 90 Tab 4   • apixaban (ELIQUIS) 5mg Tab Take 1 Tab by mouth 2 Times a Day. 180  Tab 3   • metoprolol (LOPRESSOR) 50 MG Tab Take 1 Tab by mouth 2 Times a Day. 180 Tab 3   • fluoxetine (PROZAC) 40 MG capsule Take 1 Cap by mouth every day. 90 Cap 4   • aspirin EC (ECOTRIN) 81 MG TBEC Take 1 Tab by mouth every day. 30 Tab 0     No facility-administered encounter medications on file as of 8/16/2017.     ROS     Objective:   There were no vitals taken for this visit.    Physical Exam   Constitutional: She is oriented to person, place, and time. She appears well-developed and well-nourished. No distress.   HENT:   Mouth/Throat: Oropharynx is clear and moist.   Eyes: Conjunctivae and EOM are normal.   Neck: Neck supple. No JVD present. No thyroid mass present.   Cardiovascular: Normal rate, regular rhythm, S1 normal, S2 normal and normal pulses.  PMI is not displaced.  Exam reveals no gallop.    No murmur heard.  Pulses:       Carotid pulses are 2+ on the right side, and 2+ on the left side.       Radial pulses are 2+ on the right side, and 2+ on the left side.        Femoral pulses are 2+ on the right side, and 2+ on the left side.       Dorsalis pedis pulses are 2+ on the right side, and 2+ on the left side.   No peripheral edema.   Pulmonary/Chest: Effort normal and breath sounds normal.   Redness from left upper arm across chest wall and into breast.   Abdominal: Soft. Normal appearance. She exhibits no abdominal bruit and no mass. There is no hepatosplenomegaly. There is no tenderness.   Musculoskeletal: Normal range of motion. She exhibits no edema.        Lumbar back: She exhibits no tenderness and no spasm.   Neurological: She is alert and oriented to person, place, and time. She has normal strength.   Skin: Skin is warm and dry. No rash noted. No cyanosis. Nails show no clubbing.   Psychiatric: She has a normal mood and affect.       Assessment:     1. Cellulitis of left upper extremity     2. Typical atrial flutter (CMS-HCC)     3. Anticoagulant long-term use     4. Hx of breast cancer     5.  Sick sinus syndrome (CMS-[facility])     6. Cardiac pacemaker in situ     7. Persistent atrial fibrillation (CMS-[facility])     8. S/P ablation of atrial flutter         Medical Decision Making:  Today's Assessment / Status / Plan:     1. Cellulitis possibly started LUE. Will be admitted to Medicine. I spoke to Dr Damon who graciously agreed to seeing patient.  May need ID consult if does not improve.  2. PPM generator site not swollen. If does not clear over time with IV antibiotic does have the possibility the device would need to be removed.  3. Check routine labs and blood cultures. Antibiotics as per Medicine.

## 2017-08-16 NOTE — IP AVS SNAPSHOT
8/20/2017    Meron Rice  [address]  [place] [address]    Dear Meron Worley:    [facility] wants to ensure your discharge home is safe and you or your loved ones have had all of your questions answered regarding your care after you leave the hospital.    Below is a list of resources and contact information should you have any questions regarding your hospital stay, follow-up instructions, or active medical symptoms.    Questions or Concerns Regarding… Contact   Medical Questions Related to Your Discharge  (7 days a week, 8am-5pm) Contact a Nurse Care Coordinator   [phone]   Medical Questions Not Related to Your Discharge  (24 hours a day / 7 days a week)  Contact the Nurse Health Line   [phone]    Medications or Discharge Instructions Refer to your discharge packet   or contact your [facility] Primary Care Provider   [phone]   Follow-up Appointment(s) Schedule your appointment via [employer]   or contact Scheduling [phone]   Billing Review your statement via [employer]  or contact Billing [phone]   Medical Records Review your records via [employer]   or contact Medical Records [phone]     You may receive a telephone call within two days of discharge. This call is to make certain you understand your discharge instructions and have the opportunity to have any questions answered. You can also easily access your medical information, test results and upcoming appointments via the [employer] free online health management tool. You can learn more and sign up at [employer]. For assistance setting up your [employer] account, please call [phone].    Once again, we want to ensure your discharge home is safe and that you have a clear understanding of any next steps in your care. If you have any questions or concerns, please do not hesitate to contact us, we are here for you. Thank you for choosing [facility] for your healthcare needs.    Sincerely,    Your [facility] Healthcare Team

## 2017-08-16 NOTE — Clinical Note
[facility]   [address]  [place] [address]  Phone: [phone]  Fax: [phone]              Meron Rice  11/18/1932    Encounter Date: 8/16/2017    Stanislav Garrett M.D.          PROGRESS NOTE:  Subjective:   Meron Rice is a 84 y.o. female who presents today seen today as an add on. Two day h/o redness over left chest and left arm. Started with pain and redness in left arm. Bilateral mastectomy. Previous PPM in June. No redness on pacer site until last two days. Has felt nauseous. Mild chills. Previous a fib and flutter. Previous flutter RFA.    Past Medical History   Diagnosis Date   • Foot-drop    • Hypertension    • Unspecified urinary incontinence      lazy bladder; doesn't empty completely   • Personal history of venous thrombosis and embolism      in leg during child-bearing years   • Cancer (CMS-HCC) [id number]     uterus/left breast   • Other specified symptom associated with female genital organs 1998     fibroids   • Arthritis      generalized   • ASTHMA      has not needed an inhaler since 2010   • CATARACT      beginning   • Depression 5/19/2017   • Arrhythmia    • Urinary bladder disorder    • Breath shortness    • Anemia    • Unspecified hemorrhagic conditions      post op hyst. Currently on Eliquis    • Pneumonia 2017   • Bowel habit changes      irreg   • Chronic low back pain    • Pain      left shoulder    • UTI (urinary tract infection)      frequent    • Bronchitis 2017   • Fall 05/2017     Past Surgical History   Procedure Laterality Date   • Hysterectomy, total abdominal  2000   • Vein ligat & strip  1972, 1966     x2 bilateral   • Axillary node dissection  9/15/2009     Performed by DIPAK VARELA at SURGERY SAME DAY [facility]   • Mastectomy  9/30/2009     left   • Other orthopedic surgery  2002     laminectomy   • Gyn surgery  1998     excision of fibroids   • Cath placement  10/28/2009     Performed by DIPAK VARELA  at SURGERY SAME DAY [facility]   • Mastectomy  12/15/2010     right   • Cath removal  12/15/2010     Performed by NANO VARELA at SURGERY [facility]   • Breast reconstruction  12/15/2010     Performed by JAK MEZA at SURGERY [facility]   • Latissimus flap  12/15/2010     Performed by JAK MEZA at SURGERY [facility]   • Appendectomy  1968   • Dilation and curettage  1961   • Breast implant revision  7/11/2011     Performed by JAK MEZA at SURGERY [facility]   • Nipple reconstruction  7/11/2011     Performed by AJK MEZA at SURGERY [facility]   • Hip hemiarthroplasty  5/14/2012     left; Performed by KEITH COWART at SURGERY [facility]   • Other surgical procedure  2004     bladder repair   • Breast reconstruction  11/26/2012     Performed by Jak Meza M.D. at SURGERY [facility]   • Wound closure general  4/10/2013     Performed by Nano Varela M.D. at SURGERY SAME DAY [facility]   • Breast implant removal  4/10/2013     Performed by Jak Meza M.D. at SURGERY SAME DAY [facility]   • Femur nailing intramedullary Right 6/3/2015     Procedure: FEMUR NAILING INTRAMEDULLARY;  Surgeon: Deshaun Denis M.D.;  Location: [facility];  Service:      Family History   Problem Relation Age of Onset   • Diabetes     • Heart Disease     • Stroke     • Cancer     • Hypertension     • Diabetes Mother    • Heart Disease Mother    • Heart Disease Father    • Stroke Father    • Hypertension Father    • Diabetes Sister    • Heart Disease Sister    • Cancer Brother      lung   • Diabetes Other    • Diabetes Child    • Psychiatry Child      History   Smoking status   • Former Smoker -- 0.10 packs/day for 1 years   • Types: Cigarettes   • Quit date: 01/01/1964   Smokeless tobacco   • Never Used     Comment: 50 years ago in 1960  SOCIAL      Allergies   Allergen Reactions   • Alendronate-Butylpar-Propylpar-Saccharin  [Fosamax] Diarrhea     .     Outpatient Encounter Prescriptions as of 8/16/2017   Medication Sig Dispense Refill   • bethanechol (URECHOLINE) 10 MG Tab Take 1 Tab by mouth 3 times a day. 90 Tab 4   • apixaban (ELIQUIS) 5mg Tab Take 1 Tab by mouth 2 Times a Day. 180 Tab 3   • metoprolol (LOPRESSOR) 50 MG Tab Take 1 Tab by mouth 2 Times a Day. 180 Tab 3   • fluoxetine (PROZAC) 40 MG capsule Take 1 Cap by mouth every day. 90 Cap 4   • aspirin EC (ECOTRIN) 81 MG TBEC Take 1 Tab by mouth every day. 30 Tab 0     No facility-administered encounter medications on file as of 8/16/2017.     ROS     Objective:   There were no vitals taken for this visit.    Physical Exam   Constitutional: She is oriented to person, place, and time. She appears well-developed and well-nourished. No distress.   HENT:   Mouth/Throat: Oropharynx is clear and moist.   Eyes: Conjunctivae and EOM are normal.   Neck: Neck supple. No JVD present. No thyroid mass present.   Cardiovascular: Normal rate, regular rhythm, S1 normal, S2 normal and normal pulses.  PMI is not displaced.  Exam reveals no gallop.    No murmur heard.  Pulses:       Carotid pulses are 2+ on the right side, and 2+ on the left side.       Radial pulses are 2+ on the right side, and 2+ on the left side.        Femoral pulses are 2+ on the right side, and 2+ on the left side.       Dorsalis pedis pulses are 2+ on the right side, and 2+ on the left side.   No peripheral edema.   Pulmonary/Chest: Effort normal and breath sounds normal.   Redness from left upper arm across chest wall and into breast.   Abdominal: Soft. Normal appearance. She exhibits no abdominal bruit and no mass. There is no hepatosplenomegaly. There is no tenderness.   Musculoskeletal: Normal range of motion. She exhibits no edema.        Lumbar back: She exhibits no tenderness and no spasm.   Neurological: She is alert and oriented to person, place, and time. She has normal strength.   Skin: Skin is warm and dry. No  rash noted. No cyanosis. Nails show no clubbing.   Psychiatric: She has a normal mood and affect.       Assessment:     1. Cellulitis of left upper extremity     2. Typical atrial flutter (CMS-[facility])     3. Anticoagulant long-term use     4. Hx of breast cancer     5. Sick sinus syndrome (CMS-[facility])     6. Cardiac pacemaker in situ     7. Persistent atrial fibrillation (CMS-[facility])     8. S/P ablation of atrial flutter         Medical Decision Making:  Today's Assessment / Status / Plan:     1. Cellulitis possibly started LUE. Will be admitted to Medicine. I spoke to Dr Damon who graciously agreed to seeing patient.  May need ID consult if does not improve.  2. PPM generator site not swollen. If does not clear over time with IV antibiotic does have the possibility the device would need to be removed.  3. Check routine labs and blood cultures. Antibiotics as per Medicine.      Lorenzo Allen M.D.  [address]  [place] [address]  VIA In Basket     Stanislav Damon D.O.  [address] [place] [address]  [place] [address]  VIA Mail

## 2017-08-16 NOTE — ED NOTES
"Med rec complete per Pt at bedside  Allergies reviewed  Pt has a standing Rx for Cipro due to frequent bladder infection  Pt thinks she took some about \"2 weeks ago\" for 7 days    "

## 2017-08-16 NOTE — IP AVS SNAPSHOT
" Home Care Instructions                                                                                                                  Name:Meron Rice  Medical Record Number:[id number]  CSN: [id number]    YOB: 1932   Age: 84 y.o.  Sex: female  HT:1.753 m (5' 9\") WT: 78.7 kg (173 lb 8 oz)          Admit Date: 8/16/2017     Discharge Date:   Today's Date: 8/20/2017  Attending Doctor:  Edel Borrego M.D.                  Allergies:  Alendronate-butylpar-propylpar-saccharin            Discharge Instructions       Discharge Instructions    Discharged to home by car with relative. Discharged via wheelchair, hospital escort: Yes.  Special equipment needed: Walker    Be sure to schedule a follow-up appointment with your primary care doctor or any specialists as instructed.     Discharge Plan:   Diet Plan: Discussed  Activity Level: Discussed  Confirmed Follow up Appointment: Appointment Scheduled  Confirmed Symptoms Management: Discussed  Medication Reconciliation Updated: Yes  Influenza Vaccine Indication: Not indicated: Previously immunized this influenza season and > 8 years of age    I understand that a diet low in cholesterol, fat, and sodium is recommended for good health. Unless I have been given specific instructions below for another diet, I accept this instruction as my diet prescription.   Other diet: Regular  Special Instructions: None    · Is patient discharged on Warfarin / Coumadin?   No     · Is patient Post Blood Transfusion?  No    Depression / Suicide Risk    As you are discharged from this [facility] facility, it is important to learn how to keep safe from harming yourself.    Recognize the warning signs:  · Abrupt changes in personality, positive or negative- including increase in energy   · Giving away possessions  · Change in eating patterns- significant weight changes-  positive or negative  · Change in sleeping patterns- unable to sleep or sleeping all the time   · Unwillingness " or inability to communicate  · Depression  · Unusual sadness, discouragement and loneliness  · Talk of wanting to die  · Neglect of personal appearance   · Rebelliousness- reckless behavior  · Withdrawal from people/activities they love  · Confusion- inability to concentrate     If you or a loved one observes any of these behaviors or has concerns about self-harm, here's what you can do:  · Talk about it- your feelings and reasons for harming yourself  · Remove any means that you might use to hurt yourself (examples: pills, rope, extension cords, firearm)  · Get professional help from the community (Mental Health, Substance Abuse, psychological counseling)  · Do not be alone:Call your Safe Contact- someone whom you trust who will be there for you.  · Call your local CRISIS HOTLINE 062-8817 or 986-601-2266  · Call your local Children's Mobile Crisis Response Team [place] (017) 783-1091 or www.Velostack  · Call the toll free National Suicide Prevention Hotlines   · National Suicide Prevention Lifeline 912-294-YKWA (3322)  · [employer] Hope Line Network 800-SUICIDE (487-8909)        Your appointments     Sep 08, 2017  1:20 PM   FOLLOW UP with Stanislav Garrett M.D.   [facility] (--)    [address] [place] [address] [address]  [facility] [id number] [phone]            Sep 22, 2017  1:00 PM   New Patient with Jillian Avalos M.D.   [facility] (--)    [address] [place] [address]  [address]  [facility] [id number] [phone]           Please bring Photo ID, Insurance Cards, All Medication Bottles and copies of any legal documents (such as Living Will, Power of ) If speaking a language besides English please bring an adult . Please arrive 30 minutes prior for check in and registration. You will be receiving a confirmation call a few days before your appointment from our automated call confirmation system.              Follow-up Information       1. Follow up with Lorenzo Allen M.D..    Specialty:  Internal Medicine    Contact information    [address]  [address]  [place] [address] [phone]          2. Follow up with Stanislav Garrett M.D. In 2 weeks.    Specialty:  Cardiac Electrophysiology    Contact information    [address]  [address]  [place] [address] [phone]           Discharge Medication Instructions:    Below are the medications your physician expects you to take upon discharge:    Review all your home medications and newly ordered medications with your doctor and/or pharmacist. Follow medication instructions as directed by your doctor and/or pharmacist.    Please keep your medication list with you and share with your physician.               Medication List      START taking these medications        Instructions    Morning Afternoon Evening Bedtime    amoxicillin-clavulanate 875-125 MG Tabs   Last time this was given:  1 Tab on 8/20/2017 11:25 AM   Commonly known as:  AUGMENTIN        Take 1 Tab by mouth every 12 hours for 7 days.   Dose:  1 Tab                          CONTINUE taking these medications        Instructions    Morning Afternoon Evening Bedtime    apixaban 5mg Tabs   Last time this was given:  5 mg on 8/20/2017  7:57 AM   Commonly known as:  ELIQUIS        Take 1 Tab by mouth 2 Times a Day.   Dose:  5 mg                        aspirin EC 81 MG Tbec   Last time this was given:  81 mg on 8/20/2017  7:57 AM   Commonly known as:  ECOTRIN        Take 1 Tab by mouth every day.   Dose:  81 mg                        fluoxetine 40 MG capsule   Last time this was given:  40 mg on 8/20/2017  7:57 AM   Commonly known as:  PROZAC        Doctor's comments:  Replaces 20 mg dose sent earlier today   Take 1 Cap by mouth every day.   Dose:  40 mg                        metoprolol 50 MG Tabs   Last time this was given:  50 mg on 8/20/2017  7:58 AM   Commonly known as:  LOPRESSOR        Take 1 Tab by mouth 2 Times a Day.   Dose:  50 mg                         therapeutic multivitamin-minerals Tabs        Take 1 Tab by mouth every day.   Dose:  1 Tab                        tramadol 50 MG Tabs   Last time this was given:  50 mg on 8/18/2017  9:39 PM   Commonly known as:  ULTRAM        Take 50 mg by mouth every four hours as needed.   Dose:  50 mg                             Where to Get Your Medications      These medications were sent to [facility] [address] [facility] (), [place] - [address]  [address] () [place] [address]     Phone:  [phone]    - amoxicillin-clavulanate 875-125 MG Tabs            Instructions           Diet / Nutrition:    Follow any diet instructions given to you by your doctor or the dietician, including how much salt (sodium) you are allowed each day.    If you are overweight, talk to your doctor about a weight reduction plan.    Activity:    Remain physically active following your doctor's instructions about exercise and activity.    Rest often.     Any time you become even a little tired or short of breath, SIT DOWN and rest.    Worsening Symptoms:    Report any of the following signs and symptoms to the doctor's office immediately:    *Pain of jaw, arm, or neck  *Chest pain not relieved by medication                               *Dizziness or loss of consciousness  *Difficulty breathing even when at rest   *More tired than usual                                       *Bleeding drainage or swelling of surgical site  *Swelling of feet, ankles, legs or stomach                 *Fever (>100ºF)  *Pink or blood tinged sputum  *Weight gain (3lbs/day or 5lbs /week)           *Shock from internal defibrillator (if applicable)  *Palpitations or irregular heartbeats                *Cool and/or numb extremities    Stroke Awareness    Common Risk Factors for Stroke include:    Age  Atrial Fibrillation  Carotid Artery Stenosis  Diabetes Mellitus  Excessive alcohol consumption  High blood pressure  Overweight   Physical  inactivity  Smoking    Warning signs and symptoms of a stroke include:    *Sudden numbness or weakness of the face, arm or leg (especially on one side of the body).  *Sudden confusion, trouble speaking or understanding.  *Sudden trouble seeing in one or both eyes.  *Sudden trouble walking, dizziness, loss of balance or coordination.Sudden severe headache with no known cause.    It is very important to get treatment quickly when a stroke occurs. If you experience any of the above warning signs, call 911 immediately.                   Disclaimer         Quit Smoking / Tobacco Use:    I understand the use of any tobacco products increases my chance of suffering from future heart disease or stroke and could cause other illnesses which may shorten my life. Quitting the use of tobacco products is the single most important thing I can do to improve my health. For further information on smoking / tobacco cessation call a Toll Free Quit Line at 1-679.890.1047 (*National [employer] [place]) or 1-798.690.2701 (American Lung Association) or you can access the web based program at www.lungRed Balloon Security.org.    [place] Tobacco Users Help Line:  (913) 477-8249       Toll Free: 1-249.177.1263  Quit Tobacco Program [facility] (108)585-5243    Crisis Hotline:    [place] Crisis Hotline:  2-234-JANSLJJ or 1-444.369.4819    [place] Crisis Hotline:    1-490.533.3359 or 489-026-4209    Discharge Survey:   Thank you for choosing [facility]. We hope we did everything we could to make your hospital stay a pleasant one. You may be receiving a phone survey and we would appreciate your time and participation in answering the questions. Your input is very valuable to us in our efforts to improve our service to our patients and their families.        My signature on this form indicates that:    1. I have reviewed and understand the above information.  2. My questions regarding this information have been answered to my  satisfaction.  3. I have formulated a plan with my discharge nurse to obtain my prescribed medications for home.                  Disclaimer         __________________________________                     __________       ________                       Patient Signature                                                 Date                    Time

## 2017-08-16 NOTE — PROGRESS NOTES
Admitting orders from [facility], Dr. Garrett received.  Copy faxed to Telemetry 7 @ [phone].  Confirmation received.  Scanned into Media tab.

## 2017-08-16 NOTE — IP AVS SNAPSHOT
" <p align=\"LEFT\"><IMG SRC=\"//[email or web address]$/[email or web address]\" alt=\"Image\" WIDTH=\"50%\" HEIGHT=\"200\" BORDER=\"\"></p>                   Name:Meron Rice  Medical Record Number:[id number]  CSN: [id number]    YOB: 1932   Age: 84 y.o.  Sex: female  HT:1.753 m (5' 9\") WT: 78.7 kg (173 lb 8 oz)          Admit Date: 8/16/2017     Discharge Date:   Today's Date: 8/20/2017  Attending Doctor:  Edel Borrego M.D.                  Allergies:  Alendronate-butylpar-propylpar-saccharin          Your appointments     Sep 08, 2017  1:20 PM   FOLLOW UP with Stanislav Garrett M.D.   [facility] (--)    [address] [facility]  [place] [address] [phone]            Sep 22, 2017  1:00 PM   New Patient with Jillian Avalos M.D.   [facility] - Internal Medicine (--)    [address] [place] [address]  [address]  [place] [address] [phone]           Please bring Photo ID, Insurance Cards, All Medication Bottles and copies of any legal documents (such as Living Will, Power of ) If speaking a language besides English please bring an adult . Please arrive 30 minutes prior for check in and registration. You will be receiving a confirmation call a few days before your appointment from our automated call confirmation system.              Follow-up Information     1. Follow up with Lorenzo Allen M.D..    Specialty:  Internal Medicine    Contact information    [address]  [address]  [place] [address] [phone]          2. Follow up with Stanislav Garrett M.D. In 2 weeks.    Specialty:  Cardiac Electrophysiology    Contact information    [address] [facility] [address]  [address]  [place] [address] [phone]           Medication List      Take these Medications        Instructions    amoxicillin-clavulanate 875-125 MG Tabs   Commonly known as:  AUGMENTIN    Take 1 Tab by mouth every 12 hours for 7 days.   Dose:  1 Tab       apixaban 5mg Tabs   Commonly known as:  ELIQUIS    Take " 1 Tab by mouth 2 Times a Day.   Dose:  5 mg       aspirin EC 81 MG Tbec   Commonly known as:  ECOTRIN    Take 1 Tab by mouth every day.   Dose:  81 mg       fluoxetine 40 MG capsule   Commonly known as:  PROZAC    Doctor's comments:  Replaces 20 mg dose sent earlier today   Take 1 Cap by mouth every day.   Dose:  40 mg       metoprolol 50 MG Tabs   Commonly known as:  LOPRESSOR    Take 1 Tab by mouth 2 Times a Day.   Dose:  50 mg       therapeutic multivitamin-minerals Tabs    Take 1 Tab by mouth every day.   Dose:  1 Tab       tramadol 50 MG Tabs   Commonly known as:  ULTRAM    Take 50 mg by mouth every four hours as needed.   Dose:  50 mg

## 2017-08-16 NOTE — IP AVS SNAPSHOT
Klypper Access Code: Activation code not generated  Current Klypper Status: Active    [employer]  A secure, online tool to manage your health information     [employer]’s Klypper® is a secure, online tool that connects you to your personalized health information from the privacy of your home -- day or night - making it very easy for you to manage your healthcare. Once the activation process is completed, you can even access your medical information using the Klypper tess, which is available for free in the Apple Tess store or Google Play store.     Klypper provides the following levels of access (as shown below):   My Chart Features   [facility] Primary Care Doctor [facility]  Specialists [facility]  Urgent  Care Non-[facility]  Primary Care  Doctor   Email your healthcare team securely and privately 24/7 X X X X   Manage appointments: schedule your next appointment; view details of past/upcoming appointments X      Request prescription refills. X      View recent personal medical records, including lab and immunizations X X X X   View health record, including health history, allergies, medications X X X X   Read reports about your outpatient visits, procedures, consult and ER notes X X X X   See your discharge summary, which is a recap of your hospital and/or ER visit that includes your diagnosis, lab results, and care plan. X X       How to register for Klypper:  1. Go to  [email or web address].  2. Click on the Sign Up Now box, which takes you to the New Member Sign Up page. You will need to provide the following information:  a. Enter your Klypper Access Code exactly as it appears at the top of this page. (You will not need to use this code after you’ve completed the sign-up process. If you do not sign up before the expiration date, you must request a new code.)   b. Enter your date of birth.   c. Enter your home email address.   d. Click Submit, and follow the next screen’s instructions.  3. Create a Klypper ID. This will  be your [employer] login ID and cannot be changed, so think of one that is secure and easy to remember.  4. Create a [employer] password. You can change your password at any time.  5. Enter your Password Reset Question and Answer. This can be used at a later time if you forget your password.   6. Enter your e-mail address. This allows you to receive e-mail notifications when new information is available in [employer].  7. Click Sign Up. You can now view your health information.    For assistance activating your [employer] account, call [phone]

## 2017-08-16 NOTE — PROGRESS NOTES
Two RN skin check done with AHSAN Bailey.  Pt has redness in the upper left arm/chest area, skin tear on the right shin, and a skin tear on the left shin.  Anterior aspect of left foot is reddened.  Otherwise, skin intact with no areas of concern.

## 2017-08-16 NOTE — TELEPHONE ENCOUNTER
Stanislav Garrett M.D.  Le Barrientos R.N. Cc: Lisa Campos, Med Ass't        Caller: Unspecified (Yesterday, 7:29 PM)                     Have her com ein to see anika or maxine today and I will take a look

## 2017-08-16 NOTE — ED NOTES
Chief Complaint   Patient presents with   • Arm Pain     pacemeker insertion 6/2017   • Rash     L upper arm and chest

## 2017-08-17 LAB
ANION GAP SERPL CALC-SCNC: 7 MMOL/L (ref 0–11.9)
BASOPHILS # BLD AUTO: 1.4 % (ref 0–1.8)
BASOPHILS # BLD: 0.09 K/UL (ref 0–0.12)
BUN SERPL-MCNC: 24 MG/DL (ref 8–22)
CALCIUM SERPL-MCNC: 8.9 MG/DL (ref 8.5–10.5)
CHLORIDE SERPL-SCNC: 107 MMOL/L (ref 96–112)
CO2 SERPL-SCNC: 21 MMOL/L (ref 20–33)
CREAT SERPL-MCNC: 0.69 MG/DL (ref 0.5–1.4)
EOSINOPHIL # BLD AUTO: 0.29 K/UL (ref 0–0.51)
EOSINOPHIL NFR BLD: 4.4 % (ref 0–6.9)
ERYTHROCYTE [DISTWIDTH] IN BLOOD BY AUTOMATED COUNT: 62.4 FL (ref 35.9–50)
GFR SERPL CREATININE-BSD FRML MDRD: >60 ML/MIN/1.73 M 2
GLUCOSE SERPL-MCNC: 95 MG/DL (ref 65–99)
HCT VFR BLD AUTO: 41.5 % (ref 37–47)
HGB BLD-MCNC: 13.7 G/DL (ref 12–16)
IMM GRANULOCYTES # BLD AUTO: 0.01 K/UL (ref 0–0.11)
IMM GRANULOCYTES NFR BLD AUTO: 0.2 % (ref 0–0.9)
LYMPHOCYTES # BLD AUTO: 2.71 K/UL (ref 1–4.8)
LYMPHOCYTES NFR BLD: 41.3 % (ref 22–41)
MCH RBC QN AUTO: 31.1 PG (ref 27–33)
MCHC RBC AUTO-ENTMCNC: 33 G/DL (ref 33.6–35)
MCV RBC AUTO: 94.1 FL (ref 81.4–97.8)
MONOCYTES # BLD AUTO: 1 K/UL (ref 0–0.85)
MONOCYTES NFR BLD AUTO: 15.2 % (ref 0–13.4)
NEUTROPHILS # BLD AUTO: 2.46 K/UL (ref 2–7.15)
NEUTROPHILS NFR BLD: 37.5 % (ref 44–72)
NRBC # BLD AUTO: 0 K/UL
NRBC BLD AUTO-RTO: 0 /100 WBC
PLATELET # BLD AUTO: 174 K/UL (ref 164–446)
PMV BLD AUTO: 9.8 FL (ref 9–12.9)
POTASSIUM SERPL-SCNC: 3.8 MMOL/L (ref 3.6–5.5)
RBC # BLD AUTO: 4.41 M/UL (ref 4.2–5.4)
SODIUM SERPL-SCNC: 135 MMOL/L (ref 135–145)
WBC # BLD AUTO: 6.6 K/UL (ref 4.8–10.8)

## 2017-08-17 PROCEDURE — 80048 BASIC METABOLIC PNL TOTAL CA: CPT

## 2017-08-17 PROCEDURE — 700105 HCHG RX REV CODE 258: Performed by: HOSPITALIST

## 2017-08-17 PROCEDURE — 85025 COMPLETE CBC W/AUTO DIFF WBC: CPT

## 2017-08-17 PROCEDURE — 99232 SBSQ HOSP IP/OBS MODERATE 35: CPT | Performed by: INTERNAL MEDICINE

## 2017-08-17 PROCEDURE — 770020 HCHG ROOM/CARE - TELE (206)

## 2017-08-17 PROCEDURE — 700111 HCHG RX REV CODE 636 W/ 250 OVERRIDE (IP): Performed by: HOSPITALIST

## 2017-08-17 PROCEDURE — 700102 HCHG RX REV CODE 250 W/ 637 OVERRIDE(OP): Performed by: HOSPITALIST

## 2017-08-17 PROCEDURE — 36415 COLL VENOUS BLD VENIPUNCTURE: CPT

## 2017-08-17 PROCEDURE — A9270 NON-COVERED ITEM OR SERVICE: HCPCS | Performed by: HOSPITALIST

## 2017-08-17 RX ADMIN — APIXABAN 5 MG: 5 TABLET, FILM COATED ORAL at 09:17

## 2017-08-17 RX ADMIN — AMPICILLIN SODIUM AND SULBACTAM SODIUM 3 G: 2; 1 INJECTION, POWDER, FOR SOLUTION INTRAMUSCULAR; INTRAVENOUS at 12:20

## 2017-08-17 RX ADMIN — APIXABAN 5 MG: 5 TABLET, FILM COATED ORAL at 21:42

## 2017-08-17 RX ADMIN — METOPROLOL TARTRATE 50 MG: 50 TABLET, FILM COATED ORAL at 09:17

## 2017-08-17 RX ADMIN — SODIUM CHLORIDE: 9 INJECTION, SOLUTION INTRAVENOUS at 07:05

## 2017-08-17 RX ADMIN — TRAMADOL HYDROCHLORIDE 50 MG: 50 TABLET, COATED ORAL at 21:42

## 2017-08-17 RX ADMIN — FLUOXETINE HYDROCHLORIDE 40 MG: 20 CAPSULE ORAL at 09:17

## 2017-08-17 RX ADMIN — AMPICILLIN SODIUM AND SULBACTAM SODIUM 3 G: 2; 1 INJECTION, POWDER, FOR SOLUTION INTRAMUSCULAR; INTRAVENOUS at 07:05

## 2017-08-17 RX ADMIN — AMPICILLIN SODIUM AND SULBACTAM SODIUM 3 G: 2; 1 INJECTION, POWDER, FOR SOLUTION INTRAMUSCULAR; INTRAVENOUS at 17:56

## 2017-08-17 RX ADMIN — TRAMADOL HYDROCHLORIDE 50 MG: 50 TABLET, COATED ORAL at 12:23

## 2017-08-17 RX ADMIN — METOPROLOL TARTRATE 50 MG: 50 TABLET, FILM COATED ORAL at 21:42

## 2017-08-17 RX ADMIN — STANDARDIZED SENNA CONCENTRATE AND DOCUSATE SODIUM 2 TABLET: 8.6; 5 TABLET, FILM COATED ORAL at 21:42

## 2017-08-17 RX ADMIN — ASPIRIN 81 MG: 81 TABLET ORAL at 09:16

## 2017-08-17 ASSESSMENT — ENCOUNTER SYMPTOMS
WHEEZING: 0
PND: 0
FOCAL WEAKNESS: 0
LOSS OF CONSCIOUSNESS: 0
CONSTIPATION: 0
WEAKNESS: 0
DIAPHORESIS: 0
HEADACHES: 0
COUGH: 0
CLAUDICATION: 0
FEVER: 0
SEIZURES: 0
SORE THROAT: 0
ABDOMINAL PAIN: 0
DIZZINESS: 0
MYALGIAS: 1
SPUTUM PRODUCTION: 0
SPEECH CHANGE: 0
NAUSEA: 0
PALPITATIONS: 0
SHORTNESS OF BREATH: 0
WEIGHT LOSS: 0
PSYCHIATRIC NEGATIVE: 1
ORTHOPNEA: 0
HEARTBURN: 0
VOMITING: 0
DIARRHEA: 0
FLANK PAIN: 0
BLURRED VISION: 0
CHILLS: 0
MUSCULOSKELETAL NEGATIVE: 1
DOUBLE VISION: 0

## 2017-08-17 ASSESSMENT — COPD QUESTIONNAIRES
COPD SCREENING SCORE: 2
DO YOU EVER COUGH UP ANY MUCUS OR PHLEGM?: NO/ONLY WITH OCCASIONAL COLDS OR INFECTIONS
HAVE YOU SMOKED AT LEAST 100 CIGARETTES IN YOUR ENTIRE LIFE: NO/DON'T KNOW
DURING THE PAST 4 WEEKS HOW MUCH DID YOU FEEL SHORT OF BREATH: NONE/LITTLE OF THE TIME

## 2017-08-17 ASSESSMENT — PAIN SCALES - GENERAL
PAINLEVEL_OUTOF10: 0
PAINLEVEL_OUTOF10: 6
PAINLEVEL_OUTOF10: 6
PAINLEVEL_OUTOF10: 5

## 2017-08-17 ASSESSMENT — PATIENT HEALTH QUESTIONNAIRE - PHQ9
SUM OF ALL RESPONSES TO PHQ9 QUESTIONS 1 AND 2: 0
1. LITTLE INTEREST OR PLEASURE IN DOING THINGS: NOT AT ALL
2. FEELING DOWN, DEPRESSED, IRRITABLE, OR HOPELESS: NOT AT ALL
SUM OF ALL RESPONSES TO PHQ QUESTIONS 1-9: 0

## 2017-08-17 NOTE — PROGRESS NOTES
Patient resting. No needs verbalized Bed low locked, SRx2, call bell within reach, Non skid socks on, Bed alarm activated

## 2017-08-17 NOTE — PROGRESS NOTES
Received bedside report from Barbra RN, Pt assessed, A&Ox4, Vitals Stable, pt has 0/10 of pain. Pt updated on plan of care. Call light within reach, personal belongings within reach.  Bed in lowest position. Tele monitor on.

## 2017-08-17 NOTE — CARE PLAN
Problem: Safety  Goal: Will remain free from falls  Outcome: PROGRESSING AS EXPECTED  Uses call light appropriately, wears non-skid socks while ambulating, uses assistive walker while ambulating.

## 2017-08-17 NOTE — H&P
DATE OF ADMISSION:  08/16/2017    CARDIOLOGIST:  Stanislav Garrett MD.    PRIMARY CARE PHYSICIAN:  Lorenzo Allen MD.    CHIEF COMPLAINT:  Patient was transferred today from Dr. Stanislav Garrett's office   for concern of left shoulder/upper chest cellulitis.    HISTORY OF PRESENT ILLNESS:  This is an 84-year-old female with a history of   pacemaker.  She started having over the last 3-4 days some left shoulder pain   as well as some left upper chest redness.  She had some tingling in her left   arm and has had a followup with Dr. Stanislav Garrett today who evaluated her chest   and was concerned of cellulitis and asked the patient to be directly admitted   to the hospital.  Here, she is here.  She has had some subjective fevers, no   chills, no nausea or vomiting, no diarrhea.  Denies any recent traumas or   scratches to her upper chest.  She denies any sore throat, no neck pain, no   photophobia, no headaches.  She is currently awake, alert.  She does state   decrease in appetite recently.    REVIEW OF SYSTEMS:  As stated above, otherwise unremarkable per CMS/AMA   criteria.    ALLERGIES:  ALENDRONATE.    CURRENT OUTPATIENT MEDICATIONS:  Consist of:  1.  Eliquis 5 mg twice a day.  2.  Aspirin 81 mg daily.  3.  Prozac 40 mg daily.  4.  Metoprolol 50 mg twice a day.  5.  Multivitamin and minerals daily.  6.  Tramadol 50 mg every 4 hours as needed.    PAST MEDICAL HISTORY:  1.  Hypertension.  2.  History of pacemaker insertion.  3.  History of breast cancer.  4.  History of cataracts.  5.  History of uterine cancer.  6.  History of foot drop on the left.  7.  History of urinary incontinence.  8.  History of venous thrombosis and embolism during her childbearing years.  9.  Generalized arthritis.  10.  History of depression.    PAST SURGICAL HISTORY:  She has had D and C in 1961, appendectomy in 1968.    Latissimus flap in 2010 by Dr. Meza.  She has had vasectomy, 2010 on the   right.  She has had complications of breast implant  infections which required   excision back in 2009 and 2010.  She has had excision of fibroids in 1998.    Orthopedic surgery of a laminectomy in 2002.  Vein ligation and stripping in   1972 and 1966.  She has had a total abdominal hysterectomy in 2000.  She has   had hip hemiarthroplasty in 2012, breast implant removal in 2013, femur   nailing intramedullary in 2015.    SOCIAL HISTORY:  She is a retired nurse.  She quit smoking over 50 years ago.    She quit smoking in 1964.  She only smoked from 1960 to 1964, socially.    Occasional glass of alcohol.    FAMILY HISTORY:  Mother and father, father had history of heart disease.    Mother had heart disease and diabetes.  Father had stroke and hypertension.    PHYSICAL EXAMINATION:  VITAL SIGNS:  Temperature 97.6, heart rate of 61, respirations 18, blood   pressure is 117/54, oxygen 91% on room air.  GENERAL:  This is an elderly female.  She is awake, alert, fairly robust   health.  She is awake.  Her speech is clear.  She is able to talk in full   sentence speech with no difficulties.  She has good eye contact.  HEENT:  NCAT, EOMI, PERRL, sclerae are anicteric.  NECK:  Supple.  I do not appreciate any C-spine tenderness nor does she have   any palpable adenopathy in the neck.  Her trachea is midline.  I did not hear   any stridor on auscultation.  LUNGS:  Clear to auscultation bilaterally.  There is no accessory muscle use.    No crackles.  CHEST:  She has had bilateral mastectomy.  Her upper left chest has some   discoloration, mild erythema, darkening of the skin on the upper chest.  The   left shoulder is swollen compared to the right shoulder.  Patient does state   some tenderness on moderate palpation of this.  CARDIOVASCULAR:  She has a regular rate and rhythm, no murmurs.  She has a   palpable left upper pacemaker, it is nontender.  ABDOMEN:  Soft, nontender, nondistended.  Positive bowel sounds.  EXTREMITIES:  She has 2+ radial artery pulses bilaterally, 2+  dorsalis pedal   pulses.  Left foot is swollen chronically compared to the right, she states.    No significant asymmetry of the calves.  She does state foot drop on the left.    There are no tremors noted.  No clubbing or cyanosis of the digits.  NEUROLOGIC:  Cranial nerves II-XII appear grossly intact.  PSYCHIATRIC:  Normal affect.    LABORATORY DATA:  CBC shows a white blood cell count 7.5, hemoglobin of 13.2,   hematocrit of 40.2, platelet counts 183.  Chemistry panel:  Sodium is 134,   potassium 3.9, chloride 106, CO2 of 20, BUN of 26, creatinine 0.71.  AST, ALT   unremarkable.    EKG showed a ventricular rate of 60, paced rhythm, appears to be atrial paced.    ASSESSMENT AND PLAN:  1.  Left upper shoulder cellulitis/infection.  Patient has not had a lactic   acid.  I will check one.  I start the patient on Unasyn.  I have consideration   of initiating vancomycin as well.  She has normal white blood cell count,   question of possible pacemaker involvement.  If this is infected, she will   need removal of this.  Thus, cardiology will be following along.  Blood   cultures have been obtained.  We will monitor blood cultures.  Monitor her   vital signs as well for now.  2.  History of atrial fibrillation. I have her on Eliquis, we will continue   and have her on metoprolol for rate control and monitor in the telemetry   floor.  3.  Hyponatremia.  We will follow up on BNP, give her gentle fluid hydration.  4.  History of breast cancer in the past.  She has already had a mastectomy in   the past, question this is complicating potential infection.  5.  History of depression.  We will continue with Prozac.  6.  History of foot drop on the left.       ____________________________________     DO KELLY GARCIA / EVELYN    DD:  08/16/2017 18:58:52  DT:  08/16/2017 19:29:59    D#:  [id number]  Job#:  [id number]

## 2017-08-17 NOTE — PROGRESS NOTES
Patient resting. Requested water. Bed low locked, SRx2, call bell within reach, Non skid socks on, Bed alarm activated.

## 2017-08-17 NOTE — PROGRESS NOTES
Cardiology Progress Note               Author: Neha Kern Date & Time created: 8/17/2017  9:12 AM     Interval History:  Patient seen on EPs rounds.  Patient seen in office and sent to hospital for left arm/shoulder/upper chest cellulitis adjacent to pacemaker.  As per Dr Damon's admission note:  CHIEF COMPLAINT:  Patient was transferred today from Dr. Stanislav Garrett's office    for concern of left shoulder/upper chest cellulitis.     HISTORY OF PRESENT ILLNESS:  This is an 84-year-old female with a history of    pacemaker.  She started having over the last 3-4 days some left shoulder pain    as well as some left upper chest redness.  She had some tingling in her left    arm and has had a followup with Dr. Stanislav Garrett today who evaluated her chest    and was concerned of cellulitis and asked the patient to be directly admitted    to the hospital.  Here, she is here.  She has had some subjective fevers, no    chills, no nausea or vomiting, no diarrhea.  Denies any recent traumas or    scratches to her upper chest.  She denies any sore throat, no neck pain, no    photophobia, no headaches.  She is currently awake, alert.  She does state    decrease in appetite recently.   ASSESSMENT AND PLAN:  1.  Left upper shoulder cellulitis/infection.  Patient has not had a lactic    acid.  I will check one.  I start the patient on Unasyn.  I have consideration   of initiating vancomycin as well.  She has normal white blood cell count,    question of possible pacemaker involvement.  If this is infected, she will    need removal of this.  Thus, cardiology will be following along.  Blood    cultures have been obtained.  We will monitor blood cultures.  Monitor her    vital signs as well for now.  On Unasyn IV . No fever. WBC normal. Blood Cultures no growth at present time.    Review of Systems   Constitutional: Negative for fever, chills, weight loss and malaise/fatigue.   HENT: Negative for congestion and sore throat.    Eyes:  Negative for blurred vision and double vision.   Respiratory: Negative for cough, sputum production, shortness of breath and wheezing.    Cardiovascular: Negative for chest pain, palpitations, orthopnea, claudication, leg swelling and PND.   Gastrointestinal: Negative for heartburn, nausea, vomiting and abdominal pain.   Genitourinary: Negative for dysuria, frequency and flank pain.   Musculoskeletal: Negative.    Skin: Positive for rash.        Left upper arm extending up to shoulder and over pacemaker pocket site. Patient feels upper arm redness is less today.   Neurological: Negative for dizziness, speech change, focal weakness, seizures, loss of consciousness and headaches.   Endo/Heme/Allergies: Negative.    Psychiatric/Behavioral: Negative.        Physical Exam   Constitutional: She is oriented to person, place, and time. She appears well-developed and well-nourished.   HENT:   Head: Normocephalic and atraumatic.   Eyes: Pupils are equal, round, and reactive to light.   Neck: Normal range of motion. Neck supple. No thyromegaly present.   Cardiovascular: Normal rate, regular rhythm, normal heart sounds and intact distal pulses.  Exam reveals no gallop and no friction rub.    No murmur heard.  Pulmonary/Chest: Effort normal and breath sounds normal. No respiratory distress. She has no wheezes. She has no rales. She exhibits no tenderness.   Pacemaker site warm and area slightly swollen and warm to touch. Mild erythema over area.   Abdominal: Soft. Bowel sounds are normal. She exhibits no distension. There is no tenderness. There is no guarding.   Musculoskeletal: Normal range of motion. She exhibits no edema.   Neurological: She is alert and oriented to person, place, and time.   Skin: Skin is warm and dry.   Left upper arm extending up to shoulder and over pacemaker pocket site. Patient feels upper arm redness is less today. Area over pacemaker site is warm to touch.     Psychiatric: She has a normal mood and  affect.       Hemodynamics:  Temp (24hrs), Av.3 °C (97.3 °F), Min:36 °C (96.8 °F), Max:36.4 °C (97.6 °F)  Temperature: 36 °C (96.8 °F)  Pulse  Av.8  Min: 60  Max: 66   Blood Pressure : 158/64 mmHg     Respiratory:    Respiration: 20, Pulse Oximetry: 97 %           Fluids:     Weight: 80 kg (176 lb 5.9 oz)  GI/Nutrition:  Orders Placed This Encounter   Procedures   • Diet Order     Standing Status: Standing      Number of Occurrences: 1      Standing Expiration Date:      Order Specific Question:  Diet:     Answer:  Regular [1]     Lab Results:  Recent Labs      17   1658  17   0413   WBC  7.5  6.6   RBC  4.26  4.41   HEMOGLOBIN  13.2  13.7   HEMATOCRIT  40.2  41.5   MCV  94.4  94.1   MCH  31.0  31.1   MCHC  32.8*  33.0*   RDW  63.3*  62.4*   PLATELETCT  182  174   MPV  10.0  9.8     Recent Labs      17   1659  17   0413   SODIUM  134*  135   POTASSIUM  3.9  3.8   CHLORIDE  106  107   CO2  20  21   GLUCOSE  88  95   BUN  26*  24*   CREATININE  0.71  0.69   CALCIUM  9.0  8.9                         Medical Decision Making, by Problem:  Active Hospital Problems    Diagnosis   • Cellulitis of multiple sites of upper extremity and shoulder [L03.119]       Plan:  On AB continue to follow along.  Hopefully superficial cellulitis.  Cultures no growth from yesterday PM.  Area of concern as per exam note.  Continue to follow.    EKG reviewed, Medications reviewed and Labs reviewed

## 2017-08-18 PROCEDURE — 700111 HCHG RX REV CODE 636 W/ 250 OVERRIDE (IP): Performed by: HOSPITALIST

## 2017-08-18 PROCEDURE — A9270 NON-COVERED ITEM OR SERVICE: HCPCS | Performed by: HOSPITALIST

## 2017-08-18 PROCEDURE — 700102 HCHG RX REV CODE 250 W/ 637 OVERRIDE(OP): Performed by: HOSPITALIST

## 2017-08-18 PROCEDURE — 99232 SBSQ HOSP IP/OBS MODERATE 35: CPT | Performed by: INTERNAL MEDICINE

## 2017-08-18 PROCEDURE — 700105 HCHG RX REV CODE 258: Performed by: HOSPITALIST

## 2017-08-18 PROCEDURE — 770020 HCHG ROOM/CARE - TELE (206)

## 2017-08-18 RX ADMIN — AMPICILLIN SODIUM AND SULBACTAM SODIUM 3 G: 2; 1 INJECTION, POWDER, FOR SOLUTION INTRAMUSCULAR; INTRAVENOUS at 00:53

## 2017-08-18 RX ADMIN — FLUOXETINE HYDROCHLORIDE 40 MG: 20 CAPSULE ORAL at 08:24

## 2017-08-18 RX ADMIN — SODIUM CHLORIDE: 9 INJECTION, SOLUTION INTRAVENOUS at 05:22

## 2017-08-18 RX ADMIN — AMPICILLIN SODIUM AND SULBACTAM SODIUM 3 G: 2; 1 INJECTION, POWDER, FOR SOLUTION INTRAMUSCULAR; INTRAVENOUS at 11:24

## 2017-08-18 RX ADMIN — AMPICILLIN SODIUM AND SULBACTAM SODIUM 3 G: 2; 1 INJECTION, POWDER, FOR SOLUTION INTRAMUSCULAR; INTRAVENOUS at 05:22

## 2017-08-18 RX ADMIN — METOPROLOL TARTRATE 50 MG: 50 TABLET, FILM COATED ORAL at 08:25

## 2017-08-18 RX ADMIN — AMPICILLIN SODIUM AND SULBACTAM SODIUM 3 G: 2; 1 INJECTION, POWDER, FOR SOLUTION INTRAMUSCULAR; INTRAVENOUS at 18:18

## 2017-08-18 RX ADMIN — ASPIRIN 81 MG: 81 TABLET ORAL at 08:24

## 2017-08-18 RX ADMIN — TRAMADOL HYDROCHLORIDE 50 MG: 50 TABLET, COATED ORAL at 21:39

## 2017-08-18 RX ADMIN — APIXABAN 5 MG: 5 TABLET, FILM COATED ORAL at 08:24

## 2017-08-18 RX ADMIN — STANDARDIZED SENNA CONCENTRATE AND DOCUSATE SODIUM 2 TABLET: 8.6; 5 TABLET, FILM COATED ORAL at 08:24

## 2017-08-18 RX ADMIN — METOPROLOL TARTRATE 50 MG: 50 TABLET, FILM COATED ORAL at 21:37

## 2017-08-18 RX ADMIN — APIXABAN 5 MG: 5 TABLET, FILM COATED ORAL at 21:37

## 2017-08-18 ASSESSMENT — ENCOUNTER SYMPTOMS
ABDOMINAL PAIN: 0
FEVER: 0
SHORTNESS OF BREATH: 0
VOMITING: 0
WHEEZING: 0
BLURRED VISION: 0
DIZZINESS: 0
SEIZURES: 0
PALPITATIONS: 0
PSYCHIATRIC NEGATIVE: 1
CONSTIPATION: 0
HEARTBURN: 0
NAUSEA: 0
FOCAL WEAKNESS: 0
FLANK PAIN: 0
PND: 0
SPUTUM PRODUCTION: 0
MYALGIAS: 0
COUGH: 0
LOSS OF CONSCIOUSNESS: 0
MUSCULOSKELETAL NEGATIVE: 1
CHILLS: 0
HEADACHES: 0
CLAUDICATION: 0
SPEECH CHANGE: 0
WEIGHT LOSS: 0
DOUBLE VISION: 0
SORE THROAT: 0
DIAPHORESIS: 0
ORTHOPNEA: 0

## 2017-08-18 ASSESSMENT — PAIN SCALES - GENERAL
PAINLEVEL_OUTOF10: 5
PAINLEVEL_OUTOF10: 0

## 2017-08-18 NOTE — PROGRESS NOTES
Received bedside report from Autumn FOREMAN, Pt assessed, A&Ox4, Vitals stable, pt has 0/10 of pain, Lung sounds clear, no SOB Noted.  Pt updated on plan of care. Call light within reach, personal belongings within reach.  Bed in lowest position.  Pt ambulates via Standby with walker. Tele monitor on.

## 2017-08-18 NOTE — PROGRESS NOTES
Salima, paged RT BP. Patient asymptomatic and resting. Reports no pain or discomfort. Will continue to monitor

## 2017-08-18 NOTE — ASSESSMENT & PLAN NOTE
improving on antibiotics  Cardiology following  Cultures negative  Start oral antibiotics today  Left upper arm pain, will check a duplex scan to rule out thrombus

## 2017-08-18 NOTE — CARE PLAN
Problem: Safety  Goal: Will remain free from injury  Outcome: PROGRESSING AS EXPECTED  Patient remained free of falls or injury during shift    Problem: Infection  Goal: Will remain free from infection  Outcome: PROGRESSING AS EXPECTED  Patient remained free of new signs or symptoms of infection during shift. ABX administered per MD order

## 2017-08-18 NOTE — PROGRESS NOTES
Bedside shift report. Pt ambulated to bathroom with walker tolerated well. No further needs verbalized. Bed low locked, SRx2, call bell within reach, Bed alarm activated

## 2017-08-18 NOTE — PROGRESS NOTES
Cardiology Progress Note               Author: Neha Kern Date & Time created: 8/18/2017  10:10 AM     Interval History:  Patient seen on EPs rounds.  Patient seen in office and sent to hospital for left arm/shoulder/upper chest cellulitis adjacent to pacemaker.  As per Dr Damon's admission note:  CHIEF COMPLAINT:  Patient was transferred today from Dr. Stanislav Garrett's office    for concern of left shoulder/upper chest cellulitis.     HISTORY OF PRESENT ILLNESS:  This is an 84-year-old female with a history of    pacemaker.  She started having over the last 3-4 days some left shoulder pain    as well as some left upper chest redness.  She had some tingling in her left    arm and has had a followup with Dr. Stanislav Garrett today who evaluated her chest    and was concerned of cellulitis and asked the patient to be directly admitted    to the hospital.  Here, she is here.  She has had some subjective fevers, no    chills, no nausea or vomiting, no diarrhea.  Denies any recent traumas or    scratches to her upper chest.  She denies any sore throat, no neck pain, no    photophobia, no headaches.  She is currently awake, alert.  She does state    decrease in appetite recently.   ASSESSMENT AND PLAN:  1.  Left upper shoulder cellulitis/infection.  Patient has not had a lactic    acid.  I will check one.  I start the patient on Unasyn.  I have consideration   of initiating vancomycin as well.  She has normal white blood cell count,    question of possible pacemaker involvement.  If this is infected, she will    need removal of this.  Thus, cardiology will be following along.  Blood    cultures have been obtained.  We will monitor blood cultures.  Monitor her    vital signs as well for now.  On Unasyn IV . No fever. WBC normal. Left shift reduced today.Blood Cultures no growth.  Arm and shoulder erythema improved.    Review of Systems   Constitutional: Negative for fever, chills, weight loss and malaise/fatigue.   HENT:  Negative for congestion and sore throat.    Eyes: Negative for blurred vision and double vision.   Respiratory: Negative for cough, sputum production, shortness of breath and wheezing.    Cardiovascular: Negative for chest pain, palpitations, orthopnea, claudication, leg swelling and PND.   Gastrointestinal: Negative for heartburn, nausea, vomiting and abdominal pain.   Genitourinary: Negative for dysuria, frequency and flank pain.   Musculoskeletal: Negative.    Skin: Positive for rash.        Rash much improved.   Neurological: Negative for dizziness, speech change, focal weakness, seizures, loss of consciousness and headaches.   Endo/Heme/Allergies: Negative.    Psychiatric/Behavioral: Negative.        Physical Exam   Constitutional: She is oriented to person, place, and time. She appears well-developed and well-nourished.   HENT:   Head: Normocephalic and atraumatic.   Eyes: Pupils are equal, round, and reactive to light.   Neck: Normal range of motion. Neck supple. No thyromegaly present.   Cardiovascular: Normal rate, regular rhythm, normal heart sounds and intact distal pulses.  Exam reveals no gallop and no friction rub.    No murmur heard.  Pulmonary/Chest: Effort normal and breath sounds normal. No respiratory distress. She has no wheezes. She has no rales. She exhibits no tenderness.   Abdominal: Soft. Bowel sounds are normal. She exhibits no distension. There is no tenderness. There is no guarding.   Musculoskeletal: Normal range of motion. She exhibits no edema.   Neurological: She is alert and oriented to person, place, and time.   Skin: Skin is warm and dry. There is erythema (Much less over arm and shoulder).   Psychiatric: She has a normal mood and affect.       Hemodynamics:  Temp (24hrs), Av.3 °C (97.3 °F), Min:36.1 °C (97 °F), Max:36.6 °C (97.9 °F)  Temperature: 36.1 °C (97 °F)  Pulse  Av.5  Min: 60  Max: 66   Blood Pressure : (!) 186/102 mmHg     Respiratory:    Respiration: 17, Pulse  Oximetry: 93 %           Fluids:     Weight: 81.9 kg (180 lb 8.9 oz)  GI/Nutrition:  Orders Placed This Encounter   Procedures   • Diet Order     Standing Status: Standing      Number of Occurrences: 1      Standing Expiration Date:      Order Specific Question:  Diet:     Answer:  Regular [1]     Lab Results:  Recent Labs      08/16/17   1658  08/17/17   0413   WBC  7.5  6.6   RBC  4.26  4.41   HEMOGLOBIN  13.2  13.7   HEMATOCRIT  40.2  41.5   MCV  94.4  94.1   MCH  31.0  31.1   MCHC  32.8*  33.0*   RDW  63.3*  62.4*   PLATELETCT  182  174   MPV  10.0  9.8     Recent Labs      08/16/17   1659 08/17/17   0413   SODIUM  134*  135   POTASSIUM  3.9  3.8   CHLORIDE  106  107   CO2  20  21   GLUCOSE  88  95   BUN  26*  24*   CREATININE  0.71  0.69   CALCIUM  9.0  8.9                         Medical Decision Making, by Problem:  Active Hospital Problems    Diagnosis   • Cardiac pacemaker in situ [Z95.0]   • Hx of breast cancer [Z85.3]   • Cellulitis of multiple sites of upper extremity and shoulder [L03.119]   • Persistent atrial fibrillation (CMS-HCC) [I48.1]       Plan:  CBC left shift improving WBC lower although never high.  Cultures remain negative.  No fever.   IV AB continue per Dr Borrego.    EKG reviewed, Medications reviewed and Labs reviewed

## 2017-08-18 NOTE — CARE PLAN
Problem: Safety  Goal: Will remain free from injury  Outcome: PROGRESSING AS EXPECTED  No falls or injury during shift. Ambulation tolerated well. Bed low locked, SRx2, call bell within reach, Non skid socks on    Problem: Infection  Goal: Will remain free from infection  Outcome: PROGRESSING AS EXPECTED  No new signs or symptoms of infection noted. ABX administered per MD order.

## 2017-08-18 NOTE — PROGRESS NOTES
[facility] Hospitalist Progress Note    Date of Service: 2017    Chief Complaint  84 y.o. female admitted 2017 with pain and redness over the left upper chest and arm.    Interval Problem Update  She is improving on iv antibiotics  No fevers    Consultants/Specialty  Cardiology    Disposition  Home        Review of Systems   Constitutional: Negative for fever, chills and diaphoresis.   Respiratory: Negative for cough and shortness of breath.    Cardiovascular: Negative for chest pain and palpitations.   Gastrointestinal: Negative for nausea, abdominal pain, diarrhea and constipation.   Genitourinary: Negative for dysuria and urgency.   Musculoskeletal: Positive for myalgias.        Left arm soreness improving today   Skin: Positive for rash. Negative for itching.        Redness less over left shoulder   Neurological: Negative for dizziness, weakness and headaches.      Physical Exam  Laboratory/Imaging   Hemodynamics  Temp (24hrs), Av.2 °C (97.2 °F), Min:36 °C (96.8 °F), Max:36.3 °C (97.4 °F)   Temperature: 36.2 °C (97.2 °F)  Pulse  Av  Min: 60  Max: 66    Blood Pressure : 127/65 mmHg      Respiratory      Respiration: 20, Pulse Oximetry: 94 %             Fluids    Intake/Output Summary (Last 24 hours) at 17 1703  Last data filed at 17 1300   Gross per 24 hour   Intake    660 ml   Output   1250 ml   Net   -590 ml       Nutrition  Orders Placed This Encounter   Procedures   • Diet Order     Standing Status: Standing      Number of Occurrences: 1      Standing Expiration Date:      Order Specific Question:  Diet:     Answer:  Regular [1]     Physical Exam   Constitutional: She is oriented to person, place, and time. No distress.   HENT:   Mouth/Throat: No oropharyngeal exudate.   Eyes: Right eye exhibits no discharge. Left eye exhibits no discharge. No scleral icterus.   Neck: Neck supple. No JVD present.   Cardiovascular: Normal rate and regular rhythm.    No murmur heard.  Pulmonary/Chest:  Effort normal and breath sounds normal.   Evidence of bilateral mastectomy  Pacemaker site with no effusion   Abdominal: She exhibits no distension. There is no tenderness.   Musculoskeletal: She exhibits no edema.   Neurological: She is alert and oriented to person, place, and time.   Skin: Skin is dry. No rash noted. No erythema.   Nursing note and vitals reviewed.      Recent Labs      08/16/17 1658 08/17/17   0413   WBC  7.5  6.6   RBC  4.26  4.41   HEMOGLOBIN  13.2  13.7   HEMATOCRIT  40.2  41.5   MCV  94.4  94.1   MCH  31.0  31.1   MCHC  32.8*  33.0*   RDW  63.3*  62.4*   PLATELETCT  182  174   MPV  10.0  9.8     Recent Labs      08/16/17 1659 08/17/17 0413   SODIUM  134*  135   POTASSIUM  3.9  3.8   CHLORIDE  106  107   CO2  20  21   GLUCOSE  88  95   BUN  26*  24*   CREATININE  0.71  0.69   CALCIUM  9.0  8.9                      Assessment/Plan     Cardiac pacemaker in situ (present on admission)  Assessment & Plan  Pacemaker functioning    Persistent atrial fibrillation (CMS-HCC) (present on admission)  Assessment & Plan  Rate controlled on metoprolol  Continue eliquis for anticoagulation    Cellulitis of multiple sites of upper extremity and shoulder  Assessment & Plan  improving on antibiotics  Cardiology following  Cultures negative  Will continue iv therapy    Hx of breast cancer (present on admission)  Assessment & Plan  Lymph node dissection also done  Higher risk for infection after this kind of surgery  Will monitor for clinical improvement of infection    Labs reviewed and Medications reviewed  Presley catheter: No Presley      DVT: eliquis.    Ulcer prophylaxis: Not indicated  Antibiotics: Treating active infection/contamination beyond 24 hours perioperative coverage  Assessed for rehab: Patient returned to prior level of function, rehabilitation not indicated at this time

## 2017-08-19 PROCEDURE — 770020 HCHG ROOM/CARE - TELE (206)

## 2017-08-19 PROCEDURE — 700102 HCHG RX REV CODE 250 W/ 637 OVERRIDE(OP): Performed by: HOSPITALIST

## 2017-08-19 PROCEDURE — 99232 SBSQ HOSP IP/OBS MODERATE 35: CPT | Performed by: INTERNAL MEDICINE

## 2017-08-19 PROCEDURE — A9270 NON-COVERED ITEM OR SERVICE: HCPCS | Performed by: INTERNAL MEDICINE

## 2017-08-19 PROCEDURE — 700102 HCHG RX REV CODE 250 W/ 637 OVERRIDE(OP): Performed by: INTERNAL MEDICINE

## 2017-08-19 PROCEDURE — 700105 HCHG RX REV CODE 258: Performed by: HOSPITALIST

## 2017-08-19 PROCEDURE — A9270 NON-COVERED ITEM OR SERVICE: HCPCS | Performed by: HOSPITALIST

## 2017-08-19 PROCEDURE — 700111 HCHG RX REV CODE 636 W/ 250 OVERRIDE (IP): Performed by: HOSPITALIST

## 2017-08-19 RX ORDER — AMOXICILLIN AND CLAVULANATE POTASSIUM 875; 125 MG/1; MG/1
1 TABLET, FILM COATED ORAL EVERY 12 HOURS
Status: DISCONTINUED | OUTPATIENT
Start: 2017-08-19 | End: 2017-08-20 | Stop reason: HOSPADM

## 2017-08-19 RX ADMIN — METOPROLOL TARTRATE 50 MG: 50 TABLET, FILM COATED ORAL at 09:12

## 2017-08-19 RX ADMIN — AMPICILLIN SODIUM AND SULBACTAM SODIUM 3 G: 2; 1 INJECTION, POWDER, FOR SOLUTION INTRAMUSCULAR; INTRAVENOUS at 00:22

## 2017-08-19 RX ADMIN — AMOXICILLIN AND CLAVULANATE POTASSIUM 1 TABLET: 875; 125 TABLET, FILM COATED ORAL at 21:12

## 2017-08-19 RX ADMIN — AMPICILLIN SODIUM AND SULBACTAM SODIUM 3 G: 2; 1 INJECTION, POWDER, FOR SOLUTION INTRAMUSCULAR; INTRAVENOUS at 06:39

## 2017-08-19 RX ADMIN — AMOXICILLIN AND CLAVULANATE POTASSIUM 1 TABLET: 875; 125 TABLET, FILM COATED ORAL at 11:49

## 2017-08-19 RX ADMIN — ASPIRIN 81 MG: 81 TABLET ORAL at 09:12

## 2017-08-19 RX ADMIN — FLUOXETINE HYDROCHLORIDE 40 MG: 20 CAPSULE ORAL at 09:12

## 2017-08-19 RX ADMIN — METOPROLOL TARTRATE 50 MG: 50 TABLET, FILM COATED ORAL at 21:11

## 2017-08-19 RX ADMIN — STANDARDIZED SENNA CONCENTRATE AND DOCUSATE SODIUM 2 TABLET: 8.6; 5 TABLET, FILM COATED ORAL at 21:11

## 2017-08-19 RX ADMIN — APIXABAN 5 MG: 5 TABLET, FILM COATED ORAL at 21:12

## 2017-08-19 RX ADMIN — APIXABAN 5 MG: 5 TABLET, FILM COATED ORAL at 09:12

## 2017-08-19 ASSESSMENT — PAIN SCALES - GENERAL
PAINLEVEL_OUTOF10: 0

## 2017-08-19 ASSESSMENT — ENCOUNTER SYMPTOMS
PALPITATIONS: 0
WHEEZING: 0
COUGH: 0
CLAUDICATION: 0
HEARTBURN: 0
ORTHOPNEA: 0
SHORTNESS OF BREATH: 0
ABDOMINAL PAIN: 0
VOMITING: 0
FEVER: 0
CONSTIPATION: 0
MYALGIAS: 1
NAUSEA: 0
DIZZINESS: 0
CHILLS: 0
HEADACHES: 0
PND: 0

## 2017-08-19 ASSESSMENT — COPD QUESTIONNAIRES: DURING THE PAST 4 WEEKS HOW MUCH DID YOU FEEL SHORT OF BREATH: NONE/LITTLE OF THE TIME

## 2017-08-19 NOTE — CARE PLAN
Problem: Safety  Goal: Will remain free from falls  Intervention: Implement fall precautions    08/18/17 2000   OTHER   Environmental Precautions Treaded Slipper Socks on Patient;Personal Belongings, Wastebasket, Call Bell etc. in Easy Reach;Report Given to Other Health Care Providers Regarding Fall Risk;Bed in Low Position;Communication Sign for Patients & Families;Mobility Assessed & Appropriate Sign Placed   IV Pole on Same Side of Bed as Bathroom Yes   Bedrails Bedrails Closest to Bathroom Down   Chair/Bed Strip Alarm Yes - Alarm On

## 2017-08-19 NOTE — PROGRESS NOTES
[facility] Hospitalist Progress Note    Date of Service: 2017    Chief Complaint  84 y.o. female admitted 2017 with pain and redness over the left upper chest and arm.    Interval Problem Update  She is improving on iv antibiotics  No fevers  Left upper arm tender to touch    Consultants/Specialty  Cardiology    Disposition  Home        Review of Systems   Constitutional: Negative for fever and chills.   HENT: Negative for congestion.    Respiratory: Negative for cough, shortness of breath and wheezing.    Cardiovascular: Negative for chest pain and palpitations.   Gastrointestinal: Negative for heartburn, nausea, vomiting, abdominal pain and constipation.   Genitourinary: Negative for dysuria.   Musculoskeletal: Positive for myalgias.        Left upper arm pain despite redness improving   Skin: Negative for rash.   Neurological: Negative for dizziness and headaches.      Physical Exam  Laboratory/Imaging   Hemodynamics  Temp (24hrs), Av.4 °C (97.6 °F), Min:36 °C (96.8 °F), Max:37.1 °C (98.8 °F)   Temperature: 36.2 °C (97.2 °F)  Pulse  Av.7  Min: 60  Max: 76    Blood Pressure : (!) 162/83 mmHg      Respiratory      Respiration: 18, Pulse Oximetry: 94 %             Fluids    Intake/Output Summary (Last 24 hours) at 17 1422  Last data filed at 17 1300   Gross per 24 hour   Intake    250 ml   Output    300 ml   Net    -50 ml       Nutrition  Orders Placed This Encounter   Procedures   • Diet Order     Standing Status: Standing      Number of Occurrences: 1      Standing Expiration Date:      Order Specific Question:  Diet:     Answer:  Regular [1]     Physical Exam   Constitutional: She is oriented to person, place, and time. No distress.   HENT:   Mouth/Throat: Oropharynx is clear and moist.   Eyes: Conjunctivae are normal. No scleral icterus.   Neck: Neck supple. No JVD present.   Cardiovascular: Normal rate, regular rhythm and intact distal pulses.    No bruising  Radial pulses 2+  bilaterally   Pulmonary/Chest: Effort normal and breath sounds normal.   Evidence of bilateral mastectomy  Pacemaker site with no effusion   Abdominal: Soft. Bowel sounds are normal. There is no tenderness.   Musculoskeletal: She exhibits no edema.   Neurological: She is alert and oriented to person, place, and time.   Skin: Skin is warm and dry. No rash noted. No erythema.   Nursing note and vitals reviewed.      Recent Labs      08/16/17 1658 08/17/17 0413   WBC  7.5  6.6   RBC  4.26  4.41   HEMOGLOBIN  13.2  13.7   HEMATOCRIT  40.2  41.5   MCV  94.4  94.1   MCH  31.0  31.1   MCHC  32.8*  33.0*   RDW  63.3*  62.4*   PLATELETCT  182  174   MPV  10.0  9.8     Recent Labs      08/16/17 1659 08/17/17 0413   SODIUM  134*  135   POTASSIUM  3.9  3.8   CHLORIDE  106  107   CO2  20  21   GLUCOSE  88  95   BUN  26*  24*   CREATININE  0.71  0.69   CALCIUM  9.0  8.9                      Assessment/Plan     Cardiac pacemaker in situ (present on admission)  Assessment & Plan  Pacemaker functioning    Persistent atrial fibrillation (CMS-HCC) (present on admission)  Assessment & Plan  Rate controlled on metoprolol  Continue eliquis     Cellulitis of multiple sites of upper extremity and shoulder  Assessment & Plan  improving on antibiotics  Cardiology following  Cultures negative  Start oral antibiotics today  Left upper arm pain, will check a duplex scan to rule out thrombus    Hx of breast cancer (present on admission)  Assessment & Plan  Lymph node dissection also done  Higher risk for infection after this kind of surgery        Labs reviewed and Medications reviewed  Presley catheter: No Rpesley      DVT: eliquis.    Ulcer prophylaxis: Not indicated  Antibiotics: Treating active infection/contamination beyond 24 hours perioperative coverage  Assessed for rehab: Patient returned to prior level of function, rehabilitation not indicated at this time

## 2017-08-19 NOTE — PROGRESS NOTES
Bedside report received from night shift RN, assumed pt care. Pt assessment complete. Pt A&O x4. Reviewed plan of care with pt. Tele box on and working. Pt on room air. D/C'ed fluids. Chart and labs reviewed. Bed in lowest position, call light within reach. Hourly rounding in place. Educated about calling for assistance.

## 2017-08-19 NOTE — CARE PLAN
Problem: Pain Management  Goal: Pain level will decrease to patient’s comfort goal  Intervention: Follow pain managment plan developed in collaboration with patient and Interdisciplinary Team  Assess and medicate pt pain per MAR and orders.

## 2017-08-19 NOTE — CARE PLAN
Problem: Safety  Goal: Will remain free from falls  Outcome: MET Date Met:  08/19/17  Treaded slipper socks, bed alarm on, education on use of call bell for assistance, bed in locked and lowest position, call light within reach, personal belongings closest to pt.     Problem: Infection  Goal: Will remain free from infection  Outcome: MET Date Met:  08/19/17  Hang hygiene, trending WBC. Pt remained free of infection through out entire shift.

## 2017-08-19 NOTE — PROGRESS NOTES
Cardiology Progress Note               Author: Frederick Hilton Date & Time created: 2017  3:51 PM     Interval History:      Consultation for upper chest cellulitis adjacent to pacemaker      Admitted with: Transferred from cardiology office, concern for left shoulder/upper chest cellulitis            History of sick sinus syndrome status post successful placement of dual chamber pacemaker ([email or web address]) on 17, bilateral mastectomy, A. fib flutter status post ablation of flutter, hypertension      Labs reviewed  No labs today        BP = 162/80  HR = 65 paced        Review of Systems   Respiratory: Negative for cough and shortness of breath.    Cardiovascular: Negative for chest pain, palpitations, orthopnea, claudication, leg swelling and PND.       Physical Exam   Constitutional: She is oriented to person, place, and time. She appears well-developed.   HENT:   Head: Normocephalic.   Eyes: Conjunctivae are normal.   Neck: No JVD present. No thyromegaly present.   Cardiovascular:   Pulses:       Carotid pulses are 2+ on the right side, and 2+ on the left side.       Radial pulses are 2+ on the right side, and 2+ on the left side.   paced   Pulmonary/Chest: She has no wheezes.   Abdominal: Soft.   Musculoskeletal: She exhibits no edema.   Neurological: She is alert and oriented to person, place, and time.   Skin: Skin is warm and dry.       Hemodynamics:  Temp (24hrs), Av.4 °C (97.6 °F), Min:36 °C (96.8 °F), Max:37.1 °C (98.8 °F)  Temperature: 36.2 °C (97.2 °F)  Pulse  Av.7  Min: 60  Max: 76   Blood Pressure : (!) 162/83 mmHg     Respiratory:    Respiration: 18, Pulse Oximetry: 94 %           Fluids:  Date 17 0700 - 17 0659   Shift 7694-3136 1627-7056 9166-7726 24 Hour Total   I  N  T  A  K  E   P.O. 250   250    Shift Total 250   250   O  U  T  P  U  T   Urine 300   300    Shift Total 300   300   Weight (kg) 79.2 79.2 79.2 79.2       Weight: 79.2 kg (174 lb 9.7  oz)  GI/Nutrition:  Orders Placed This Encounter   Procedures   • Diet Order     Standing Status: Standing      Number of Occurrences: 1      Standing Expiration Date:      Order Specific Question:  Diet:     Answer:  Regular [1]     Lab Results:  Recent Labs      08/16/17   1658  08/17/17   0413   WBC  7.5  6.6   RBC  4.26  4.41   HEMOGLOBIN  13.2  13.7   HEMATOCRIT  40.2  41.5   MCV  94.4  94.1   MCH  31.0  31.1   MCHC  32.8*  33.0*   RDW  63.3*  62.4*   PLATELETCT  182  174   MPV  10.0  9.8     Recent Labs      08/16/17   1659 08/17/17   0413   SODIUM  134*  135   POTASSIUM  3.9  3.8   CHLORIDE  106  107   CO2  20  21   GLUCOSE  88  95   BUN  26*  24*   CREATININE  0.71  0.69   CALCIUM  9.0  8.9                         Medical Decision Making, by Problem:  Active Hospital Problems    Diagnosis   • Cardiac pacemaker in situ [Z95.0]   • Hx of breast cancer [Z85.3]   • Cellulitis of multiple sites of upper extremity and shoulder [L03.119]   • Persistent atrial fibrillation (CMS-HCC) [I48.1]       Plan:    Cellulitis of left upper extremity and shoulder, on Augmentin 875/125 PO BID, site much improved      Blood cultures negative from 8/16/17      Hx of atrial fib, on metoprolol 50 BID, Eliquis 5 mg BID, paced rhythm       PPM ( BS ) 6/1/17    Likely DC in am        Medications reviewed and Labs reviewed

## 2017-08-20 VITALS
WEIGHT: 173.5 LBS | DIASTOLIC BLOOD PRESSURE: 67 MMHG | HEIGHT: 69 IN | TEMPERATURE: 96.9 F | RESPIRATION RATE: 18 BRPM | SYSTOLIC BLOOD PRESSURE: 124 MMHG | BODY MASS INDEX: 25.7 KG/M2 | HEART RATE: 64 BPM | OXYGEN SATURATION: 96 %

## 2017-08-20 PROBLEM — L03.119: Status: RESOLVED | Noted: 2017-08-16 | Resolved: 2017-08-20

## 2017-08-20 PROCEDURE — 99239 HOSP IP/OBS DSCHRG MGMT >30: CPT | Performed by: INTERNAL MEDICINE

## 2017-08-20 PROCEDURE — 700102 HCHG RX REV CODE 250 W/ 637 OVERRIDE(OP): Performed by: HOSPITALIST

## 2017-08-20 PROCEDURE — 93971 EXTREMITY STUDY: CPT | Mod: 26 | Performed by: SURGERY

## 2017-08-20 PROCEDURE — 93971 EXTREMITY STUDY: CPT

## 2017-08-20 PROCEDURE — A9270 NON-COVERED ITEM OR SERVICE: HCPCS | Performed by: INTERNAL MEDICINE

## 2017-08-20 PROCEDURE — A9270 NON-COVERED ITEM OR SERVICE: HCPCS | Performed by: HOSPITALIST

## 2017-08-20 PROCEDURE — 700102 HCHG RX REV CODE 250 W/ 637 OVERRIDE(OP): Performed by: INTERNAL MEDICINE

## 2017-08-20 RX ORDER — AMOXICILLIN AND CLAVULANATE POTASSIUM 875; 125 MG/1; MG/1
1 TABLET, FILM COATED ORAL EVERY 12 HOURS
Qty: 14 TAB | Refills: 0 | Status: SHIPPED | OUTPATIENT
Start: 2017-08-20 | End: 2017-08-27

## 2017-08-20 RX ADMIN — STANDARDIZED SENNA CONCENTRATE AND DOCUSATE SODIUM 2 TABLET: 8.6; 5 TABLET, FILM COATED ORAL at 07:59

## 2017-08-20 RX ADMIN — ASPIRIN 81 MG: 81 TABLET ORAL at 07:57

## 2017-08-20 RX ADMIN — METOPROLOL TARTRATE 50 MG: 50 TABLET, FILM COATED ORAL at 07:58

## 2017-08-20 RX ADMIN — AMOXICILLIN AND CLAVULANATE POTASSIUM 1 TABLET: 875; 125 TABLET, FILM COATED ORAL at 11:25

## 2017-08-20 RX ADMIN — FLUOXETINE HYDROCHLORIDE 40 MG: 20 CAPSULE ORAL at 07:57

## 2017-08-20 RX ADMIN — APIXABAN 5 MG: 5 TABLET, FILM COATED ORAL at 07:57

## 2017-08-20 ASSESSMENT — PAIN SCALES - GENERAL: PAINLEVEL_OUTOF10: 0

## 2017-08-20 NOTE — PROGRESS NOTES
PT RESTING IN BED WITH EYES CLOSED. AWOKE WITH VERBAL AND SOFT PHYSICAL STIMULI. ASSESSMENT COMPLETE. DENIES PAIN. NO NEEDS AT THIS TIME. BED IN LOWEST POSITION AND CALL LIGHT IN REACH. MEDICATIONS GIVEN AND EXPLAINED. IN VERY GOOD SPIRITS

## 2017-08-20 NOTE — PROGRESS NOTES
BEDSIDE REPORT COMPLETED, PT RESTING IN BED, UPDATED ON PLAN OF CARE. PT VERBALIZED UNDERSTANDING. NO NEEDS AT THIS TIME, NO COMPLAINTS OF PAIN, CALL LIGHT IN REACH, BED IN LOWEST POSITION, SIDE RAILS UP X2, NO DISTRESS NOTED.

## 2017-08-20 NOTE — DISCHARGE INSTRUCTIONS
Discharge Instructions    Discharged to home by car with relative. Discharged via wheelchair, hospital escort: Yes.  Special equipment needed: Walker    Be sure to schedule a follow-up appointment with your primary care doctor or any specialists as instructed.     Discharge Plan:   Diet Plan: Discussed  Activity Level: Discussed  Confirmed Follow up Appointment: Appointment Scheduled  Confirmed Symptoms Management: Discussed  Medication Reconciliation Updated: Yes  Influenza Vaccine Indication: Not indicated: Previously immunized this influenza season and > 8 years of age    I understand that a diet low in cholesterol, fat, and sodium is recommended for good health. Unless I have been given specific instructions below for another diet, I accept this instruction as my diet prescription.   Other diet: Regular  Special Instructions: None    · Is patient discharged on Warfarin / Coumadin?   No     · Is patient Post Blood Transfusion?  No    Depression / Suicide Risk    As you are discharged from this [facility] facility, it is important to learn how to keep safe from harming yourself.    Recognize the warning signs:  · Abrupt changes in personality, positive or negative- including increase in energy   · Giving away possessions  · Change in eating patterns- significant weight changes-  positive or negative  · Change in sleeping patterns- unable to sleep or sleeping all the time   · Unwillingness or inability to communicate  · Depression  · Unusual sadness, discouragement and loneliness  · Talk of wanting to die  · Neglect of personal appearance   · Rebelliousness- reckless behavior  · Withdrawal from people/activities they love  · Confusion- inability to concentrate     If you or a loved one observes any of these behaviors or has concerns about self-harm, here's what you can do:  · Talk about it- your feelings and reasons for harming yourself  · Remove any means that you might use to hurt yourself (examples: pills,  rope, extension cords, firearm)  · Get professional help from the community (Mental Health, Substance Abuse, psychological counseling)  · Do not be alone:Call your Safe Contact- someone whom you trust who will be there for you.  · Call your local CRISIS HOTLINE 628-6646 or 014-811-6029  · Call your local Children's Mobile Crisis Response Team [place] (822) 626-5904 or www.[employer]  · Call the toll free National Suicide Prevention Hotlines   · National Suicide Prevention Lifeline 501-812-QRKL (3373)  · National Hope Line Network 800-SUICIDE (457-4747)

## 2017-08-20 NOTE — PROGRESS NOTES
Received report from Autumn FOREMAN.  Pt resting in bed at this time.  Denies any needs at this time.  Explains she thinks she is going home today.  Assessment is ongoing.

## 2017-08-20 NOTE — PROGRESS NOTES
Pt IV and tele removed.  Pt acknowledged understanding of discharge instructions.  Paperwork signed and placed in the chart.

## 2017-08-20 NOTE — DISCHARGE SUMMARY
CHIEF COMPLAINT ON ADMISSION  No chief complaint on file.      CODE STATUS  Full Code    HPI & HOSPITAL COURSE  This is a 84 y.o. female here with redness and pain in the left arm extending over the left side of the chest over her pacemaker site. She was admitted to the hospital and placed on iv unasyn therapy. Her erythema improved as did her left arm pain. Duplex scan of the left arm showed no thrombus. She was switched to oral augmentin and continued to show improvement on this. Blood cultures remained negative during her hospital stay.    Therefore, she is discharged in good and stable condition with close outpatient follow-up.    SPECIFIC OUTPATIENT FOLLOW-UP  Cardiology Dr. Garrett in 2 weeks    DISCHARGE PROBLEM LIST  Active Problems:    Cardiac pacemaker in situ POA: Yes    Hx of breast cancer POA: Yes    Persistent atrial fibrillation (CMS-HCC) POA: Yes    Cellulitis of multiple sites of upper extremity and shoulder POA: Unknown  Resolved Problems:    * No resolved hospital problems. *      FOLLOW UP  Future Appointments  Date Time Provider Department Center   9/8/2017 1:20 PM Stanislav Garrett M.D. [facility] None   9/22/2017 1:00 PM Jillian Avalos M.D. [facility] None     No follow-up provider specified.    MEDICATIONS ON DISCHARGE   Meron Rice   Home Medication Instructions JOSE MANUEL:[id number]    Printed on:08/20/17 0805   Medication Information                      apixaban (ELIQUIS) 5mg Tab  Take 1 Tab by mouth 2 Times a Day.             aspirin EC (ECOTRIN) 81 MG TBEC  Take 1 Tab by mouth every day.             fluoxetine (PROZAC) 40 MG capsule  Take 1 Cap by mouth every day.             metoprolol (LOPRESSOR) 50 MG Tab  Take 1 Tab by mouth 2 Times a Day.             therapeutic multivitamin-minerals (THERAGRAN-M) Tab  Take 1 Tab by mouth every day.             tramadol (ULTRAM) 50 MG Tab  Take 50 mg by mouth every four hours as needed.                 DIET  Orders Placed This Encounter   Procedures   • Diet Order      Standing Status: Standing      Number of Occurrences: 1      Standing Expiration Date:      Order Specific Question:  Diet:     Answer:  Regular [1]       ACTIVITY  As tolerated.  Weight bearing as tolerated      CONSULTATIONS  Cardiology Dr. Garrett    PROCEDURES  None    LABORATORY  Lab Results   Component Value Date/Time    SODIUM 135 08/17/2017 04:13 AM    POTASSIUM 3.8 08/17/2017 04:13 AM    CHLORIDE 107 08/17/2017 04:13 AM    CO2 21 08/17/2017 04:13 AM    GLUCOSE 95 08/17/2017 04:13 AM    BUN 24* 08/17/2017 04:13 AM    CREATININE 0.69 08/17/2017 04:13 AM        Lab Results   Component Value Date/Time    WBC 6.6 08/17/2017 04:13 AM    HEMOGLOBIN 13.7 08/17/2017 04:13 AM    HEMATOCRIT 41.5 08/17/2017 04:13 AM    PLATELET COUNT 174 08/17/2017 04:13 AM        Total time of the discharge process exceeds 38 minutes

## 2017-08-21 LAB
BACTERIA BLD CULT: NORMAL
BACTERIA BLD CULT: NORMAL
SIGNIFICANT IND 70042: NORMAL
SIGNIFICANT IND 70042: NORMAL
SITE SITE: NORMAL
SITE SITE: NORMAL
SOURCE SOURCE: NORMAL
SOURCE SOURCE: NORMAL

## 2017-08-21 NOTE — TELEPHONE ENCOUNTER
Meron Rice  [phone] (home)     Catheter supply company that pt uses called again today to ask for an addendum a second time on office note dated 02/04/2016. They are now requesting for incontinence or retention Dx to be added so that medicare can continue to cover this. Fax # [phone].

## 2017-08-22 ENCOUNTER — TELEPHONE (OUTPATIENT)
Dept: CARDIOLOGY | Facility: MEDICAL CENTER | Age: 82
End: 2017-08-22

## 2017-08-22 NOTE — TELEPHONE ENCOUNTER
----- Message from mEily Gillis, Med Ass't sent at 8/22/2017 11:59 AM PDT -----  Regarding: Home Care Questions  Contact: [phone]  Patient of DS would like a call back in regards to continuing Home care with [facility] that has been suspended because she was admitted on 8/17/17. Patient states that DS put her on home care.    Thank you

## 2017-08-22 NOTE — TELEPHONE ENCOUNTER
Pt had home care, went into hospital and wants to resume it now that she is out. Ok to order?     [facility]assandra [phone]

## 2017-08-23 DIAGNOSIS — Z95.0 CARDIAC PACEMAKER IN SITU: ICD-10-CM

## 2017-08-28 ENCOUNTER — TELEPHONE (OUTPATIENT)
Dept: CARDIOLOGY | Facility: MEDICAL CENTER | Age: 82
End: 2017-08-28

## 2017-08-28 NOTE — TELEPHONE ENCOUNTER
----- Message from Leonard Gillis sent at 8/28/2017 12:04 PM PDT -----  Regarding: Home care referral  Contact: [phone]  VIRY/Le    Pt states she spoke to [facility] and they did not  receive referral. Pt would please like referral re faxed. She would like a call back when completed at [phone].

## 2017-09-01 ENCOUNTER — TELEPHONE (OUTPATIENT)
Dept: CARDIOLOGY | Facility: MEDICAL CENTER | Age: 82
End: 2017-09-01

## 2017-09-01 NOTE — TELEPHONE ENCOUNTER
Pt reports an area above PM on her shoulder which shows purple bruising.  The area is not raised, is not tender, not spreading at this time.   No swelling, no redness.    Advised her to continue to observe and if it worsens, go to ER.   She agreed.

## 2017-09-01 NOTE — TELEPHONE ENCOUNTER
----- Message from Brenda Velasquez sent at 9/1/2017 11:29 AM PDT -----  Regarding: Patient's left shoulder is purple at Pacemaker site   VIRY/Annemarie    Patient said that her left shoulder is purple where her Pacemaker is and she wants a call back asap at [phone] to find out what she should do.

## 2017-09-08 ENCOUNTER — OFFICE VISIT (OUTPATIENT)
Dept: CARDIOLOGY | Facility: MEDICAL CENTER | Age: 82
End: 2017-09-08
Payer: MEDICARE

## 2017-09-08 VITALS
OXYGEN SATURATION: 94 % | SYSTOLIC BLOOD PRESSURE: 122 MMHG | HEART RATE: 69 BPM | WEIGHT: 172 LBS | BODY MASS INDEX: 25.48 KG/M2 | DIASTOLIC BLOOD PRESSURE: 84 MMHG | HEIGHT: 69 IN

## 2017-09-08 DIAGNOSIS — Z95.0 CARDIAC PACEMAKER IN SITU: ICD-10-CM

## 2017-09-08 DIAGNOSIS — Z86.79 S/P ABLATION OF ATRIAL FLUTTER: ICD-10-CM

## 2017-09-08 DIAGNOSIS — N39.0 RECURRENT UTI: ICD-10-CM

## 2017-09-08 DIAGNOSIS — N30.00 ACUTE CYSTITIS WITHOUT HEMATURIA: ICD-10-CM

## 2017-09-08 DIAGNOSIS — I48.0 PAF (PAROXYSMAL ATRIAL FIBRILLATION) (HCC): ICD-10-CM

## 2017-09-08 DIAGNOSIS — Z98.890 S/P ABLATION OF ATRIAL FLUTTER: ICD-10-CM

## 2017-09-08 DIAGNOSIS — I49.5 SICK SINUS SYNDROME (HCC): ICD-10-CM

## 2017-09-08 DIAGNOSIS — L03.114 CELLULITIS OF LEFT UPPER EXTREMITY: ICD-10-CM

## 2017-09-08 PROBLEM — I48.19 PERSISTENT ATRIAL FIBRILLATION (HCC): Status: RESOLVED | Noted: 2017-06-16 | Resolved: 2017-09-08

## 2017-09-08 PROCEDURE — 99214 OFFICE O/P EST MOD 30 MIN: CPT | Mod: 25 | Performed by: INTERNAL MEDICINE

## 2017-09-08 PROCEDURE — 93280 PM DEVICE PROGR EVAL DUAL: CPT | Performed by: INTERNAL MEDICINE

## 2017-09-08 RX ORDER — CIPROFLOXACIN 250 MG/1
250 TABLET, FILM COATED ORAL 2 TIMES DAILY
Qty: 6 TAB | Refills: 0 | Status: ON HOLD | OUTPATIENT
Start: 2017-09-08 | End: 2017-09-12

## 2017-09-08 NOTE — PROGRESS NOTES
Subjective:   Meron Rice is a 84 y.o. female who presents today with recent cellulitis. Resolved with antibiotics. Has a UTI and wants antibiotics. No constitutional symptoms. PAF. No flutter. Mild FERNANDEZ.     Past Medical History:   Diagnosis Date   • Depression 5/19/2017   • Fall 05/2017   • Pneumonia 2017   • Bronchitis 2017   • Other specified symptom associated with female genital organs 1998    fibroids   • Anemia    • Arrhythmia    • Arthritis     generalized   • ASTHMA     has not needed an inhaler since 2010   • Bowel habit changes     irreg   • Breath shortness    • Cancer (CMS-HCC) [id number]    uterus/left breast   • CATARACT     beginning   • Chronic low back pain    • Foot-drop    • Hypertension    • Pain     left shoulder    • Personal history of venous thrombosis and embolism     in leg during child-bearing years   • Unspecified hemorrhagic conditions     post op hyst. Currently on Eliquis    • Unspecified urinary incontinence     lazy bladder; doesn't empty completely   • Urinary bladder disorder    • UTI (urinary tract infection)     frequent      Past Surgical History:   Procedure Laterality Date   • FEMUR NAILING INTRAMEDULLARY Right 6/3/2015    Procedure: FEMUR NAILING INTRAMEDULLARY;  Surgeon: Deshaun Denis M.D.;  Location: [facility];  Service:    • WOUND CLOSURE GENERAL  4/10/2013    Performed by Nano Riley M.D. at SURGERY SAME DAY [facility]   • BREAST IMPLANT REMOVAL  4/10/2013    Performed by Jak Meza M.D. at SURGERY SAME DAY [facility] ORS   • BREAST RECONSTRUCTION  11/26/2012    Performed by Jak Meza M.D. at [facility]   • HIP HEMIARTHROPLASTY  5/14/2012    left; Performed by KEITH COWART at SURGERY [facility]   • BREAST IMPLANT REVISION  7/11/2011    Performed by JAK MEZA at [facility]   • NIPPLE RECONSTRUCTION  7/11/2011    Performed by JAK MEZA at [facility]   •  MASTECTOMY  12/15/2010    right   • CATH REMOVAL  12/15/2010    Performed by DIPAK VARELA at SURGERY [facility] ORS   • BREAST RECONSTRUCTION  12/15/2010    Performed by JAK ROSALES at SURGERY [facility] ORS   • LATISSIMUS FLAP  12/15/2010    Performed by JAK ROSALES at SURGERY [facility] ORS   • CATH PLACEMENT  10/28/2009    Performed by DIPAK VARELA at SURGERY SAME DAY [facility] ORS   • MASTECTOMY  9/30/2009    left   • AXILLARY NODE DISSECTION  9/15/2009    Performed by DIPAK VARELA at SURGERY SAME DAY [facility] ORS   • OTHER SURGICAL PROCEDURE  2004    bladder repair   • OTHER ORTHOPEDIC SURGERY  2002    laminectomy   • HYSTERECTOMY, TOTAL ABDOMINAL  2000   • GYN SURGERY  1998    excision of fibroids   • APPENDECTOMY  1968   • DILATION AND CURETTAGE  1961   • VEIN LIGAT & STRIP  1972, 1966    x2 bilateral     Family History   Problem Relation Age of Onset   • Diabetes Mother    • Heart Disease Mother    • Heart Disease Father    • Stroke Father    • Hypertension Father    • Diabetes Sister    • Heart Disease Sister    • Cancer Brother      lung   • Diabetes     • Heart Disease     • Stroke     • Cancer     • Hypertension     • Diabetes Other    • Diabetes Child    • Psychiatry Child      History   Smoking Status   • Former Smoker   • Packs/day: 0.10   • Years: 1.00   • Types: Cigarettes   • Quit date: 1/1/1964   Smokeless Tobacco   • Never Used     Comment: 50 years ago in 1960  SOCIAL      Allergies   Allergen Reactions   • Alendronate-Butylpar-Propylpar-Saccharin [Fosamax] Diarrhea     .     Outpatient Encounter Prescriptions as of 9/8/2017   Medication Sig Dispense Refill   • ciprofloxacin (CIPRO) 250 MG Tab Take 1 Tab by mouth 2 times a day. 6 Tab 0   • tramadol (ULTRAM) 50 MG Tab Take 50 mg by mouth every four hours as needed.     • therapeutic multivitamin-minerals (THERAGRAN-M) Tab Take 1 Tab by mouth every day.     • apixaban (ELIQUIS) 5mg Tab Take 1 Tab by mouth 2 Times a Day. 180  "Tab 3   • metoprolol (LOPRESSOR) 50 MG Tab Take 1 Tab by mouth 2 Times a Day. 180 Tab 3   • fluoxetine (PROZAC) 40 MG capsule Take 1 Cap by mouth every day. 90 Cap 4   • aspirin EC (ECOTRIN) 81 MG TBEC Take 1 Tab by mouth every day. 30 Tab 0     No facility-administered encounter medications on file as of 9/8/2017.      ROS     Objective:   /84   Pulse 69   Ht 1.753 m (5' 9.02\")   Wt 78 kg (172 lb)   SpO2 94%   BMI 25.39 kg/m²     Physical Exam   Constitutional: She is oriented to person, place, and time. She appears well-developed. No distress.   HENT:   Mouth/Throat: Mucous membranes are normal.   Eyes: Conjunctivae and EOM are normal.   Neck: No JVD present. No tracheal deviation present. No thyroid mass and no thyromegaly present.   Cardiovascular: Normal rate, regular rhythm and intact distal pulses.    No murmur heard.  Pulmonary/Chest: Effort normal and breath sounds normal. No respiratory distress. She exhibits no tenderness.   Chest site normal   Abdominal: Soft. There is no tenderness.   Musculoskeletal: Normal range of motion. She exhibits no edema.   Neurological: She is alert and oriented to person, place, and time. She has normal strength. She displays no tremor.   Skin: Skin is warm and dry. She is not diaphoretic.   Psychiatric: She has a normal mood and affect. Her behavior is normal.   Vitals reviewed.      Assessment:     1. Acute cystitis without hematuria     2. PAF (paroxysmal atrial fibrillation) (CMS-[facility])     3. S/P ablation of atrial flutter     4. Sick sinus syndrome (CMS-[facility])     5. Recurrent UTI     6. Cardiac pacemaker in situ     7. Cellulitis of left upper extremity         Medical Decision Making:  Today's Assessment / Status / Plan:   1. Cellulitis resolved.  2. UTI written for cipro. If not improved will see primary. She does not want a culture.  3. PPM nl function.  4. PAF noted.  5. Anticoagulation continue.  6. F/U in 4 months.  "

## 2017-09-08 NOTE — LETTER
[facility]   [address], [address]  [place] [address]  Phone: [phone]  Fax: [phone]              Meron Rice  11/18/1932    Encounter Date: 9/8/2017    Stanislav Garrett M.D.          PROGRESS NOTE:  Subjective:   Meron Rice is a 84 y.o. female who presents today with recent cellulitis. Resolved with antibiotics. Has a UTI and wants antibiotics. No constitutional symptoms. PAF. No flutter. Mild FERNANDEZ.     Past Medical History:   Diagnosis Date   • Depression 5/19/2017   • Fall 05/2017   • Pneumonia 2017   • Bronchitis 2017   • Other specified symptom associated with female genital organs 1998    fibroids   • Anemia    • Arrhythmia    • Arthritis     generalized   • ASTHMA     has not needed an inhaler since 2010   • Bowel habit changes     irreg   • Breath shortness    • Cancer (CMS-[facility]) [id number]    uterus/left breast   • CATARACT     beginning   • Chronic low back pain    • Foot-drop    • Hypertension    • Pain     left shoulder    • Personal history of venous thrombosis and embolism     in leg during child-bearing years   • Unspecified hemorrhagic conditions     post op hyst. Currently on Eliquis    • Unspecified urinary incontinence     lazy bladder; doesn't empty completely   • Urinary bladder disorder    • UTI (urinary tract infection)     frequent      Past Surgical History:   Procedure Laterality Date   • FEMUR NAILING INTRAMEDULLARY Right 6/3/2015    Procedure: FEMUR NAILING INTRAMEDULLARY;  Surgeon: Deshaun Denis M.D.;  Location: SURGERY [facility];  Service:    • WOUND CLOSURE GENERAL  4/10/2013    Performed by Nano Riley M.D. at SURGERY SAME DAY [facility] ORS   • BREAST IMPLANT REMOVAL  4/10/2013    Performed by Ele Meza M.D. at SURGERY SAME DAY [facility] ORS   • BREAST RECONSTRUCTION  11/26/2012    Performed by Ele Meza M.D. at SURGERY [facility]   • HIP HEMIARTHROPLASTY  5/14/2012    left; Performed by  KEITH COWART at SURGERY [facility] ORS   • BREAST IMPLANT REVISION  7/11/2011    Performed by JAK ROSALES at SURGERY [facility] ORS   • NIPPLE RECONSTRUCTION  7/11/2011    Performed by JAK ROSALES at SURGERY [facility] ORS   • MASTECTOMY  12/15/2010    right   • CATH REMOVAL  12/15/2010    Performed by DIPAK VARELA at SURGERY [facility] ORS   • BREAST RECONSTRUCTION  12/15/2010    Performed by JAK ROSALES at SURGERY [facility] ORS   • LATISSIMUS FLAP  12/15/2010    Performed by JAK ROSALES at SURGERY [facility] ORS   • CATH PLACEMENT  10/28/2009    Performed by DIPAK VARELA at SURGERY SAME DAY [facility] ORS   • MASTECTOMY  9/30/2009    left   • AXILLARY NODE DISSECTION  9/15/2009    Performed by DIPAK VARELA at SURGERY SAME DAY [facility] ORS   • OTHER SURGICAL PROCEDURE  2004    bladder repair   • OTHER ORTHOPEDIC SURGERY  2002    laminectomy   • HYSTERECTOMY, TOTAL ABDOMINAL  2000   • GYN SURGERY  1998    excision of fibroids   • APPENDECTOMY  1968   • DILATION AND CURETTAGE  1961   • VEIN LIGAT & STRIP  1972, 1966    x2 bilateral     Family History   Problem Relation Age of Onset   • Diabetes Mother    • Heart Disease Mother    • Heart Disease Father    • Stroke Father    • Hypertension Father    • Diabetes Sister    • Heart Disease Sister    • Cancer Brother      lung   • Diabetes     • Heart Disease     • Stroke     • Cancer     • Hypertension     • Diabetes Other    • Diabetes Child    • Psychiatry Child      History   Smoking Status   • Former Smoker   • Packs/day: 0.10   • Years: 1.00   • Types: Cigarettes   • Quit date: 1/1/1964   Smokeless Tobacco   • Never Used     Comment: 50 years ago in 1960  SOCIAL      Allergies   Allergen Reactions   • Alendronate-Butylpar-Propylpar-Saccharin [Fosamax] Diarrhea     .     Outpatient Encounter Prescriptions as of 9/8/2017   Medication Sig Dispense Refill   • ciprofloxacin (CIPRO) 250 MG Tab Take 1 Tab by mouth 2 times a  "day. 6 Tab 0   • tramadol (ULTRAM) 50 MG Tab Take 50 mg by mouth every four hours as needed.     • therapeutic multivitamin-minerals (THERAGRAN-M) Tab Take 1 Tab by mouth every day.     • apixaban (ELIQUIS) 5mg Tab Take 1 Tab by mouth 2 Times a Day. 180 Tab 3   • metoprolol (LOPRESSOR) 50 MG Tab Take 1 Tab by mouth 2 Times a Day. 180 Tab 3   • fluoxetine (PROZAC) 40 MG capsule Take 1 Cap by mouth every day. 90 Cap 4   • aspirin EC (ECOTRIN) 81 MG TBEC Take 1 Tab by mouth every day. 30 Tab 0     No facility-administered encounter medications on file as of 9/8/2017.      ROS     Objective:   /84   Pulse 69   Ht 1.753 m (5' 9.02\")   Wt 78 kg (172 lb)   SpO2 94%   BMI 25.39 kg/m²      Physical Exam   Constitutional: She is oriented to person, place, and time. She appears well-developed. No distress.   HENT:   Mouth/Throat: Mucous membranes are normal.   Eyes: Conjunctivae and EOM are normal.   Neck: No JVD present. No tracheal deviation present. No thyroid mass and no thyromegaly present.   Cardiovascular: Normal rate, regular rhythm and intact distal pulses.    No murmur heard.  Pulmonary/Chest: Effort normal and breath sounds normal. No respiratory distress. She exhibits no tenderness.   Chest site normal   Abdominal: Soft. There is no tenderness.   Musculoskeletal: Normal range of motion. She exhibits no edema.   Neurological: She is alert and oriented to person, place, and time. She has normal strength. She displays no tremor.   Skin: Skin is warm and dry. She is not diaphoretic.   Psychiatric: She has a normal mood and affect. Her behavior is normal.   Vitals reviewed.      Assessment:     1. Acute cystitis without hematuria     2. PAF (paroxysmal atrial fibrillation) (CMS-HCC)     3. S/P ablation of atrial flutter     4. Sick sinus syndrome (CMS-HCC)     5. Recurrent UTI     6. Cardiac pacemaker in situ     7. Cellulitis of left upper extremity         Medical Decision Making:  Today's Assessment / " Status / Plan:   1. Cellulitis resolved.  2. UTI written for cipro. If not improved will see primary. She does not want a culture.  3. PPM nl function.  4. PAF noted.  5. Anticoagulation continue.  6. F/U in 4 months.      Lorenzo Allen M.D.  [address] [place] [address]   [address]  [place] [id number]  VIA In Basket

## 2017-09-11 ENCOUNTER — TELEPHONE (OUTPATIENT)
Dept: CARDIOLOGY | Facility: MEDICAL CENTER | Age: 82
End: 2017-09-11

## 2017-09-11 NOTE — TELEPHONE ENCOUNTER
Pt states she hates ER, not as red as it was, advised her to talk to dtr and let her know DS wants ER, we will keep appt and see what pt decides in am.

## 2017-09-11 NOTE — TELEPHONE ENCOUNTER
----- Message from Savanna Begr sent at 9/11/2017  2:16 PM PDT -----  Regarding: pacemaker site is swollen and red  VIRY/Le Keita (Homehealth nurse) visited patient today and said swelling on pacemaker site has increased, and it also appears to be red. She would like you to call the patient back at [phone].

## 2017-09-12 ENCOUNTER — HOSPITAL ENCOUNTER (INPATIENT)
Facility: MEDICAL CENTER | Age: 82
LOS: 4 days | DRG: 262 | End: 2017-09-16
Attending: INTERNAL MEDICINE | Admitting: INTERNAL MEDICINE
Payer: MEDICARE

## 2017-09-12 ENCOUNTER — OFFICE VISIT (OUTPATIENT)
Dept: CARDIOLOGY | Facility: MEDICAL CENTER | Age: 82
End: 2017-09-12
Payer: MEDICARE

## 2017-09-12 ENCOUNTER — NON-PROVIDER VISIT (OUTPATIENT)
Dept: CARDIOLOGY | Facility: MEDICAL CENTER | Age: 82
End: 2017-09-12
Payer: MEDICARE

## 2017-09-12 ENCOUNTER — RESOLUTE PROFESSIONAL BILLING HOSPITAL PROF FEE (OUTPATIENT)
Dept: HOSPITALIST | Facility: MEDICAL CENTER | Age: 82
End: 2017-09-12
Payer: MEDICARE

## 2017-09-12 ENCOUNTER — HOSPITAL ENCOUNTER (OUTPATIENT)
Facility: MEDICAL CENTER | Age: 82
End: 2017-09-12
Payer: MEDICARE

## 2017-09-12 DIAGNOSIS — Z98.890 S/P ABLATION OF ATRIAL FLUTTER: ICD-10-CM

## 2017-09-12 DIAGNOSIS — L03.114 CELLULITIS OF LEFT UPPER EXTREMITY: ICD-10-CM

## 2017-09-12 DIAGNOSIS — I49.5 SICK SINUS SYNDROME (HCC): ICD-10-CM

## 2017-09-12 DIAGNOSIS — Z95.0 CARDIAC PACEMAKER IN SITU: ICD-10-CM

## 2017-09-12 DIAGNOSIS — Z86.79 S/P ABLATION OF ATRIAL FLUTTER: ICD-10-CM

## 2017-09-12 DIAGNOSIS — Z79.01 ANTICOAGULANT LONG-TERM USE: ICD-10-CM

## 2017-09-12 DIAGNOSIS — I48.3 TYPICAL ATRIAL FLUTTER (HCC): ICD-10-CM

## 2017-09-12 PROBLEM — T82.7XXA INFECTED PACEMAKER (HCC): Status: ACTIVE | Noted: 2017-09-12

## 2017-09-12 PROCEDURE — A9270 NON-COVERED ITEM OR SERVICE: HCPCS | Performed by: INTERNAL MEDICINE

## 2017-09-12 PROCEDURE — 87040 BLOOD CULTURE FOR BACTERIA: CPT

## 2017-09-12 PROCEDURE — 93280 PM DEVICE PROGR EVAL DUAL: CPT | Performed by: INTERNAL MEDICINE

## 2017-09-12 PROCEDURE — 700105 HCHG RX REV CODE 258: Performed by: INTERNAL MEDICINE

## 2017-09-12 PROCEDURE — 770020 HCHG ROOM/CARE - TELE (206)

## 2017-09-12 PROCEDURE — 99223 1ST HOSP IP/OBS HIGH 75: CPT | Mod: AI | Performed by: INTERNAL MEDICINE

## 2017-09-12 PROCEDURE — 700102 HCHG RX REV CODE 250 W/ 637 OVERRIDE(OP): Performed by: INTERNAL MEDICINE

## 2017-09-12 PROCEDURE — 36415 COLL VENOUS BLD VENIPUNCTURE: CPT

## 2017-09-12 PROCEDURE — 99214 OFFICE O/P EST MOD 30 MIN: CPT | Mod: 25 | Performed by: INTERNAL MEDICINE

## 2017-09-12 RX ORDER — SODIUM CHLORIDE 9 MG/ML
INJECTION, SOLUTION INTRAVENOUS CONTINUOUS
Status: DISCONTINUED | OUTPATIENT
Start: 2017-09-12 | End: 2017-09-15

## 2017-09-12 RX ORDER — ONDANSETRON 4 MG/1
4 TABLET, ORALLY DISINTEGRATING ORAL EVERY 4 HOURS PRN
Status: DISCONTINUED | OUTPATIENT
Start: 2017-09-12 | End: 2017-09-16 | Stop reason: HOSPADM

## 2017-09-12 RX ORDER — M-VIT,TX,IRON,MINS/CALC/FOLIC 27MG-0.4MG
1 TABLET ORAL DAILY
Status: DISCONTINUED | OUTPATIENT
Start: 2017-09-12 | End: 2017-09-16 | Stop reason: HOSPADM

## 2017-09-12 RX ORDER — METOPROLOL TARTRATE 50 MG/1
50 TABLET, FILM COATED ORAL 2 TIMES DAILY
Status: DISCONTINUED | OUTPATIENT
Start: 2017-09-12 | End: 2017-09-13

## 2017-09-12 RX ORDER — POLYETHYLENE GLYCOL 3350 17 G/17G
1 POWDER, FOR SOLUTION ORAL
Status: DISCONTINUED | OUTPATIENT
Start: 2017-09-12 | End: 2017-09-16 | Stop reason: HOSPADM

## 2017-09-12 RX ORDER — TRAMADOL HYDROCHLORIDE 50 MG/1
50 TABLET ORAL EVERY 4 HOURS PRN
Status: DISCONTINUED | OUTPATIENT
Start: 2017-09-12 | End: 2017-09-16 | Stop reason: HOSPADM

## 2017-09-12 RX ORDER — BISACODYL 10 MG
10 SUPPOSITORY, RECTAL RECTAL
Status: DISCONTINUED | OUTPATIENT
Start: 2017-09-12 | End: 2017-09-16 | Stop reason: HOSPADM

## 2017-09-12 RX ORDER — LABETALOL HYDROCHLORIDE 5 MG/ML
10 INJECTION, SOLUTION INTRAVENOUS EVERY 4 HOURS PRN
Status: DISCONTINUED | OUTPATIENT
Start: 2017-09-12 | End: 2017-09-16 | Stop reason: HOSPADM

## 2017-09-12 RX ORDER — ACETAMINOPHEN 325 MG/1
650 TABLET ORAL EVERY 6 HOURS PRN
Status: DISCONTINUED | OUTPATIENT
Start: 2017-09-12 | End: 2017-09-13

## 2017-09-12 RX ORDER — FLUOXETINE HYDROCHLORIDE 20 MG/1
40 CAPSULE ORAL DAILY
Status: DISCONTINUED | OUTPATIENT
Start: 2017-09-12 | End: 2017-09-16 | Stop reason: HOSPADM

## 2017-09-12 RX ORDER — AMOXICILLIN 250 MG
2 CAPSULE ORAL 2 TIMES DAILY
Status: DISCONTINUED | OUTPATIENT
Start: 2017-09-12 | End: 2017-09-16 | Stop reason: HOSPADM

## 2017-09-12 RX ORDER — ONDANSETRON 2 MG/ML
4 INJECTION INTRAMUSCULAR; INTRAVENOUS EVERY 4 HOURS PRN
Status: DISCONTINUED | OUTPATIENT
Start: 2017-09-12 | End: 2017-09-16 | Stop reason: HOSPADM

## 2017-09-12 RX ADMIN — SODIUM CHLORIDE: 9 INJECTION, SOLUTION INTRAVENOUS at 18:09

## 2017-09-12 RX ADMIN — TRAMADOL HYDROCHLORIDE 50 MG: 50 TABLET, COATED ORAL at 21:06

## 2017-09-12 RX ADMIN — METOPROLOL TARTRATE 50 MG: 50 TABLET, FILM COATED ORAL at 21:06

## 2017-09-12 ASSESSMENT — ENCOUNTER SYMPTOMS
NAUSEA: 0
CLAUDICATION: 0
FEVER: 0
NECK PAIN: 0
CHILLS: 0
DIZZINESS: 0
DIARRHEA: 0
FOCAL WEAKNESS: 0
WEIGHT LOSS: 0
COUGH: 0
SPUTUM PRODUCTION: 0
SHORTNESS OF BREATH: 0
SENSORY CHANGE: 0
TINGLING: 0
DEPRESSION: 0
ABDOMINAL PAIN: 0
HEADACHES: 0
WEAKNESS: 0
VOMITING: 0
BACK PAIN: 0
CONSTIPATION: 0
PALPITATIONS: 0

## 2017-09-12 ASSESSMENT — LIFESTYLE VARIABLES
EVER FELT BAD OR GUILTY ABOUT YOUR DRINKING: NO
EVER_SMOKED: NEVER
DO YOU DRINK ALCOHOL: YES
AVERAGE NUMBER OF DAYS PER WEEK YOU HAVE A DRINK CONTAINING ALCOHOL: 3
EVER HAD A DRINK FIRST THING IN THE MORNING TO STEADY YOUR NERVES TO GET RID OF A HANGOVER: NO
HAVE YOU EVER FELT YOU SHOULD CUT DOWN ON YOUR DRINKING: NO
HAVE PEOPLE ANNOYED YOU BY CRITICIZING YOUR DRINKING: NO
HOW MANY TIMES IN THE PAST YEAR HAVE YOU HAD 5 OR MORE DRINKS IN A DAY: 0
TOTAL SCORE: 0
CONSUMPTION TOTAL: NEGATIVE
TOTAL SCORE: 0
ON A TYPICAL DAY WHEN YOU DRINK ALCOHOL HOW MANY DRINKS DO YOU HAVE: 1
TOTAL SCORE: 0

## 2017-09-12 ASSESSMENT — PATIENT HEALTH QUESTIONNAIRE - PHQ9
SUM OF ALL RESPONSES TO PHQ9 QUESTIONS 1 AND 2: 0
1. LITTLE INTEREST OR PLEASURE IN DOING THINGS: NOT AT ALL
SUM OF ALL RESPONSES TO PHQ QUESTIONS 1-9: 0
2. FEELING DOWN, DEPRESSED, IRRITABLE, OR HOPELESS: NOT AT ALL

## 2017-09-12 ASSESSMENT — COGNITIVE AND FUNCTIONAL STATUS - GENERAL
WALKING IN HOSPITAL ROOM: A LITTLE
SUGGESTED CMS G CODE MODIFIER MOBILITY: CJ
SUGGESTED CMS G CODE MODIFIER DAILY ACTIVITY: CJ
DRESSING REGULAR UPPER BODY CLOTHING: A LITTLE
CLIMB 3 TO 5 STEPS WITH RAILING: A LITTLE
TURNING FROM BACK TO SIDE WHILE IN FLAT BAD: A LITTLE
DAILY ACTIVITIY SCORE: 20
DRESSING REGULAR LOWER BODY CLOTHING: A LOT
HELP NEEDED FOR BATHING: A LITTLE
MOBILITY SCORE: 21

## 2017-09-12 ASSESSMENT — PAIN SCALES - GENERAL
PAINLEVEL_OUTOF10: 0
PAINLEVEL_OUTOF10: 5

## 2017-09-12 NOTE — PROGRESS NOTES
Subjective:   Meron Rice is a 84 y.o. female who presents today with recent treatment for cellulitis with negative blood cultures. Back with low grade fevers and redness encompassing the chest  LUE and PPM site. No discharge. Good underlying rhythm.    Past Medical History:   Diagnosis Date   • Depression 5/19/2017   • Fall 05/2017   • Pneumonia 2017   • Bronchitis 2017   • Other specified symptom associated with female genital organs 1998    fibroids   • Anemia    • Arrhythmia    • Arthritis     generalized   • ASTHMA     has not needed an inhaler since 2010   • Bowel habit changes     irreg   • Breath shortness    • Cancer (CMS-HCC) [id number]    uterus/left breast   • CATARACT     beginning   • Chronic low back pain    • Foot-drop    • Hypertension    • Pain     left shoulder    • Personal history of venous thrombosis and embolism     in leg during child-bearing years   • Unspecified hemorrhagic conditions     post op hyst. Currently on Eliquis    • Unspecified urinary incontinence     lazy bladder; doesn't empty completely   • Urinary bladder disorder    • UTI (urinary tract infection)     frequent      Past Surgical History:   Procedure Laterality Date   • FEMUR NAILING INTRAMEDULLARY Right 6/3/2015    Procedure: FEMUR NAILING INTRAMEDULLARY;  Surgeon: Deshaun Denis M.D.;  Location: [facility];  Service:    • WOUND CLOSURE GENERAL  4/10/2013    Performed by Nano Riley M.D. at SURGERY SAME DAY [facility]   • BREAST IMPLANT REMOVAL  4/10/2013    Performed by Jak Meza M.D. at SURGERY SAME DAY [facility]   • BREAST RECONSTRUCTION  11/26/2012    Performed by Jak Meza M.D. at SURGERY [facility]   • HIP HEMIARTHROPLASTY  5/14/2012    left; Performed by KEITH COWART at SURGERY [facility]   • BREAST IMPLANT REVISION  7/11/2011    Performed by JAK MEZA at [facility]   • NIPPLE RECONSTRUCTION  7/11/2011    Performed by CONNIE  JAK HACKETT at SURGERY [facility] ORS   • MASTECTOMY  12/15/2010    right   • CATH REMOVAL  12/15/2010    Performed by DIPAK VARELA at SURGERY [facility] ORS   • BREAST RECONSTRUCTION  12/15/2010    Performed by JAK ROSALES at SURGERY [facility] ORS   • LATISSIMUS FLAP  12/15/2010    Performed by JAK ROSALES at SURGERY [facility] ORS   • CATH PLACEMENT  10/28/2009    Performed by DIPAK VARELA at SURGERY SAME DAY [facility] ORS   • MASTECTOMY  9/30/2009    left   • AXILLARY NODE DISSECTION  9/15/2009    Performed by DIPAK VARELA at SURGERY SAME DAY [facility] ORS   • OTHER SURGICAL PROCEDURE  2004    bladder repair   • OTHER ORTHOPEDIC SURGERY  2002    laminectomy   • HYSTERECTOMY, TOTAL ABDOMINAL  2000   • GYN SURGERY  1998    excision of fibroids   • APPENDECTOMY  1968   • DILATION AND CURETTAGE  1961   • VEIN LIGAT & STRIP  1972, 1966    x2 bilateral     Family History   Problem Relation Age of Onset   • Diabetes Mother    • Heart Disease Mother    • Heart Disease Father    • Stroke Father    • Hypertension Father    • Diabetes Sister    • Heart Disease Sister    • Cancer Brother      lung   • Diabetes     • Heart Disease     • Stroke     • Cancer     • Hypertension     • Diabetes Other    • Diabetes Child    • Psychiatry Child      History   Smoking Status   • Former Smoker   • Packs/day: 0.10   • Years: 1.00   • Types: Cigarettes   • Quit date: 1/1/1964   Smokeless Tobacco   • Never Used     Comment: 50 years ago in 1960  SOCIAL      Allergies   Allergen Reactions   • Alendronate-Butylpar-Propylpar-Saccharin [Fosamax] Diarrhea     .     Outpatient Encounter Prescriptions as of 9/12/2017   Medication Sig Dispense Refill   • ciprofloxacin (CIPRO) 250 MG Tab Take 1 Tab by mouth 2 times a day. 6 Tab 0   • tramadol (ULTRAM) 50 MG Tab Take 50 mg by mouth every four hours as needed.     • therapeutic multivitamin-minerals (THERAGRAN-M) Tab Take 1 Tab by mouth every day.     • apixaban (ELIQUIS) 5mg  Tab Take 1 Tab by mouth 2 Times a Day. 180 Tab 3   • metoprolol (LOPRESSOR) 50 MG Tab Take 1 Tab by mouth 2 Times a Day. 180 Tab 3   • fluoxetine (PROZAC) 40 MG capsule Take 1 Cap by mouth every day. 90 Cap 4   • aspirin EC (ECOTRIN) 81 MG TBEC Take 1 Tab by mouth every day. 30 Tab 0     No facility-administered encounter medications on file as of 9/12/2017.      ROS     Objective:   There were no vitals taken for this visit.    Physical Exam   Constitutional: She is oriented to person, place, and time. She appears well-developed. No distress.   HENT:   Mouth/Throat: Mucous membranes are normal.   Eyes: Conjunctivae and EOM are normal.   Neck: No JVD present. No tracheal deviation present. No thyroid mass and no thyromegaly present.   Cardiovascular: Normal rate, regular rhythm and intact distal pulses.    No murmur heard.  Pulmonary/Chest: Effort normal and breath sounds normal. No respiratory distress. She exhibits no tenderness.   Redness over chest   Abdominal: Soft. There is no tenderness.   Musculoskeletal: Normal range of motion. She exhibits no edema.   Neurological: She is alert and oriented to person, place, and time. She has normal strength. She displays no tremor.   Skin: Skin is warm and dry. She is not diaphoretic.   Psychiatric: She has a normal mood and affect. Her behavior is normal.   Vitals reviewed.      Assessment:     1. Anticoagulant long-term use     2. Typical atrial flutter (CMS-HCC)     3. Sick sinus syndrome (CMS-HCC)     4. S/P ablation of atrial flutter     5. Cellulitis of left upper extremity         Medical Decision Making:  Today's Assessment / Status / Plan:   1. Possible PPM infection. Admit. Consult hospitalist. Check blood work.  Removal PPM and leads tomorrow.  2. Hold beta blockers and eliquis.

## 2017-09-12 NOTE — PROGRESS NOTES
Direct admit from [facility], Dr. Stanislav Garrett, [phone].  Accepted by Dr. Garrett for Pacemaker removal secondary to infection.  Cath lab in am.  Hospitalist will consult per office report.  ADT signed & held @ [phone], needs to be released upon pt arrival.  Written orders received.  Fax to unit [phone] and scanned into Media tab.  Pt coming by private vehicle.

## 2017-09-12 NOTE — LETTER
[facility]   [address]  [place] [address]  Phone: [phone]  Fax: [phone]              Meron Rice  11/18/1932    Encounter Date: 9/12/2017    Stanislav Garrett M.D.          PROGRESS NOTE:  Subjective:   Meron Rice is a 84 y.o. female who presents today with recent treatment for cellulitis with negative blood cultures. Back with low grade fevers and redness encompassing the chest  LUE and PPM site. No discharge. Good underlying rhythm.    Past Medical History:   Diagnosis Date   • Depression 5/19/2017   • Fall 05/2017   • Pneumonia 2017   • Bronchitis 2017   • Other specified symptom associated with female genital organs 1998    fibroids   • Anemia    • Arrhythmia    • Arthritis     generalized   • ASTHMA     has not needed an inhaler since 2010   • Bowel habit changes     irreg   • Breath shortness    • Cancer (CMS-HCC) [id number]    uterus/left breast   • CATARACT     beginning   • Chronic low back pain    • Foot-drop    • Hypertension    • Pain     left shoulder    • Personal history of venous thrombosis and embolism     in leg during child-bearing years   • Unspecified hemorrhagic conditions     post op hyst. Currently on Eliquis    • Unspecified urinary incontinence     lazy bladder; doesn't empty completely   • Urinary bladder disorder    • UTI (urinary tract infection)     frequent      Past Surgical History:   Procedure Laterality Date   • FEMUR NAILING INTRAMEDULLARY Right 6/3/2015    Procedure: FEMUR NAILING INTRAMEDULLARY;  Surgeon: Deshaun Denis M.D.;  Location: SURGERY [facility];  Service:    • WOUND CLOSURE GENERAL  4/10/2013    Performed by Nano Riley M.D. at SURGERY SAME DAY [facility] ORS   • BREAST IMPLANT REMOVAL  4/10/2013    Performed by Ele Meza M.D. at SURGERY SAME DAY [facility] ORS   • BREAST RECONSTRUCTION  11/26/2012    Performed by Ele Meza M.D. at SURGERY [facility]   • HIP  HEMIARTHROPLASTY  5/14/2012    left; Performed by KEITH COWART at SURGERY [facility] ORS   • BREAST IMPLANT REVISION  7/11/2011    Performed by JAK ROSALES at SURGERY [facility] ORS   • NIPPLE RECONSTRUCTION  7/11/2011    Performed by JAK ROSALES at SURGERY [facility] ORS   • MASTECTOMY  12/15/2010    right   • CATH REMOVAL  12/15/2010    Performed by DIPAK VARELA at SURGERY [facility] ORS   • BREAST RECONSTRUCTION  12/15/2010    Performed by JAK ROSALES at SURGERY [facility] ORS   • LATISSIMUS FLAP  12/15/2010    Performed by JAK ROSALES at SURGERY [facility] ORS   • CATH PLACEMENT  10/28/2009    Performed by DIPAK VARELA at SURGERY SAME DAY [facility] ORS   • MASTECTOMY  9/30/2009    left   • AXILLARY NODE DISSECTION  9/15/2009    Performed by DIPAK VARELA at SURGERY SAME DAY [facility] ORS   • OTHER SURGICAL PROCEDURE  2004    bladder repair   • OTHER ORTHOPEDIC SURGERY  2002    laminectomy   • HYSTERECTOMY, TOTAL ABDOMINAL  2000   • GYN SURGERY  1998    excision of fibroids   • APPENDECTOMY  1968   • DILATION AND CURETTAGE  1961   • VEIN LIGAT & STRIP  1972, 1966    x2 bilateral     Family History   Problem Relation Age of Onset   • Diabetes Mother    • Heart Disease Mother    • Heart Disease Father    • Stroke Father    • Hypertension Father    • Diabetes Sister    • Heart Disease Sister    • Cancer Brother      lung   • Diabetes     • Heart Disease     • Stroke     • Cancer     • Hypertension     • Diabetes Other    • Diabetes Child    • Psychiatry Child      History   Smoking Status   • Former Smoker   • Packs/day: 0.10   • Years: 1.00   • Types: Cigarettes   • Quit date: 1/1/1964   Smokeless Tobacco   • Never Used     Comment: 50 years ago in 1960  SOCIAL      Allergies   Allergen Reactions   • Alendronate-Butylpar-Propylpar-Saccharin [Fosamax] Diarrhea     .     Outpatient Encounter Prescriptions as of 9/12/2017   Medication Sig Dispense Refill   • ciprofloxacin  (CIPRO) 250 MG Tab Take 1 Tab by mouth 2 times a day. 6 Tab 0   • tramadol (ULTRAM) 50 MG Tab Take 50 mg by mouth every four hours as needed.     • therapeutic multivitamin-minerals (THERAGRAN-M) Tab Take 1 Tab by mouth every day.     • apixaban (ELIQUIS) 5mg Tab Take 1 Tab by mouth 2 Times a Day. 180 Tab 3   • metoprolol (LOPRESSOR) 50 MG Tab Take 1 Tab by mouth 2 Times a Day. 180 Tab 3   • fluoxetine (PROZAC) 40 MG capsule Take 1 Cap by mouth every day. 90 Cap 4   • aspirin EC (ECOTRIN) 81 MG TBEC Take 1 Tab by mouth every day. 30 Tab 0     No facility-administered encounter medications on file as of 9/12/2017.      ROS     Objective:   There were no vitals taken for this visit.    Physical Exam   Constitutional: She is oriented to person, place, and time. She appears well-developed. No distress.   HENT:   Mouth/Throat: Mucous membranes are normal.   Eyes: Conjunctivae and EOM are normal.   Neck: No JVD present. No tracheal deviation present. No thyroid mass and no thyromegaly present.   Cardiovascular: Normal rate, regular rhythm and intact distal pulses.    No murmur heard.  Pulmonary/Chest: Effort normal and breath sounds normal. No respiratory distress. She exhibits no tenderness.   Redness over chest   Abdominal: Soft. There is no tenderness.   Musculoskeletal: Normal range of motion. She exhibits no edema.   Neurological: She is alert and oriented to person, place, and time. She has normal strength. She displays no tremor.   Skin: Skin is warm and dry. She is not diaphoretic.   Psychiatric: She has a normal mood and affect. Her behavior is normal.   Vitals reviewed.      Assessment:     1. Anticoagulant long-term use     2. Typical atrial flutter (CMS-HCC)     3. Sick sinus syndrome (CMS-HCC)     4. S/P ablation of atrial flutter     5. Cellulitis of left upper extremity         Medical Decision Making:  Today's Assessment / Status / Plan:   1. Possible PPM infection. Admit. Consult hospitalist. Check blood  work.  Removal PPM and leads tomorrow.  2. Hold beta blockers and eliquis.      Lorenzo Allen M.D.  [address]  [place] [address]  VIA In Basket

## 2017-09-13 LAB
ANION GAP SERPL CALC-SCNC: 4 MMOL/L (ref 0–11.9)
BUN SERPL-MCNC: 21 MG/DL (ref 8–22)
CALCIUM SERPL-MCNC: 9 MG/DL (ref 8.5–10.5)
CHLORIDE SERPL-SCNC: 106 MMOL/L (ref 96–112)
CO2 SERPL-SCNC: 23 MMOL/L (ref 20–33)
CREAT SERPL-MCNC: 0.86 MG/DL (ref 0.5–1.4)
EKG IMPRESSION: NORMAL
ERYTHROCYTE [DISTWIDTH] IN BLOOD BY AUTOMATED COUNT: 50.5 FL (ref 35.9–50)
GFR SERPL CREATININE-BSD FRML MDRD: >60 ML/MIN/1.73 M 2
GLUCOSE SERPL-MCNC: 95 MG/DL (ref 65–99)
GRAM STN SPEC: NORMAL
GRAM STN SPEC: NORMAL
HCT VFR BLD AUTO: 41.9 % (ref 37–47)
HGB BLD-MCNC: 13.8 G/DL (ref 12–16)
INR PPP: 1.16 (ref 0.87–1.13)
MCH RBC QN AUTO: 30.8 PG (ref 27–33)
MCHC RBC AUTO-ENTMCNC: 32.9 G/DL (ref 33.6–35)
MCV RBC AUTO: 93.5 FL (ref 81.4–97.8)
PLATELET # BLD AUTO: 189 K/UL (ref 164–446)
PMV BLD AUTO: 9.5 FL (ref 9–12.9)
POTASSIUM SERPL-SCNC: 3.7 MMOL/L (ref 3.6–5.5)
PROTHROMBIN TIME: 15.2 SEC (ref 12–14.6)
RBC # BLD AUTO: 4.48 M/UL (ref 4.2–5.4)
SIGNIFICANT IND 70042: NORMAL
SIGNIFICANT IND 70042: NORMAL
SITE SITE: NORMAL
SITE SITE: NORMAL
SODIUM SERPL-SCNC: 133 MMOL/L (ref 135–145)
SOURCE SOURCE: NORMAL
SOURCE SOURCE: NORMAL
WBC # BLD AUTO: 6.9 K/UL (ref 4.8–10.8)

## 2017-09-13 PROCEDURE — 99233 SBSQ HOSP IP/OBS HIGH 50: CPT | Performed by: INTERNAL MEDICINE

## 2017-09-13 PROCEDURE — 80048 BASIC METABOLIC PNL TOTAL CA: CPT

## 2017-09-13 PROCEDURE — 99153 MOD SED SAME PHYS/QHP EA: CPT

## 2017-09-13 PROCEDURE — 700102 HCHG RX REV CODE 250 W/ 637 OVERRIDE(OP): Performed by: INTERNAL MEDICINE

## 2017-09-13 PROCEDURE — 93005 ELECTROCARDIOGRAM TRACING: CPT | Performed by: INTERNAL MEDICINE

## 2017-09-13 PROCEDURE — 99152 MOD SED SAME PHYS/QHP 5/>YRS: CPT

## 2017-09-13 PROCEDURE — A9270 NON-COVERED ITEM OR SERVICE: HCPCS | Performed by: INTERNAL MEDICINE

## 2017-09-13 PROCEDURE — 87070 CULTURE OTHR SPECIMN AEROBIC: CPT | Mod: 91

## 2017-09-13 PROCEDURE — 87205 SMEAR GRAM STAIN: CPT

## 2017-09-13 PROCEDURE — 0JPT0PZ REMOVAL OF CARDIAC RHYTHM RELATED DEVICE FROM TRUNK SUBCUTANEOUS TISSUE AND FASCIA, OPEN APPROACH: ICD-10-PCS | Performed by: INTERNAL MEDICINE

## 2017-09-13 PROCEDURE — 93010 ELECTROCARDIOGRAM REPORT: CPT | Performed by: INTERNAL MEDICINE

## 2017-09-13 PROCEDURE — 770020 HCHG ROOM/CARE - TELE (206)

## 2017-09-13 PROCEDURE — 85610 PROTHROMBIN TIME: CPT

## 2017-09-13 PROCEDURE — 33235 REMOVAL PACEMAKER ELECTRODE: CPT

## 2017-09-13 PROCEDURE — 85027 COMPLETE CBC AUTOMATED: CPT

## 2017-09-13 PROCEDURE — 02PA3MZ REMOVAL OF CARDIAC LEAD FROM HEART, PERCUTANEOUS APPROACH: ICD-10-PCS | Performed by: INTERNAL MEDICINE

## 2017-09-13 PROCEDURE — 304952 HCHG R 2 PADS

## 2017-09-13 PROCEDURE — 33233 REMOVAL OF PM GENERATOR: CPT

## 2017-09-13 PROCEDURE — 700105 HCHG RX REV CODE 258: Performed by: INTERNAL MEDICINE

## 2017-09-13 PROCEDURE — 36415 COLL VENOUS BLD VENIPUNCTURE: CPT

## 2017-09-13 PROCEDURE — 700111 HCHG RX REV CODE 636 W/ 250 OVERRIDE (IP)

## 2017-09-13 PROCEDURE — 700101 HCHG RX REV CODE 250

## 2017-09-13 PROCEDURE — 305387 HCHG SUTURES

## 2017-09-13 RX ORDER — MIDAZOLAM HYDROCHLORIDE 1 MG/ML
INJECTION INTRAMUSCULAR; INTRAVENOUS
Status: COMPLETED
Start: 2017-09-13 | End: 2017-09-13

## 2017-09-13 RX ORDER — OXYCODONE HYDROCHLORIDE AND ACETAMINOPHEN 5; 325 MG/1; MG/1
1 TABLET ORAL EVERY 4 HOURS PRN
Status: DISCONTINUED | OUTPATIENT
Start: 2017-09-13 | End: 2017-09-16 | Stop reason: HOSPADM

## 2017-09-13 RX ORDER — LIDOCAINE HYDROCHLORIDE 20 MG/ML
INJECTION, SOLUTION INFILTRATION; PERINEURAL
Status: COMPLETED
Start: 2017-09-13 | End: 2017-09-13

## 2017-09-13 RX ORDER — ACETAMINOPHEN 325 MG/1
650 TABLET ORAL EVERY 4 HOURS PRN
Status: DISCONTINUED | OUTPATIENT
Start: 2017-09-13 | End: 2017-09-16 | Stop reason: HOSPADM

## 2017-09-13 RX ADMIN — MULTIPLE VITAMINS W/ MINERALS TAB 1 TABLET: TAB at 09:00

## 2017-09-13 RX ADMIN — MIDAZOLAM 1 MG: 1 INJECTION INTRAMUSCULAR; INTRAVENOUS at 12:53

## 2017-09-13 RX ADMIN — TRAMADOL HYDROCHLORIDE 50 MG: 50 TABLET, COATED ORAL at 20:49

## 2017-09-13 RX ADMIN — FENTANYL CITRATE 100 MCG: 50 INJECTION, SOLUTION INTRAMUSCULAR; INTRAVENOUS at 12:53

## 2017-09-13 RX ADMIN — TRAMADOL HYDROCHLORIDE 50 MG: 50 TABLET, COATED ORAL at 05:54

## 2017-09-13 RX ADMIN — MIDAZOLAM 2 MG: 1 INJECTION INTRAMUSCULAR; INTRAVENOUS at 12:53

## 2017-09-13 RX ADMIN — VANCOMYCIN HYDROCHLORIDE 3200 MG: 1 INJECTION, POWDER, LYOPHILIZED, FOR SOLUTION INTRAVENOUS at 12:20

## 2017-09-13 RX ADMIN — SODIUM CHLORIDE: 9 INJECTION, SOLUTION INTRAVENOUS at 05:54

## 2017-09-13 RX ADMIN — ASPIRIN 81 MG: 81 TABLET ORAL at 09:33

## 2017-09-13 RX ADMIN — LIDOCAINE HYDROCHLORIDE: 20 INJECTION, SOLUTION INFILTRATION; PERINEURAL at 12:52

## 2017-09-13 RX ADMIN — FLUOXETINE HYDROCHLORIDE 40 MG: 20 CAPSULE ORAL at 09:32

## 2017-09-13 RX ADMIN — METOPROLOL TARTRATE 50 MG: 50 TABLET, FILM COATED ORAL at 09:00

## 2017-09-13 ASSESSMENT — COGNITIVE AND FUNCTIONAL STATUS - GENERAL
DRESSING REGULAR UPPER BODY CLOTHING: A LITTLE
SUGGESTED CMS G CODE MODIFIER MOBILITY: CJ
WALKING IN HOSPITAL ROOM: A LITTLE
WALKING IN HOSPITAL ROOM: A LITTLE
MOBILITY SCORE: 21
HELP NEEDED FOR BATHING: A LITTLE
SUGGESTED CMS G CODE MODIFIER DAILY ACTIVITY: CJ
MOBILITY SCORE: 21
DRESSING REGULAR LOWER BODY CLOTHING: A LITTLE
DRESSING REGULAR LOWER BODY CLOTHING: A LITTLE
DAILY ACTIVITIY SCORE: 21
HELP NEEDED FOR BATHING: A LITTLE
TURNING FROM BACK TO SIDE WHILE IN FLAT BAD: A LITTLE
SUGGESTED CMS G CODE MODIFIER DAILY ACTIVITY: CJ
TURNING FROM BACK TO SIDE WHILE IN FLAT BAD: A LITTLE
CLIMB 3 TO 5 STEPS WITH RAILING: A LITTLE
SUGGESTED CMS G CODE MODIFIER MOBILITY: CJ
DRESSING REGULAR UPPER BODY CLOTHING: A LITTLE
DAILY ACTIVITIY SCORE: 21
CLIMB 3 TO 5 STEPS WITH RAILING: A LITTLE

## 2017-09-13 ASSESSMENT — ENCOUNTER SYMPTOMS
CHILLS: 0
NECK PAIN: 0
CONSTIPATION: 0
COUGH: 0
FEVER: 0
DEPRESSION: 0
HEADACHES: 0
SPUTUM PRODUCTION: 0
PALPITATIONS: 0
NAUSEA: 0
BACK PAIN: 0
DIZZINESS: 0
ABDOMINAL PAIN: 0
SENSORY CHANGE: 0

## 2017-09-13 ASSESSMENT — PAIN SCALES - GENERAL
PAINLEVEL_OUTOF10: 1
PAINLEVEL_OUTOF10: 4
PAINLEVEL_OUTOF10: 0
PAINLEVEL_OUTOF10: 0
PAINLEVEL_OUTOF10: 5

## 2017-09-13 NOTE — CARE PLAN
Problem: Infection  Goal: Will remain free from infection  Outcome: PROGRESSING AS EXPECTED  Patient demonstrates proper hand hygiene.    Problem: Knowledge Deficit  Goal: Knowledge of disease process/condition, treatment plan, diagnostic tests, and medications will improve  Outcome: PROGRESSING AS EXPECTED  Patient understands she has been NPO for pacemaker removal today.

## 2017-09-13 NOTE — ASSESSMENT & PLAN NOTE
Status post pacemaker placement, concern for pacemaker infection.  Monitor patient closely on telemetry  Continue patient on metoprolol with holding parameters.

## 2017-09-13 NOTE — ASSESSMENT & PLAN NOTE
Dr. Garrett following, s/p pacemaker removal   Blood cultures NGTD, pacemaker lead cultures NGTD   Echo done May 2017 shows ejection fraction 55%, grade 3 diastolic function, mild mitral regurgitation [confirmed on UNA]  Dr. Guzman, ID following, on Ancef

## 2017-09-13 NOTE — PROGRESS NOTES
[facility] Progress [facility]    Date of Service: 2017    Chief Complaint  84 y.o. female with past medical history of paroxysmal atrial fibrillation on Eliquis, HTN admitted 2017 with concern for pacemaker infection from cardiologists's office.     Interval Problem Update  No acute events overnight. Patient resting in bed. Patient denies any fever/chills, chest pain, shortness of breath. Patient scheduled for pacemaker removal today. Patient presently nothing by mouth.    Consultants/Specialty  Dr. Garrett/cardiology    Disposition  To be determined        Review of Systems   Constitutional: Negative for chills and fever.   Respiratory: Negative for cough and sputum production.    Cardiovascular: Negative for chest pain and palpitations.   Gastrointestinal: Negative for abdominal pain, constipation and nausea.   Musculoskeletal: Negative for back pain, joint pain and neck pain.   Neurological: Negative for dizziness, sensory change and headaches.   Psychiatric/Behavioral: Negative for depression.      Physical Exam  Laboratory/Imaging   Hemodynamics  Temp (24hrs), Av.3 °C (97.4 °F), Min:36 °C (96.8 °F), Max:36.9 °C (98.4 °F)   Temperature: 36.3 °C (97.3 °F)  Pulse  Av.2  Min: 56  Max: 66    Blood Pressure : 154/84 (Nurse notified.)      Respiratory      Respiration: 18, Pulse Oximetry: 96 %             Fluids    Intake/Output Summary (Last 24 hours) at 17 1058  Last data filed at 17 0800   Gross per 24 hour   Intake             1305 ml   Output                0 ml   Net             1305 ml       Nutrition  Orders Placed This Encounter   Procedures   • Diet Order     Standing Status:   Standing     Number of Occurrences:   1     Order Specific Question:   Diet:     Answer:   2 Gram Sodium [7]     Order Specific Question:   Diet:     Answer:   Cardiac [6]     Physical Exam   Constitutional: She is oriented to person, place, and time. She appears well-developed and well-nourished. No  distress.   HENT:   Head: Normocephalic and atraumatic.   Right Ear: External ear normal.   Left Ear: External ear normal.   Eyes: Conjunctivae and EOM are normal. Right eye exhibits no discharge. Left eye exhibits no discharge. No scleral icterus.   Neck: Normal range of motion. Neck supple. No JVD present.   Cardiovascular: Normal rate.  An irregularly irregular rhythm present.   Pulmonary/Chest: Effort normal and breath sounds normal. No respiratory distress. She has no wheezes. She has no rales.   Pacemaker noted left anterior chest wall. No erythema or exudate noted around site.   Abdominal: Soft. Bowel sounds are normal. She exhibits no distension. There is no tenderness. There is no rebound.   Neurological: She is alert and oriented to person, place, and time. No cranial nerve deficit.   Skin: Skin is warm and dry. She is not diaphoretic. There is pallor.   Psychiatric: She has a normal mood and affect. Her behavior is normal. Thought content normal.   Nursing note and vitals reviewed.      Recent Labs      09/13/17   0319   WBC  6.9   RBC  4.48   HEMOGLOBIN  13.8   HEMATOCRIT  41.9   MCV  93.5   MCH  30.8   MCHC  32.9*   RDW  50.5*   PLATELETCT  189   MPV  9.5     Recent Labs      09/13/17   0319   SODIUM  133*   POTASSIUM  3.7   CHLORIDE  106   CO2  23   GLUCOSE  95   BUN  21   CREATININE  0.86   CALCIUM  9.0                      Assessment/Plan     Sick sinus syndrome (CMS-HCC)- (present on admission)   Assessment & Plan    Status post pacemaker placement, concern for pacemaker infection.  Monitor patient closely on telemetry  Continue patient on metoprolol with holding parameters.        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 Garrett following, patient scheduled for pacemaker removal tomorrow. Patient nothing by mouth after  midnight.  Blood cultures pending.  Echo done May 2017 shows ejection fraction 55%, grade 3 diastolic function, mild mitral regurgitation [confirmed on UNA]  Dr. Guzman, ID consulted, no antibiotics at present time. Will await culture results.        PAF (paroxysmal atrial fibrillation) (CMS-HCC)- (present on admission)   Assessment & Plan    Rate controlled, continue patient on metoprolol  Eliquis held for surgical intervention, will resume postprocedure        Hx of cancer of uterus- (present on admission)   Assessment & Plan    Stable        Hx of breast cancer- (present on admission)   Assessment & Plan    Status post bilateral mastectomy, stable            Reviewed items::  Labs reviewed and Medications reviewed  Presley catheter::  No Presley  DVT prophylaxis - mechanical:  SCDs  Ulcer Prophylaxis::  Not indicated

## 2017-09-13 NOTE — PROGRESS NOTES
2 RN skin assessment performed by AHSAN Gsaton and AHSAN Hamilton. Healing scrape on L shin. L foot, distal L leg red, edema, blanching. R heel pink, blanching. Bilat feet dry, flaky. No other skin breakdown present on admission.

## 2017-09-13 NOTE — PROGRESS NOTES
Patient NPO . Scheduled for generator and lead extraction today with DR Garrett.  Site no redness. Bruised appearance. No questions. Procedure scheduled around 12 noon.

## 2017-09-13 NOTE — PROGRESS NOTES
Assumed pt care. Pt assisted to bathroom and back to bed. Plan of care discussed with pt. Verbalizes understanding. Bed in lowest position, locked, and call light within reach. Paced on monitor.

## 2017-09-13 NOTE — H&P
Hospital Medicine History and Physical    Date of Service  9/12/2017    Chief Complaint  Patient is transferred today from Dr. Stanislav Garrett's office for concerns of pacemaker infection    History of Presenting Illness  This is a 84 y.o. female with past medical history significant for breast cancer, uterine cancer, hypertension, sick sinus syndrome status post pacemaker placement on June 1, 2017 who presented 9/12/2017 with concern for infected pacemaker. Patient was recently admitted to our service for left upper extremity cellulitis with extension over her chest and pacemaker site. She was treated with antibiotics and discharged home on Augmentin to complete her antibiotic course. Patient states that her erythema around her left arm improved but continues to have purplish discoloration over her pacemaker site. She was evaluated by Dr. Garrett's office today and is admitted directly for pacemaker removal scheduled for tomorrow morning. She denies any fever/chills, nausea/vomiting, chest pain, palpitations, shortness of breath, lower extremity edema, abdominal pain.    Primary Care Physician  Lorenzo Allen M.D.    Consultants  Dr. Garrett - Cardiology    Code Status  FULL CODE     Review of Systems  Review of Systems   Constitutional: Negative for chills, fever, malaise/fatigue and weight loss.   HENT: Negative for hearing loss.    Respiratory: Negative for cough, sputum production and shortness of breath.    Cardiovascular: Negative for chest pain, palpitations, claudication and leg swelling.   Gastrointestinal: Negative for abdominal pain, constipation, diarrhea, nausea and vomiting.   Genitourinary: Negative for dysuria, frequency and urgency.   Musculoskeletal: Negative for back pain and neck pain.   Neurological: Negative for dizziness, tingling, sensory change, focal weakness, weakness and headaches.   Psychiatric/Behavioral: Negative for depression.        Past Medical History  Past Medical History:    Diagnosis Date   • Depression 5/19/2017   • Fall 05/2017   • Pneumonia 2017   • Bronchitis 2017   • Other specified symptom associated with female genital organs 1998    fibroids   • Anemia    • Arrhythmia    • Arthritis     generalized   • ASTHMA     has not needed an inhaler since 2010   • Bowel habit changes     irreg   • Breath shortness    • Cancer (CMS-HCC) [id number]    uterus/left breast   • CATARACT     beginning   • Chronic low back pain    • Foot-drop    • Hypertension    • Pain     left shoulder    • Personal history of venous thrombosis and embolism     in leg during child-bearing years   • Unspecified hemorrhagic conditions     post op hyst. Currently on Eliquis    • Unspecified urinary incontinence     lazy bladder; doesn't empty completely   • Urinary bladder disorder    • UTI (urinary tract infection)     frequent        Surgical History  Past Surgical History:   Procedure Laterality Date   • FEMUR NAILING INTRAMEDULLARY Right 6/3/2015    Procedure: FEMUR NAILING INTRAMEDULLARY;  Surgeon: Deshaun Denis M.D.;  Location: [facility];  Service:    • WOUND CLOSURE GENERAL  4/10/2013    Performed by Nano Varela M.D. at SURGERY SAME DAY [facility] ORS   • BREAST IMPLANT REMOVAL  4/10/2013    Performed by Jak Meza M.D. at SURGERY SAME DAY [facility] ORS   • BREAST RECONSTRUCTION  11/26/2012    Performed by Jak Meza M.D. at SURGERY [facility]   • HIP HEMIARTHROPLASTY  5/14/2012    left; Performed by KEITH COWART at SURGERY [facility]   • BREAST IMPLANT REVISION  7/11/2011    Performed by JAK MEZA at SURGERY [facility]   • NIPPLE RECONSTRUCTION  7/11/2011    Performed by JAK MEZA at SURGERY [facility]   • MASTECTOMY  12/15/2010    right   • CATH REMOVAL  12/15/2010    Performed by NANO VARELA at SURGERY [facility]   • BREAST RECONSTRUCTION  12/15/2010    Performed by JAK MEZA at SURGERY [facility]   •  LATISSIMUS FLAP  12/15/2010    Performed by JAK ROSALES at SURGERY [facility] ORS   • CATH PLACEMENT  10/28/2009    Performed by DIPAK VARELA at SURGERY SAME DAY [facility] ORS   • MASTECTOMY  9/30/2009    left   • AXILLARY NODE DISSECTION  9/15/2009    Performed by DIPAK VARELA at SURGERY SAME DAY [facility] ORS   • OTHER SURGICAL PROCEDURE  2004    bladder repair   • OTHER ORTHOPEDIC SURGERY  2002    laminectomy   • HYSTERECTOMY, TOTAL ABDOMINAL  2000   • GYN SURGERY  1998    excision of fibroids   • APPENDECTOMY  1968   • DILATION AND CURETTAGE  1961   • VEIN LIGAT & STRIP  1972, 1966    x2 bilateral       Medications  No current facility-administered medications on file prior to encounter.      Current Outpatient Prescriptions on File Prior to Encounter   Medication Sig Dispense Refill   • tramadol (ULTRAM) 50 MG Tab Take 50 mg by mouth every four hours as needed.     • therapeutic multivitamin-minerals (THERAGRAN-M) Tab Take 1 Tab by mouth every day.     • apixaban (ELIQUIS) 5mg Tab Take 1 Tab by mouth 2 Times a Day. 180 Tab 3   • metoprolol (LOPRESSOR) 50 MG Tab Take 1 Tab by mouth 2 Times a Day. 180 Tab 3   • fluoxetine (PROZAC) 40 MG capsule Take 1 Cap by mouth every day. 90 Cap 4   • aspirin EC (ECOTRIN) 81 MG TBEC Take 1 Tab by mouth every day. 30 Tab 0       Family History  Family History   Problem Relation Age of Onset   • Diabetes Mother    • Heart Disease Mother    • Heart Disease Father    • Stroke Father    • Hypertension Father    • Diabetes Sister    • Heart Disease Sister    • Cancer Brother      lung   • Diabetes     • Heart Disease     • Stroke     • Cancer     • Hypertension     • Diabetes Other    • Diabetes Child    • Psychiatry Child        Social History  Social History   Substance Use Topics   • Smoking status: Former Smoker     Packs/day: 0.10     Years: 1.00     Types: Cigarettes     Quit date: 1/1/1964   • Smokeless tobacco: Never Used      Comment: 50 years ago in 1960  SOCIAL     • Alcohol use Yes      Comment: 3-4 drinks per week        Allergies  Allergies   Allergen Reactions   • Alendronate-Butylpar-Propylpar-Saccharin [Fosamax] Diarrhea     .        Physical Exam  Laboratory   Hemodynamics  Temp (24hrs), Av.4 °C (97.6 °F), Min:36.4 °C (97.6 °F), Max:36.4 °C (97.6 °F)   Temperature: 36.4 °C (97.6 °F)  Pulse  Av  Min: 60  Max: 60    Blood Pressure : 140/78      Respiratory      Respiration: 18, Pulse Oximetry: 98 %             Physical Exam   Constitutional: She is oriented to person, place, and time. She appears well-developed and well-nourished. No distress.   HENT:   Head: Normocephalic and atraumatic.   Eyes: Conjunctivae and EOM are normal. Right eye exhibits no discharge. Left eye exhibits no discharge. No scleral icterus.   Neck: Normal range of motion. Neck supple. No tracheal deviation present. No thyromegaly present.   Cardiovascular: Normal rate and regular rhythm.  Exam reveals no gallop and no friction rub.    No murmur heard.  Pulmonary/Chest: Effort normal and breath sounds normal. No respiratory distress. She has no wheezes. She has no rales.   Healed midline surgical scar noted, no erythema or exudate noted around site.   Abdominal: Soft. Bowel sounds are normal. She exhibits no distension. There is no tenderness. There is no rebound and no guarding.   Musculoskeletal: Normal range of motion. She exhibits no edema.   Neurological: She is alert and oriented to person, place, and time. No cranial nerve deficit.   Skin: She is not diaphoretic.   Purplish discoloration noted over pacemaker site on left anterior chest wall. No discharge noted from site. Nontender to palpation.   Psychiatric: She has a normal mood and affect. Her behavior is normal. Thought content normal.   Nursing note and vitals reviewed.              No results for input(s): ALTSGPT, ASTSGOT, ALKPHOSPHAT, TBILIRUBIN, DBILIRUBIN, GAMMAGT, AMYLASE, LIPASE, ALB, PREALBUMIN, GLUCOSE in the last 72  hours.              Lab Results   Component Value Date    TROPONINI <0.02 05/18/2017     Urinalysis:    Lab Results  Component Value Date/Time   SPECGRAVITY 1.015 05/12/2017 1300   GLUCOSEUR Negative 05/12/2017 1300   KETONES Negative 05/12/2017 1300   NITRITE Negative 05/12/2017 1300   WBCURINE 10-20 (A) 05/12/2017 1300   RBCURINE 2-5 (A) 03/05/2017 1555   BACTERIA Few (A) 05/12/2017 1300   EPITHELCELL Few 05/12/2017 1300        Imaging  None   Assessment/Plan     I anticipate this patient will require at least two midnights for appropriate medical management, necessitating inpatient admission.    Sick sinus syndrome (CMS-HCC)- (present on admission)   Assessment & Plan    Status post pacemaker placement, concern for pacemaker infection.  Monitor patient closely on telemetry  Continue patient on metoprolol with holding parameters.        Infected pacemaker (CMS-HCC)   Assessment & Plan    Dr. Garrett following, patient scheduled for pacemaker removal tomorrow. Patient nothing by mouth after midnight.  Blood cultures ordered.  Echo done May 2017 shows ejection fraction 55%, grade 3 diastolic function, mild mitral regurgitation [confirmed on UNA]        Hx of cancer of uterus- (present on admission)   Assessment & Plan    Stable        Hx of breast cancer- (present on admission)   Assessment & Plan    Status post bilateral mastectomy, stable            VTE prophylaxis: SCDs. Will hold patient's eliquis at present time as she is scheduled for surgery tomorrow morning.

## 2017-09-13 NOTE — CARE PLAN
Problem: Safety  Goal: Will remain free from injury  Outcome: PROGRESSING AS EXPECTED  Marivel Rice Fall Risk Assessment:     Last Known Fall: Within the last six months  Mobility: Use of assistive device/requires assist of two people  Medications: Cardiovascular or central nervous system meds  Mental Status/LOC/Awareness: Awake, alert, and oriented to date, place, and person  Toileting Needs: No needs  Volume/Electrolyte Status: Use of IV fluids/tube feeds  Communication/Sensory: Visual (Glasses)/hearing deficit  Behavior: Appropriate behavior  Marivel Rice Fall Risk Total: 12  Fall Risk Level: MODERATE RISK    Universal Fall Precautions:  call light/belongings in reach, bed in low position and locked, wheelchairs and assistive devices out of sight, siderails up x 2, use non-slip footwear, adequate lighting, clutter free and spill free environment, educate on level of risk, educate to call for assistance    Fall Risk Level Interventions:    TRIAL (TELE 8, NEURO, MED PEDRITO 5) Moderate Fall Risk Interventions  Place yellow fall risk ID band on patient: completed  Provide patient/family education based on risk assessment : completed  Educate patient/family to call staff for assistance when getting out of bed: completed  Place fall precaution signage outside patient door: completed  Utilize bed/chair fall alarm: completed     Patient Specific Interventions:     Medication: review medications with patient and family, limit combination of prn medications, provide patients who received diuretics/laxatives frequent toileting and assess need for orthostatic hypotension evaluation  Mental Status/LOC/Awareness: reinforce falls education and check on patient hourly  Toileting: provide frquent toileting, monitor intake and output/use of appropriate interventions and instruct patient/family on the use of grab bars  Volume/Electrolyte Status: ensure patient remains hydrated, monitor abnormal lab values and ensure IV fluids are  appropriate  Communication/Sensory: update plan of care on whiteboard, ensure proper positioning when transferrng/ambulating and ensure patient has glasses/contacts and hearing aids/dentures  Behavioral: encourage patient to voice feelings, engage patient in daily activities and instruct/reinforce fall program rationale  Mobility: schedule physical activity throughout the day, provide comfort measures during transport, dangle prior to standing, utilize bed/chair fall alarm, ensure bed is locked and in lowest position, provide appropriate assistive device and instruct patient to exit bed on their strongest side    Problem: Pain Management  Goal: Pain level will decrease to patient's comfort goal  Outcome: PROGRESSING AS EXPECTED   09/12/17 2100 09/12/17 8650   OTHER   Nurse Pain Scale 0 - 10  5 --    Non Verbal Scale  --  Calm;Sleeping;Unlabored Breathing   Comfort Goal --  Comfort at Rest   Discussed plan of care for pain management. Medication information per MAR, and non-pharmacological interventions: rest. Pt verbalizes agreement and understanding.

## 2017-09-13 NOTE — PROGRESS NOTES
Received bedside shift report from night RN, Marilin.  Pt is AOx4.  Discussed POC and goal for the day with patient and she verbalized understanding.  Eduated pt on white board and call light.  Bed locked and in lowest position with bilateral bedside rails up.  Will resume care and continue to monitor.    Patient is aware she is going for pacemaker removal at 1000.

## 2017-09-13 NOTE — CONSULTS
INFECTIOUS DISEASES INPATIENT CONSULT NOTE     Date of Service: 09/13/17    Consult Requested By: Stefanie Rodriguez M.D.    Reason for Consultation:  Possible infected pacemaker    History of Present Illness:   Meron Rice is a 84 y.o. Woman with a history of breast and uterine cancer, sick sinus syndrome s/p pacemaker placement and recurrent urinary tract infections on 6/1/17 admitted 9/12/2017 from cardiology clinic due to concerns for pacemaker infection. She was recently hospitalized last month for cellulitis in the left arm extending over the left chest over her pacemaker site. Doppler revealed no thrombus. She was treated with IV abx and then discharged on oral augmentin for one week. Blood cultures at that time were also negative. Patient states the erythema and tenderness improved on the abx course. She denies any recent fevers or chills but did not chills when she had cellulitis. She denies any open wound or tenderness around her pacemaker site. She was recently treated by her PCP for a urinary tract infection over the weekend with cipro for three days. She was seen in follow in the cardiology clinic on the day of admission and due to concerns for possible pacemaker infection, she was admitted to the hospital. On presentation, she was afebrile and without leukocytosis. She is not currently on abx. Blood cultures are pending. She will have her pacemaker removed today. ID service was consulted for further recommendations.     Review Of Systems:  ROS are negative except as noted above in the HPI.    PMH:   Past Medical History:   Diagnosis Date   • Depression 5/19/2017   • Fall 05/2017   • Pneumonia 2017   • Bronchitis 2017   • Other specified symptom associated with female genital organs 1998    fibroids   • Anemia    • Arrhythmia    • Arthritis     generalized   • ASTHMA     has not needed an inhaler since 2010   • Bowel habit changes     irreg   • Breath shortness    • Cancer (CMS-[facility]) [phone]     uterus/left breast   • CATARACT     beginning   • Chronic low back pain    • Foot-drop    • Hypertension    • Pain     left shoulder    • Personal history of venous thrombosis and embolism     in leg during child-bearing years   • Unspecified hemorrhagic conditions     post op hyst. Currently on Eliquis    • Unspecified urinary incontinence     lazy bladder; doesn't empty completely   • Urinary bladder disorder    • UTI (urinary tract infection)     frequent          PSH:  Past Surgical History:   Procedure Laterality Date   • FEMUR NAILING INTRAMEDULLARY Right 6/3/2015    Procedure: FEMUR NAILING INTRAMEDULLARY;  Surgeon: Deshaun Denis M.D.;  Location: [facility];  Service:    • WOUND CLOSURE GENERAL  4/10/2013    Performed by Nano Varela M.D. at SURGERY SAME DAY [facility] ORS   • BREAST IMPLANT REMOVAL  4/10/2013    Performed by Jak Meza M.D. at SURGERY SAME DAY [facility]   • BREAST RECONSTRUCTION  11/26/2012    Performed by Jak Meza M.D. at SURGERY [facility]   • HIP HEMIARTHROPLASTY  5/14/2012    left; Performed by KEITH COWART at SURGERY [facility]   • BREAST IMPLANT REVISION  7/11/2011    Performed by JAK MEZA at SURGERY [facility]   • NIPPLE RECONSTRUCTION  7/11/2011    Performed by JAK MEZA at SURGERY [facility]   • MASTECTOMY  12/15/2010    right   • CATH REMOVAL  12/15/2010    Performed by NANO VARELA at SURGERY [facility] ORS   • BREAST RECONSTRUCTION  12/15/2010    Performed by JAK MEZA at SURGERY [facility]   • LATISSIMUS FLAP  12/15/2010    Performed by JAK MEZA at SURGERY [facility]   • CATH PLACEMENT  10/28/2009    Performed by NANO VARELA at SURGERY SAME DAY [facility] ORS   • MASTECTOMY  9/30/2009    left   • AXILLARY NODE DISSECTION  9/15/2009    Performed by NANO VARELA at SURGERY SAME DAY [facility] ORS   • OTHER SURGICAL PROCEDURE  2004    bladder repair   • OTHER  ORTHOPEDIC SURGERY  2002    laminectomy   • HYSTERECTOMY, TOTAL ABDOMINAL  2000   • GYN SURGERY  1998    excision of fibroids   • APPENDECTOMY  1968   • DILATION AND CURETTAGE  1961   • VEIN LIGAT & STRIP  1972, 1966    x2 bilateral       FAMILY HX:  Family History   Problem Relation Age of Onset   • Diabetes Mother    • Heart Disease Mother    • Heart Disease Father    • Stroke Father    • Hypertension Father    • Diabetes Sister    • Heart Disease Sister    • Cancer Brother      lung   • Diabetes     • Heart Disease     • Stroke     • Cancer     • Hypertension     • Diabetes Other    • Diabetes Child    • Psychiatry Child        SOCIAL HX:  Social History     Social History   • Marital status:      Spouse name: N/A   • Number of children: N/A   • Years of education: N/A     Occupational History   • Not on file.     Social History Main Topics   • Smoking status: Former Smoker     Packs/day: 0.10     Years: 1.00     Types: Cigarettes     Quit date: 1/1/1964   • Smokeless tobacco: Never Used      Comment: 50 years ago in 1960  SOCIAL    • Alcohol use Yes      Comment: 3-4 drinks per week    • Drug use: No   • 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     Other Topics Concern   • Not on file     Social History Narrative   • No narrative on file     History   Smoking Status   • Former Smoker   • Packs/day: 0.10   • Years: 1.00   • Types: Cigarettes   • Quit date: 1/1/1964   Smokeless Tobacco   • Never Used     Comment: 50 years ago in 1960  SOCIAL      History   Alcohol Use   • Yes     Comment: 3-4 drinks per week        Allergies/Intolerances:  Allergies   Allergen Reactions   • Alendronate-Butylpar-Propylpar-Saccharin [Fosamax] Diarrhea     .       History reviewed with the patient    Other Current Medications:    Current Facility-Administered Medications:   •  influenza vaccine high-dose injection 0.5 mL, 0.5 mL, Intramuscular, Once, Stefanie Rodriguez M.D.  •  aspirin EC (ECOTRIN) tablet 81 mg, 81 mg, Oral, DAILY, Stefanie Rodriguez  "M.D.  •  fluoxetine (PROZAC) capsule 40 mg, 40 mg, Oral, DAILY, Stefanie Rodriguez M.D.  •  metoprolol (LOPRESSOR) tablet 50 mg, 50 mg, Oral, BID, Stefanie Rodriguez M.D., Stopped at 17 0900  •  therapeutic multivitamin-minerals (THERAGRAN-M) tablet 1 Tab, 1 Tab, Oral, DAILY, Stefanie Rodriguez M.D., Stopped at 17 0900  •  tramadol (ULTRAM) 50 MG tablet 50 mg, 50 mg, Oral, Q4HRS PRN, Stefanie Rodriguez M.D., 50 mg at 17 0554  •  senna-docusate (PERICOLACE or SENOKOT S) 8.6-50 MG per tablet 2 Tab, 2 Tab, Oral, BID, Stopped at 17 2100 **AND** polyethylene glycol/lytes (MIRALAX) PACKET 1 Packet, 1 Packet, Oral, QDAY PRN **AND** magnesium hydroxide (MILK OF MAGNESIA) suspension 30 mL, 30 mL, Oral, QDAY PRN **AND** bisacodyl (DULCOLAX) suppository 10 mg, 10 mg, Rectal, QDAY PRN, Stefanie Rodriguez M.D.  •  NS infusion, , Intravenous, Continuous, Stefanie Rodriguez M.D., Last Rate: 75 mL/hr at 17 0554  •  labetalol (NORMODYNE,TRANDATE) injection 10 mg, 10 mg, Intravenous, Q4HRS PRN, Stefanie Rodriguez M.D.  •  ondansetron (ZOFRAN) syringe/vial injection 4 mg, 4 mg, Intravenous, Q4HRS PRN, Stefanie Rodriguez M.D.  •  ondansetron (ZOFRAN ODT) dispertab 4 mg, 4 mg, Oral, Q4HRS PRN, Stefanie Rodriguez M.D.  •  acetaminophen (TYLENOL) tablet 650 mg, 650 mg, Oral, Q6HRS PRN, Stefanie Rodriguez M.D.  [unfilled]    Most Recent Vital Signs:  /84   Pulse 66   Temp 36.1 °C (97 °F)   Resp 16   Ht 1.753 m (5' 9\")   Wt 80.7 kg (177 lb 14.6 oz)   SpO2 95%   Breastfeeding? No   BMI 26.27 kg/m²   Temp  Av.4 °C (97.5 °F)  Min: 36 °C (96.8 °F)  Max: 36.9 °C (98.4 °F)    Physical Exam:  General: well-appearing, no acute distress  HEENT: sclera anicteric, PERRL, EOMI, MMM, no oral lesions  Neck: supple, no lymphadenopathy  Chest: CTAB, no r/r/w, normal work of breathing. Left upper chest palpable PPM. There is no open wound, erythema or tenderness to palpation over the PPM site.  Cardiac: RRR, normal S1 S2, no m/r/g   Abdomen: + bowel sounds, soft, " "non-tender, non-distended, no HSM  Extremities: WWP, no edema, 2+ pulses  Skin: no rashes or worrisome lesions  Neuro: Alert and oriented times 3, non-focal exam, speech fluent, moves all extremities    Pertinent Lab Results:  Recent Labs      09/13/17 0319   WBC  6.9      Recent Labs      09/13/17 0319   HEMOGLOBIN  13.8   HEMATOCRIT  41.9   MCV  93.5   MCH  30.8   PLATELETCT  189         Recent Labs      09/13/17 0319   SODIUM  133*   POTASSIUM  3.7   CHLORIDE  106   CO2  23   CREATININE  0.86        No results for input(s): ALBUMIN in the last 72 hours.    Invalid input(s): AST, ALT, ALKPHOS, BILITOT, TOTALBILIRUB, BILIRUBINTOT, BILIRUBINDIR, BILIRUBININD, ALKALINEPHOS     Pertinent Micro:  Results     Procedure Component Value Units Date/Time    BLOOD CULTURE [[id number]] Collected:  09/12/17 1905    Order Status:  Completed Specimen:  Blood from Peripheral Updated:  09/13/17 0819     Significant Indicator NEG     Source BLD     Site PERIPHERAL     Blood Culture --     No Growth    Note: Blood cultures are incubated for 5 days and  are monitored continuously.Positive blood cultures  are called to the RN and reported as soon as  they are identified.      Narrative:       Per Hospital Policy: Only change Specimen Src: to \"Line\" if  specified by physician order.    BLOOD CULTURE [[id number]] Collected:  09/12/17 1906    Order Status:  Completed Specimen:  Blood from Peripheral Updated:  09/13/17 0819     Significant Indicator NEG     Source BLD     Site PERIPHERAL     Blood Culture --     No Growth    Note: Blood cultures are incubated for 5 days and  are monitored continuously.Positive blood cultures  are called to the RN and reported as soon as  they are identified.      Narrative:       Per Hospital Policy: Only change Specimen Src: to \"Line\" if  specified by physician order.        Blood Culture   Date Value Ref Range Status   09/12/2017   Preliminary    No Growth    Note: Blood cultures are incubated for 5 " days and  are monitored continuously.Positive blood cultures  are called to the RN and reported as soon as  they are identified.          Studies:  None new to review    IMPRESSION:   1. Possible pacemaker infection  2. Recent left upper extremity and left upper chest cellulitis      PLAN:   Meron Rice is a 84 y.o. woman with a history of breast and uterine cancer and sick-sinus syndrome s/p PPM placement on 06/01/17 with recent left upper extremity and left upper chest cellulitis that responded to abx admitted for possible pacemaker infection. She is clinically stable without fevers, leukocytosis or signs of infection around her pacemaker; however she still could have subacute/chronic underlying infection. She was also treated with cipro over the weekend that could be masking signs of infection. Recent blood cultures were negative and blood cultures on admission are negative to date. Will continue to hold off on starting any abx given clinically stability. Please send leads and pacemaker for culture. Further recommendations per clinical course and culture results. Will continue to follow.    Plan of care discussed with DARWIN Rodriguez M.D.. Will continue to follow    Joy Guzman M.D.

## 2017-09-13 NOTE — OP REPORT
PROCEDURE:  Generator removal and pacemaker lead extraction dual chamber    : Stanislav Garrett M.D.    ANESTHESIA: Moderate sedation    ESTIMATED BLOOD LOSS: 20 cc.    SPECIMENS: None.    COMPLICATIONS: None    INDICATION: Recurrent cellulitis over PPM site    PRE-PROCEDURE ECG: a pacing    POST-PROCEDURE ECG: SR at 55 BBM    DESCRIPTION OF PROCEDURE: After informed written consent, the patient was brought to the electrophysiology lab in the fasting, unsedated state. The patient was prepped and draped in the usual sterile fashion. The procedure was performed under moderate sedation with local anesthetic. An incision was made with a scalpel along the old scar. Access to the device pocket was made using a combination of blunt dissection and electrocautery. The old generator and leads were freed from adhesions and the generator disconnected from the leads. Leads were freed from the suture sleeve. Pocket was cultured. No purulent material seen. Lead screws were retracted and the leads were removed with direct traction without difficulty. All suture material was removed. The pocket was irrigated with antibiotic solution. The wound was closed with three layers of absorbable sutures and covered with Steri-Strips. Following recovery from sedation, the patient was transferred to a monitored bed in good condition. Leads and PPM were sent to micro.    IMPLANTED DEVICE INFORMATION:    Pulse generator removed is a [employer] model L311  Serial number [id number]      LEAD INFORMATION:  1. Right atrial lead removed is a [employer] model 7740, serial number [id number]  2. Right ventricular lead removed is a [place] model 7741, serial number [id number]    TOTAL FLUORO TIME: 0.4 min    IMPRESSIONS:  1. Successful Dual chamber PPM and lead removal    RECOMMENDATIONS:  1. Transfer to PPU.

## 2017-09-14 LAB
BASOPHILS # BLD AUTO: 1.5 % (ref 0–1.8)
BASOPHILS # BLD: 0.1 K/UL (ref 0–0.12)
EKG IMPRESSION: NORMAL
EOSINOPHIL # BLD AUTO: 0.31 K/UL (ref 0–0.51)
EOSINOPHIL NFR BLD: 4.7 % (ref 0–6.9)
ERYTHROCYTE [DISTWIDTH] IN BLOOD BY AUTOMATED COUNT: 50.9 FL (ref 35.9–50)
HCT VFR BLD AUTO: 46.1 % (ref 37–47)
HGB BLD-MCNC: 15.3 G/DL (ref 12–16)
IMM GRANULOCYTES # BLD AUTO: 0.02 K/UL (ref 0–0.11)
IMM GRANULOCYTES NFR BLD AUTO: 0.3 % (ref 0–0.9)
LYMPHOCYTES # BLD AUTO: 2.59 K/UL (ref 1–4.8)
LYMPHOCYTES NFR BLD: 39 % (ref 22–41)
MCH RBC QN AUTO: 31.5 PG (ref 27–33)
MCHC RBC AUTO-ENTMCNC: 33.2 G/DL (ref 33.6–35)
MCV RBC AUTO: 94.9 FL (ref 81.4–97.8)
MONOCYTES # BLD AUTO: 0.82 K/UL (ref 0–0.85)
MONOCYTES NFR BLD AUTO: 12.3 % (ref 0–13.4)
NEUTROPHILS # BLD AUTO: 2.8 K/UL (ref 2–7.15)
NEUTROPHILS NFR BLD: 42.2 % (ref 44–72)
NRBC # BLD AUTO: 0 K/UL
NRBC BLD AUTO-RTO: 0 /100 WBC
PLATELET # BLD AUTO: 186 K/UL (ref 164–446)
PMV BLD AUTO: 9.3 FL (ref 9–12.9)
RBC # BLD AUTO: 4.86 M/UL (ref 4.2–5.4)
WBC # BLD AUTO: 6.6 K/UL (ref 4.8–10.8)

## 2017-09-14 PROCEDURE — A9270 NON-COVERED ITEM OR SERVICE: HCPCS | Performed by: INTERNAL MEDICINE

## 2017-09-14 PROCEDURE — 36415 COLL VENOUS BLD VENIPUNCTURE: CPT

## 2017-09-14 PROCEDURE — 99233 SBSQ HOSP IP/OBS HIGH 50: CPT | Performed by: INTERNAL MEDICINE

## 2017-09-14 PROCEDURE — 700105 HCHG RX REV CODE 258: Performed by: INTERNAL MEDICINE

## 2017-09-14 PROCEDURE — 93005 ELECTROCARDIOGRAM TRACING: CPT | Performed by: INTERNAL MEDICINE

## 2017-09-14 PROCEDURE — 90471 IMMUNIZATION ADMIN: CPT

## 2017-09-14 PROCEDURE — 93010 ELECTROCARDIOGRAM REPORT: CPT | Performed by: INTERNAL MEDICINE

## 2017-09-14 PROCEDURE — 700111 HCHG RX REV CODE 636 W/ 250 OVERRIDE (IP): Performed by: INTERNAL MEDICINE

## 2017-09-14 PROCEDURE — 3E0234Z INTRODUCTION OF SERUM, TOXOID AND VACCINE INTO MUSCLE, PERCUTANEOUS APPROACH: ICD-10-PCS | Performed by: INTERNAL MEDICINE

## 2017-09-14 PROCEDURE — 700102 HCHG RX REV CODE 250 W/ 637 OVERRIDE(OP): Performed by: INTERNAL MEDICINE

## 2017-09-14 PROCEDURE — 85025 COMPLETE CBC W/AUTO DIFF WBC: CPT

## 2017-09-14 PROCEDURE — 770020 HCHG ROOM/CARE - TELE (206)

## 2017-09-14 PROCEDURE — 90662 IIV NO PRSV INCREASED AG IM: CPT | Performed by: INTERNAL MEDICINE

## 2017-09-14 RX ORDER — CEFAZOLIN SODIUM 2 G/100ML
2 INJECTION, SOLUTION INTRAVENOUS EVERY 8 HOURS
Status: DISCONTINUED | OUTPATIENT
Start: 2017-09-14 | End: 2017-09-16

## 2017-09-14 RX ADMIN — ASPIRIN 81 MG: 81 TABLET ORAL at 08:41

## 2017-09-14 RX ADMIN — OXYCODONE HYDROCHLORIDE AND ACETAMINOPHEN 1 TABLET: 5; 325 TABLET ORAL at 21:57

## 2017-09-14 RX ADMIN — APIXABAN 5 MG: 5 TABLET, FILM COATED ORAL at 16:40

## 2017-09-14 RX ADMIN — CEFAZOLIN SODIUM 2 G: 2 INJECTION, SOLUTION INTRAVENOUS at 22:03

## 2017-09-14 RX ADMIN — OXYCODONE HYDROCHLORIDE AND ACETAMINOPHEN 1 TABLET: 5; 325 TABLET ORAL at 16:41

## 2017-09-14 RX ADMIN — CEFAZOLIN SODIUM 2 G: 2 INJECTION, SOLUTION INTRAVENOUS at 11:53

## 2017-09-14 RX ADMIN — OXYCODONE HYDROCHLORIDE AND ACETAMINOPHEN 1 TABLET: 5; 325 TABLET ORAL at 00:07

## 2017-09-14 RX ADMIN — MULTIPLE VITAMINS W/ MINERALS TAB 1 TABLET: TAB at 08:41

## 2017-09-14 RX ADMIN — FLUOXETINE HYDROCHLORIDE 40 MG: 20 CAPSULE ORAL at 08:40

## 2017-09-14 RX ADMIN — SODIUM CHLORIDE: 9 INJECTION, SOLUTION INTRAVENOUS at 18:49

## 2017-09-14 RX ADMIN — INFLUENZA A VIRUSA/MICHIGAN/45/2015 X-275 (H1N1) ANTIGEN (FORMALDEHYDE INACTIVATED), INFLUENZA A VIRUS A/HONG KONG/4801/2014 X-263B (H3N2) ANTIGEN (FORMALDEHYDE INACTIVATED), AND INFLUENZA B VIRUS B/BRISBANE/60/2008 ANTIGEN (FORMALDEHYDE INACTIVATED) 0.5 ML: 60; 60; 60 INJECTION, SUSPENSION INTRAMUSCULAR at 08:42

## 2017-09-14 RX ADMIN — STANDARDIZED SENNA CONCENTRATE AND DOCUSATE SODIUM 2 TABLET: 8.6; 5 TABLET, FILM COATED ORAL at 08:40

## 2017-09-14 RX ADMIN — APIXABAN 5 MG: 5 TABLET, FILM COATED ORAL at 21:57

## 2017-09-14 RX ADMIN — TRAMADOL HYDROCHLORIDE 50 MG: 50 TABLET, COATED ORAL at 08:41

## 2017-09-14 ASSESSMENT — ENCOUNTER SYMPTOMS
FOCAL WEAKNESS: 0
DIARRHEA: 0
PND: 0
COUGH: 0
DOUBLE VISION: 0
CHILLS: 0
BRUISES/BLEEDS EASILY: 0
BLURRED VISION: 0
HEARTBURN: 0
CONSTIPATION: 0
MYALGIAS: 0
HEADACHES: 0
WHEEZING: 0
SENSORY CHANGE: 0
SPUTUM PRODUCTION: 0
PALPITATIONS: 0
PSYCHIATRIC NEGATIVE: 1
SORE THROAT: 0
LOSS OF CONSCIOUSNESS: 0
ORTHOPNEA: 0
DIZZINESS: 0
ABDOMINAL PAIN: 0
NAUSEA: 0
FEVER: 0
CLAUDICATION: 0
SPEECH CHANGE: 0
VOMITING: 0
DEPRESSION: 0
SHORTNESS OF BREATH: 0
BLOOD IN STOOL: 0

## 2017-09-14 ASSESSMENT — PAIN SCALES - GENERAL
PAINLEVEL_OUTOF10: 5
PAINLEVEL_OUTOF10: 1
PAINLEVEL_OUTOF10: 5
PAINLEVEL_OUTOF10: 8
PAINLEVEL_OUTOF10: 1
PAINLEVEL_OUTOF10: 4
PAINLEVEL_OUTOF10: 2
PAINLEVEL_OUTOF10: 3

## 2017-09-14 NOTE — CARE PLAN
Problem: Safety  Goal: Will remain free from injury  Outcome: PROGRESSING AS EXPECTED  Pt is A&OX 4. Treaded socks on, bed locked & low, call light and belongings within reach.       Problem: Knowledge Deficit  Goal: Knowledge of disease process/condition, treatment plan, diagnostic tests, and medications will improve  Outcome: PROGRESSING AS EXPECTED  POC discussed with patient at bedside.

## 2017-09-14 NOTE — CARE PLAN
Problem: Knowledge Deficit  Goal: Knowledge of disease process/condition, treatment plan, diagnostic tests, and medications will improve  Outcome: PROGRESSING AS EXPECTED  Pt educated on POC, current medications and doses and disease process. All questions answered.

## 2017-09-14 NOTE — PROGRESS NOTES
Bedside report completed, assumed pt care w/ RN Miley. Pt lying in bed comfortably.  A/O x4, pain 5/10 at this time. Safety precautions in place. Call light and personal belongings within reach. Educated patient on calling for assistance when needed. All pt. needs are met at this time.

## 2017-09-14 NOTE — PROGRESS NOTES
Monitor Summary:    Paced 60-65  (R) PVC, (O) PAC, (R) Coup    Patient had pacer removed    SB with 1st degree AVB 52-59  .24 / .10 / .42

## 2017-09-14 NOTE — PROGRESS NOTES
Marivel Rice Fall Risk Assessment:     Last Known Fall: Within the last six months  Mobility: Immobilized/requires assist of one person  Medications: No meds  Mental Status/LOC/Awareness: Awake, alert, and oriented to date, place, and person  Toileting Needs: No needs  Volume/Electrolyte Status: Use of IV fluids/tube feeds  Communication/Sensory: Visual (Glasses)/hearing deficit  Behavior: Appropriate behavior  Marivel Rice Fall Risk Total: 10  Fall Risk Level: LOW RISK    Universal Fall Precautions:  call light/belongings in reach, bed in low position and locked, use non-slip footwear, adequate lighting, educate to call for assistance    Fall Risk Level Interventions:   TRIAL ([employer] 8, NEURO, MED PEDRITO 5) Low Fall Risk Interventions  Place yellow fall risk ID band on patient: verified  Provide patient/family education based on risk assessment: completed  Educate patient/family to call staff for assistance when getting out of bed: completed  Place fall precaution signage outside patient door: completedTRIAL ([employer] 8, NEURO, MED PEDRITO 5) Moderate Fall Risk Interventions  Place yellow fall risk ID band on patient: completed  Provide patient/family education based on risk assessment : completed  Educate patient/family to call staff for assistance when getting out of bed: completed  Place fall precaution signage outside patient door: completed  Utilize bed/chair fall alarm: completed     Patient Specific Interventions:     Medication: review medications with patient and family  Mental Status/LOC/Awareness: reinforce falls education  Toileting: provide frquent toileting  Volume/Electrolyte Status: ensure patient remains hydrated  Communication/Sensory: update plan of care on whiteboard  Behavioral: not applicable  Mobility: utilize bed/chair fall alarm

## 2017-09-14 NOTE — PROGRESS NOTES
[facility]    Date of Service: 2017    Chief Complaint  84 y.o. female with past medical history of paroxysmal atrial fibrillation on Eliquis, HTN admitted 2017 with concern for pacemaker infection from cardiologists's office.     Interval Problem Update  Status post pacemaker removal yesterday. Patient's culture results negative to date. Patient started on Ancef per ID recommendations. Patient complains of tenderness to palpation over left anterior chest wall. Patient eating breakfast in bed this morning, no acute complaints.    Consultants/Specialty  Dr. Garrett/cardiology    Disposition  To be determined        Review of Systems   Constitutional: Negative for chills and fever.   Respiratory: Negative for cough and sputum production.    Cardiovascular: Negative for chest pain and palpitations.        Tenderness over pacermaker removal site, left anterior chest wall   Gastrointestinal: Negative for abdominal pain, constipation and nausea.   Genitourinary: Negative for dysuria and frequency.   Neurological: Negative for dizziness, sensory change and headaches.   Psychiatric/Behavioral: Negative for depression.      Physical Exam  Laboratory/Imaging   Hemodynamics  Temp (24hrs), Av.5 °C (97.7 °F), Min:36.2 °C (97.1 °F), Max:37.1 °C (98.8 °F)   Temperature: 36.4 °C (97.5 °F)  Pulse  Av  Min: 51  Max: 66    Blood Pressure : 150/76      Respiratory      Respiration: 16, Pulse Oximetry: 94 %             Fluids    Intake/Output Summary (Last 24 hours) at 17 1356  Last data filed at 17 0800   Gross per 24 hour   Intake              600 ml   Output                0 ml   Net              600 ml       Nutrition  Orders Placed This Encounter   Procedures   • Diet Order     Standing Status:   Standing     Number of Occurrences:   1     Order Specific Question:   Diet:     Answer:   Cardiac [6]     Physical Exam   Constitutional: She is oriented to person, place, and time. She  appears well-developed and well-nourished. No distress.   HENT:   Head: Normocephalic and atraumatic.   Right Ear: External ear normal.   Left Ear: External ear normal.   Eyes: Conjunctivae and EOM are normal. Right eye exhibits no discharge. Left eye exhibits no discharge. No scleral icterus.   Neck: Normal range of motion. Neck supple. No JVD present.   Cardiovascular: Normal rate.  An irregularly irregular rhythm present.   Pulmonary/Chest: Effort normal and breath sounds normal. No respiratory distress. She has no wheezes. She has no rales. She exhibits tenderness (Left anterior chest wall, dressing in place, C/D/I).   Abdominal: Soft. Bowel sounds are normal. She exhibits no distension. There is no tenderness.   Neurological: She is alert and oriented to person, place, and time. No cranial nerve deficit.   Skin: Skin is warm and dry. She is not diaphoretic. There is pallor.   Psychiatric: She has a normal mood and affect. Her behavior is normal. Thought content normal.   Nursing note and vitals reviewed.      Recent Labs      09/13/17   0319  09/14/17   0739   WBC  6.9  6.6   RBC  4.48  4.86   HEMOGLOBIN  13.8  15.3   HEMATOCRIT  41.9  46.1   MCV  93.5  94.9   MCH  30.8  31.5   MCHC  32.9*  33.2*   RDW  50.5*  50.9*   PLATELETCT  189  186   MPV  9.5  9.3     Recent Labs      09/13/17   0319   SODIUM  133*   POTASSIUM  3.7   CHLORIDE  106   CO2  23   GLUCOSE  95   BUN  21   CREATININE  0.86   CALCIUM  9.0     Recent Labs      09/13/17   1036   INR  1.16*                  Assessment/Plan     Sick sinus syndrome (CMS-HCC)- (present on admission)   Assessment & Plan    Status post pacemaker placement, concern for pacemaker infection.  Monitor patient closely on telemetry  Continue patient on metoprolol with holding parameters.        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 Garrett following, s/p pacemaker removal   Blood cultures NGTD, pacemaker lead cultures NGTD   Echo done May 2017 shows ejection fraction 55%, grade 3 diastolic function, mild mitral regurgitation [confirmed on UNA]  Dr. Guzman, ID following, on Ancef         PAF (paroxysmal atrial fibrillation) (CMS-HCC)- (present on admission)   Assessment & Plan    Rate controlled, continue patient on metoprolol  Eliquis resumed         Hx of cancer of uterus- (present on admission)   Assessment & Plan    Stable        Hx of breast cancer- (present on admission)   Assessment & Plan    Status post bilateral mastectomy, stable            Reviewed items::  Labs reviewed and Medications reviewed  Presley catheter::  No Presley  DVT: Eliquis.  DVT prophylaxis - mechanical:  SCDs  Ulcer Prophylaxis::  Not indicated

## 2017-09-14 NOTE — PROGRESS NOTES
Cardiology Progress Note               Author: Neha Kern Date & Time created: 9/14/2017  9:18 AM     Interval History:  Patient admitted on 9/12/17 for pacemaker generator and lead removal for recurrent cellulitis despite AB treatment. Cultures have been negative.  Device removed by Dr Garrett on 9/13/17. Cultures pending.  DR Rodriguez and Dr Patel ID on board assisting with patient's care.   During the nigh lowest HR was 55 bpm.  She feels well this AM offering no complaints.  Pressure dressing over explantation site removed and new Tegaderm dressing applied.  Site appears uncomplicated.    Review of Systems   Constitutional: Negative for chills and fever.   HENT: Negative for sore throat.         No difficulty swallowing   Eyes: Negative for blurred vision and double vision.   Respiratory: Negative for cough, shortness of breath and wheezing.    Cardiovascular: Negative for chest pain, palpitations, orthopnea, claudication, leg swelling and PND.   Gastrointestinal: Negative for abdominal pain, blood in stool, heartburn, nausea and vomiting.   Genitourinary: Negative for dysuria, frequency, hematuria and urgency.   Musculoskeletal: Negative for myalgias.   Skin: Negative.    Neurological: Negative for dizziness, speech change, focal weakness, loss of consciousness and headaches.   Endo/Heme/Allergies: Does not bruise/bleed easily.   Psychiatric/Behavioral: Negative.        Physical Exam   Constitutional: She is oriented to person, place, and time. She appears well-developed and well-nourished.   HENT:   Head: Normocephalic and atraumatic.   Eyes: Pupils are equal, round, and reactive to light.   Neck: Normal range of motion. Neck supple. No thyromegaly present.   Cardiovascular: Normal rate, regular rhythm, normal heart sounds and intact distal pulses.  Exam reveals no gallop and no friction rub.    No murmur heard.  Pulmonary/Chest: Effort normal and breath sounds normal. No respiratory distress. She has no  wheezes. She has no rales. She exhibits no tenderness.   Device site is uncomplicated. Pressure dressing removed and new Tegaderm dressing applied.   Abdominal: Soft. Bowel sounds are normal. She exhibits no distension. There is no tenderness. There is no guarding.   Musculoskeletal: Normal range of motion. She exhibits no edema.   Neurological: She is alert and oriented to person, place, and time.   Skin: Skin is warm and dry.   Psychiatric: She has a normal mood and affect.       Hemodynamics:  Temp (24hrs), Av.4 °C (97.6 °F), Min:36.1 °C (97 °F), Max:37.1 °C (98.8 °F)  Temperature: 36.2 °C (97.1 °F)  Pulse  Av.6  Min: 51  Max: 66   Blood Pressure : 150/73     Respiratory:    Respiration: 18, Pulse Oximetry: 93 %           Fluids:     Weight: 79.9 kg (176 lb 2.4 oz)  GI/Nutrition:  Orders Placed This Encounter   Procedures   • Diet Order     Standing Status:   Standing     Number of Occurrences:   1     Order Specific Question:   Diet:     Answer:   Cardiac [6]     Lab Results:  Recent Labs      17   0319  17   0739   WBC  6.9  6.6   RBC  4.48  4.86   HEMOGLOBIN  13.8  15.3   HEMATOCRIT  41.9  46.1   MCV  93.5  94.9   MCH  30.8  31.5   MCHC  32.9*  33.2*   RDW  50.5*  50.9*   PLATELETCT  189  186   MPV  9.5  9.3     Recent Labs      17   0319   SODIUM  133*   POTASSIUM  3.7   CHLORIDE  106   CO2  23   GLUCOSE  95   BUN  21   CREATININE  0.86   CALCIUM  9.0     Recent Labs      17   1036   INR  1.16*                     Medical Decision Making, by Problem:  Active Hospital Problems    Diagnosis   • Sick sinus syndrome (CMS-HCC) [I49.5]   • Essential hypertension [I10]   • Hx of breast cancer [Z85.3]   • Hx of cancer of uterus [Z85.42]   • Infected pacemaker (CMS-HCC) [T82.7XXA]   • PAF (paroxysmal atrial fibrillation) (CMS-HCC) [I48.0]       Plan:  Continue IV AB and awaiting results of cultures that are currently pending.  Up with assistance with her walker today.        Reviewed  items::  EKG reviewed, Radiology images reviewed and Medications reviewed

## 2017-09-14 NOTE — PROGRESS NOTES
Infectious Disease Progress Note    Author: Joy Guzman M.D. Date & Time of service: 2017  9:12 AM    Chief Complaint:  FU possible pacemaker infection    Interval History:   AF, WBC 6.6, having discomfort at surgical site, otherwise no issues, denies diarrhea, fever or chills, asking if she will need a new PM  Labs Reviewed, Medications Reviewed, Radiology Reviewed and Wound Reviewed.    Review of Systems:  Review of Systems   Constitutional: Negative for chills and fever.   Cardiovascular: Positive for chest pain.        At PM removal site   Gastrointestinal: Negative for abdominal pain, diarrhea, nausea and vomiting.   Genitourinary: Negative for dysuria.   Neurological: Negative for headaches.       Hemodynamics:  Temp (24hrs), Av.4 °C (97.6 °F), Min:36.1 °C (97 °F), Max:37.1 °C (98.8 °F)  Temperature: 36.2 °C (97.1 °F)  Pulse  Av.6  Min: 51  Max: 66   Blood Pressure : 150/73       Physical Exam:  Physical Exam   Constitutional: She is oriented to person, place, and time. She appears well-developed.   HENT:   Head: Normocephalic and atraumatic.   Eyes: EOM are normal. Pupils are equal, round, and reactive to light.   Neck: Neck supple.   Cardiovascular: Normal rate, regular rhythm and normal heart sounds.    Pulmonary/Chest: Effort normal and breath sounds normal.   Left upper chest pressure dressing   Abdominal: Soft. There is no tenderness.   Musculoskeletal: Normal range of motion. She exhibits no edema.   Neurological: She is alert and oriented to person, place, and time.       Meds:    Current Facility-Administered Medications:   •  ceFAZolin  •  Pharmacy Consult Request  •  acetaminophen  •  oxycodone-acetaminophen  •  aspirin EC  •  fluoxetine  •  therapeutic multivitamin-minerals  •  tramadol  •  senna-docusate **AND** polyethylene glycol/lytes **AND** magnesium hydroxide **AND** bisacodyl  •  NS  •  labetalol  •  ondansetron  •  ondansetron    Labs:  Recent Labs      17    0319  17   0739   WBC  6.9  6.6   RBC  4.48  4.86   HEMOGLOBIN  13.8  15.3   HEMATOCRIT  41.9  46.1   MCV  93.5  94.9   MCH  30.8  31.5   RDW  50.5*  50.9*   PLATELETCT  189  186   MPV  9.5  9.3   NEUTSPOLYS   --   42.20*   LYMPHOCYTES   --   39.00   MONOCYTES   --   12.30   EOSINOPHILS   --   4.70   BASOPHILS   --   1.50     Recent Labs      17   SODIUM  133*   POTASSIUM  3.7   CHLORIDE  106   CO2  23   GLUCOSE  95   BUN  21     Recent Labs      17   031   CREATININE  0.86       Imaging:  Ue Venous Duplex ([facility] Only)    Result Date: 2017   Upper Extremity  Venous Duplex Report  Vascular Laboratory  CONCLUSIONS  no dvt or svt in the left upper extremity  CHANNING LARSON  Exam Date:     2017 13:26  Room #:     Inpatient  Priority:     Routine  Ht (in):     69      Wt (lb):     174  Ordering Physician:        ANJU MARTE  Referring Physician:       ROSANNA Reno  Sonographer:               Astrid Lerner RDCS, HEIDIT  Study Type:                Limited Unilateral  Technical Quality:         Adequate  Age:    84    Gender:     F  MRN:    [id number]  :    1932      BSA:    1.95  Indications:     Pain in Limb  CPT Codes:       03505  ICD Codes:       729.5  History:         Left upper extremity pain  Limitations:     TDS due to left pacemaker which obstructed the subclavian                   view.  PROCEDURES:  Left upper extremity venous duplex imaging.  The following venous structures were evaluated: internal jugular,  subclavian, axillary, brachial, radial, ulnar, cephalic and basilic veins.  Serial compression, augmentation maneuvers,  color and spectral Doppler  flow evaluations were performed.  FINDINGS:  Left upper extremity.  Complete color filling and compressibility with normal venous flow dynamics  including spontaneous flow, response to augmentation maneuvers, and  respiratory phasicity.  No superficial or deep  "venous thrombosis.  left radial and ulnar vein not evaluated  Mitul Mckeon MD  (Electronically Signed)  Final Date:      20 August 2017                   18:08      Micro:  Results     Procedure Component Value Units Date/Time    GRAM STAIN [[id number]] Collected:  09/13/17 1237    Order Status:  Completed Specimen:  Wound Updated:  09/13/17 2147     Significant Indicator .     Source WND     Site Pacemaker Pocket     Gram Stain Result --     Few WBCs.  No organisms seen.      GRAM STAIN [[id number]] Collected:  09/13/17 1235    Order Status:  Completed Specimen:  Wound Updated:  09/13/17 2147     Significant Indicator .     Source WND     Site Pacemaker Pockets     Gram Stain Result --     Rare WBCs.  No organisms seen.      CULTURE WOUND W/ GRAM STAIN [[id number]] Collected:  09/13/17 1237    Order Status:  Completed Specimen:  Other Updated:  09/13/17 1359    CULTURE WOUND W/ GRAM STAIN [[id number]] Collected:  09/13/17 1235    Order Status:  Completed Specimen:  Other Updated:  09/13/17 1358    BLOOD CULTURE [[id number]] Collected:  09/12/17 1905    Order Status:  Completed Specimen:  Blood from Peripheral Updated:  09/13/17 0819     Significant Indicator NEG     Source BLD     Site PERIPHERAL     Blood Culture --     No Growth    Note: Blood cultures are incubated for 5 days and  are monitored continuously.Positive blood cultures  are called to the RN and reported as soon as  they are identified.      Narrative:       Per Hospital Policy: Only change Specimen Src: to \"Line\" if  specified by physician order.    BLOOD CULTURE [[id number]] Collected:  09/12/17 1906    Order Status:  Completed Specimen:  Blood from Peripheral Updated:  09/13/17 0819     Significant Indicator NEG     Source BLD     Site PERIPHERAL     Blood Culture --     No Growth    Note: Blood cultures are incubated for 5 days and  are monitored continuously.Positive blood cultures  are called to the RN and reported as soon as  they are " "identified.      Narrative:       Per Hospital Policy: Only change Specimen Src: to \"Line\" if  specified by physician order.          Assessment:  Active Hospital Problems    Diagnosis   • Sick sinus syndrome (CMS-HCC) [I49.5]   • Essential hypertension [I10]   • Hx of breast cancer [Z85.3]   • Hx of cancer of uterus [Z85.42]   • Infected pacemaker (CMS-HCC) [T82.7XXA]   • PAF (paroxysmal atrial fibrillation) (CMS-HCC) [I48.0]       Plan:  Possible pacemaker infection   Afebrile  No leukocytosis  S/p pacemaker removal on 9/13 by Dr. Garrett  No purulence seen intraop  Gram stain - no org, Cx - pending  Start cefazolin for now  Bcx - NGTD  Follow cultures  If cultures remain negative, will anticipate treating with PO abx for 2 weeks    Recent left upper extremity and left upper chest cellulitis over PM site  Resolved with course of augmentin    Sick sinus syndrome s/p PPM  S/p PPM removal 9/13    Discussed with internal medicine/Dr. Rodriguez  "

## 2017-09-15 LAB
ERYTHROCYTE [DISTWIDTH] IN BLOOD BY AUTOMATED COUNT: 50.1 FL (ref 35.9–50)
HCT VFR BLD AUTO: 42.4 % (ref 37–47)
HGB BLD-MCNC: 14 G/DL (ref 12–16)
MCH RBC QN AUTO: 31.3 PG (ref 27–33)
MCHC RBC AUTO-ENTMCNC: 33 G/DL (ref 33.6–35)
MCV RBC AUTO: 94.6 FL (ref 81.4–97.8)
PLATELET # BLD AUTO: 184 K/UL (ref 164–446)
PMV BLD AUTO: 9.4 FL (ref 9–12.9)
RBC # BLD AUTO: 4.48 M/UL (ref 4.2–5.4)
WBC # BLD AUTO: 7.4 K/UL (ref 4.8–10.8)

## 2017-09-15 PROCEDURE — 85027 COMPLETE CBC AUTOMATED: CPT

## 2017-09-15 PROCEDURE — 36415 COLL VENOUS BLD VENIPUNCTURE: CPT

## 2017-09-15 PROCEDURE — 770020 HCHG ROOM/CARE - TELE (206)

## 2017-09-15 PROCEDURE — A9270 NON-COVERED ITEM OR SERVICE: HCPCS | Performed by: INTERNAL MEDICINE

## 2017-09-15 PROCEDURE — 700102 HCHG RX REV CODE 250 W/ 637 OVERRIDE(OP): Performed by: INTERNAL MEDICINE

## 2017-09-15 PROCEDURE — 700111 HCHG RX REV CODE 636 W/ 250 OVERRIDE (IP): Performed by: INTERNAL MEDICINE

## 2017-09-15 PROCEDURE — 700105 HCHG RX REV CODE 258: Performed by: INTERNAL MEDICINE

## 2017-09-15 PROCEDURE — 99232 SBSQ HOSP IP/OBS MODERATE 35: CPT | Performed by: INTERNAL MEDICINE

## 2017-09-15 RX ADMIN — MULTIPLE VITAMINS W/ MINERALS TAB 1 TABLET: TAB at 08:26

## 2017-09-15 RX ADMIN — APIXABAN 5 MG: 5 TABLET, FILM COATED ORAL at 21:26

## 2017-09-15 RX ADMIN — FLUOXETINE HYDROCHLORIDE 40 MG: 20 CAPSULE ORAL at 08:26

## 2017-09-15 RX ADMIN — CEFAZOLIN SODIUM 2 G: 2 INJECTION, SOLUTION INTRAVENOUS at 06:08

## 2017-09-15 RX ADMIN — CEFAZOLIN SODIUM 2 G: 2 INJECTION, SOLUTION INTRAVENOUS at 21:26

## 2017-09-15 RX ADMIN — TRAMADOL HYDROCHLORIDE 50 MG: 50 TABLET, COATED ORAL at 21:31

## 2017-09-15 RX ADMIN — CEFAZOLIN SODIUM 2 G: 2 INJECTION, SOLUTION INTRAVENOUS at 11:51

## 2017-09-15 RX ADMIN — OXYCODONE HYDROCHLORIDE AND ACETAMINOPHEN 1 TABLET: 5; 325 TABLET ORAL at 18:10

## 2017-09-15 RX ADMIN — SODIUM CHLORIDE: 9 INJECTION, SOLUTION INTRAVENOUS at 11:51

## 2017-09-15 RX ADMIN — ASPIRIN 81 MG: 81 TABLET ORAL at 08:26

## 2017-09-15 RX ADMIN — APIXABAN 5 MG: 5 TABLET, FILM COATED ORAL at 08:26

## 2017-09-15 ASSESSMENT — PAIN SCALES - GENERAL
PAINLEVEL_OUTOF10: 6
PAINLEVEL_OUTOF10: 0
PAINLEVEL_OUTOF10: 0
PAINLEVEL_OUTOF10: 2
PAINLEVEL_OUTOF10: 4
PAINLEVEL_OUTOF10: 0
PAINLEVEL_OUTOF10: 0

## 2017-09-15 ASSESSMENT — ENCOUNTER SYMPTOMS
MUSCULOSKELETAL NEGATIVE: 1
GASTROINTESTINAL NEGATIVE: 1
DIARRHEA: 0
CHILLS: 0
ORTHOPNEA: 0
SPUTUM PRODUCTION: 0
COUGH: 0
PSYCHIATRIC NEGATIVE: 1
NAUSEA: 0
SHORTNESS OF BREATH: 1
ABDOMINAL PAIN: 0
EYES NEGATIVE: 1
VOMITING: 0
WEAKNESS: 0
PALPITATIONS: 0
DIZZINESS: 0
DIAPHORESIS: 0
HEADACHES: 0
PND: 0
CONSTIPATION: 0
FEVER: 0
WHEEZING: 0

## 2017-09-15 NOTE — PROGRESS NOTES
Marivel Rice Fall Risk Assessment:     Last Known Fall: Within the last six months  Mobility: Immobilized/requires assist of one person  Medications: No meds  Mental Status/LOC/Awareness: Awake, alert, and oriented to date, place, and person  Toileting Needs: No needs  Volume/Electrolyte Status: Use of IV fluids/tube feeds  Communication/Sensory: Visual (Glasses)/hearing deficit  Behavior: Appropriate behavior  Marivel Rice Fall Risk Total: 10  Fall Risk Level: LOW RISK    Universal Fall Precautions:  call light/belongings in reach, bed in low position and locked, wheelchairs and assistive devices out of sight, siderails up x 2, educate on level of risk, educate to call for assistance, use non-slip footwear, adequate lighting, clutter free and spill free environment    Fall Risk Level Interventions:   TRIAL ([employer] 8, NEURO, MED PEDRITO 5) Low Fall Risk Interventions  Place yellow fall risk ID band on patient: verified  Provide patient/family education based on risk assessment: completed  Educate patient/family to call staff for assistance when getting out of bed: completed  Place fall precaution signage outside patient door: completedTRIAL ([employer] 8, NEURO, MED PEDRITO 5) Moderate Fall Risk Interventions  Place yellow fall risk ID band on patient: completed  Provide patient/family education based on risk assessment : completed  Educate patient/family to call staff for assistance when getting out of bed: completed  Place fall precaution signage outside patient door: completed  Utilize bed/chair fall alarm: completed     Patient Specific Interventions:     Medication: review medications with patient and family, assess for medications that can be discontinued or dosage decreased and limit combination of prn medications  Mental Status/LOC/Awareness: reorient patient, reinforce falls education, encourage family to stay with patient, check on patient hourly, utilize bed/chair fall alarm, reinforce the use of call light and  provide activity  Toileting: provide frquent toileting, instruct male patients prone to dizziness to void while sitting, instruct patient/family on the use of grab bars and toilet prior to giving pain medications  Volume/Electrolyte Status: ensure patient remains hydrated, advance diet as tolerated, monitor abnormal lab values and ensure IV fluids are appropriate  Communication/Sensory: update plan of care on whiteboard, provide communication alternatives/ and ensure proper positioning when transferrng/ambulating  Behavioral: encourage patient to voice feelings, engage patient in daily activities, administer medication as ordered and instruct/reinforce fall program rationale  Mobility: schedule physical activity throughout the day, provide comfort measures during transport, utilize bed/chair fall alarm, ensure bed is locked and in lowest position, provide appropriate assistive device and instruct patient to exit bed on their strongest side

## 2017-09-15 NOTE — PROGRESS NOTES
Assessment completed. Patient A&O x 4. Pt has tenderness and soreness to L upper chest site, post pacer removal. POC discussed, IV ABX and fluids running. Pt up to bathroom with assistance of one with FWWW. Call light & bedside table within reach. Bed locked and in lowest position. Bed alarm on and fall precautions in place. Hourly rounding in place.

## 2017-09-15 NOTE — PROGRESS NOTES
[facility]    Date of Service: 9/15/2017    Chief Complaint  84 y.o. female with past medical history of paroxysmal atrial fibrillation on Eliquis, HTN admitted 2017 with concern for pacemaker infection from cardiologists's office.     Interval Problem Update  No acute events overnight. Patient continued on antibiotics per infectious disease recommendations. Patient's culture results negative to date. If culture results remain negative tomorrow she will likely be transitioned to PO abx for 2 weeks per ID recs    Consultants/Specialty  Dr. Garrett/cardiology    Disposition  To be determined        Review of Systems   Constitutional: Negative for chills and fever.   Respiratory: Negative for cough and sputum production.    Cardiovascular: Negative for chest pain and palpitations.        Tenderness over pacermaker removal site, left anterior chest wall   Gastrointestinal: Negative for abdominal pain, constipation and nausea.   Neurological: Negative for headaches.      Physical Exam  Laboratory/Imaging   Hemodynamics  Temp (24hrs), Av.5 °C (97.7 °F), Min:36.2 °C (97.2 °F), Max:36.8 °C (98.3 °F)   Temperature: 36.8 °C (98.3 °F)  Pulse  Av.3  Min: 51  Max: 80    Blood Pressure : 157/86      Respiratory      Respiration: 18, Pulse Oximetry: 93 %             Fluids  No intake or output data in the 24 hours ending 09/15/17 1123    Nutrition  Orders Placed This Encounter   Procedures   • Diet Order     Standing Status:   Standing     Number of Occurrences:   1     Order Specific Question:   Diet:     Answer:   Cardiac [6]     Physical Exam   Constitutional: She is oriented to person, place, and time. She appears well-developed and well-nourished. No distress.   HENT:   Head: Normocephalic and atraumatic.   Eyes: Conjunctivae are normal. Right eye exhibits no discharge. Left eye exhibits no discharge. No scleral icterus.   Neck: Normal range of motion. Neck supple.   Cardiovascular: Normal  rate.  An irregularly irregular rhythm present.   Pulmonary/Chest: Effort normal and breath sounds normal. She exhibits tenderness (Left anterior chest wall, dressing in place, C/D/I).   Abdominal: Soft. Bowel sounds are normal. She exhibits no distension. There is no tenderness.   Musculoskeletal: She exhibits no edema.   Neurological: She is alert and oriented to person, place, and time. No cranial nerve deficit.   Skin: Skin is warm and dry. She is not diaphoretic. There is pallor.   Psychiatric: She has a normal mood and affect. Her behavior is normal. Thought content normal.   Nursing note and vitals reviewed.      Recent Labs      09/13/17   0319  09/14/17   0739  09/15/17   0219   WBC  6.9  6.6  7.4   RBC  4.48  4.86  4.48   HEMOGLOBIN  13.8  15.3  14.0   HEMATOCRIT  41.9  46.1  42.4   MCV  93.5  94.9  94.6   MCH  30.8  31.5  31.3   MCHC  32.9*  33.2*  33.0*   RDW  50.5*  50.9*  50.1*   PLATELETCT  189  186  184   MPV  9.5  9.3  9.4     Recent Labs      09/13/17   0319   SODIUM  133*   POTASSIUM  3.7   CHLORIDE  106   CO2  23   GLUCOSE  95   BUN  21   CREATININE  0.86   CALCIUM  9.0     Recent Labs      09/13/17   1036   INR  1.16*                  Assessment/Plan     Sick sinus syndrome (CMS-[facility])- (present on admission)   Assessment & Plan    Status post pacemaker placement, concern for pacemaker infection.  Monitor patient closely on telemetry  Continue patient on metoprolol with holding parameters.        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 Garrett following, s/p pacemaker removal   Blood cultures NGTD, pacemaker lead cultures NGTD   Echo done May 2017 shows ejection fraction 55%, grade 3 diastolic function, mild mitral regurgitation [confirmed on UNA]  Dr. Guzman, ID following, on Ancef         PAF (paroxysmal atrial  fibrillation) (CMS-HCC)- (present on admission)   Assessment & Plan    Rate controlled, continue patient on metoprolol  Eliquis resumed         Hx of cancer of uterus- (present on admission)   Assessment & Plan    Stable        Hx of breast cancer- (present on admission)   Assessment & Plan    Status post bilateral mastectomy, stable            Reviewed items::  Labs reviewed and Medications reviewed  Presley catheter::  No Presley  DVT: Eliquis.  DVT prophylaxis - mechanical:  SCDs  Ulcer Prophylaxis::  Not indicated

## 2017-09-15 NOTE — PROGRESS NOTES
Cardiology Progress Note                Date & Time created: 9/15/2017  1:54 PM     Interval History:  No major events overnight.  The patient states she feels good.  Tele indicated a 1 time HR of 45, sinus, & occassionally PVC's & bigem noted during the night.  BP stable.  PPM site is clean, dry, & intact.  No edema, ecchymosis, erythema, or drainage noted at the site.  Has been afebrile & has a WBC WNL.  She has ambulated in the hallway & states she did well.    Review of Systems   Constitutional: Positive for malaise/fatigue. Negative for chills, diaphoresis and fever.   HENT: Negative.    Eyes: Negative.    Respiratory: Positive for shortness of breath. Negative for cough and wheezing.         Described as mild SOB.  States she just needs to take deeper breaths.   Cardiovascular: Negative for chest pain, palpitations, orthopnea, leg swelling and PND.   Gastrointestinal: Negative.    Genitourinary: Negative.    Musculoskeletal: Negative.    Skin: Negative.    Neurological: Negative for dizziness and weakness.   Endo/Heme/Allergies: Negative.    Psychiatric/Behavioral: Negative.        Physical Exam   Constitutional: She is oriented to person, place, and time. She appears well-developed and well-nourished. No distress.   HENT:   Head: Normocephalic and atraumatic.   Eyes: EOM are normal. Pupils are equal, round, and reactive to light. Scleral icterus is present.   Neck: Normal range of motion. Neck supple. No JVD present.   Cardiovascular: Normal rate, regular rhythm, normal heart sounds and intact distal pulses.  Exam reveals no gallop and no friction rub.    No murmur heard.  Pulmonary/Chest: Effort normal and breath sounds normal. No respiratory distress. She has no wheezes. She has no rales. She exhibits no tenderness.   Not on oxygen   Abdominal: Soft. Bowel sounds are normal. There is no tenderness. There is no rebound and no guarding.   Musculoskeletal: Normal range of motion. She exhibits no edema.    Neurological: She is alert and oriented to person, place, and time.   Skin: Skin is warm and dry. She is not diaphoretic.   Psychiatric: She has a normal mood and affect. Her behavior is normal. Judgment and thought content normal.   Nursing note and vitals reviewed.      Hemodynamics:  Temp (24hrs), Av.6 °C (97.8 °F), Min:36.2 °C (97.2 °F), Max:36.8 °C (98.3 °F)  Temperature: 36.7 °C (98 °F)  Pulse  Av.6  Min: 51  Max: 80   Blood Pressure : 146/75     Respiratory:    Respiration: 18, Pulse Oximetry: 93 %           Fluids:     Weight: 80.7 kg (177 lb 14.6 oz)  GI/Nutrition:  Orders Placed This Encounter   Procedures   • Diet Order     Standing Status:   Standing     Number of Occurrences:   1     Order Specific Question:   Diet:     Answer:   Cardiac [6]     Lab Results:  Recent Labs      17   0319  17   0739  09/15/17   0219   WBC  6.9  6.6  7.4   RBC  4.48  4.86  4.48   HEMOGLOBIN  13.8  15.3  14.0   HEMATOCRIT  41.9  46.1  42.4   MCV  93.5  94.9  94.6   MCH  30.8  31.5  31.3   MCHC  32.9*  33.2*  33.0*   RDW  50.5*  50.9*  50.1*   PLATELETCT  189  186  184   MPV  9.5  9.3  9.4     Recent Labs      17   0319   SODIUM  133*   POTASSIUM  3.7   CHLORIDE  106   CO2  23   GLUCOSE  95   BUN  21   CREATININE  0.86   CALCIUM  9.0     Recent Labs      17   1036   INR  1.16*       Medical Decision Making, by Problem:  Active Hospital Problems    Diagnosis   • Sick sinus syndrome (CMS-HCC) [I49.5]   • Essential hypertension [I10]   • Hx of breast cancer [Z85.3]   • Hx of cancer of uterus [Z85.42]   • Infected pacemaker (CMS-HCC) [T82.7XXA]   • PAF (paroxysmal atrial fibrillation) (CMS-HCC) [I48.0]       Plan:    1.  PPM infection s/p removal on 17  -ID on board, continue IV ancef for now until cultures finalized  -afebrile, WBC WNL, vitals WNL  -occasional episodes of sinus bradycardia with ectopy at night, normal rate & rhythm during the day  -BC & PPM pocket cultures negative to  date, awaiting finalization before sending home      Reviewed items::  EKG reviewed, Labs reviewed and Medications reviewed  Presley catheter::  No Presley  DVT: Eliquis.  DVT prophylaxis - mechanical:  Not indicated at this time, ambulatory  Ulcer Prophylaxis::  Not indicated

## 2017-09-15 NOTE — PROGRESS NOTES
Infectious Disease Progress Note    Author: Joy Guzman M.D. Date & Time of service: 9/15/2017  8:58 AM    Chief Complaint:  FU possible pacemaker infection    Interval History:   AF, WBC 6.6, having discomfort at surgical site, otherwise no issues, denies diarrhea, fever or chills, asking if she will need a new PM  9/15 AF, WBC 7.4, feeling well without any new issues, tolerating abx without side effects. OR cx NGTD x 48hrs  Labs Reviewed, Medications Reviewed, Radiology Reviewed and Wound Reviewed.    Review of Systems:  Review of Systems   Constitutional: Negative for chills and fever.   Cardiovascular: Positive for chest pain.        At PM removal site   Gastrointestinal: Negative for abdominal pain, diarrhea, nausea and vomiting.   Genitourinary: Negative for dysuria.   Neurological: Negative for headaches.       Hemodynamics:  Temp (24hrs), Av.5 °C (97.7 °F), Min:36.2 °C (97.2 °F), Max:36.8 °C (98.3 °F)  Temperature: 36.8 °C (98.3 °F)  Pulse  Av.3  Min: 51  Max: 80   Blood Pressure : 157/86       Physical Exam:  Physical Exam   Constitutional: She is oriented to person, place, and time. She appears well-developed.   HENT:   Head: Normocephalic and atraumatic.   Eyes: EOM are normal. Pupils are equal, round, and reactive to light.   Neck: Neck supple.   Cardiovascular: Normal rate, regular rhythm and normal heart sounds.    Pulmonary/Chest: Effort normal and breath sounds normal.   Left upper chest dressing  No edema or erythema at prior PM site   Abdominal: Soft. There is no tenderness.   Musculoskeletal: Normal range of motion. She exhibits no edema.   Neurological: She is alert and oriented to person, place, and time.       Meds:    Current Facility-Administered Medications:   •  ceFAZolin  •  apixaban  •  Pharmacy Consult Request  •  acetaminophen  •  oxycodone-acetaminophen  •  aspirin EC  •  fluoxetine  •  therapeutic multivitamin-minerals  •  tramadol  •  senna-docusate **AND**  polyethylene glycol/lytes **AND** magnesium hydroxide **AND** bisacodyl  •  NS  •  labetalol  •  ondansetron  •  ondansetron    Labs:  Recent Labs      17   0319  17   0739  09/15/17   0219   WBC  6.9  6.6  7.4   RBC  4.48  4.86  4.48   HEMOGLOBIN  13.8  15.3  14.0   HEMATOCRIT  41.9  46.1  42.4   MCV  93.5  94.9  94.6   MCH  30.8  31.5  31.3   RDW  50.5*  50.9*  50.1*   PLATELETCT  189  186  184   MPV  9.5  9.3  9.4   NEUTSPOLYS   --   42.20*   --    LYMPHOCYTES   --   39.00   --    MONOCYTES   --   12.30   --    EOSINOPHILS   --   4.70   --    BASOPHILS   --   1.50   --      Recent Labs      17   SODIUM  133*   POTASSIUM  3.7   CHLORIDE  106   CO2  23   GLUCOSE  95   BUN  21     Recent Labs      17   CREATININE  0.86       Imaging:  Ue Venous Duplex ([facility] Only)    Result Date: 2017   Upper Extremity  Venous Duplex Report  Vascular Laboratory  CONCLUSIONS  no dvt or svt in the left upper extremity  CHANNING LARSON  Exam Date:     2017 13:26  Room #:     Inpatient  Priority:     Routine  Ht (in):     69      Wt (lb):     174  Ordering Physician:        ANJU MARTE  Referring Physician:       [id number] Pyle  Sonographer:               Astrid Lerner RDCS, RVT  Study Type:                Limited Unilateral  Technical Quality:         Adequate  Age:    84    Gender:     F  MRN:    [id number]  :    1932      BSA:    1.95  Indications:     Pain in Limb  CPT Codes:       96288  ICD Codes:       729.5  History:         Left upper extremity pain  Limitations:     TDS due to left pacemaker which obstructed the subclavian                   view.  PROCEDURES:  Left upper extremity venous duplex imaging.  The following venous structures were evaluated: internal jugular,  subclavian, axillary, brachial, radial, ulnar, cephalic and basilic veins.  Serial compression, augmentation maneuvers,  color and spectral Doppler   flow evaluations were performed.  FINDINGS:  Left upper extremity.  Complete color filling and compressibility with normal venous flow dynamics  including spontaneous flow, response to augmentation maneuvers, and  respiratory phasicity.  No superficial or deep venous thrombosis.  left radial and ulnar vein not evaluated  Mitul Mckeon MD  (Electronically Signed)  Final Date:      20 August 2017                   18:08      Micro:  Results     Procedure Component Value Units Date/Time    CULTURE WOUND W/ GRAM STAIN [[id number]] Collected:  09/13/17 1237    Order Status:  Completed Specimen:  Wound Updated:  09/15/17 0759     Significant Indicator NEG     Source WND     Site Pacemaker Pocket     Culture Result Wound No growth at 48 hours.     Gram Stain Result --     Few WBCs.  No organisms seen.      CULTURE WOUND W/ GRAM STAIN [[id number]] Collected:  09/13/17 1235    Order Status:  Completed Specimen:  Wound Updated:  09/15/17 0758     Significant Indicator NEG     Source WND     Site Pacemaker Pockets     Culture Result Wound No growth at 48 hours.     Gram Stain Result --     Rare WBCs.  No organisms seen.      GRAM STAIN [[id number]] Collected:  09/13/17 1237    Order Status:  Completed Specimen:  Wound Updated:  09/13/17 2147     Significant Indicator .     Source WND     Site Pacemaker Pocket     Gram Stain Result --     Few WBCs.  No organisms seen.      GRAM STAIN [[id number]] Collected:  09/13/17 1235    Order Status:  Completed Specimen:  Wound Updated:  09/13/17 2147     Significant Indicator .     Source WND     Site Pacemaker Pockets     Gram Stain Result --     Rare WBCs.  No organisms seen.      BLOOD CULTURE [[id number]] Collected:  09/12/17 1905    Order Status:  Completed Specimen:  Blood from Peripheral Updated:  09/13/17 0819     Significant Indicator NEG     Source BLD     Site PERIPHERAL     Blood Culture --     No Growth    Note: Blood cultures are incubated for 5 days and  are monitored  "continuously.Positive blood cultures  are called to the RN and reported as soon as  they are identified.      Narrative:       Per Hospital Policy: Only change Specimen Src: to \"Line\" if  specified by physician order.    BLOOD CULTURE [[id number]] Collected:  09/12/17 1906    Order Status:  Completed Specimen:  Blood from Peripheral Updated:  09/13/17 0819     Significant Indicator NEG     Source BLD     Site PERIPHERAL     Blood Culture --     No Growth    Note: Blood cultures are incubated for 5 days and  are monitored continuously.Positive blood cultures  are called to the RN and reported as soon as  they are identified.      Narrative:       Per Hospital Policy: Only change Specimen Src: to \"Line\" if  specified by physician order.          Assessment:  Active Hospital Problems    Diagnosis   • Sick sinus syndrome (CMS-HCC) [I49.5]   • Essential hypertension [I10]   • Hx of breast cancer [Z85.3]   • Hx of cancer of uterus [Z85.42]   • Infected pacemaker (CMS-HCC) [T82.7XXA]   • PAF (paroxysmal atrial fibrillation) (CMS-HCC) [I48.0]       Plan:  Possible pacemaker infection   Afebrile  No leukocytosis  S/p pacemaker removal on 9/13 by Dr. Garrett  No purulence seen intraop  Gram stain - no org, Cx - NGTD x 48 hrs  Continue cefazolin for now  Bcx - NGTD  If cultures remain negative tomorrw, will anticipate treating with PO abx for 2 weeks total  Stop date 09/28/17    Recent left upper extremity and left upper chest cellulitis over PM site  Resolved with course of augmentin    Sick sinus syndrome s/p PPM  S/p PPM removal 9/13    Discussed with internal medicine/Dr. Rodriguez  "

## 2017-09-16 ENCOUNTER — PATIENT OUTREACH (OUTPATIENT)
Dept: HEALTH INFORMATION MANAGEMENT | Facility: OTHER | Age: 82
End: 2017-09-16

## 2017-09-16 VITALS
SYSTOLIC BLOOD PRESSURE: 143 MMHG | TEMPERATURE: 98.4 F | RESPIRATION RATE: 15 BRPM | BODY MASS INDEX: 25.86 KG/M2 | HEART RATE: 71 BPM | HEIGHT: 69 IN | OXYGEN SATURATION: 96 % | DIASTOLIC BLOOD PRESSURE: 75 MMHG | WEIGHT: 174.6 LBS

## 2017-09-16 PROBLEM — T82.7XXA INFECTED PACEMAKER (HCC): Status: RESOLVED | Noted: 2017-09-12 | Resolved: 2017-09-16

## 2017-09-16 LAB
BACTERIA WND AEROBE CULT: NORMAL
BACTERIA WND AEROBE CULT: NORMAL
GRAM STN SPEC: NORMAL
GRAM STN SPEC: NORMAL
SIGNIFICANT IND 70042: NORMAL
SIGNIFICANT IND 70042: NORMAL
SITE SITE: NORMAL
SITE SITE: NORMAL
SOURCE SOURCE: NORMAL
SOURCE SOURCE: NORMAL

## 2017-09-16 PROCEDURE — A9270 NON-COVERED ITEM OR SERVICE: HCPCS | Performed by: INTERNAL MEDICINE

## 2017-09-16 PROCEDURE — 700102 HCHG RX REV CODE 250 W/ 637 OVERRIDE(OP): Performed by: INTERNAL MEDICINE

## 2017-09-16 PROCEDURE — 99239 HOSP IP/OBS DSCHRG MGMT >30: CPT | Performed by: INTERNAL MEDICINE

## 2017-09-16 PROCEDURE — 700111 HCHG RX REV CODE 636 W/ 250 OVERRIDE (IP): Performed by: INTERNAL MEDICINE

## 2017-09-16 RX ORDER — AMOXICILLIN AND CLAVULANATE POTASSIUM 875; 125 MG/1; MG/1
1 TABLET, FILM COATED ORAL EVERY 12 HOURS
Qty: 28 TAB | Refills: 0 | Status: SHIPPED | OUTPATIENT
Start: 2017-09-16 | End: 2017-09-30

## 2017-09-16 RX ORDER — AMOXICILLIN AND CLAVULANATE POTASSIUM 875; 125 MG/1; MG/1
1 TABLET, FILM COATED ORAL EVERY 12 HOURS
Status: DISCONTINUED | OUTPATIENT
Start: 2017-09-16 | End: 2017-09-16 | Stop reason: HOSPADM

## 2017-09-16 RX ADMIN — ASPIRIN 81 MG: 81 TABLET ORAL at 08:35

## 2017-09-16 RX ADMIN — OXYCODONE HYDROCHLORIDE AND ACETAMINOPHEN 1 TABLET: 5; 325 TABLET ORAL at 02:47

## 2017-09-16 RX ADMIN — MULTIPLE VITAMINS W/ MINERALS TAB 1 TABLET: TAB at 08:36

## 2017-09-16 RX ADMIN — AMOXICILLIN AND CLAVULANATE POTASSIUM 1 TABLET: 875; 125 TABLET, FILM COATED ORAL at 09:00

## 2017-09-16 RX ADMIN — CEFAZOLIN SODIUM 2 G: 2 INJECTION, SOLUTION INTRAVENOUS at 02:48

## 2017-09-16 RX ADMIN — FLUOXETINE HYDROCHLORIDE 40 MG: 20 CAPSULE ORAL at 08:35

## 2017-09-16 RX ADMIN — APIXABAN 5 MG: 5 TABLET, FILM COATED ORAL at 08:36

## 2017-09-16 ASSESSMENT — PAIN SCALES - GENERAL
PAINLEVEL_OUTOF10: 1
PAINLEVEL_OUTOF10: 4
PAINLEVEL_OUTOF10: 1
PAINLEVEL_OUTOF10: 1

## 2017-09-16 ASSESSMENT — ENCOUNTER SYMPTOMS
GASTROINTESTINAL NEGATIVE: 1
NAUSEA: 0
FEVER: 0
PSYCHIATRIC NEGATIVE: 1
EYES NEGATIVE: 1
VOMITING: 0
DIARRHEA: 0
CHILLS: 0
PND: 0
PALPITATIONS: 0
ABDOMINAL PAIN: 0
RESPIRATORY NEGATIVE: 1
MUSCULOSKELETAL NEGATIVE: 1
ORTHOPNEA: 0
CONSTITUTIONAL NEGATIVE: 1
HEADACHES: 0
NEUROLOGICAL NEGATIVE: 1

## 2017-09-16 ASSESSMENT — LIFESTYLE VARIABLES: EVER_SMOKED: NEVER

## 2017-09-16 NOTE — PROGRESS NOTES
Cardiology Progress Note                Date & Time created: 2017  2:32 PM     Interval History:  S/p PPM extraction on 17 due to suspected PPM infection.  All cultures negative.  Afebrile with no leukocytosis.  Feel good, is ready to go home.  Overnight on tele she was SR 60's-70's, all other VSS.    Review of Systems   Constitutional: Negative.    HENT: Negative.    Eyes: Negative.    Respiratory: Negative.    Cardiovascular: Negative for chest pain, palpitations, orthopnea, leg swelling and PND.        Pacemaker extraction site is covered with a gauze/tegaderm dressing that is clean, dry, & intact.  No ecchymosis or edema noted.   Gastrointestinal: Negative.    Genitourinary: Negative.    Musculoskeletal: Negative.    Skin: Negative.    Neurological: Negative.    Endo/Heme/Allergies: Negative.    Psychiatric/Behavioral: Negative.        Physical Exam   Constitutional: She is oriented to person, place, and time. She appears well-developed and well-nourished.   HENT:   Head: Normocephalic and atraumatic.   Eyes: Pupils are equal, round, and reactive to light.   Neck: Normal range of motion. Neck supple. No JVD present.   Cardiovascular: Normal rate, regular rhythm, normal heart sounds and intact distal pulses.    Pulmonary/Chest: Effort normal and breath sounds normal.   Abdominal: Soft. Bowel sounds are normal.   Musculoskeletal: Normal range of motion.   Neurological: She is alert and oriented to person, place, and time.   Skin: Skin is warm and dry.   Psychiatric: She has a normal mood and affect. Her behavior is normal. Judgment and thought content normal.       Hemodynamics:  Temp (24hrs), Av °C (98.6 °F), Min:36.6 °C (97.8 °F), Max:37.4 °C (99.3 °F)  Temperature: 36.9 °C (98.4 °F)  Pulse  Av.2  Min: 51  Max: 80   Blood Pressure : 143/75     Respiratory:    Respiration: 15, Pulse Oximetry: 96 %           Fluids:  Date 17 0700 - 17 0659   Shift 8384-2435 8236-3578 8032-8386 24  Hour Total   I  N  T  A  K  E   P.O. 240   240    Shift Total 240   240   O  U  T  P  U  T   Shift Total       Weight (kg) 79.2 79.2 79.2 79.2       Weight: 79.2 kg (174 lb 9.7 oz)  GI/Nutrition:  Orders Placed This Encounter   Procedures   • Diet Order     Standing Status:   Standing     Number of Occurrences:   1     Order Specific Question:   Diet:     Answer:   Cardiac [6]     Lab Results:  Recent Labs      09/14/17   0739  09/15/17   0219   WBC  6.6  7.4   RBC  4.86  4.48   HEMOGLOBIN  15.3  14.0   HEMATOCRIT  46.1  42.4   MCV  94.9  94.6   MCH  31.5  31.3   MCHC  33.2*  33.0*   RDW  50.9*  50.1*   PLATELETCT  186  184   MPV  9.3  9.4           Medical Decision Making, by Problem:  Active Hospital Problems    Diagnosis   • Sick sinus syndrome (CMS-HCC) [I49.5]   • Essential hypertension [I10]   • Hx of breast cancer [Z85.3]   • Hx of cancer of uterus [Z85.42]   • PAF (paroxysmal atrial fibrillation) (CMS-HCC) [I48.0]       Plan:  s/p PPM extraction on 9/13/17 secondary to presumed infection.  All cultures were negative, ID switched to PO augmentin.  Tele stable overnight.  Site care education reviewed with patient.  She will call our office & make a 1 week f/u appt with EP MD or APN for dressing removal & further direction regarding re-evaluation for future PPM placement.    Clear from EP standpoint.  Patient to be discharged today by the hospitalist.    Reviewed items::  Labs reviewed and Medications reviewed  Presley catheter::  No Presley

## 2017-09-16 NOTE — PROGRESS NOTES
Discharge instructions given to patient. Prescriptions given to patient. Discharge instructions given to patient at bedside, verbalizes understanding and states plans for follow-up with PCP. New and home medication review, post-discharge activity level and worsening of symptoms needing follow-up care discussed. Telemetry monitor/IV cathlon removed. All belongings accounted for, all questions answered at this time. Patient was taken out via wheelchair and escort out to a family vehicle.

## 2017-09-16 NOTE — PROGRESS NOTES
Infectious Disease Progress Note    Author: Joy Guzman M.D. Date & Time of service: 2017  8:43 AM    Chief Complaint:  FU possible pacemaker infection    Interval History:   AF, WBC 6.6, having discomfort at surgical site, otherwise no issues, denies diarrhea, fever or chills, asking if she will need a new PM  9/15 AF, WBC 7.4, feeling well without any new issues, tolerating abx without side effects. OR cx NGTD x 48hrs   AF, no CBC, no new complaints per pt, feels well, OR cultures remain neg  Labs Reviewed, Medications Reviewed, Radiology Reviewed and Wound Reviewed.    Review of Systems:  Review of Systems   Constitutional: Negative for chills and fever.   Cardiovascular: Positive for chest pain.        At PM removal site   Gastrointestinal: Negative for abdominal pain, diarrhea, nausea and vomiting.   Genitourinary: Negative for dysuria.   Neurological: Negative for headaches.       Hemodynamics:  Temp (24hrs), Av.1 °C (98.7 °F), Min:36.7 °C (98 °F), Max:37.4 °C (99.3 °F)  Temperature: 36.8 °C (98.2 °F)  Pulse  Av.6  Min: 51  Max: 80   Blood Pressure : 142/78       Physical Exam:  Physical Exam   Constitutional: She is oriented to person, place, and time. She appears well-developed.   HENT:   Head: Normocephalic and atraumatic.   Eyes: EOM are normal. Pupils are equal, round, and reactive to light.   Neck: Neck supple.   Cardiovascular: Normal rate, regular rhythm and normal heart sounds.    Pulmonary/Chest: Effort normal and breath sounds normal.   Left upper chest dressing  No edema or erythema at prior PM site   Abdominal: Soft. There is no tenderness.   Musculoskeletal: Normal range of motion. She exhibits no edema.   Neurological: She is alert and oriented to person, place, and time.       Meds:    Current Facility-Administered Medications:   •  ceFAZolin  •  apixaban  •  Pharmacy Consult Request  •  acetaminophen  •  oxycodone-acetaminophen  •  aspirin EC  •  fluoxetine  •   therapeutic multivitamin-minerals  •  tramadol  •  senna-docusate **AND** polyethylene glycol/lytes **AND** magnesium hydroxide **AND** bisacodyl  •  labetalol  •  ondansetron  •  ondansetron    Labs:  Recent Labs      17   0739  09/15/17   0219   WBC  6.6  7.4   RBC  4.86  4.48   HEMOGLOBIN  15.3  14.0   HEMATOCRIT  46.1  42.4   MCV  94.9  94.6   MCH  31.5  31.3   RDW  50.9*  50.1*   PLATELETCT  186  184   MPV  9.3  9.4   NEUTSPOLYS  42.20*   --    LYMPHOCYTES  39.00   --    MONOCYTES  12.30   --    EOSINOPHILS  4.70   --    BASOPHILS  1.50   --      No results for input(s): SODIUM, POTASSIUM, CHLORIDE, CO2, GLUCOSE, BUN, CPKTOTAL in the last 72 hours.  No results for input(s): ALBUMIN, TBILIRUBIN, ALKPHOSPHAT, TOTPROTEIN, ALTSGPT, ASTSGOT, CREATININE in the last 72 hours.    Imaging:  Ue Venous Duplex ([facility] Only)    Result Date: 2017   Upper Extremity  Venous Duplex Report  Vascular Laboratory  CONCLUSIONS  no dvt or svt in the left upper extremity  CHANNING LARSON  Exam Date:     2017 13:26  Room #:     Inpatient  Priority:     Routine  Ht (in):     69      Wt (lb):     174  Ordering Physician:        ANJU MARTE  Referring Physician:       ROSANNA Reno  Sonographer:               Astrid Lerner RDCS, RVT  Study Type:                Limited Unilateral  Technical Quality:         Adequate  Age:    84    Gender:     F  MRN:    [id number]  :    1932      BSA:    1.95  Indications:     Pain in Limb  CPT Codes:       21036  ICD Codes:       729.5  History:         Left upper extremity pain  Limitations:     TDS due to left pacemaker which obstructed the subclavian                   view.  PROCEDURES:  Left upper extremity venous duplex imaging.  The following venous structures were evaluated: internal jugular,  subclavian, axillary, brachial, radial, ulnar, cephalic and basilic veins.  Serial compression, augmentation maneuvers,  color  and spectral Doppler  flow evaluations were performed.  FINDINGS:  Left upper extremity.  Complete color filling and compressibility with normal venous flow dynamics  including spontaneous flow, response to augmentation maneuvers, and  respiratory phasicity.  No superficial or deep venous thrombosis.  left radial and ulnar vein not evaluated  Mitul Mckeon MD  (Electronically Signed)  Final Date:      20 August 2017                   18:08      Micro:  Results     Procedure Component Value Units Date/Time    CULTURE WOUND W/ GRAM STAIN [[id number]] Collected:  09/13/17 1237    Order Status:  Completed Specimen:  Wound Updated:  09/16/17 0742     Gram Stain Result --     Few WBCs.  No organisms seen.       Significant Indicator NEG     Source WND     Site Pacemaker Pocket     Culture Result Wound No growth at 72 hours.    CULTURE WOUND W/ GRAM STAIN [[id number]] Collected:  09/13/17 1235    Order Status:  Completed Specimen:  Wound Updated:  09/16/17 0742     Gram Stain Result --     Rare WBCs.  No organisms seen.       Significant Indicator NEG     Source WND     Site Pacemaker Pockets     Culture Result Wound No growth at 72 hours.    GRAM STAIN [[id number]] Collected:  09/13/17 1237    Order Status:  Completed Specimen:  Wound Updated:  09/13/17 2147     Significant Indicator .     Source WND     Site Pacemaker Pocket     Gram Stain Result --     Few WBCs.  No organisms seen.      GRAM STAIN [[id number]] Collected:  09/13/17 1235    Order Status:  Completed Specimen:  Wound Updated:  09/13/17 2147     Significant Indicator .     Source WND     Site Pacemaker Pockets     Gram Stain Result --     Rare WBCs.  No organisms seen.      BLOOD CULTURE [[id number]] Collected:  09/12/17 1905    Order Status:  Completed Specimen:  Blood from Peripheral Updated:  09/13/17 0819     Significant Indicator NEG     Source BLD     Site PERIPHERAL     Blood Culture --     No Growth    Note: Blood cultures are incubated for 5 days  "and  are monitored continuously.Positive blood cultures  are called to the RN and reported as soon as  they are identified.      Narrative:       Per Hospital Policy: Only change Specimen Src: to \"Line\" if  specified by physician order.    BLOOD CULTURE [[id number]] Collected:  09/12/17 1906    Order Status:  Completed Specimen:  Blood from Peripheral Updated:  09/13/17 0819     Significant Indicator NEG     Source BLD     Site PERIPHERAL     Blood Culture --     No Growth    Note: Blood cultures are incubated for 5 days and  are monitored continuously.Positive blood cultures  are called to the RN and reported as soon as  they are identified.      Narrative:       Per Hospital Policy: Only change Specimen Src: to \"Line\" if  specified by physician order.          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 Garrett  No purulence seen intraop  Gram stain - no org, Cx - NGTD x 72 hrs  DC cefazolin   Transition to PO augmentin  Bcx - NGTD  Stop date 09/28/17    Recent left upper extremity and left upper chest cellulitis over PM site  Resolved with course of augmentin    Sick sinus syndrome s/p PPM  S/p PPM removal 9/13    No ID clinic Follow up needed.    Discussed with internal medicine/Dr. Rodriguez. ID signing off.  "

## 2017-09-16 NOTE — DISCHARGE SUMMARY
CHIEF COMPLAINT ON ADMISSION  No chief complaint on file.      CODE STATUS  Full Code    HPI & HOSPITAL COURSE  Please see H&P dictated by myself. This is a 84 y.o. female with past medical history significant for breast cancer, uterine cancer, hypertension, sick sinus syndrome status post pacemaker placement on June 1, 2017 who presented 9/12/2017 with concern for infected pacemaker. Patient was admitted to telemetry and blood cultures were drawn. Patient was taken to the OR and had her pacemaker removed. Infectious Disease was consulted for antibiotic recommendations. Patient's blood cultures, pacemaker pocket cultures and wound cultures remained negative to date. Patient was discharged home with Augmentin for 2 weeks. Patient did not have any acute events on telemetry monitor.     Therefore, she is discharged in good and stable condition with close outpatient follow-up.        DISCHARGE PROBLEM LIST  Active Problems:    Sick sinus syndrome (CMS-HCC) POA: Yes    Essential hypertension POA: Yes    Hx of breast cancer POA: Yes    Hx of cancer of uterus POA: Yes    PAF (paroxysmal atrial fibrillation) (CMS-HCC) POA: Yes  Resolved Problems:    Infected pacemaker (CMS-HCC) POA: Yes      FOLLOW UP  Future Appointments  Date Time Provider Department Center   9/22/2017 1:00 PM Jillian Avalos M.D. [facility] None   10/4/2017 4:00 PM Stanislav Garrett M.D. [facility] None     Jillian Avalos M.D.  [address]  [address]  [place] [facility] [address]  [phone]    Schedule an appointment as soon as possible for a visit in 1 week      Stanislav Garrett M.D.  [address]  [address]  [facility] [address]  [phone]    Schedule an appointment as soon as possible for a visit in 3 weeks        MEDICATIONS ON DISCHARGE   Meron Rice   Home Medication Instructions JOSE MANUEL:[id number]    Printed on:09/16/17 1101   Medication Information                      amoxicillin-clavulanate (AUGMENTIN) 875-125 MG Tab  Take 1 Tab by mouth every 12 hours for 14 days.              apixaban (ELIQUIS) 5mg Tab  Take 1 Tab by mouth 2 Times a Day.             aspirin EC (ECOTRIN) 81 MG TBEC  Take 1 Tab by mouth every day.             fluoxetine (PROZAC) 40 MG capsule  Take 1 Cap by mouth every day.             metoprolol (LOPRESSOR) 50 MG Tab  Take 1 Tab by mouth 2 Times a Day.             therapeutic multivitamin-minerals (THERAGRAN-M) Tab  Take 1 Tab by mouth every day.             tramadol (ULTRAM) 50 MG Tab  Take 50 mg by mouth every four hours as needed.                 DIET  Orders Placed This Encounter   Procedures   • Diet Order     Standing Status:   Standing     Number of Occurrences:   1     Order Specific Question:   Diet:     Answer:   Cardiac [6]       ACTIVITY  As tolerated.        CONSULTATIONS  Dr. Garrett - Cardiology     PROCEDURES  Removal and pacemaker lead extraction dual chamber.    LABORATORY  Lab Results   Component Value Date/Time    SODIUM 133 (L) 09/13/2017 03:19 AM    POTASSIUM 3.7 09/13/2017 03:19 AM    CHLORIDE 106 09/13/2017 03:19 AM    CO2 23 09/13/2017 03:19 AM    GLUCOSE 95 09/13/2017 03:19 AM    BUN 21 09/13/2017 03:19 AM    CREATININE 0.86 09/13/2017 03:19 AM    CREATININE 1.2 05/09/2009 04:10 AM        Lab Results   Component Value Date/Time    WBC 7.4 09/15/2017 02:19 AM    HEMOGLOBIN 14.0 09/15/2017 02:19 AM    HEMATOCRIT 42.4 09/15/2017 02:19 AM    PLATELETCT 184 09/15/2017 02:19 AM        Total time of the discharge process exceeds 38 minutes

## 2017-09-16 NOTE — CARE PLAN
Problem: Safety  Goal: Will remain free from falls  Outcome: PROGRESSING AS EXPECTED  Discussed with patient the importance to use call light when assistance is needed. Patient verbalized understanding. Patient has bed alarm on, call light within reach, treaded socks on, and hourly rounding in place.     Problem: Knowledge Deficit  Goal: Knowledge of disease process/condition, treatment plan, diagnostic tests, and medications will improve  Outcome: PROGRESSING AS EXPECTED  Patient has had more education reinforcement can success improve understand of medication, test, labs, and signs and symptoms to call her doctor.

## 2017-09-16 NOTE — DISCHARGE INSTRUCTIONS
Discharge Instructions    Discharged to home by car with relative. Discharged via wheelchair, hospital escort: Yes.  Special equipment needed: Not Applicable    Be sure to schedule a follow-up appointment with your primary care doctor or any specialists as instructed.     Discharge Plan:   Diet Plan: Discussed  Activity Level: Discussed  Confirmed Follow up Appointment: Appointment Scheduled  Confirmed Symptoms Management: Discussed  Medication Reconciliation Updated: Yes  Influenza Vaccine Indication: Indicated: 65 years and older  Influenza Vaccine Given - only chart on this line when given: Influenza Vaccine Given (See MAR)    I understand that a diet low in cholesterol, fat, and sodium is recommended for good health. Unless I have been given specific instructions below for another diet, I accept this instruction as my diet prescription.   Other diet:     Special Instructions: None    · Is patient discharged on Warfarin / Coumadin?   No     · Is patient Post Blood Transfusion?  No    Depression / Suicide Risk    As you are discharged from this [facility] facility, it is important to learn how to keep safe from harming yourself.    Recognize the warning signs:  · Abrupt changes in personality, positive or negative- including increase in energy   · Giving away possessions  · Change in eating patterns- significant weight changes-  positive or negative  · Change in sleeping patterns- unable to sleep or sleeping all the time   · Unwillingness or inability to communicate  · Depression  · Unusual sadness, discouragement and loneliness  · Talk of wanting to die  · Neglect of personal appearance   · Rebelliousness- reckless behavior  · Withdrawal from people/activities they love  · Confusion- inability to concentrate     If you or a loved one observes any of these behaviors or has concerns about self-harm, here's what you can do:  · Talk about it- your feelings and reasons for harming yourself  · Remove any means that  you might use to hurt yourself (examples: pills, rope, extension cords, firearm)  · Get professional help from the community (Mental Health, Substance Abuse, psychological counseling)  · Do not be alone:Call your Safe Contact- someone whom you trust who will be there for you.  · Call your local CRISIS HOTLINE 901-0199 or 380-710-1214  · Call your local Children's Mobile Crisis Response Team [place] (603) 577-5291 or www.[employer]  · Call the toll free National Suicide Prevention Hotlines   · National Suicide Prevention Lifeline 202-921-TEGK (3974)  · National Hope Line Network 800-SUICIDE (071-0648)

## 2017-10-04 ENCOUNTER — TELEPHONE (OUTPATIENT)
Dept: CARDIOLOGY | Facility: MEDICAL CENTER | Age: 82
End: 2017-10-04

## 2017-10-04 NOTE — TELEPHONE ENCOUNTER
Per Nyasia, pt developed nausea, diarrhea this afternoon.  Unclear if food related.  Pt upright, able to walk, no problems with speech or breathing.    Advised we will cancel appt today, please call tomorrow to discuss how pt is doing and to reschedule.   Recommended electrolyte beverage, go to ER if symptoms worsen.

## 2017-10-04 NOTE — TELEPHONE ENCOUNTER
----- Message from Savanna Berg sent at 10/4/2017  3:09 PM PDT -----  Regarding: patient very ill and has appt today at 4pm  VIRY/Annemarie      Patient's daughter Nyasia called and said patient has very bad vomiting and diarrhea right now, and won't make her 4pm appt, this has been going on for about an hour. She doesn't know if she needs to get her to ER. She can be reached at [phone].

## 2017-10-25 ENCOUNTER — TELEPHONE (OUTPATIENT)
Dept: CARDIOLOGY | Facility: MEDICAL CENTER | Age: 82
End: 2017-10-25

## 2017-10-25 ENCOUNTER — OFFICE VISIT (OUTPATIENT)
Dept: CARDIOLOGY | Facility: MEDICAL CENTER | Age: 82
End: 2017-10-25
Payer: MEDICARE

## 2017-10-25 VITALS
OXYGEN SATURATION: 96 % | WEIGHT: 170 LBS | DIASTOLIC BLOOD PRESSURE: 60 MMHG | HEART RATE: 50 BPM | SYSTOLIC BLOOD PRESSURE: 110 MMHG | BODY MASS INDEX: 25.18 KG/M2 | HEIGHT: 69 IN

## 2017-10-25 DIAGNOSIS — I49.5 SICK SINUS SYNDROME (HCC): ICD-10-CM

## 2017-10-25 DIAGNOSIS — Z86.79 S/P ABLATION OF ATRIAL FLUTTER: ICD-10-CM

## 2017-10-25 DIAGNOSIS — I48.3 TYPICAL ATRIAL FLUTTER (HCC): ICD-10-CM

## 2017-10-25 DIAGNOSIS — Z98.890 S/P ABLATION OF ATRIAL FLUTTER: ICD-10-CM

## 2017-10-25 DIAGNOSIS — I48.0 PAF (PAROXYSMAL ATRIAL FIBRILLATION) (HCC): ICD-10-CM

## 2017-10-25 DIAGNOSIS — Z85.3 HX OF BREAST CANCER: ICD-10-CM

## 2017-10-25 PROBLEM — Z95.0 CARDIAC PACEMAKER IN SITU: Status: RESOLVED | Noted: 2017-06-02 | Resolved: 2017-10-25

## 2017-10-25 LAB — EKG IMPRESSION: NORMAL

## 2017-10-25 PROCEDURE — 93000 ELECTROCARDIOGRAM COMPLETE: CPT | Performed by: INTERNAL MEDICINE

## 2017-10-25 PROCEDURE — 99214 OFFICE O/P EST MOD 30 MIN: CPT | Mod: 24 | Performed by: INTERNAL MEDICINE

## 2017-10-25 NOTE — LETTER
[facility]   [address], [address]  [place] [address]  Phone: [phone]  Fax: [phone]              Meron Rice  11/18/1932    Encounter Date: 10/25/2017    Stanislav Garrett M.D.          PROGRESS NOTE:  Subjective:   Meron Rice is a 84 y.o. female who presents today with recent PPM with infection and cellulitis inon left side. Removed PPM with resolution. Bilateral mastectomy. LN removed on left. SSS with sinus bradycardia and lethargy. On beta blockers. No metal allergies    Past Medical History:   Diagnosis Date   • Anemia    • Arrhythmia    • Arthritis     generalized   • ASTHMA     has not needed an inhaler since 2010   • Bowel habit changes     irreg   • Breath shortness    • Bronchitis 2017   • Cancer (CMS-HCC) [id number]    uterus/left breast   • CATARACT     beginning   • Chronic low back pain    • Depression 5/19/2017   • Fall 05/2017   • Foot-drop    • Hypertension    • Other specified symptom associated with female genital organs 1998    fibroids   • Pain     left shoulder    • Personal history of venous thrombosis and embolism     in leg during child-bearing years   • Pneumonia 2017   • Unspecified hemorrhagic conditions     post op hyst. Currently on Eliquis    • Unspecified urinary incontinence     lazy bladder; doesn't empty completely   • Urinary bladder disorder    • UTI (urinary tract infection)     frequent      Past Surgical History:   Procedure Laterality Date   • FEMUR NAILING INTRAMEDULLARY Right 6/3/2015    Procedure: FEMUR NAILING INTRAMEDULLARY;  Surgeon: Deshaun Denis M.D.;  Location: SURGERY [facility];  Service:    • WOUND CLOSURE GENERAL  4/10/2013    Performed by Nano Riley M.D. at SURGERY SAME DAY [facility] ORS   • BREAST IMPLANT REMOVAL  4/10/2013    Performed by Ele Meza M.D. at SURGERY SAME DAY [facility] ORS   • BREAST RECONSTRUCTION  11/26/2012    Performed by Ele Meza M.D. at SURGERY  [facility] ORS   • HIP HEMIARTHROPLASTY  5/14/2012    left; Performed by KEITH COWART at SURGERY [facility] ORS   • BREAST IMPLANT REVISION  7/11/2011    Performed by JAK ROSALES at SURGERY [facility] ORS   • NIPPLE RECONSTRUCTION  7/11/2011    Performed by JAK ROSALES at SURGERY [facility] ORS   • MASTECTOMY  12/15/2010    right   • CATH REMOVAL  12/15/2010    Performed by DIPAK VARELA at SURGERY [facility] ORS   • BREAST RECONSTRUCTION  12/15/2010    Performed by JAK ROSALES at SURGERY [facility] ORS   • LATISSIMUS FLAP  12/15/2010    Performed by JAK ROSALES at SURGERY [facility] ORS   • CATH PLACEMENT  10/28/2009    Performed by DIPAK VARELA at SURGERY SAME DAY [facility] ORS   • MASTECTOMY  9/30/2009    left   • AXILLARY NODE DISSECTION  9/15/2009    Performed by DIPAK VARELA at SURGERY SAME DAY [facility] ORS   • OTHER SURGICAL PROCEDURE  2004    bladder repair   • OTHER ORTHOPEDIC SURGERY  2002    laminectomy   • HYSTERECTOMY, TOTAL ABDOMINAL  2000   • GYN SURGERY  1998    excision of fibroids   • APPENDECTOMY  1968   • DILATION AND CURETTAGE  1961   • VEIN LIGAT & STRIP  1972, 1966    x2 bilateral     Family History   Problem Relation Age of Onset   • Diabetes Mother    • Heart Disease Mother    • Heart Disease Father    • Stroke Father    • Hypertension Father    • Diabetes Sister    • Heart Disease Sister    • Cancer Brother      lung   • Diabetes     • Heart Disease     • Stroke     • Cancer     • Hypertension     • Diabetes Other    • Diabetes Child    • Psychiatry Child      History   Smoking Status   • Former Smoker   • Packs/day: 0.10   • Years: 1.00   • Types: Cigarettes   • Quit date: 1/1/1964   Smokeless Tobacco   • Never Used     Comment: 50 years ago in 1960  SOCIAL      Allergies   Allergen Reactions   • Alendronate-Butylpar-Propylpar-Saccharin [Fosamax] Diarrhea     .     Outpatient Encounter Prescriptions as of 10/25/2017   Medication Sig  "Dispense Refill   • tramadol (ULTRAM) 50 MG Tab Take 50 mg by mouth every four hours as needed.     • therapeutic multivitamin-minerals (THERAGRAN-M) Tab Take 1 Tab by mouth every day.     • apixaban (ELIQUIS) 5mg Tab Take 1 Tab by mouth 2 Times a Day. 180 Tab 3   • metoprolol (LOPRESSOR) 50 MG Tab Take 1 Tab by mouth 2 Times a Day. 180 Tab 3   • fluoxetine (PROZAC) 40 MG capsule Take 1 Cap by mouth every day. 90 Cap 4   • [DISCONTINUED] aspirin EC (ECOTRIN) 81 MG TBEC Take 1 Tab by mouth every day. 30 Tab 0     No facility-administered encounter medications on file as of 10/25/2017.      ROS     Objective:   /60   Pulse (!) 50   Ht 1.753 m (5' 9.02\")   Wt 77.1 kg (170 lb)   SpO2 96%   BMI 25.09 kg/m²      Physical Exam   Constitutional: She is oriented to person, place, and time. She appears well-developed. No distress.   HENT:   Mouth/Throat: Mucous membranes are normal.   Eyes: Conjunctivae and EOM are normal.   Neck: No JVD present. No tracheal deviation present. No thyroid mass and no thyromegaly present.   Cardiovascular: Normal rate, regular rhythm and intact distal pulses.    No murmur heard.  Pulmonary/Chest: Effort normal and breath sounds normal. No respiratory distress. She exhibits no tenderness.   Abdominal: Soft. There is no tenderness.   Musculoskeletal: Normal range of motion. She exhibits no edema.   Neurological: She is alert and oriented to person, place, and time. She has normal strength. She displays no tremor.   Skin: Skin is warm and dry. She is not diaphoretic.   Psychiatric: She has a normal mood and affect. Her behavior is normal.   Vitals reviewed.      Assessment:     1. PAF (paroxysmal atrial fibrillation) (CMS-HCC)  EKG   2. Sick sinus syndrome (CMS-HCC)     3. S/P ablation of atrial flutter     4. Hx of breast cancer     5. Typical atrial flutter (CMS-HCC)         Medical Decision Making:  Today's Assessment / Status / Plan:   1. SSS schedule PPM for right side.  2. Hold " anticoagulation 1 day.  3. Cut metoprolol to 25 BID.  The risks, benefits, and alternatives to permanent pacemaker placement were discussed in great detail.  Specific risks mentioned to the patient including bleeding, cardiac perforation with possible tamponade possibly requiring pericardiocentesis or open heart surgery.  In addition the possibility of lead dislodgment (2-3%), pneumothorax (3%), hemothorax, infection were discussed.  Also, mentioned were the risk of death, stroke, and myocardial infarction.  The patient verbalized understanding of the potential complications and wishes to proceed with the procedure.        Jillian Avalos M.D.  [address]  [address]  [place] [address]  VIA In Basket

## 2017-10-26 ENCOUNTER — TELEPHONE (OUTPATIENT)
Dept: CARDIOLOGY | Facility: MEDICAL CENTER | Age: 82
End: 2017-10-26

## 2017-10-26 NOTE — PROGRESS NOTES
Subjective:   Meron Rice is a 84 y.o. female who presents today with recent PPM with infection and cellulitis inon left side. Removed PPM with resolution. Bilateral mastectomy. LN removed on left. SSS with sinus bradycardia and lethargy. On beta blockers. No metal allergies    Past Medical History:   Diagnosis Date   • Anemia    • Arrhythmia    • Arthritis     generalized   • ASTHMA     has not needed an inhaler since 2010   • Bowel habit changes     irreg   • Breath shortness    • Bronchitis 2017   • Cancer (CMS-HCC) [id number]    uterus/left breast   • CATARACT     beginning   • Chronic low back pain    • Depression 5/19/2017   • Fall 05/2017   • Foot-drop    • Hypertension    • Other specified symptom associated with female genital organs 1998    fibroids   • Pain     left shoulder    • Personal history of venous thrombosis and embolism     in leg during child-bearing years   • Pneumonia 2017   • Unspecified hemorrhagic conditions     post op hyst. Currently on Eliquis    • Unspecified urinary incontinence     lazy bladder; doesn't empty completely   • Urinary bladder disorder    • UTI (urinary tract infection)     frequent      Past Surgical History:   Procedure Laterality Date   • FEMUR NAILING INTRAMEDULLARY Right 6/3/2015    Procedure: FEMUR NAILING INTRAMEDULLARY;  Surgeon: Deshaun Denis M.D.;  Location: SURGERY [facility];  Service:    • WOUND CLOSURE GENERAL  4/10/2013    Performed by Nano Riley M.D. at SURGERY SAME DAY [facility] ORS   • BREAST IMPLANT REMOVAL  4/10/2013    Performed by Jak Meza M.D. at SURGERY SAME DAY [facility] ORS   • BREAST RECONSTRUCTION  11/26/2012    Performed by Jak Meza M.D. at SURGERY [facility]   • HIP HEMIARTHROPLASTY  5/14/2012    left; Performed by KEITH COWART at SURGERY [facility]   • BREAST IMPLANT REVISION  7/11/2011    Performed by JAK MEZA at SURGERY [facility]   • NIPPLE RECONSTRUCTION  7/11/2011     Performed by JAK ROSALES at SURGERY [facility] ORS   • MASTECTOMY  12/15/2010    right   • CATH REMOVAL  12/15/2010    Performed by DIPAK VARELA at SURGERY [facility] ORS   • BREAST RECONSTRUCTION  12/15/2010    Performed by JAK ROSALES at SURGERY [facility] ORS   • LATISSIMUS FLAP  12/15/2010    Performed by JAK ROSALES at SURGERY [facility] ORS   • CATH PLACEMENT  10/28/2009    Performed by DIPAK VARELA at SURGERY SAME DAY [facility] ORS   • MASTECTOMY  9/30/2009    left   • AXILLARY NODE DISSECTION  9/15/2009    Performed by DIPAK VARELA at SURGERY SAME DAY [facility] ORS   • OTHER SURGICAL PROCEDURE  2004    bladder repair   • OTHER ORTHOPEDIC SURGERY  2002    laminectomy   • HYSTERECTOMY, TOTAL ABDOMINAL  2000   • GYN SURGERY  1998    excision of fibroids   • APPENDECTOMY  1968   • DILATION AND CURETTAGE  1961   • VEIN LIGAT & STRIP  1972, 1966    x2 bilateral     Family History   Problem Relation Age of Onset   • Diabetes Mother    • Heart Disease Mother    • Heart Disease Father    • Stroke Father    • Hypertension Father    • Diabetes Sister    • Heart Disease Sister    • Cancer Brother      lung   • Diabetes     • Heart Disease     • Stroke     • Cancer     • Hypertension     • Diabetes Other    • Diabetes Child    • Psychiatry Child      History   Smoking Status   • Former Smoker   • Packs/day: 0.10   • Years: 1.00   • Types: Cigarettes   • Quit date: 1/1/1964   Smokeless Tobacco   • Never Used     Comment: 50 years ago in 1960  SOCIAL      Allergies   Allergen Reactions   • Alendronate-Butylpar-Propylpar-Saccharin [Fosamax] Diarrhea     .     Outpatient Encounter Prescriptions as of 10/25/2017   Medication Sig Dispense Refill   • tramadol (ULTRAM) 50 MG Tab Take 50 mg by mouth every four hours as needed.     • therapeutic multivitamin-minerals (THERAGRAN-M) Tab Take 1 Tab by mouth every day.     • apixaban (ELIQUIS) 5mg Tab Take 1 Tab by mouth 2 Times a Day. 180 Tab 3   •  "metoprolol (LOPRESSOR) 50 MG Tab Take 1 Tab by mouth 2 Times a Day. 180 Tab 3   • fluoxetine (PROZAC) 40 MG capsule Take 1 Cap by mouth every day. 90 Cap 4   • [DISCONTINUED] aspirin EC (ECOTRIN) 81 MG TBEC Take 1 Tab by mouth every day. 30 Tab 0     No facility-administered encounter medications on file as of 10/25/2017.      ROS     Objective:   /60   Pulse (!) 50   Ht 1.753 m (5' 9.02\")   Wt 77.1 kg (170 lb)   SpO2 96%   BMI 25.09 kg/m²     Physical Exam   Constitutional: She is oriented to person, place, and time. She appears well-developed. No distress.   HENT:   Mouth/Throat: Mucous membranes are normal.   Eyes: Conjunctivae and EOM are normal.   Neck: No JVD present. No tracheal deviation present. No thyroid mass and no thyromegaly present.   Cardiovascular: Normal rate, regular rhythm and intact distal pulses.    No murmur heard.  Pulmonary/Chest: Effort normal and breath sounds normal. No respiratory distress. She exhibits no tenderness.   Abdominal: Soft. There is no tenderness.   Musculoskeletal: Normal range of motion. She exhibits no edema.   Neurological: She is alert and oriented to person, place, and time. She has normal strength. She displays no tremor.   Skin: Skin is warm and dry. She is not diaphoretic.   Psychiatric: She has a normal mood and affect. Her behavior is normal.   Vitals reviewed.      Assessment:     1. PAF (paroxysmal atrial fibrillation) (CMS-HCC)  EKG   2. Sick sinus syndrome (CMS-HCC)     3. S/P ablation of atrial flutter     4. Hx of breast cancer     5. Typical atrial flutter (CMS-HCC)         Medical Decision Making:  Today's Assessment / Status / Plan:   1. SSS schedule PPM for right side.  2. Hold anticoagulation 1 day.  3. Cut metoprolol to 25 BID.  The risks, benefits, and alternatives to permanent pacemaker placement were discussed in great detail.  Specific risks mentioned to the patient including bleeding, cardiac perforation with possible tamponade possibly " requiring pericardiocentesis or open heart surgery.  In addition the possibility of lead dislodgment (2-3%), pneumothorax (3%), hemothorax, infection were discussed.  Also, mentioned were the risk of death, stroke, and myocardial infarction.  The patient verbalized understanding of the potential complications and wishes to proceed with the procedure.

## 2017-10-26 NOTE — TELEPHONE ENCOUNTER
Patient scheduled for PM insert on 11-1-17 at [facility] with Dr. Garrett.

## 2017-10-26 NOTE — TELEPHONE ENCOUNTER
Pt and daughter given blank procedure instruction sheet.   Please call daughter Nyasia after 9AM at [phone] to schedule pacemaker next week with Dr. Garrett.  To Lisa.

## 2017-10-30 ENCOUNTER — TELEPHONE (OUTPATIENT)
Dept: CARDIOLOGY | Facility: MEDICAL CENTER | Age: 82
End: 2017-10-30

## 2017-10-30 NOTE — TELEPHONE ENCOUNTER
----- Message from Reyna Phan sent at 10/30/2017  4:12 PM PDT -----  Regarding: clarification of instructions  Contact: [phone]  VIRY/sujata    Pt calling to discuss eliquis hold prior to surgery (p/m change) on 11/1 and needs clarification of instructions. Please call [phone]

## 2017-10-30 NOTE — TELEPHONE ENCOUNTER
Pt seemed very confused. Reviewed several times and it was difficult for pt to remember. Advised last dose eliquis tonight, then none starting tomorrow until after procedure as advised.

## 2017-10-31 ENCOUNTER — NON-PROVIDER VISIT (OUTPATIENT)
Dept: CARDIOLOGY | Facility: MEDICAL CENTER | Age: 82
End: 2017-10-31
Payer: MEDICARE

## 2017-10-31 DIAGNOSIS — Z02.9 ENCOUNTERS FOR ADMINISTRATIVE PURPOSE: ICD-10-CM

## 2017-10-31 DIAGNOSIS — Z01.812 PRE-OPERATIVE LABORATORY EXAMINATION: ICD-10-CM

## 2017-10-31 DIAGNOSIS — Z01.810 PRE-OPERATIVE CARDIOVASCULAR EXAMINATION: ICD-10-CM

## 2017-10-31 LAB
ALBUMIN SERPL BCP-MCNC: 4.1 G/DL (ref 3.2–4.9)
ALBUMIN/GLOB SERPL: 1.3 G/DL
ALP SERPL-CCNC: 101 U/L (ref 30–99)
ALT SERPL-CCNC: 13 U/L (ref 2–50)
ANION GAP SERPL CALC-SCNC: 8 MMOL/L (ref 0–11.9)
AST SERPL-CCNC: 22 U/L (ref 12–45)
BILIRUB SERPL-MCNC: 0.6 MG/DL (ref 0.1–1.5)
BUN SERPL-MCNC: 24 MG/DL (ref 8–22)
CALCIUM SERPL-MCNC: 9.7 MG/DL (ref 8.5–10.5)
CHLORIDE SERPL-SCNC: 106 MMOL/L (ref 96–112)
CO2 SERPL-SCNC: 21 MMOL/L (ref 20–33)
CREAT SERPL-MCNC: 0.9 MG/DL (ref 0.5–1.4)
EKG IMPRESSION: NORMAL
ERYTHROCYTE [DISTWIDTH] IN BLOOD BY AUTOMATED COUNT: 51.6 FL (ref 35.9–50)
GFR SERPL CREATININE-BSD FRML MDRD: 60 ML/MIN/1.73 M 2
GLOBULIN SER CALC-MCNC: 3.1 G/DL (ref 1.9–3.5)
GLUCOSE SERPL-MCNC: 94 MG/DL (ref 65–99)
HCT VFR BLD AUTO: 49.1 % (ref 37–47)
HGB BLD-MCNC: 15.8 G/DL (ref 12–16)
INR PPP: 1.18 (ref 0.87–1.13)
MCH RBC QN AUTO: 31.7 PG (ref 27–33)
MCHC RBC AUTO-ENTMCNC: 32.2 G/DL (ref 33.6–35)
MCV RBC AUTO: 98.4 FL (ref 81.4–97.8)
PLATELET # BLD AUTO: 217 K/UL (ref 164–446)
PMV BLD AUTO: 9.4 FL (ref 9–12.9)
POTASSIUM SERPL-SCNC: 3.9 MMOL/L (ref 3.6–5.5)
PROT SERPL-MCNC: 7.2 G/DL (ref 6–8.2)
PROTHROMBIN TIME: 14.7 SEC (ref 12–14.6)
RBC # BLD AUTO: 4.99 M/UL (ref 4.2–5.4)
SODIUM SERPL-SCNC: 135 MMOL/L (ref 135–145)
WBC # BLD AUTO: 8.2 K/UL (ref 4.8–10.8)

## 2017-10-31 PROCEDURE — 85027 COMPLETE CBC AUTOMATED: CPT

## 2017-10-31 PROCEDURE — 85610 PROTHROMBIN TIME: CPT

## 2017-10-31 PROCEDURE — 93005 ELECTROCARDIOGRAM TRACING: CPT

## 2017-10-31 PROCEDURE — 80053 COMPREHEN METABOLIC PANEL: CPT

## 2017-10-31 PROCEDURE — 36415 COLL VENOUS BLD VENIPUNCTURE: CPT

## 2017-10-31 PROCEDURE — 93010 ELECTROCARDIOGRAM REPORT: CPT | Performed by: INTERNAL MEDICINE

## 2017-10-31 RX ORDER — METOPROLOL TARTRATE 50 MG/1
75 TABLET, FILM COATED ORAL 2 TIMES DAILY
COMMUNITY
End: 2017-11-14 | Stop reason: SDUPTHER

## 2017-10-31 RX ORDER — FLUOXETINE HYDROCHLORIDE 40 MG/1
40 CAPSULE ORAL EVERY MORNING
COMMUNITY
End: 2018-01-31

## 2017-11-01 ENCOUNTER — APPOINTMENT (OUTPATIENT)
Dept: RADIOLOGY | Facility: MEDICAL CENTER | Age: 82
End: 2017-11-01
Attending: INTERNAL MEDICINE
Payer: MEDICARE

## 2017-11-01 ENCOUNTER — HOSPITAL ENCOUNTER (OUTPATIENT)
Facility: MEDICAL CENTER | Age: 82
End: 2017-11-02
Attending: INTERNAL MEDICINE | Admitting: INTERNAL MEDICINE
Payer: MEDICARE

## 2017-11-01 PROBLEM — I49.5 SSS (SICK SINUS SYNDROME) (HCC): Status: ACTIVE | Noted: 2017-11-01

## 2017-11-01 LAB — EKG IMPRESSION: NORMAL

## 2017-11-01 PROCEDURE — 99152 MOD SED SAME PHYS/QHP 5/>YRS: CPT

## 2017-11-01 PROCEDURE — 700111 HCHG RX REV CODE 636 W/ 250 OVERRIDE (IP): Performed by: INTERNAL MEDICINE

## 2017-11-01 PROCEDURE — 304853 HCHG PPM TEST CABLE

## 2017-11-01 PROCEDURE — 33208 INSRT HEART PM ATRIAL & VENT: CPT

## 2017-11-01 PROCEDURE — G0378 HOSPITAL OBSERVATION PER HR: HCPCS

## 2017-11-01 PROCEDURE — 700105 HCHG RX REV CODE 258: Performed by: INTERNAL MEDICINE

## 2017-11-01 PROCEDURE — C1892 INTRO/SHEATH,FIXED,PEEL-AWAY: HCPCS

## 2017-11-01 PROCEDURE — 700105 HCHG RX REV CODE 258

## 2017-11-01 PROCEDURE — C1898 LEAD, PMKR, OTHER THAN TRANS: HCPCS

## 2017-11-01 PROCEDURE — C1785 PMKR, DUAL, RATE-RESP: HCPCS

## 2017-11-01 PROCEDURE — 160002 HCHG RECOVERY MINUTES (STAT)

## 2017-11-01 PROCEDURE — A9270 NON-COVERED ITEM OR SERVICE: HCPCS | Performed by: INTERNAL MEDICINE

## 2017-11-01 PROCEDURE — 93010 ELECTROCARDIOGRAM REPORT: CPT | Performed by: INTERNAL MEDICINE

## 2017-11-01 PROCEDURE — 93005 ELECTROCARDIOGRAM TRACING: CPT | Performed by: INTERNAL MEDICINE

## 2017-11-01 PROCEDURE — 99153 MOD SED SAME PHYS/QHP EA: CPT

## 2017-11-01 PROCEDURE — 700102 HCHG RX REV CODE 250 W/ 637 OVERRIDE(OP): Performed by: INTERNAL MEDICINE

## 2017-11-01 PROCEDURE — 71010 DX-CHEST-PORTABLE (1 VIEW): CPT

## 2017-11-01 PROCEDURE — 96365 THER/PROPH/DIAG IV INF INIT: CPT

## 2017-11-01 PROCEDURE — 700101 HCHG RX REV CODE 250

## 2017-11-01 PROCEDURE — 700111 HCHG RX REV CODE 636 W/ 250 OVERRIDE (IP)

## 2017-11-01 PROCEDURE — 304952 HCHG R 2 PADS

## 2017-11-01 PROCEDURE — 305387 HCHG SUTURES

## 2017-11-01 RX ORDER — ACETAMINOPHEN 325 MG/1
650 TABLET ORAL EVERY 4 HOURS PRN
Status: DISCONTINUED | OUTPATIENT
Start: 2017-11-01 | End: 2017-11-02 | Stop reason: HOSPADM

## 2017-11-01 RX ORDER — CEFAZOLIN SODIUM 1 G/3ML
INJECTION, POWDER, FOR SOLUTION INTRAMUSCULAR; INTRAVENOUS
Status: COMPLETED
Start: 2017-11-01 | End: 2017-11-01

## 2017-11-01 RX ORDER — SODIUM CHLORIDE 9 MG/ML
INJECTION, SOLUTION INTRAVENOUS
Status: COMPLETED
Start: 2017-11-01 | End: 2017-11-01

## 2017-11-01 RX ORDER — MIDAZOLAM HYDROCHLORIDE 1 MG/ML
INJECTION INTRAMUSCULAR; INTRAVENOUS
Status: COMPLETED
Start: 2017-11-01 | End: 2017-11-01

## 2017-11-01 RX ORDER — LIDOCAINE HYDROCHLORIDE 20 MG/ML
INJECTION, SOLUTION INFILTRATION; PERINEURAL
Status: COMPLETED
Start: 2017-11-01 | End: 2017-11-01

## 2017-11-01 RX ORDER — OXYCODONE HYDROCHLORIDE AND ACETAMINOPHEN 5; 325 MG/1; MG/1
1 TABLET ORAL EVERY 4 HOURS PRN
Status: DISCONTINUED | OUTPATIENT
Start: 2017-11-01 | End: 2017-11-02 | Stop reason: HOSPADM

## 2017-11-01 RX ORDER — METOPROLOL TARTRATE 50 MG/1
50 TABLET, FILM COATED ORAL 2 TIMES DAILY
Status: DISCONTINUED | OUTPATIENT
Start: 2017-11-01 | End: 2017-11-02 | Stop reason: HOSPADM

## 2017-11-01 RX ORDER — FLUOXETINE HYDROCHLORIDE 20 MG/1
40 CAPSULE ORAL EVERY MORNING
Status: DISCONTINUED | OUTPATIENT
Start: 2017-11-01 | End: 2017-11-02 | Stop reason: HOSPADM

## 2017-11-01 RX ORDER — CLONIDINE HYDROCHLORIDE 0.1 MG/1
0.1 TABLET ORAL EVERY 4 HOURS PRN
Status: DISCONTINUED | OUTPATIENT
Start: 2017-11-01 | End: 2017-11-02 | Stop reason: HOSPADM

## 2017-11-01 RX ADMIN — MIDAZOLAM 2 MG: 1 INJECTION INTRAMUSCULAR; INTRAVENOUS at 15:14

## 2017-11-01 RX ADMIN — SODIUM CHLORIDE: 9 INJECTION, SOLUTION INTRAVENOUS at 23:45

## 2017-11-01 RX ADMIN — DEXTROSE MONOHYDRATE 2 G: 50 INJECTION, SOLUTION INTRAVENOUS at 23:53

## 2017-11-01 RX ADMIN — METOPROLOL TARTRATE 50 MG: 50 TABLET, FILM COATED ORAL at 20:31

## 2017-11-01 RX ADMIN — CLONIDINE HYDROCHLORIDE 0.1 MG: 0.1 TABLET ORAL at 17:18

## 2017-11-01 RX ADMIN — OXYCODONE HYDROCHLORIDE AND ACETAMINOPHEN 1 TABLET: 5; 325 TABLET ORAL at 20:31

## 2017-11-01 RX ADMIN — MIDAZOLAM 2 MG: 1 INJECTION INTRAMUSCULAR; INTRAVENOUS at 14:55

## 2017-11-01 RX ADMIN — FLUOXETINE HYDROCHLORIDE 40 MG: 20 CAPSULE ORAL at 17:18

## 2017-11-01 RX ADMIN — CEFAZOLIN 3000 MG: 1 INJECTION, POWDER, FOR SOLUTION INTRAVENOUS at 14:55

## 2017-11-01 RX ADMIN — FENTANYL CITRATE 100 MCG: 50 INJECTION, SOLUTION INTRAMUSCULAR; INTRAVENOUS at 14:55

## 2017-11-01 RX ADMIN — LIDOCAINE HYDROCHLORIDE: 20 INJECTION, SOLUTION INFILTRATION; PERINEURAL at 14:55

## 2017-11-01 ASSESSMENT — PATIENT HEALTH QUESTIONNAIRE - PHQ9
2. FEELING DOWN, DEPRESSED, IRRITABLE, OR HOPELESS: NOT AT ALL
SUM OF ALL RESPONSES TO PHQ QUESTIONS 1-9: 0
1. LITTLE INTEREST OR PLEASURE IN DOING THINGS: NOT AT ALL
SUM OF ALL RESPONSES TO PHQ9 QUESTIONS 1 AND 2: 0

## 2017-11-01 ASSESSMENT — LIFESTYLE VARIABLES
EVER FELT BAD OR GUILTY ABOUT YOUR DRINKING: NO
ALCOHOL_USE: YES
AVERAGE NUMBER OF DAYS PER WEEK YOU HAVE A DRINK CONTAINING ALCOHOL: 2
EVER_SMOKED: NEVER
TOTAL SCORE: 1
HAVE PEOPLE ANNOYED YOU BY CRITICIZING YOUR DRINKING: NO
HAVE YOU EVER FELT YOU SHOULD CUT DOWN ON YOUR DRINKING: YES
ON A TYPICAL DAY WHEN YOU DRINK ALCOHOL HOW MANY DRINKS DO YOU HAVE: 1
TOTAL SCORE: 1
EVER HAD A DRINK FIRST THING IN THE MORNING TO STEADY YOUR NERVES TO GET RID OF A HANGOVER: NO
CONSUMPTION TOTAL: NEGATIVE
HOW MANY TIMES IN THE PAST YEAR HAVE YOU HAD 5 OR MORE DRINKS IN A DAY: 0
TOTAL SCORE: 1

## 2017-11-01 ASSESSMENT — PAIN SCALES - GENERAL
PAINLEVEL_OUTOF10: 0
PAINLEVEL_OUTOF10: 0
PAINLEVEL_OUTOF10: 5
PAINLEVEL_OUTOF10: 0
PAINLEVEL_OUTOF10: 2
PAINLEVEL_OUTOF10: 0

## 2017-11-01 NOTE — OP REPORT
PROCEDURE PERFORMED: Dual chamber PPM    : Stanislav Garrett MD    ASSISTANT: none    ANESTHESIA: Local with moderate sedation    EBL: 30 cc    SPECIMENS: None    INDICATION: SSS    PRE PROCEDURE ECG: SR    POST PROCEDURE ECG: SR     COMPLICATIONS:  none     DESCRIPTION OF PROCEDURE:  After informed written consent, the patient was brought to the electrophysiology lab in the fasting, unsedated state. The patient was prepped and draped in the usual sterile fashion. The procedure was performed under moderate sedation with local anesthetic.   A right infraclavicular incision was made with a scalpel and the pectoral device pocket was created using a combination of blunt dissection and electrocautery. The modified Seldinger technique was used to gain access to the right axillary vein. A peel-away hemostasis sheath was placed in the vein. Under fluoroscopic guidance, the pacemaker leads were introduced into the heart. The ventricular lead was advanced to the RVOT and then lowered into position at the RV apex. The atrial lead was positioned in the Right atrial appendage. The leads were tested and had satisfactory sensing and pacing parameters. High output ventricular pacing did not produce extracardiac stimulation. The leads were sutured to the underlying pectoral muscle with interrupted non absorbable suture over a silastic suture sleeve. The device pocket was irrigated with antibiotic solution, inspected, and no bleeding was seen. The leads were connected to the PPM pulse generator and the device was inserted into the pocket. The wound was closed with three layers of absorbable sutures. Following recovery from sedation, the patient was transferred to a monitored bed in good condition.     IMPLANTED DEVICE INFORMATION:  Pulse generator is a [employer] model L311  Serial # [id number]    LEAD INFORMATION:  1)Right atrial lead is a [employer] model # 7740 , serial # [id number] ,P wave 1.8 millivolts, threshold 1.1 Volts, pacing impedance  665 Ohms.    2)Right ventricular lead is a Anderson model # 7741 , serial # [id number] ,R wave 21 millivolts, threshold 0.5 Volts, pacing impedance 1021 Ohms.    DEVICE PROGRAMMING:  DDDR with search    FLUOROSCOPY TIME: 2.6 min    IMPRESSIONS:  1. Successful DDDR implantation    RECOMMENDATIONS:  1. Transfer to monitored bed  2. PA and lateral chest x-ray  3. Device interrogation prior to hospital discharge  4. Followup in device clinic

## 2017-11-02 ENCOUNTER — APPOINTMENT (OUTPATIENT)
Dept: RADIOLOGY | Facility: MEDICAL CENTER | Age: 82
End: 2017-11-02
Attending: INTERNAL MEDICINE
Payer: MEDICARE

## 2017-11-02 VITALS
RESPIRATION RATE: 16 BRPM | TEMPERATURE: 98 F | HEART RATE: 60 BPM | WEIGHT: 163.8 LBS | OXYGEN SATURATION: 92 % | BODY MASS INDEX: 24.26 KG/M2 | HEIGHT: 69 IN | DIASTOLIC BLOOD PRESSURE: 78 MMHG | SYSTOLIC BLOOD PRESSURE: 130 MMHG

## 2017-11-02 LAB — EKG IMPRESSION: NORMAL

## 2017-11-02 PROCEDURE — G0378 HOSPITAL OBSERVATION PER HR: HCPCS

## 2017-11-02 PROCEDURE — A9270 NON-COVERED ITEM OR SERVICE: HCPCS | Performed by: INTERNAL MEDICINE

## 2017-11-02 PROCEDURE — 700102 HCHG RX REV CODE 250 W/ 637 OVERRIDE(OP): Performed by: INTERNAL MEDICINE

## 2017-11-02 PROCEDURE — 93005 ELECTROCARDIOGRAM TRACING: CPT | Performed by: INTERNAL MEDICINE

## 2017-11-02 PROCEDURE — A9270 NON-COVERED ITEM OR SERVICE: HCPCS | Performed by: NURSE PRACTITIONER

## 2017-11-02 PROCEDURE — 93010 ELECTROCARDIOGRAM REPORT: CPT | Performed by: INTERNAL MEDICINE

## 2017-11-02 PROCEDURE — 71010 DX-CHEST-PORTABLE (1 VIEW): CPT

## 2017-11-02 PROCEDURE — 700102 HCHG RX REV CODE 250 W/ 637 OVERRIDE(OP): Performed by: NURSE PRACTITIONER

## 2017-11-02 RX ORDER — TRAMADOL HYDROCHLORIDE 50 MG/1
50 TABLET ORAL EVERY 6 HOURS PRN
Qty: 15 TAB | Refills: 0 | Status: SHIPPED | OUTPATIENT
Start: 2017-11-02 | End: 2018-03-22

## 2017-11-02 RX ORDER — DOXYCYCLINE HYCLATE 100 MG
100 TABLET ORAL 2 TIMES DAILY
Qty: 8 TAB | Refills: 0 | Status: SHIPPED | OUTPATIENT
Start: 2017-11-02 | End: 2018-01-31

## 2017-11-02 RX ADMIN — FLUOXETINE HYDROCHLORIDE 40 MG: 20 CAPSULE ORAL at 08:03

## 2017-11-02 RX ADMIN — OXYCODONE HYDROCHLORIDE AND ACETAMINOPHEN 1 TABLET: 5; 325 TABLET ORAL at 01:40

## 2017-11-02 RX ADMIN — METOPROLOL TARTRATE 50 MG: 50 TABLET, FILM COATED ORAL at 08:04

## 2017-11-02 RX ADMIN — APIXABAN 5 MG: 5 TABLET, FILM COATED ORAL at 08:23

## 2017-11-02 ASSESSMENT — PAIN SCALES - GENERAL
PAINLEVEL_OUTOF10: 1
PAINLEVEL_OUTOF10: 0
PAINLEVEL_OUTOF10: 6
PAINLEVEL_OUTOF10: 5
PAINLEVEL_OUTOF10: 1

## 2017-11-02 ASSESSMENT — PATIENT HEALTH QUESTIONNAIRE - PHQ9
SUM OF ALL RESPONSES TO PHQ9 QUESTIONS 1 AND 2: 0
SUM OF ALL RESPONSES TO PHQ QUESTIONS 1-9: 0
2. FEELING DOWN, DEPRESSED, IRRITABLE, OR HOPELESS: NOT AT ALL
1. LITTLE INTEREST OR PLEASURE IN DOING THINGS: NOT AT ALL

## 2017-11-02 ASSESSMENT — LIFESTYLE VARIABLES
TOTAL SCORE: 1
AVERAGE NUMBER OF DAYS PER WEEK YOU HAVE A DRINK CONTAINING ALCOHOL: 2
EVER FELT BAD OR GUILTY ABOUT YOUR DRINKING: NO
HAVE PEOPLE ANNOYED YOU BY CRITICIZING YOUR DRINKING: NO
ON A TYPICAL DAY WHEN YOU DRINK ALCOHOL HOW MANY DRINKS DO YOU HAVE: 1
CONSUMPTION TOTAL: NEGATIVE
HAVE YOU EVER FELT YOU SHOULD CUT DOWN ON YOUR DRINKING: YES
EVER_SMOKED: NEVER
DO YOU DRINK ALCOHOL: YES
TOTAL SCORE: 1
HOW MANY TIMES IN THE PAST YEAR HAVE YOU HAD 5 OR MORE DRINKS IN A DAY: 0
EVER HAD A DRINK FIRST THING IN THE MORNING TO STEADY YOUR NERVES TO GET RID OF A HANGOVER: NO
TOTAL SCORE: 1

## 2017-11-02 NOTE — PROGRESS NOTES
Patient is sleeping in bed no indications of pain or distress. Bed in the lowest position and call light and personal belongings within reach. Will continue to monitor.

## 2017-11-02 NOTE — PROGRESS NOTES
Shoulder immobilizer applied to pt RUE per request. Pt educated on use and need for brace   RN notified

## 2017-11-02 NOTE — PROGRESS NOTES
Assumed care of pt transferred from PACU, bedside report received.  Pt denies chest pain or SOB.  VSS.  Tele box on.  Pt oriented to room and floor.  Pt resting comfortably.  All needs met.  Bed low and locked, call light in reach.  Discussed POC.

## 2017-11-02 NOTE — PROGRESS NOTES
Received report on patient. She is resting in bed watching TV and has minor complaints of discomfort at the pacemaker site and denies any intervention. Bed in the lowest position and call light and personal belongings within reach.

## 2017-11-02 NOTE — DISCHARGE SUMMARY
DISCHARGE DIAGNOSES:  1.  Sick sinus syndrome.  2.  History of atrial flutter.  3.  History of cellulitis at prior pacer pocket site.  4.  Chronic anticoagulation  5.  History of neurogenic bladder.  6.  Status post atrial flutter ablation.    HISTORY OF PRESENT HOSPITALIZATION:  The patient is an 84-year-old    female who is followed longitudinally by Dr. Stanislav Garrett in our office.    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  Therefore,   on September 13, the pacemaker generator leads were removed with recurrent   cellulitis despite antibiotic treatment and negative cultures.  The patient   has been on antibiotic coverage during this period of time without a temporary   device and was admitted electively on 11/01/2017 for reimplantation of   permanent pacemaker system.  The device is a [employer] pulse   generator, model L311, serial #[id number].  Right atrial lead is a [employer] model   7740, serial #[id number].  P-waves were measured at 1.8 millivolts, threshold of   1.1 volts and a pacing impedance at 665 ohms.  RV lead is a [employer] #7741,   serial #[id number].  R-waves are measured at 21 millivolts, threshold 0.5 volts   and impedance of 1021 ohms.  Interrogation of the device this morning   demonstrates intact thresholds and sensing.  Right atrial threshold has come   down a bit to 0.7 volts and ventricular threshold was at 0.4 volts.    Complications of the procedure were none.  This a.m., she is feeling well.    She has some soreness of course at the implant site on the right.  Chest   x-rays have been completed which demonstrate no evidence of late pneumothorax.    Patient is afebrile at 36.2, blood pressure 142/78.    LABORATORY DATA:  On admission, H and H of 15.8 and 48.4.  Electrolytes are   stable.  Sodium 135, potassium 3.9, chloride 106, CO2 of 21, BUN 24, and   creatinine 0.90 with the EGFR greater  than 60.  INR was 1.18.    DISCHARGE MEDICATIONS:  Will include Eliquis 5 mg b.i.d., Prozac 40 mg daily,   metoprolol 50 mg twice daily, doxycycline 100 mg p.o. b.i.d. for 4 days, and   Tramadol 50 mg q.6 hours p.r.n. pain.    IMPRESSIONS:  1.  Sick sinus syndrome.  2.  History of prior pacemaker implantation with removal for cellulitis on   09/13/2017.  3.  History of atrial flutter.  4.  History of atrial flutter ablation.  5.  Chronic anticoagulation.    PLAN:  The patient will be discharged home on the above medical regimen.  Arm   restricted activities were reviewed.  She was given a shoulder immobilizer to   help with reminder to keep her arm close to her side.  She will keep the   dressing dry and intact for 1 week and then will come in to our office for   review of the device.  The patient will observe the site for any swelling,   drainage, or redness.       ____________________________________     JOSE Childs / EVELYN    DD:  11/02/2017 08:31:00  DT:  11/02/2017 10:02:21    D#:  [id number]  Job#:  [id number]    cc: LASHAE GERARD MD

## 2017-11-02 NOTE — CARE PLAN
Problem: Communication  Goal: The ability to communicate needs accurately and effectively will improve  Outcome: PROGRESSING AS EXPECTED  Patient verbalizes any concerns or questions that she has with her care, treatment, and medications.     Problem: Safety  Goal: Will remain free from injury  Outcome: PROGRESSING AS EXPECTED  Patient calls when she needs to get up, wears threaded socks, uses a four wheel walker, and is steady on her feet.

## 2017-11-02 NOTE — DISCHARGE INSTRUCTIONS
Discharge Instructions    Discharged to home by car with relative. Discharged via wheelchair, hospital escort: Yes.  Special equipment needed: Not Applicable    Be sure to schedule a follow-up appointment with your primary care doctor or any specialists as instructed.     Discharge Plan:   Diet Plan: Discussed  Activity Level: Discussed  Confirmed Follow up Appointment: Appointment Scheduled  Confirmed Symptoms Management: Discussed  Medication Reconciliation Updated: Yes  Influenza Vaccine Indication: Not indicated: Previously immunized this influenza season and > 8 years of age    I understand that a diet low in cholesterol, fat, and sodium is recommended for good health. Unless I have been given specific instructions below for another diet, I accept this instruction as my diet prescription.   Other diet: cardiac    Special Instructions: None    · Is patient discharged on Warfarin / Coumadin?   No     · Is patient Post Blood Transfusion?  No    No lifting over 5 # with the left arm. No above the head activities with the left arm. Immobilizer at night. Follow-up in [facility] in 1 week.   Report any swelling or redness at the site.              Pacemaker Implantation, Care After  Refer to this sheet over the next few weeks. These instructions provide you with information on caring for yourself after the procedure. Your health care provider may also give you more specific instructions. Your treatment has been planned according to current medical practices, but problems sometimes occur. Call your health care provider if you have any problems or questions regarding your pacemaker.   WHAT TO EXPECT AFTER THE PROCEDURE  · You may feel pain. Some pain is normal. It may last a few days.  · A slight bump may be seen over the skin where the device was placed. Sometimes, it is possible to feel the device under the skin. This is normal.  · In the months and years afterward, your health care provider will check the device,  the leads, and the battery every few months. Eventually, when the battery is low, the device will be replaced.  HOME CARE INSTRUCTIONS  Medicines  · Take medicines only as directed by your health care provider.  · If you were prescribed an antibiotic medicine, finish it all even if you start to feel better.  · Do not take any other medicines without asking your health care provider first. Some medicines, including certain painkillers, can cause bleeding in your stomach after surgery.  Wound Care  · Do not remove the bandage on your chest until directed to do so by your health care provider.  · After your bandage is removed, you may see pieces of tape called skin adhesive strips over the area where the cut was made (incision site). Let them fall off on their own.  · Check the incision site every day to make sure it is not infected, bleeding, or starting to pull apart.  · Do not use lotions or ointments near the incision site unless directed to do so.  · Keep the incision area clean and dry for 2-3 days after the procedure or as directed by your health care provider. It takes several weeks for the incision site to completely heal.  · Do not take baths, swim, or use a hot tub until your health care provider approves.  Activities  · Try to walk a little every day. Exercising is important after this procedure. It is also important to use your shoulder on the side of the pacemaker in daily tasks that do not require exaggerated motion.  · Avoid sudden jerking, pulling, or chopping movements that pull your upper arm far away from your body for at least 6 weeks.  · Do not lift your upper arm above your shoulders for at least 6 weeks. This means no tennis, golf, or swimming for this period of time. If you sleep with the arm above your head, use a restraint to prevent this from happening as you sleep.  · You may go back to work when your health care provider says it is okay. Check with your health care provider before you  start to drive or play sports.  Other Instructions  · Follow diet instructions if they were provided. You should be able to eat what you usually do right away, but you may need to limit your salt intake.  · Weigh yourself every day. If you suddenly gain weight, fluid may be building up in your body.  · Always carry your pacemaker identification card with you. The card should list the implant date, device model, and . Consider wearing a medical alert bracelet or necklace.  · Tell all health care providers that you have a pacemaker. This may prevent them from giving you a magnetic resource imaging scan (MRI) because of the strong magnets used during that test.  · If you must pass through a metal detector, quickly walk through it. Do not stop under the detector or stand near it.  · Avoid places or objects with a strong electric or magnetic field, including:  ¨ Airport security peck. When at the airport, let officials know you have a pacemaker. Your ID card will let you be checked in a way that is safe for you and that will not damage your pacemaker. Also, do not let a security person wave a magnetic wand near your pacemaker. That can make it stop working.  ¨ Power plants.  ¨ Large electrical generators.  ¨ Radiofrequency transmission towers, such as cell phone and radio towers.  · Do not use amateur (ham) radio equipment or electric (arc) welding torches. Some devices are safe to use if held at least 1 foot from your pacemaker. These include power tools, lawn mowers, and speakers. If you are unsure of whether something is safe to use, ask your health care provider.  · You may safely use electric blankets, heating pads, computers, and microwave ovens.  · When using your cell phone, hold it to the ear opposite the pacemaker. Do not leave your cell phone in a pocket over the pacemaker.  · Keep all follow-up visits as directed by your health care provider. This is how your health care provider makes sure your  chest is healing the way it should. Ask your health care provider when you should come back to have your stitches or staples taken out.  · Have your pacemaker checked every 3-6 months or as directed by your health care provider. Most pacemakers last for 4-8 years before a new one is needed.  SEEK MEDICAL CARE IF:  · You gain weight suddenly.  · Your legs or feet swell more than they have before.  · It feels like your heart is fluttering or skipping beats (heart palpitations).  · You have a fever.  SEEK IMMEDIATE MEDICAL CARE IF:  · You have chest pain.  · You feel more short of breath than you have felt before.  · You feel more light-headed than you have felt before.  · You have problems with your incision site, such as swelling or bleeding, or it starts to open up.  · You have drainage, redness, swelling, or pain at your incision site.     This information is not intended to replace advice given to you by your health care provider. Make sure you discuss any questions you have with your health care provider.     Document Released: 07/07/2006 Document Revised: 01/08/2016 Document Reviewed: 04/19/2013  [employer] Patient Education ©2016 [employer]    Depression / Suicide Risk    As you are discharged from this [facility] facility, it is important to learn how to keep safe from harming yourself.    Recognize the warning signs:  · Abrupt changes in personality, positive or negative- including increase in energy   · Giving away possessions  · Change in eating patterns- significant weight changes-  positive or negative  · Change in sleeping patterns- unable to sleep or sleeping all the time   · Unwillingness or inability to communicate  · Depression  · Unusual sadness, discouragement and loneliness  · Talk of wanting to die  · Neglect of personal appearance   · Rebelliousness- reckless behavior  · Withdrawal from people/activities they love  · Confusion- inability to concentrate     If you or a loved one  observes any of these behaviors or has concerns about self-harm, here's what you can do:  · Talk about it- your feelings and reasons for harming yourself  · Remove any means that you might use to hurt yourself (examples: pills, rope, extension cords, firearm)  · Get professional help from the community (Mental Health, Substance Abuse, psychological counseling)  · Do not be alone:Call your Safe Contact- someone whom you trust who will be there for you.  · Call your local CRISIS HOTLINE 266-2122 or 598-975-0009  · Call your local Children's Mobile Crisis Response Team [place] (312) 921-1416 or www.OnAir3G  · Call the toll free National Suicide Prevention Hotlines   · National Suicide Prevention Lifeline 671-947-ZEQV (4389)  · National Hope Line Network 800-SUICIDE (770-1547)

## 2017-11-02 NOTE — PROGRESS NOTES
Pt given discharge instructions.  Discussed diet, activity, follow up appointments, symptoms and management, and prescriptions provided.  Packet sent with patient.  IV d/c'd, tele box off, and all questions answered.  Transport called for wheelchair.

## 2017-11-02 NOTE — PROGRESS NOTES
Two RN skin check performed with AHSAN Knutson.  No areas concerning for breakdown identified.  Pt does have a dressing over her recent pacer site on the left upper chest.

## 2017-11-02 NOTE — CARE PLAN
Problem: Safety  Goal: Will remain free from falls  Outcome: PROGRESSING AS EXPECTED  Treaded slipper socks on pt. Bed alarm in use. Pt calls for assistance, gait is steady.

## 2017-11-02 NOTE — CARE PLAN
"Problem: Pain Management  Goal: Pain level will decrease to patient's comfort goal  Pt has not reported any pain at rest. Pt has reported \"surgical pain\" but says it is tolerable and doesn't bother her.       "

## 2017-11-02 NOTE — PROGRESS NOTES
Assumed care of pt. Bedside report received from night R.N. Pt denies chest pain or SOB. VSS. Pt resting comfortably. All needs met. Bed low and locked, call light in reach. Discussed POC.

## 2017-11-14 ENCOUNTER — OFFICE VISIT (OUTPATIENT)
Dept: CARDIOLOGY | Facility: MEDICAL CENTER | Age: 82
End: 2017-11-14
Payer: MEDICARE

## 2017-11-14 VITALS
DIASTOLIC BLOOD PRESSURE: 72 MMHG | OXYGEN SATURATION: 94 % | SYSTOLIC BLOOD PRESSURE: 128 MMHG | HEIGHT: 69 IN | HEART RATE: 76 BPM

## 2017-11-14 DIAGNOSIS — I49.5 SICK SINUS SYNDROME (HCC): ICD-10-CM

## 2017-11-14 DIAGNOSIS — Z95.0 CARDIAC PACEMAKER IN SITU: ICD-10-CM

## 2017-11-14 PROCEDURE — 99214 OFFICE O/P EST MOD 30 MIN: CPT | Mod: 25 | Performed by: NURSE PRACTITIONER

## 2017-11-14 PROCEDURE — 93280 PM DEVICE PROGR EVAL DUAL: CPT | Performed by: NURSE PRACTITIONER

## 2017-11-14 RX ORDER — METOPROLOL TARTRATE 50 MG/1
75 TABLET, FILM COATED ORAL 2 TIMES DAILY
Qty: 270 TAB | Refills: 3 | COMMUNITY
Start: 2017-11-14 | End: 2018-08-20 | Stop reason: SDUPTHER

## 2017-11-14 ASSESSMENT — ENCOUNTER SYMPTOMS
CHILLS: 0
BLURRED VISION: 0
COUGH: 0
HEARTBURN: 0
FEVER: 0
VOMITING: 0
PSYCHIATRIC NEGATIVE: 1
DOUBLE VISION: 0
BLOOD IN STOOL: 0
WHEEZING: 0
MYALGIAS: 0
BRUISES/BLEEDS EASILY: 0
FOCAL WEAKNESS: 0
SORE THROAT: 0
ABDOMINAL PAIN: 0
DIZZINESS: 0
SPEECH CHANGE: 0
LOSS OF CONSCIOUSNESS: 0
SHORTNESS OF BREATH: 0
HEADACHES: 0
PALPITATIONS: 0
CLAUDICATION: 0
ORTHOPNEA: 0
PND: 0
NAUSEA: 0

## 2017-11-14 NOTE — PROGRESS NOTES
Subjective:   Merno Rice is a 84 y.o. female who presents today for follow up of her recent pacemaker reimplantation 11/2/17 with site and device follow up.  HISTORY OF PRESENT HOSPITALIZATION:  The patient is an 84-year-old  female who is followed longitudinally by Dr. Stanislav Garrett in our office.    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  Therefore,   on September 13, the pacemaker generator leads were removed with recurrent   cellulitis despite antibiotic treatment and negative cultures.  The patient   has been on antibiotic coverage during this period of time without a temporary   device and was admitted electively on 11/01/2017 for reimplantation of   permanent pacemaker system.  The device is a [employer] pulse   generator, model L311, serial #[id number].  Right atrial lead is a [place] model   7740, serial #[id number].  P-waves were measured at 1.8 millivolts, threshold of   1.1 volts and a pacing impedance at 665 ohms.  RV lead is a [place] #7741,   serial #[id number].  R-waves are measured at 21 millivolts, threshold 0.5 volts   and impedance of 1021 ohms.  Interrogation of the device this morning   demonstrates intact thresholds and sensing.  Right atrial threshold has come   down a bit to 0.7 volts and ventricular threshold was at 0.4 volts.    Complications of the procedure were none.  This a.m., she is feeling well.    She has some soreness of course at the implant site on the right.  Chest   x-rays have been completed which demonstrate no evidence of late pneumothorax.  Patient is afebrile at 36.2, blood pressure 142/78.    Here today for review of her pacemaker function and review of site with prior cellulitis after implantation of last PPM.        Past Medical History:   Diagnosis Date   • Anemia    • Arrhythmia     atrial fibrillation   • Arthritis     generalized   • ASTHMA     has not  needed an inhaler since 2010   • Bowel habit changes     irreg   • Breath shortness     on exertion   • Bronchitis      in past, not current   • Cancer (CMS-HCC) [id number]    uterus/left breast   • CATARACT     beginning   • Chronic low back pain    • Depression 5/19/2017   • Fall 05/2017   • Foot-drop    • Hypertension    • Other specified symptom associated with female genital organs 1998    fibroids   • Pacemaker     in past, removed due to cellulitis   • Pain     left shoulder    • Personal history of venous thrombosis and embolism     in leg during child-bearing years   • Pneumonia 2017   • Unspecified hemorrhagic conditions     post op hyst. Currently on Eliquis    • Unspecified urinary incontinence     lazy bladder; doesn't empty completely   • Urinary bladder disorder    • UTI (urinary tract infection)     frequent      Past Surgical History:   Procedure Laterality Date   • OTHER  09/2017    pacemaker removal    • FEMUR NAILING INTRAMEDULLARY Right 6/3/2015    Procedure: FEMUR NAILING INTRAMEDULLARY;  Surgeon: Deshaun Denis M.D.;  Location: [facility];  Service:    • WOUND CLOSURE GENERAL  4/10/2013    Performed by Nano Varela M.D. at SURGERY SAME DAY [facility]   • BREAST IMPLANT REMOVAL  4/10/2013    Performed by Jak Meza M.D. at SURGERY SAME DAY [facility]   • BREAST RECONSTRUCTION  11/26/2012    Performed by Jak Meza M.D. at SURGERY [facility]   • HIP HEMIARTHROPLASTY  5/14/2012    left; Performed by KEITH COWART at SURGERY [facility]   • BREAST IMPLANT REVISION  7/11/2011    Performed by JAK MEZA at SURGERY [facility]   • NIPPLE RECONSTRUCTION  7/11/2011    Performed by JAK MEZA at SURGERY [facility]   • MASTECTOMY  12/15/2010    right   • CATH REMOVAL  12/15/2010    Performed by NANO VARELA at SURGERY [facility]   • BREAST RECONSTRUCTION  12/15/2010    Performed by JAK MEZA at [facility]   • LATISSIMUS FLAP  12/15/2010    Performed by JAK ROSALES at SURGERY [facility]   • CATH PLACEMENT  10/28/2009    Performed by DIPAK VARELA at SURGERY SAME DAY [facility]   • MASTECTOMY  9/30/2009    left   • AXILLARY NODE DISSECTION  9/15/2009    Performed by DIPAK VARELA at SURGERY SAME DAY [facility]   • OTHER SURGICAL PROCEDURE  2004    bladder repair   • OTHER ORTHOPEDIC SURGERY  2002    laminectomy   • HYSTERECTOMY, TOTAL ABDOMINAL  2000   • GYN SURGERY  1998    excision of fibroids   • APPENDECTOMY  1968   • DILATION AND CURETTAGE  1961   • VEIN LIGAT & STRIP  1972, 1966    x2 bilateral     Family History   Problem Relation Age of Onset   • Diabetes Mother    • Heart Disease Mother    • Heart Disease Father    • Stroke Father    • Hypertension Father    • Diabetes Sister    • Heart Disease Sister    • Cancer Brother      lung   • Diabetes     • Heart Disease     • Stroke     • Cancer     • Hypertension     • Diabetes Other    • Diabetes Child    • Psychiatry Child      History   Smoking Status   • Former Smoker   • Packs/day: 0.10   • Years: 1.00   • Types: Cigarettes   • Quit date: 1/1/1964   Smokeless Tobacco   • Never Used     Comment: 50 years ago in 1960  SOCIAL      Allergies   Allergen Reactions   • Alendronate-Butylpar-Propylpar-Saccharin [Fosamax] Diarrhea     .     Outpatient Encounter Prescriptions as of 11/14/2017   Medication Sig Dispense Refill   • metoprolol (LOPRESSOR) 50 MG Tab Take 1.5 Tabs by mouth 2 times a day. 270 Tab 3   • tramadol (ULTRAM) 50 MG Tab Take 1 Tab by mouth every 6 hours as needed. 15 Tab 0   • fluoxetine (PROZAC) 40 MG capsule Take 40 mg by mouth every morning.     • therapeutic multivitamin-minerals (THERAGRAN-M) Tab Take 1 Tab by mouth every morning.     • apixaban (ELIQUIS) 5mg Tab Take 1 Tab by mouth 2 Times a Day. 180 Tab 3   • doxycycline (VIBRAMYCIN) 100 MG Tab Take 1 Tab by mouth 2 times a day. 8 Tab 0   • [DISCONTINUED] metoprolol  "(LOPRESSOR) 50 MG Tab Take 75 mg by mouth 2 times a day.       No facility-administered encounter medications on file as of 11/14/2017.      Review of Systems   Constitutional: Negative for chills and fever.   HENT: Negative for sore throat.         No difficulty swallowing   Eyes: Negative for blurred vision and double vision.   Respiratory: Negative for cough, shortness of breath and wheezing.    Cardiovascular: Negative for chest pain, palpitations, orthopnea, claudication, leg swelling and PND.   Gastrointestinal: Negative for abdominal pain, blood in stool, heartburn, nausea and vomiting.   Genitourinary: Negative for dysuria, frequency, hematuria and urgency.   Musculoskeletal: Negative for myalgias.   Skin: Negative.    Neurological: Negative for dizziness, speech change, focal weakness, loss of consciousness and headaches.   Endo/Heme/Allergies: Does not bruise/bleed easily.   Psychiatric/Behavioral: Negative.         Objective:   /72   Pulse 76   Ht 1.753 m (5' 9\")   SpO2 94%     Physical Exam   Constitutional: She is oriented to person, place, and time. She appears well-developed and well-nourished.   HENT:   Head: Normocephalic and atraumatic.   Eyes: Pupils are equal, round, and reactive to light.   Neck: Normal range of motion. Neck supple. No thyromegaly present.   Cardiovascular: Normal rate, regular rhythm, normal heart sounds and intact distal pulses.  Exam reveals no gallop and no friction rub.    No murmur heard.  Pulmonary/Chest: Effort normal and breath sounds normal. No respiratory distress. She has no wheezes. She has no rales. She exhibits no tenderness.   Device site right chest is uncomplicated.   Abdominal: Soft. Bowel sounds are normal. She exhibits no distension. There is no tenderness. There is no guarding.   Musculoskeletal: Normal range of motion. She exhibits no edema.   Neurological: She is alert and oriented to person, place, and time.   Skin: Skin is warm and dry. "   Psychiatric: She has a normal mood and affect.   Device function intact see flow sheet.    Assessment:     1. Sick sinus syndrome (CMS-HCC)     2. Cardiac pacemaker in situ         Medical Decision Making:  Today's Assessment / Status / Plan:     Doing well.   One episode of 1 hour of AF on November 6th.  Site uncomplicated.  Scab removed from explant site no evidence of retained suture.  RTc in 5 weeks.    Collaborating MD [place]

## 2017-12-12 ENCOUNTER — TELEPHONE (OUTPATIENT)
Dept: CARDIOLOGY | Facility: MEDICAL CENTER | Age: 82
End: 2017-12-12

## 2017-12-12 NOTE — TELEPHONE ENCOUNTER
----- Message from Pamela Felipe, Med Ass't sent at 12/12/2017  2:24 PM PST -----  Regarding: new RX request  RX refill request for:  fluoxetine (PROZAC) 40 MG capsule

## 2017-12-21 ENCOUNTER — TELEPHONE (OUTPATIENT)
Dept: CARDIOLOGY | Facility: MEDICAL CENTER | Age: 82
End: 2017-12-21

## 2017-12-21 NOTE — TELEPHONE ENCOUNTER
Left message for pt. To call.  Message Contents   Rose Mendes, Med Ass't  Betzaida Mcgee, L.P.N.   Phone Number: [phone]             PB     Patient would like a call she says she is feeling depressed.

## 2018-01-03 ENCOUNTER — TELEPHONE (OUTPATIENT)
Dept: CARDIOLOGY | Facility: MEDICAL CENTER | Age: 83
End: 2018-01-03

## 2018-01-03 NOTE — TELEPHONE ENCOUNTER
----- Message from Reyna Phan sent at 1/3/2018  2:41 PM PST -----  Regarding: pt in dental chair - needs clearance  Contact: [phone]  GUS/humphrey Corrales with [facility] (with patient in chair at this time).     [facility] needs clearance for cleaning & exam today, and advice on any precautions or limitations on pacemaker.    Please call [facility]anna at [phone] asap to advise.

## 2018-01-03 NOTE — TELEPHONE ENCOUNTER
Madeline Corrales this patient will need to wait 3 months post pacemaker implant for routine dental work.

## 2018-01-05 ENCOUNTER — TELEPHONE (OUTPATIENT)
Dept: CARDIOLOGY | Facility: MEDICAL CENTER | Age: 83
End: 2018-01-05

## 2018-01-05 NOTE — TELEPHONE ENCOUNTER
----- Message from Reyna Phan sent at 1/5/2018  1:37 PM PST -----  Regarding: letter of advice  Contact: [phone]  GUS/shakir Corrales with [facility] calling to ask you to fax her a letter for their file which is in response to the notes Annemarie entered on 1/3 about pt waiting 90 days for dental procedures.    Please fax to [facility] at  and if questions, [facility] ph# [phone].

## 2018-01-31 ENCOUNTER — OFFICE VISIT (OUTPATIENT)
Dept: CARDIOLOGY | Facility: MEDICAL CENTER | Age: 83
End: 2018-01-31
Payer: MEDICARE

## 2018-01-31 VITALS
HEART RATE: 60 BPM | DIASTOLIC BLOOD PRESSURE: 80 MMHG | SYSTOLIC BLOOD PRESSURE: 130 MMHG | WEIGHT: 186 LBS | BODY MASS INDEX: 27.55 KG/M2 | OXYGEN SATURATION: 98 % | HEIGHT: 69 IN

## 2018-01-31 DIAGNOSIS — N39.0 URINARY TRACT INFECTION WITHOUT HEMATURIA, SITE UNSPECIFIED: ICD-10-CM

## 2018-01-31 DIAGNOSIS — Z95.0 CARDIAC PACEMAKER IN SITU: ICD-10-CM

## 2018-01-31 DIAGNOSIS — I48.0 PAF (PAROXYSMAL ATRIAL FIBRILLATION) (HCC): ICD-10-CM

## 2018-01-31 DIAGNOSIS — Z79.01 ANTICOAGULANT LONG-TERM USE: ICD-10-CM

## 2018-01-31 LAB — EKG IMPRESSION: NORMAL

## 2018-01-31 PROCEDURE — 99214 OFFICE O/P EST MOD 30 MIN: CPT | Mod: 25 | Performed by: NURSE PRACTITIONER

## 2018-01-31 PROCEDURE — 93000 ELECTROCARDIOGRAM COMPLETE: CPT | Mod: 59 | Performed by: INTERNAL MEDICINE

## 2018-01-31 RX ORDER — FLUOXETINE HYDROCHLORIDE 20 MG/1
20 CAPSULE ORAL DAILY
Qty: 30 CAP | Refills: 0 | Status: SHIPPED | OUTPATIENT
Start: 2018-01-31 | End: 2018-03-22 | Stop reason: SDUPTHER

## 2018-01-31 RX ORDER — SULFAMETHOXAZOLE AND TRIMETHOPRIM 800; 160 MG/1; MG/1
1 TABLET ORAL 2 TIMES DAILY
Qty: 6 TAB | Refills: 0 | Status: SHIPPED | OUTPATIENT
Start: 2018-01-31 | End: 2018-03-22

## 2018-01-31 ASSESSMENT — ENCOUNTER SYMPTOMS
WHEEZING: 0
NAUSEA: 0
LOSS OF CONSCIOUSNESS: 0
BRUISES/BLEEDS EASILY: 0
BLOOD IN STOOL: 0
CHILLS: 0
COUGH: 0
SPEECH CHANGE: 0
ABDOMINAL PAIN: 0
DIZZINESS: 0
SHORTNESS OF BREATH: 0
VOMITING: 0
DOUBLE VISION: 0
DEPRESSION: 1
FEVER: 0
PND: 0
BLURRED VISION: 0
HEADACHES: 0
PALPITATIONS: 0
SORE THROAT: 0
FOCAL WEAKNESS: 0
CLAUDICATION: 0
HEARTBURN: 0
MYALGIAS: 0
ORTHOPNEA: 0

## 2018-01-31 NOTE — PROGRESS NOTES
Subjective:   Meron Rice is a 85 y.o. female who presents today   For review of her pacemaker, atrial fibrillation, anticoagulation and H/O cellulitis of prior pacemaker site.  Her leads on next visit need to be optimized in regard to voltage settings. Here today her device indicates excellent function. The atrial threshold is difficult to determine here today. Need to perform in AAI on next visit. Her Ventricular threshold is excellent at 0.5V.  She has had < 1 % recurrence of AF since her last check.  She has not established with a PCP and has urinary symptoms. She cannot get to the lab. I have explained that she needs to have a clean catch specimen and C&S done to avoid treatment that is ineffective. Urosepsis can occur. Will treat per her request with Bactrim X 3 days if no relief she will need to go to Urgent care. She has run out of Prozac and I will reorder that X 1 month only.  Chief Complaint: pacemaker follow up    Past Medical History:   Diagnosis Date   • Anemia    • Arrhythmia     atrial fibrillation   • Arthritis     generalized   • ASTHMA     has not needed an inhaler since 2010   • Bowel habit changes     irreg   • Breath shortness     on exertion   • Bronchitis      in past, not current   • Cancer (CMS-HCC) [id number]    uterus/left breast   • CATARACT     beginning   • Chronic low back pain    • Depression 5/19/2017   • Fall 05/2017   • Foot-drop    • Hypertension    • Other specified symptom associated with female genital organs 1998    fibroids   • Pacemaker     in past, removed due to cellulitis   • Pain     left shoulder    • Personal history of venous thrombosis and embolism     in leg during child-bearing years   • Pneumonia 2017   • Unspecified hemorrhagic conditions     post op hyst. Currently on Eliquis    • Unspecified urinary incontinence     lazy bladder; doesn't empty completely   • Urinary bladder disorder    • UTI (urinary tract infection)     frequent      Past Surgical History:    Procedure Laterality Date   • OTHER  09/2017    pacemaker removal    • FEMUR NAILING INTRAMEDULLARY Right 6/3/2015    Procedure: FEMUR NAILING INTRAMEDULLARY;  Surgeon: Deshaun Denis M.D.;  Location: SURGERY [facility];  Service:    • WOUND CLOSURE GENERAL  4/10/2013    Performed by Nano Varela M.D. at SURGERY SAME DAY [facility] ORS   • BREAST IMPLANT REMOVAL  4/10/2013    Performed by Jak Meza M.D. at SURGERY SAME DAY [facility] ORS   • BREAST RECONSTRUCTION  11/26/2012    Performed by Jak Meza M.D. at SURGERY [facility]   • HIP HEMIARTHROPLASTY  5/14/2012    left; Performed by KEITH COWART at SURGERY [facility]   • BREAST IMPLANT REVISION  7/11/2011    Performed by JAK MEZA at SURGERY [facility]   • NIPPLE RECONSTRUCTION  7/11/2011    Performed by JAK MEZA at SURGERY [facility]   • MASTECTOMY  12/15/2010    right   • CATH REMOVAL  12/15/2010    Performed by NANO VARELA at SURGERY [facility] ORS   • BREAST RECONSTRUCTION  12/15/2010    Performed by JAK MEZA at SURGERY [facility] ORS   • LATISSIMUS FLAP  12/15/2010    Performed by JAK MEZA at SURGERY [facility]   • CATH PLACEMENT  10/28/2009    Performed by NANO VARELA at SURGERY SAME DAY [facility] ORS   • MASTECTOMY  9/30/2009    left   • AXILLARY NODE DISSECTION  9/15/2009    Performed by NANO VARELA at SURGERY SAME DAY [facility] ORS   • OTHER SURGICAL PROCEDURE  2004    bladder repair   • OTHER ORTHOPEDIC SURGERY  2002    laminectomy   • HYSTERECTOMY, TOTAL ABDOMINAL  2000   • GYN SURGERY  1998    excision of fibroids   • APPENDECTOMY  1968   • DILATION AND CURETTAGE  1961   • VEIN LIGAT & STRIP  1972, 1966    x2 bilateral     Family History   Problem Relation Age of Onset   • Diabetes Mother    • Heart Disease Mother    • Heart Disease Father    • Stroke Father    • Hypertension Father    • Diabetes Sister    • Heart Disease Sister    • Cancer  Brother      lung   • Diabetes     • Heart Disease     • Stroke     • Cancer     • Hypertension     • Diabetes Other    • Diabetes Child    • Psychiatry Child      History   Smoking Status   • Former Smoker   • Packs/day: 0.10   • Years: 1.00   • Types: Cigarettes   • Quit date: 1/1/1964   Smokeless Tobacco   • Never Used     Comment: 50 years ago in 1960  SOCIAL      Allergies   Allergen Reactions   • Alendronate-Butylpar-Propylpar-Saccharin [Fosamax] Diarrhea     .     Outpatient Encounter Prescriptions as of 1/31/2018   Medication Sig Dispense Refill   • FLUoxetine (PROZAC) 20 MG Cap Take 1 Cap by mouth every day. 30 Cap 0   • sulfamethoxazole-trimethoprim (BACTRIM DS) 800-160 MG tablet Take 1 Tab by mouth 2 times a day. 6 Tab 0   • metoprolol (LOPRESSOR) 50 MG Tab Take 1.5 Tabs by mouth 2 times a day. 270 Tab 3   • therapeutic multivitamin-minerals (THERAGRAN-M) Tab Take 1 Tab by mouth every morning.     • apixaban (ELIQUIS) 5mg Tab Take 1 Tab by mouth 2 Times a Day. 180 Tab 3   • tramadol (ULTRAM) 50 MG Tab Take 1 Tab by mouth every 6 hours as needed. 15 Tab 0   • [DISCONTINUED] doxycycline (VIBRAMYCIN) 100 MG Tab Take 1 Tab by mouth 2 times a day. 8 Tab 0   • [DISCONTINUED] fluoxetine (PROZAC) 40 MG capsule Take 40 mg by mouth every morning.       No facility-administered encounter medications on file as of 1/31/2018.      Review of Systems   Constitutional: Negative for chills and fever.   HENT: Negative for sore throat.         No difficulty swallowing   Eyes: Negative for blurred vision and double vision.   Respiratory: Negative for cough, shortness of breath and wheezing.    Cardiovascular: Negative for chest pain, palpitations, orthopnea, claudication, leg swelling and PND.   Gastrointestinal: Negative for abdominal pain, blood in stool, heartburn, nausea and vomiting.   Genitourinary: Positive for frequency and urgency. Negative for dysuria and hematuria.   Musculoskeletal: Negative for myalgias.   Skin:  "Negative.    Neurological: Negative for dizziness, speech change, focal weakness, loss of consciousness and headaches.   Endo/Heme/Allergies: Does not bruise/bleed easily.   Psychiatric/Behavioral: Positive for depression (Ran out of SSRI has not established with PCP at this time.).        Objective:   /80   Pulse 60   Ht 1.753 m (5' 9\")   Wt 84.4 kg (186 lb)   SpO2 98%   BMI 27.47 kg/m²     Physical Exam   Constitutional: She is oriented to person, place, and time. She appears well-developed and well-nourished.   HENT:   Head: Normocephalic and atraumatic.   Eyes: Pupils are equal, round, and reactive to light.   Neck: Normal range of motion. Neck supple. No thyromegaly present.   Cardiovascular: Normal rate, regular rhythm, normal heart sounds and intact distal pulses.  Exam reveals no gallop and no friction rub.    No murmur heard.  Pulmonary/Chest: Effort normal and breath sounds normal. No respiratory distress. She has no wheezes. She has no rales. She exhibits no tenderness.   Device site right chest uncomplicated . Explantation site on left is uncomplicated.   Abdominal: Soft. Bowel sounds are normal. She exhibits no distension. There is no tenderness. There is no guarding.   Musculoskeletal: Normal range of motion. She exhibits edema (L>R).   Neurological: She is alert and oriented to person, place, and time.   Skin: Skin is warm and dry.   Psychiatric: She has a normal mood and affect.   Device function intact see flow sheet.    Assessment:     1. Cardiac pacemaker in situ     2. PAF (paroxysmal atrial fibrillation) (CMS-HCC)  EKG   3. Anticoagulant long-term use     4. Urinary tract infection without hematuria, site unspecified         Medical Decision Making:  Today's Assessment / Status / Plan:     1. PPM final thresholds need to be completed with next visit and reassessment of atrial capture.  2. PAF < 1 %.  3. AC continues on Eliquis.  She is taking 5mgs b.i.d.No obvious bleeding issues. " Renal function cr Cl 132 mL/min.  4. UTI --Bactrim ordered for 3 days. If no relief to Urgent care.  Will review Eliquis dosing with Dr Garrett.  RTc 3 months.    Collaborating MD [place]

## 2018-02-21 ENCOUNTER — APPOINTMENT (OUTPATIENT)
Dept: MEDICAL GROUP | Facility: MEDICAL CENTER | Age: 83
End: 2018-02-21
Payer: MEDICARE

## 2018-02-22 ENCOUNTER — TELEPHONE (OUTPATIENT)
Dept: CARDIOLOGY | Facility: MEDICAL CENTER | Age: 83
End: 2018-02-22

## 2018-02-22 NOTE — TELEPHONE ENCOUNTER
----- Message from Savanna Berg sent at 2/22/2018  7:52 AM PST -----  Regarding: dental office calling to amend clearance  GUS/Betzaida Lim @ [facility] is calling about clearance. She said they received clearance for cleaning, but they need it for a deep cleaning/scaling. She wants to know if the patient is still cleared. Patient's appt is today at 11:00am. Carina can be reached at [phone].

## 2018-02-22 NOTE — TELEPHONE ENCOUNTER
Pt. Needs to have peridontal procedure today. Advised Carina that she is cleared. Faxed amended clearance letter to Carina [phone].

## 2018-03-22 ENCOUNTER — HOSPITAL ENCOUNTER (OUTPATIENT)
Facility: MEDICAL CENTER | Age: 83
End: 2018-03-22
Attending: FAMILY MEDICINE
Payer: MEDICARE

## 2018-03-22 ENCOUNTER — OFFICE VISIT (OUTPATIENT)
Dept: MEDICAL GROUP | Facility: MEDICAL CENTER | Age: 83
End: 2018-03-22
Payer: MEDICARE

## 2018-03-22 VITALS
HEIGHT: 69 IN | RESPIRATION RATE: 20 BRPM | TEMPERATURE: 97.3 F | HEART RATE: 76 BPM | BODY MASS INDEX: 27.43 KG/M2 | SYSTOLIC BLOOD PRESSURE: 122 MMHG | WEIGHT: 185.19 LBS | OXYGEN SATURATION: 96 % | DIASTOLIC BLOOD PRESSURE: 78 MMHG

## 2018-03-22 DIAGNOSIS — N31.9 NEUROGENIC BLADDER: ICD-10-CM

## 2018-03-22 DIAGNOSIS — R35.0 URINARY FREQUENCY: ICD-10-CM

## 2018-03-22 DIAGNOSIS — I10 ESSENTIAL HYPERTENSION: ICD-10-CM

## 2018-03-22 DIAGNOSIS — I48.19 PERSISTENT ATRIAL FIBRILLATION (HCC): ICD-10-CM

## 2018-03-22 DIAGNOSIS — I48.0 PAF (PAROXYSMAL ATRIAL FIBRILLATION) (HCC): ICD-10-CM

## 2018-03-22 DIAGNOSIS — I49.5 SICK SINUS SYNDROME (HCC): ICD-10-CM

## 2018-03-22 DIAGNOSIS — H91.13 PRESBYCUSIS OF BOTH EARS: ICD-10-CM

## 2018-03-22 DIAGNOSIS — F32.4 MAJOR DEPRESSIVE DISORDER WITH SINGLE EPISODE, IN PARTIAL REMISSION (HCC): ICD-10-CM

## 2018-03-22 DIAGNOSIS — F41.9 ANXIETY: ICD-10-CM

## 2018-03-22 PROBLEM — L03.90 CELLULITIS: Status: RESOLVED | Noted: 2017-08-16 | Resolved: 2018-03-22

## 2018-03-22 PROBLEM — N30.00 ACUTE CYSTITIS: Status: RESOLVED | Noted: 2017-09-08 | Resolved: 2018-03-22

## 2018-03-22 PROBLEM — N39.0 UTI (URINARY TRACT INFECTION): Status: RESOLVED | Noted: 2017-03-05 | Resolved: 2018-03-22

## 2018-03-22 PROBLEM — H91.10 PRESBYCUSIS: Status: ACTIVE | Noted: 2018-03-22

## 2018-03-22 LAB
APPEARANCE UR: ABNORMAL
BACTERIA #/AREA URNS HPF: ABNORMAL /HPF
BILIRUB UR QL STRIP.AUTO: NEGATIVE
COLOR UR: YELLOW
CULTURE IF INDICATED INDCX: YES UA CULTURE
EPI CELLS #/AREA URNS HPF: NEGATIVE /HPF
GLUCOSE UR STRIP.AUTO-MCNC: NEGATIVE MG/DL
HYALINE CASTS #/AREA URNS LPF: ABNORMAL /LPF
KETONES UR STRIP.AUTO-MCNC: NEGATIVE MG/DL
LEUKOCYTE ESTERASE UR QL STRIP.AUTO: ABNORMAL
MICRO URNS: ABNORMAL
NITRITE UR QL STRIP.AUTO: NEGATIVE
PH UR STRIP.AUTO: 5.5 [PH]
PROT UR QL STRIP: NEGATIVE MG/DL
RBC # URNS HPF: ABNORMAL /HPF
RBC UR QL AUTO: ABNORMAL
SP GR UR STRIP.AUTO: 1.01
UROBILINOGEN UR STRIP.AUTO-MCNC: 0.2 MG/DL
WBC #/AREA URNS HPF: ABNORMAL /HPF

## 2018-03-22 PROCEDURE — 99214 OFFICE O/P EST MOD 30 MIN: CPT | Performed by: FAMILY MEDICINE

## 2018-03-22 PROCEDURE — 87077 CULTURE AEROBIC IDENTIFY: CPT

## 2018-03-22 PROCEDURE — 87186 SC STD MICRODIL/AGAR DIL: CPT

## 2018-03-22 PROCEDURE — 81001 URINALYSIS AUTO W/SCOPE: CPT

## 2018-03-22 PROCEDURE — 87086 URINE CULTURE/COLONY COUNT: CPT

## 2018-03-22 RX ORDER — PHENAZOPYRIDINE HYDROCHLORIDE 200 MG/1
200 TABLET, FILM COATED ORAL 3 TIMES DAILY PRN
Qty: 6 TAB | Refills: 0 | Status: SHIPPED | OUTPATIENT
Start: 2018-03-22 | End: 2018-12-11

## 2018-03-22 RX ORDER — METOPROLOL TARTRATE 50 MG/1
75 TABLET, FILM COATED ORAL 2 TIMES DAILY
Qty: 270 TAB | Refills: 3 | Status: CANCELLED | OUTPATIENT
Start: 2018-03-22

## 2018-03-22 RX ORDER — SULFAMETHOXAZOLE AND TRIMETHOPRIM 800; 160 MG/1; MG/1
1 TABLET ORAL 2 TIMES DAILY
Qty: 6 TAB | Refills: 0 | Status: SHIPPED | OUTPATIENT
Start: 2018-03-22 | End: 2018-03-25

## 2018-03-22 RX ORDER — FLUOXETINE HYDROCHLORIDE 40 MG/1
40 CAPSULE ORAL DAILY
Qty: 90 CAP | Refills: 1 | Status: SHIPPED | OUTPATIENT
Start: 2018-03-22 | End: 2018-07-11

## 2018-03-22 ASSESSMENT — PATIENT HEALTH QUESTIONNAIRE - PHQ9: CLINICAL INTERPRETATION OF PHQ2 SCORE: 0

## 2018-03-22 NOTE — ASSESSMENT & PLAN NOTE
"The patient describes a few weeks of urinary frequency and urgency without dysuria. She also notices some cloudy urine. She denies back pain or fever. She did take some erythromycin at home, which she had lying around. She thinks this helped but didn't completely get rid of what she assumes is an infection. She does have a history of urosepsis in the past. Of note, the patient does have a history of atonic bladder. See discussion above regarding \"neurogenic bladder\".  "

## 2018-03-22 NOTE — ASSESSMENT & PLAN NOTE
The patient's blood pressure is well controlled on Lopressor 75 mg twice daily. This is managed by cardiology.

## 2018-03-22 NOTE — ASSESSMENT & PLAN NOTE
The patient has a history of neurogenic bladder. She used to follow with urology who had her on suppressive antibiotics for recurrent urinary tract infections and self-catheterization. Patient has not seen urology in some time. She is not currently interested in seeing a urologist. She states that, overall, she feels as though she is doing quite well from this perspective.

## 2018-03-22 NOTE — ASSESSMENT & PLAN NOTE
The patient has major depressive disorder. She is requesting that we increase her dose for better control, however, she denies suicidal ideation. She is not currently interested in seeing a counselor.

## 2018-03-22 NOTE — ASSESSMENT & PLAN NOTE
The patient has paroxysmal atrial fibrillation and sick sinus syndrome status post pacemaker placement in 2017 complicated by infection, removal of the pacemaker, and reimplantation. She follows with cardiology and is doing well. She is maintained on Eliquis and Lopressor 50 mg twice daily.

## 2018-03-22 NOTE — ASSESSMENT & PLAN NOTE
The patient has anxiety which responds well to fluoxetine. She is requesting that we increase her dose for better control. She is not currently interested in seeing a counselor. She denies suicidal ideation.

## 2018-03-22 NOTE — PROGRESS NOTES
"[facility]  Progress Note  Established Patient    Subjective:   Meron Rice is a 85 y.o. female here today with a chief complaint of urinary frequency. The patient is alone.     Urinary frequency  The patient describes a few weeks of urinary frequency and urgency without dysuria. She also notices some cloudy urine. She denies back pain or fever. She did take some erythromycin at home, which she had lying around. She thinks this helped but didn't completely get rid of what she assumes is an infection. She does have a history of urosepsis in the past. Of note, the patient does have a history of atonic bladder. See discussion above regarding \"neurogenic bladder\".    Anxiety  The patient has anxiety which responds well to fluoxetine. She is requesting that we increase her dose for better control. She is not currently interested in seeing a counselor. She denies suicidal ideation.    Depression  The patient has major depressive disorder. She is requesting that we increase her dose for better control, however, she denies suicidal ideation. She is not currently interested in seeing a counselor.    Essential hypertension  The patient's blood pressure is well controlled on Lopressor 75 mg twice daily. This is managed by cardiology.    Neurogenic bladder  The patient has a history of neurogenic bladder. She used to follow with urology who had her on suppressive antibiotics for recurrent urinary tract infections and self-catheterization. Patient has not seen urology in some time. She is not currently interested in seeing a urologist. She states that, overall, she feels as though she is doing quite well from this perspective.    PAF (paroxysmal atrial fibrillation) (CMS-HCC)  The patient has paroxysmal atrial fibrillation and sick sinus syndrome status post pacemaker placement in 2017 complicated by infection, removal of the pacemaker, and reimplantation. She follows with cardiology and is doing well. She is " "maintained on Eliquis and Lopressor 50 mg twice daily.    Presbycusis  The patient describes some age-related hearing loss will like to see an audiologist.    Sick sinus syndrome (CMS-HCC)  See discussion regarding \"PAF\".      Current Outpatient Prescriptions on File Prior to Visit   Medication Sig Dispense Refill   • metoprolol (LOPRESSOR) 50 MG Tab Take 1.5 Tabs by mouth 2 times a day. 270 Tab 3   • therapeutic multivitamin-minerals (THERAGRAN-M) Tab Take 1 Tab by mouth every morning.     • apixaban (ELIQUIS) 5mg Tab Take 1 Tab by mouth 2 Times a Day. 180 Tab 3     No current facility-administered medications on file prior to visit.        Past Medical History:   Diagnosis Date   • Anemia    • Arrhythmia     atrial fibrillation   • Arthritis     generalized   • ASTHMA     has not needed an inhaler since 2010   • Breath shortness     on exertion   • Bronchitis      in past, not current   • Cancer (CMS-HCC) [id number]    Uterus and Breast   • CATARACT     beginning   • Chronic low back pain    • Depression 5/19/2017   • Foot-drop    • Hypertension    • Other specified symptom associated with female genital organs 1998    fibroids   • Pacemaker     in past, removed due to cellulitis   • Pain     left shoulder    • Unspecified hemorrhagic conditions     post op hyst. Currently on Eliquis    • Unspecified urinary incontinence     lazy bladder; doesn't empty completely   • UTI (urinary tract infection)     frequent        Allergies: Alendronate-butylpar-propylpar-saccharin [fosamax]    Surgical History:  has a past surgical history that includes hysterectomy, total abdominal (2000); vein ligat & strip (1972, 1966); axillary node dissection (9/15/2009); mastectomy (9/30/2009); other orthopedic surgery (2002); gyn surgery (1998); cath placement (10/28/2009); mastectomy (12/15/2010); cath removal (12/15/2010); breast reconstruction (12/15/2010); latissimus flap (12/15/2010); appendectomy (1968); dilation and curettage " "(1961); breast implant revision (7/11/2011); nipple reconstruction (7/11/2011); hip hemiarthroplasty (5/14/2012); other surgical procedure (2004); breast reconstruction (11/26/2012); wound closure general (4/10/2013); breast implant removal (4/10/2013); femur nailing intramedullary (Right, 6/3/2015); and other (09/2017).    Family History: family history includes Cancer in her brother; Diabetes in her child, mother, other, and sister; Heart Disease in her father, mother, and sister; Hypertension in her father; Psychiatry in her child; Stroke in her father.    Social History:  reports that she quit smoking about 54 years ago. Her smoking use included Cigarettes. She has a 0.10 pack-year smoking history. She has never used smokeless tobacco. She reports that she drinks alcohol. She reports that she does not use drugs.    ROS: no CP or SOB.        Objective:     Vitals:    03/22/18 1031   BP: 122/78   Pulse: 76   Resp: 20   Temp: 36.3 °C (97.3 °F)   SpO2: 96%   Weight: 84 kg (185 lb 3 oz)   Height: 1.753 m (5' 9\")       Physical Exam:  General: alert in no apparent distress.   Cardio: regular rate and rhythm, no murmurs, rubs or gallops.   Resp: CTAB no w/r/r.   Back: no CVAT.        Assessment and Plan:     1. Persistent atrial fibrillation (CMS-[facility])  - f/u cardiology, continue lopressor.   - Cardiology may consider transitioning her to Eliquis 2.5 mg twice daily.     2. Urinary frequency  Considering her history, suspect infection. No evidence of pyelonephritis. Will obtain urinalysis and treat presumptively with Bactrim, which the patient states she tolerates well. She is also requesting Pyridium.  - URINALYSIS,CULTURE IF INDICATED; Future  - sulfamethoxazole-trimethoprim (BACTRIM DS) 800-160 MG tablet; Take 1 Tab by mouth 2 times a day for 3 days.  Dispense: 6 Tab; Refill: 0  - phenazopyridine (PYRIDIUM) 200 MG Tab; Take 1 Tab by mouth 3 times a day as needed for Mild Pain (Do not use longer than 2 days.).  " Dispense: 6 Tab; Refill: 0  - ER precautions discussed.     3. Presbycusis of both ears  - REFERRAL TO AUDIOLOGY    4. PAF (paroxysmal atrial fibrillation) (CMS-[facility])  - see above.     5. Sick sinus syndrome (CMS-[facility])  - see above.     6. Neurogenic bladder  I strongly recommended the patient see urology. She declines current time.    7. Essential hypertension  - continue lopressor.     8. Major depressive disorder with single episode, in partial remission (CMS-HCC)  - increase prozac to 40 mg daily.   - fluoxetine (PROZAC) 40 MG capsule; Take 1 Cap by mouth every day.  Dispense: 90 Cap; Refill: 1    9. Anxiety  - increase prozac to 40 mg daily.         Followup: Return in about 4 weeks (around 4/19/2018), or if symptoms worsen or fail to improve.

## 2018-03-26 ENCOUNTER — TELEPHONE (OUTPATIENT)
Dept: MEDICAL GROUP | Facility: MEDICAL CENTER | Age: 83
End: 2018-03-26

## 2018-03-26 NOTE — TELEPHONE ENCOUNTER
Phone Number Called: [phone] (home)     Message: Pt informed. She informed me her symptoms have improved with the bactrim.    Left Message for patient to call back: N\A

## 2018-03-26 NOTE — TELEPHONE ENCOUNTER
----- Message from Juana Mahmood P.A.-C. sent at 3/26/2018  6:48 AM PDT -----  Please call patient. Urine culture was positive for bacteria. Have her symptoms resolved on the Bactrim? If not I will send rx for a different antibiotic. Thanks! Juana Mahmood PA-C

## 2018-04-12 ENCOUNTER — APPOINTMENT (OUTPATIENT)
Dept: RADIOLOGY | Facility: MEDICAL CENTER | Age: 83
End: 2018-04-12
Attending: EMERGENCY MEDICINE
Payer: MEDICARE

## 2018-04-12 ENCOUNTER — TELEPHONE (OUTPATIENT)
Dept: CARDIOLOGY | Facility: MEDICAL CENTER | Age: 83
End: 2018-04-12

## 2018-04-12 ENCOUNTER — HOSPITAL ENCOUNTER (EMERGENCY)
Facility: MEDICAL CENTER | Age: 83
End: 2018-04-12
Attending: EMERGENCY MEDICINE
Payer: MEDICARE

## 2018-04-12 VITALS
HEIGHT: 69 IN | RESPIRATION RATE: 18 BRPM | BODY MASS INDEX: 27.4 KG/M2 | WEIGHT: 184.97 LBS | DIASTOLIC BLOOD PRESSURE: 79 MMHG | TEMPERATURE: 97.2 F | HEART RATE: 60 BPM | OXYGEN SATURATION: 96 % | SYSTOLIC BLOOD PRESSURE: 184 MMHG

## 2018-04-12 DIAGNOSIS — M79.605 LOWER EXTREMITY PAIN, LATERAL, LEFT: ICD-10-CM

## 2018-04-12 LAB
ANION GAP SERPL CALC-SCNC: 6 MMOL/L (ref 0–11.9)
APTT PPP: 26.4 SEC (ref 24.7–36)
BASOPHILS # BLD AUTO: 1.4 % (ref 0–1.8)
BASOPHILS # BLD: 0.1 K/UL (ref 0–0.12)
BNP SERPL-MCNC: 214 PG/ML (ref 0–100)
BUN SERPL-MCNC: 26 MG/DL (ref 8–22)
CALCIUM SERPL-MCNC: 9.4 MG/DL (ref 8.4–10.2)
CHLORIDE SERPL-SCNC: 108 MMOL/L (ref 96–112)
CO2 SERPL-SCNC: 22 MMOL/L (ref 20–33)
CREAT SERPL-MCNC: 0.91 MG/DL (ref 0.5–1.4)
EOSINOPHIL # BLD AUTO: 0.17 K/UL (ref 0–0.51)
EOSINOPHIL NFR BLD: 2.4 % (ref 0–6.9)
ERYTHROCYTE [DISTWIDTH] IN BLOOD BY AUTOMATED COUNT: 48.6 FL (ref 35.9–50)
GLUCOSE SERPL-MCNC: 94 MG/DL (ref 65–99)
HCT VFR BLD AUTO: 47.9 % (ref 37–47)
HGB BLD-MCNC: 16.3 G/DL (ref 12–16)
IMM GRANULOCYTES # BLD AUTO: 0.02 K/UL (ref 0–0.11)
IMM GRANULOCYTES NFR BLD AUTO: 0.3 % (ref 0–0.9)
INR PPP: 1.21 (ref 0.87–1.13)
LYMPHOCYTES # BLD AUTO: 2.99 K/UL (ref 1–4.8)
LYMPHOCYTES NFR BLD: 42.9 % (ref 22–41)
MCH RBC QN AUTO: 32.5 PG (ref 27–33)
MCHC RBC AUTO-ENTMCNC: 34 G/DL (ref 33.6–35)
MCV RBC AUTO: 95.4 FL (ref 81.4–97.8)
MONOCYTES # BLD AUTO: 0.86 K/UL (ref 0–0.85)
MONOCYTES NFR BLD AUTO: 12.3 % (ref 0–13.4)
NEUTROPHILS # BLD AUTO: 2.83 K/UL (ref 2–7.15)
NEUTROPHILS NFR BLD: 40.7 % (ref 44–72)
NRBC # BLD AUTO: 0 K/UL
NRBC BLD-RTO: 0 /100 WBC
PLATELET # BLD AUTO: 169 K/UL (ref 164–446)
PMV BLD AUTO: 9.9 FL (ref 9–12.9)
POTASSIUM SERPL-SCNC: 4.3 MMOL/L (ref 3.6–5.5)
PROTHROMBIN TIME: 15.2 SEC (ref 12–14.6)
RBC # BLD AUTO: 5.02 M/UL (ref 4.2–5.4)
SODIUM SERPL-SCNC: 136 MMOL/L (ref 135–145)
WBC # BLD AUTO: 7 K/UL (ref 4.8–10.8)

## 2018-04-12 PROCEDURE — 80048 BASIC METABOLIC PNL TOTAL CA: CPT

## 2018-04-12 PROCEDURE — 83880 ASSAY OF NATRIURETIC PEPTIDE: CPT

## 2018-04-12 PROCEDURE — 99284 EMERGENCY DEPT VISIT MOD MDM: CPT

## 2018-04-12 PROCEDURE — 85025 COMPLETE CBC W/AUTO DIFF WBC: CPT

## 2018-04-12 PROCEDURE — 85730 THROMBOPLASTIN TIME PARTIAL: CPT

## 2018-04-12 PROCEDURE — 85610 PROTHROMBIN TIME: CPT

## 2018-04-12 PROCEDURE — 93971 EXTREMITY STUDY: CPT | Mod: LT

## 2018-04-12 ASSESSMENT — ENCOUNTER SYMPTOMS
BACK PAIN: 0
FOCAL WEAKNESS: 0
BLURRED VISION: 0
SEIZURES: 0
NECK PAIN: 0
HEADACHES: 0
COUGH: 0
CHILLS: 0
SORE THROAT: 0
SHORTNESS OF BREATH: 0
EYE REDNESS: 0
FEVER: 0
ABDOMINAL PAIN: 0
VOMITING: 0
MYALGIAS: 1

## 2018-04-12 ASSESSMENT — PAIN SCALES - GENERAL: PAINLEVEL_OUTOF10: 6

## 2018-04-12 NOTE — TELEPHONE ENCOUNTER
----- Message from Brenda Velasquez sent at 4/12/2018  1:13 PM PDT -----  Regarding: Problem with blood clot in leg  GUS/Betzaida    Patient said that she has a blood clot in her left leg below the knee and she wants a call back at [phone] to discuss.

## 2018-04-12 NOTE — TELEPHONE ENCOUNTER
"Spoke with  pt. She said, \"I know I have a clot in my leg.\" Her left leg below the knee is \"red, hot, swollen\". She had a fever yesterday and she also reported \"sharp\" cramping.  Pt. Was advised to go to ER now. She will go to [facility] ER.  "

## 2018-04-13 NOTE — ED NOTES
"Patient ambulated in with walker for LLE pain and swelling for 2 days. Patient states \"I think I have a blood clot. It feels like a kenroy horse.\" Patient is on eliquis for AFIB  "

## 2018-04-13 NOTE — DISCHARGE INSTRUCTIONS
You were seen in the Emergency Department for left lower extremity pain.    Labs, ultrasound were completed without significant acute abnormalities.    Please use 1,000mg of tylenol every 6 hours as needed for pain.    Please follow up with your primary care physician next week.    Return to the Emergency Department with uncontrolled pain, chest pain, trouble breathing, fevers, redness to the area, or other concerns.      Musculoskeletal Pain  Musculoskeletal pain is muscle and bone aches and pains. This pain can occur in any part of the body.  Follow these instructions at home:  · Only take medicines for pain, discomfort, or fever as told by your health care provider.  · You may continue all activities unless the activities cause more pain. When the pain lessens, slowly resume normal activities. Gradually increase the intensity and duration of the activities or exercise.  · During periods of severe pain, bed rest may be helpful. Lie or sit in any position that is comfortable, but get out of bed and walk around at least every several hours.  · If directed, put ice on the injured area.  ¨ Put ice in a plastic bag.  ¨ Place a towel between your skin and the bag.  ¨ Leave the ice on for 20 minutes, 2-3 times a day.  Contact a health care provider if:  · Your pain is getting worse.  · Your pain is not relieved with medicines.  · You lose function in the area of the pain if the pain is in your arms, legs, or neck.  This information is not intended to replace advice given to you by your health care provider. Make sure you discuss any questions you have with your health care provider.  Document Released: 12/18/2006 Document Revised: 05/30/2017 Document Reviewed: 08/22/2014  [employer] Interactive Patient Education © 2017 [employer]

## 2018-04-13 NOTE — ED PROVIDER NOTES
"ED Provider Note    CHIEF COMPLAINT  Chief Complaint   Patient presents with   • Leg Pain     Left       HPI  Meron Rice is a 85 y.o. female with history of atrial fibrillation on Eliquis, HTN, asthma and recurrent UTIs, who presents with left lower extremity pain and swelling which started yesterday.  Patient endorses pain and swelling to the anterolateral aspect of the left lower extremity just below her knee. She states that pain is intermittent and cramping in nature, \"like a charley horse\". She has had no history of similar symptoms in the past.  Denies associated trauma. Endorses subjective fever however has not recorded temperature.  No associated chest pain, shortness of breath, or lower extremity swelling. She does state she has some intermittent swelling of this lower extremity from time to time which she attributes to her prior surgeries. She does have foot drop of the left lower extremity from her prior back surgery and has also had bilateral hip replacements. She does states she commonly has lightheadedness upon standing with history of the same.  She states she recently finished 3 day course of bactrim for UTI.  Denies continued symptoms of dysuria or hematuria. She endorses taking her Elaquis as directed.      REVIEW OF SYSTEMS  See HPI for further details.   Review of Systems   Constitutional: Negative for chills and fever.   HENT: Negative for sore throat.    Eyes: Negative for blurred vision and redness.   Respiratory: Negative for cough and shortness of breath.    Cardiovascular: Positive for leg swelling. Negative for chest pain.   Gastrointestinal: Negative for abdominal pain and vomiting.   Genitourinary: Negative for dysuria and urgency.   Musculoskeletal: Positive for myalgias. Negative for back pain and neck pain.   Skin: Negative for rash.   Neurological: Negative for focal weakness, seizures and headaches.        Lightheadedness   Psychiatric/Behavioral: Negative for suicidal ideas. " "        PAST MEDICAL HISTORY   has a past medical history of Anemia; Arrhythmia; Arthritis; ASTHMA; Breath shortness; Bronchitis ( ); Cancer (CMS-[facility]) ([id number]); CATARACT; Chronic low back pain; Depression (5/19/2017); Foot-drop; Hypertension; Other specified symptom associated with female genital organs (1998); Pacemaker; Pain; Unspecified hemorrhagic conditions; Unspecified urinary incontinence; and UTI (urinary tract infection).    SOCIAL HISTORY  Social History     Social History Main Topics   • Smoking status: Former Smoker     Packs/day: 0.10     Years: 1.00     Types: Cigarettes     Quit date: 1/1/1964   • Smokeless tobacco: Never Used      Comment: 50 years ago in 1960  SOCIAL    • Alcohol use Yes      Comment: 3-4 drinks per week    • Drug use: No   • 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      Comment: Retired Nurse       SURGICAL HISTORY   has a past surgical history that includes hysterectomy, total abdominal (2000); vein ligat & strip (1972, 1966); axillary node dissection (9/15/2009); mastectomy (9/30/2009); other orthopedic surgery (2002); gyn surgery (1998); cath placement (10/28/2009); mastectomy (12/15/2010); cath removal (12/15/2010); breast reconstruction (12/15/2010); latissimus flap (12/15/2010); appendectomy (1968); dilation and curettage (1961); breast implant revision (7/11/2011); nipple reconstruction (7/11/2011); hip hemiarthroplasty (5/14/2012); other surgical procedure (2004); breast reconstruction (11/26/2012); wound closure general (4/10/2013); breast implant removal (4/10/2013); femur nailing intramedullary (Right, 6/3/2015); and other (09/2017).    CURRENT MEDICATIONS  Home Medications    **Home medications have not yet been reviewed for this encounter**         ALLERGIES  Allergies   Allergen Reactions   • Alendronate-Butylpar-Propylpar-Saccharin [Fosamax] Diarrhea     .       PHYSICAL EXAM   VITAL SIGNS: BP (!) 184/79   Pulse 60   Temp 36.2 °C (97.2 °F)   Resp 18   Ht 1.753 m (5' 9\") "   Wt 83.9 kg (184 lb 15.5 oz)   SpO2 96%   BMI 27.31 kg/m²      Physical Exam   Constitutional: She is oriented to person, place, and time and well-developed, well-nourished, and in no distress. No distress.   Elderly female, well appearing   HENT:   Head: Normocephalic and atraumatic.   Eyes: Conjunctivae are normal. Pupils are equal, round, and reactive to light.   Neck: Normal range of motion. Neck supple.   Cardiovascular: Normal rate, regular rhythm, normal heart sounds and intact distal pulses.    Left DP pulse dopplerable however not palpable  ABIs calculated at 1 bilaterally   Pulmonary/Chest: Effort normal and breath sounds normal. No respiratory distress.   Abdominal: Soft. She exhibits no distension. There is no tenderness.   Musculoskeletal: Normal range of motion. She exhibits edema. She exhibits no tenderness.   Mild nonpitting edema to the left lower extremity with chronic venous stasis changes. Endorses pain just distal and lateral to the left knee however not reproducible on exam. Full range of motion of the left knee without pain.   Neurological: She is alert and oriented to person, place, and time.   Moving all extremities spontaneously.  Chronic foot drop noted to the left foot, sensation intact.   Skin: Skin is warm and dry.   Psychiatric: Affect normal.         DIAGNOSTIC STUDIES      LABS  Personally reviewed by me  Labs Reviewed   CBC WITH DIFFERENTIAL - Abnormal; Notable for the following:        Result Value    Hemoglobin 16.3 (*)     Hematocrit 47.9 (*)     Neutrophils-Polys 40.70 (*)     Lymphocytes 42.90 (*)     Monos (Absolute) 0.86 (*)     All other components within normal limits    Narrative:     Indicate which anticoagulants the patient is on:->UNKNOWN  ** elaquis   BASIC METABOLIC PANEL - Abnormal; Notable for the following:     Bun 26 (*)     All other components within normal limits    Narrative:     Indicate which anticoagulants the patient is on:->UNKNOWN  ** elaquis    PROTHROMBIN TIME - Abnormal; Notable for the following:     PT 15.2 (*)     INR 1.21 (*)     All other components within normal limits    Narrative:     Indicate which anticoagulants the patient is on:->UNKNOWN  ** elaquis   ESTIMATED GFR - Abnormal; Notable for the following:     GFR If Non  59 (*)     All other components within normal limits    Narrative:     Indicate which anticoagulants the patient is on:->UNKNOWN  ** elaquis   BTYPE NATRIURETIC PEPTIDE - Abnormal; Notable for the following:     B Natriuretic Peptide 214 (*)     All other components within normal limits   APTT    Narrative:     Indicate which anticoagulants the patient is on:->UNKNOWN  ** elaquis           RADIOLOGY  Personally reviewed by me  US-EXTREMITY VENOUS UNILATERAL-LOWER LEFT   Final Result      No evidence of left lower extremity deep venous thrombosis.          ED COURSE  Vitals:    04/12/18 2001 04/12/18 2002 04/12/18 2003 04/12/18 2004   BP: (!) 163/78 (!) 169/80 (!) 187/86 (!) 184/79   Pulse:       Resp:       Temp:       SpO2:       Weight:       Height:             Medications administered:  Medications - No data to display      MEDICAL DECISION MAKING  Elderly patient with history of atrial fibrillation on Elaquis, who presents with 2 day history of left lower extremity pain and swelling.  She is afebrile, hypertensive on arrival with otherwise normal vitals and well-appearing. There is no known history of trauma. Patient has full range of motion of the left knee without concern for fracture, dislocation, or septic arthritis. Pulses of the left lower extremity are difficult to palpate however able to obtain on Doppler. She has normal ABIs without concern for significant arterial insufficiency. Ultrasound is negative for DVT. BNP not significantly elevated concerning for decompensated heart failure. Suspect likely musculoskeletal etiology to the patient's pain. She'll be discharged home with instructions on  symptomatic care and close primary care follow-up. She understands plan of care and strict return precautions for changing or worsening symptoms.        IMPRESSION  (M79.605) Lower extremity pain, lateral, left    Disposition: Discharge home  Results, diagnoses, and treatment options were discussed with the patient and/or family. Patient verbalized understanding of plan of care.    Patient referred to primary care provider for monitoring and treatment of blood pressure.      Discharge Medication List as of 4/12/2018  8:54 PM              Electronically signed by: Raquel Smith, 4/12/2018 6:07 PM

## 2018-04-19 ENCOUNTER — OFFICE VISIT (OUTPATIENT)
Dept: MEDICAL GROUP | Facility: MEDICAL CENTER | Age: 83
End: 2018-04-19
Payer: MEDICARE

## 2018-04-19 VITALS
BODY MASS INDEX: 27.43 KG/M2 | WEIGHT: 185.19 LBS | DIASTOLIC BLOOD PRESSURE: 70 MMHG | HEART RATE: 72 BPM | RESPIRATION RATE: 20 BRPM | TEMPERATURE: 97.5 F | SYSTOLIC BLOOD PRESSURE: 120 MMHG | HEIGHT: 69 IN | OXYGEN SATURATION: 96 %

## 2018-04-19 DIAGNOSIS — N39.0 RECURRENT UTI: ICD-10-CM

## 2018-04-19 DIAGNOSIS — F41.9 ANXIETY: ICD-10-CM

## 2018-04-19 DIAGNOSIS — F32.4 MAJOR DEPRESSIVE DISORDER WITH SINGLE EPISODE, IN PARTIAL REMISSION (HCC): ICD-10-CM

## 2018-04-19 DIAGNOSIS — E04.1 THYROID NODULE: ICD-10-CM

## 2018-04-19 DIAGNOSIS — N31.9 NEUROGENIC BLADDER: ICD-10-CM

## 2018-04-19 PROBLEM — R35.0 URINARY FREQUENCY: Status: RESOLVED | Noted: 2018-03-22 | Resolved: 2018-04-19

## 2018-04-19 PROCEDURE — 99214 OFFICE O/P EST MOD 30 MIN: CPT | Performed by: FAMILY MEDICINE

## 2018-04-19 RX ORDER — LORAZEPAM 0.5 MG/1
0.25 TABLET ORAL
Qty: 10 TAB | Refills: 0 | Status: SHIPPED | OUTPATIENT
Start: 2018-04-19 | End: 2018-05-09

## 2018-04-19 RX ORDER — SULFAMETHOXAZOLE AND TRIMETHOPRIM 800; 160 MG/1; MG/1
1 TABLET ORAL 2 TIMES DAILY
Qty: 6 TAB | Refills: 0 | Status: SHIPPED | OUTPATIENT
Start: 2018-04-19 | End: 2018-08-02 | Stop reason: SDUPTHER

## 2018-04-19 NOTE — ASSESSMENT & PLAN NOTE
The patient has major depressive disorder. She was recently increased to Prozac 40 mg daily. She states her mood improved. She has no suicidal ideation.

## 2018-04-19 NOTE — ASSESSMENT & PLAN NOTE
The patient has neurogenic bladder with recurrent UTIs. The most recent UTI responded to Bactrim. The patient is unwilling to see urology at the present time.

## 2018-04-19 NOTE — ASSESSMENT & PLAN NOTE
Past imaging demonstrates thyroid nodules. These have not followed up. Most recent TSH was normal.

## 2018-04-19 NOTE — PROGRESS NOTES
[facility]  Progress Note  Established Patient    Subjective:   Meron Rice is a 85 y.o. female here today with a chief complaint of anxiety. The patient is alone.     Neurogenic bladder  The patient has a history of neurogenic bladder. She used to follow with urology who had her on suppressive antibiotics for recurrent urinary tract infections and self-catheterization. Patient has not seen urology in some time. She is not interested in seeing them.     Recurrent UTI  The patient has neurogenic bladder with recurrent UTIs. The most recent UTI responded to Bactrim. The patient is unwilling to see urology at the present time.    Thyroid nodule  Past imaging demonstrates thyroid nodules. These have not followed up. Most recent TSH was normal.    Anxiety  The patient has anxiety which responds well to fluoxetine. The dose was recently increased and the patient notes that her anxiety improved. She denies suicidal ideation. She is not interested in seeing a counselor. She is asking for an as needed medication in the evening when her anxiety gets the worst.    Depression  The patient has major depressive disorder. She was recently increased to Prozac 40 mg daily. She states her mood improved. She has no suicidal ideation.      Current Outpatient Prescriptions on File Prior to Visit   Medication Sig Dispense Refill   • fluoxetine (PROZAC) 40 MG capsule Take 1 Cap by mouth every day. 90 Cap 1   • metoprolol (LOPRESSOR) 50 MG Tab Take 1.5 Tabs by mouth 2 times a day. 270 Tab 3   • therapeutic multivitamin-minerals (THERAGRAN-M) Tab Take 1 Tab by mouth every morning.     • apixaban (ELIQUIS) 5mg Tab Take 1 Tab by mouth 2 Times a Day. 180 Tab 3   • phenazopyridine (PYRIDIUM) 200 MG Tab Take 1 Tab by mouth 3 times a day as needed for Mild Pain (Do not use longer than 2 days.). 6 Tab 0     No current facility-administered medications on file prior to visit.        Past Medical History:   Diagnosis Date   • Anemia     • Arrhythmia     atrial fibrillation   • Arthritis     generalized   • ASTHMA     has not needed an inhaler since 2010   • Breath shortness     on exertion   • Bronchitis      in past, not current   • Cancer (CMS-HCC) [id number]    Uterus and Breast   • CATARACT     beginning   • Chronic low back pain    • Depression 5/19/2017   • Foot-drop    • Hypertension    • Other specified symptom associated with female genital organs 1998    fibroids   • Pacemaker     in past, removed due to cellulitis   • Pain     left shoulder    • Unspecified hemorrhagic conditions     post op hyst. Currently on Eliquis    • Unspecified urinary incontinence     lazy bladder; doesn't empty completely   • UTI (urinary tract infection)     frequent        Allergies: Alendronate-butylpar-propylpar-saccharin [fosamax]    Surgical History:  has a past surgical history that includes hysterectomy, total abdominal (2000); vein ligat & strip (1972, 1966); axillary node dissection (9/15/2009); mastectomy (9/30/2009); other orthopedic surgery (2002); gyn surgery (1998); cath placement (10/28/2009); mastectomy (12/15/2010); cath removal (12/15/2010); breast reconstruction (12/15/2010); latissimus flap (12/15/2010); appendectomy (1968); dilation and curettage (1961); breast implant revision (7/11/2011); nipple reconstruction (7/11/2011); hip hemiarthroplasty (5/14/2012); other surgical procedure (2004); breast reconstruction (11/26/2012); wound closure general (4/10/2013); breast implant removal (4/10/2013); femur nailing intramedullary (Right, 6/3/2015); and other (09/2017).    Family History: family history includes Cancer in her brother; Diabetes in her child, mother, other, and sister; Heart Disease in her father, mother, and sister; Hypertension in her father; Psychiatry in her child; Stroke in her father.    Social History:  reports that she quit smoking about 54 years ago. Her smoking use included Cigarettes. She has a 0.10 pack-year smoking  "history. She has never used smokeless tobacco. She reports that she drinks alcohol. She reports that she does not use drugs.    ROS: no CP or SOB.        Objective:     Vitals:    04/19/18 1307   BP: 120/70   Pulse: 72   Resp: 20   Temp: 36.4 °C (97.5 °F)   SpO2: 96%   Weight: 84 kg (185 lb 3 oz)   Height: 1.753 m (5' 9\")       Physical Exam:  General: alert in no apparent distress.   Cardio: regular rate and rhythm, no murmurs, rubs or gallops.   Resp: CTAB no w/r/r.         Assessment and Plan:     1. Recurrent UTI  Attributed to neurogenic bladder. I again recommended that she see urology but she declines. I will prescribe Bactrim for the patient to take at the first sign of urinary tract infection. She was advised to call me if any signs or symptoms of UTI did not respond to this treatment.  - sulfamethoxazole-trimethoprim (BACTRIM DS) 800-160 MG tablet; Take 1 Tab by mouth 2 times a day for 3 days.  Dispense: 6 Tab; Refill: 0    2. Thyroid nodule  I discussed with her my concern for thyroid cancer.   - US-SOFT TISSUES OF HEAD - NECK; Future    3. Anxiety  I think the patient would benefit from an as needed medication at night. I'm reluctant to use Vistaril because of her neurogenic bladder. I think she would be able to tolerate Ativan but we'll prescribe her a very low-dose. The patient was counseled on appropriate use of Ativan and was advised to only take the medicine as prescribed. The patient was advised of the risk of sedation and the importance of not taking this medicine with other sedating medicines or alcohol. The patient was counseled not to drive on this medicine.  checked and appropriate.   - continue prozac.   - LORazepam (ATIVAN) 0.5 MG Tab; Take 0.5 Tabs by mouth 1 time daily as needed for Anxiety for up to 20 days.  Dispense: 10 Tab; Refill: 0    4. Neurogenic bladder  - see above.     5. Major depressive disorder with single episode, in partial remission (CMS-HCC)  - continue prozac. "     Note: records from Dr. Condon (oncology) requested.        Followup: Return in about 3 months (around 7/19/2018), or if symptoms worsen or fail to improve.

## 2018-04-19 NOTE — ASSESSMENT & PLAN NOTE
The patient has anxiety which responds well to fluoxetine. The dose was recently increased and the patient notes that her anxiety improved. She denies suicidal ideation. She is not interested in seeing a counselor. She is asking for an as needed medication in the evening when her anxiety gets the worst.

## 2018-04-19 NOTE — LETTER
[facility]  Wang Disla M.D.  [address]  [place] [address]  Fax: [phone]   Authorization for Release/Disclosure of   Protected Health Information   Name: MERON RICE : 1932 SSN: [id number]   Address: [address]  [place] [address] Phone:    [phone] (home)    I authorize the entity listed below to release/disclose the PHI below to:   [facility]/Wang Disla M.D. and Wang Disla M.D.   Provider or Entity Name:     Address   City, State, Zip   Phone:      Fax:     Reason for request: continuity of care   Information to be released:    [  ] LAST COLONOSCOPY,  including any PATH REPORT and follow-up  [  ] LAST FIT/COLOGUARD RESULT [  ] LAST DEXA  [  ] LAST MAMMOGRAM  [  ] LAST PAP  [  ] LAST LABS [  ] RETINA EXAM REPORT  [  ] IMMUNIZATION RECORDS  [  ] Release all info      [  ] Check here and initial the line next to each item to release ALL health information INCLUDING  _____ Care and treatment for drug and / or alcohol abuse  _____ HIV testing, infection status, or AIDS  _____ Genetic Testing    DATES OF SERVICE OR TIME PERIOD TO BE DISCLOSED: _____________  I understand and acknowledge that:  * This Authorization may be revoked at any time by you in writing, except if your health information has already been used or disclosed.  * Your health information that will be used or disclosed as a result of you signing this authorization could be re-disclosed by the recipient. If this occurs, your re-disclosed health information may no longer be protected by State or Federal laws.  * You may refuse to sign this Authorization. Your refusal will not affect your ability to obtain treatment.  * This Authorization becomes effective upon signing and will  on (date) __________.      If no date is indicated, this Authorization will  one (1) year from the signature date.    Name: Meron Rice    Signature:   Date:     2018       PLEASE FAX  REQUESTED RECORDS BACK TO: [phone]

## 2018-04-19 NOTE — ASSESSMENT & PLAN NOTE
The patient has a history of neurogenic bladder. She used to follow with urology who had her on suppressive antibiotics for recurrent urinary tract infections and self-catheterization. Patient has not seen urology in some time. She is not interested in seeing them.

## 2018-04-25 ENCOUNTER — HOSPITAL ENCOUNTER (OUTPATIENT)
Dept: RADIOLOGY | Facility: MEDICAL CENTER | Age: 83
End: 2018-04-25
Attending: FAMILY MEDICINE
Payer: MEDICARE

## 2018-04-25 DIAGNOSIS — E04.1 THYROID NODULE: ICD-10-CM

## 2018-04-25 PROCEDURE — 76536 US EXAM OF HEAD AND NECK: CPT

## 2018-04-27 ENCOUNTER — TELEPHONE (OUTPATIENT)
Dept: MEDICAL GROUP | Facility: MEDICAL CENTER | Age: 83
End: 2018-04-27

## 2018-04-27 DIAGNOSIS — E04.1 THYROID NODULE: ICD-10-CM

## 2018-04-27 NOTE — TELEPHONE ENCOUNTER
Discussed thyroid ultrasound patient. I discussed options with her including fine-needle aspiration, watchful waiting or endocrinology referral. She would like to see an endocrinologist.

## 2018-06-14 ENCOUNTER — TELEPHONE (OUTPATIENT)
Dept: MEDICAL GROUP | Facility: MEDICAL CENTER | Age: 83
End: 2018-06-14

## 2018-06-14 NOTE — TELEPHONE ENCOUNTER
Patient called and states she is wanting to change her medicaitons from Prozac to Trintella. Please advise

## 2018-06-29 DIAGNOSIS — I48.19 PERSISTENT ATRIAL FIBRILLATION (HCC): ICD-10-CM

## 2018-07-02 RX ORDER — APIXABAN 5 MG/1
TABLET, FILM COATED ORAL
Qty: 60 TAB | Refills: 1 | Status: SHIPPED | OUTPATIENT
Start: 2018-07-02 | End: 2018-07-31 | Stop reason: SDUPTHER

## 2018-07-11 ENCOUNTER — OFFICE VISIT (OUTPATIENT)
Dept: CARDIOLOGY | Facility: MEDICAL CENTER | Age: 83
End: 2018-07-11
Payer: MEDICARE

## 2018-07-11 VITALS
DIASTOLIC BLOOD PRESSURE: 88 MMHG | SYSTOLIC BLOOD PRESSURE: 142 MMHG | WEIGHT: 188.4 LBS | HEIGHT: 69 IN | HEART RATE: 60 BPM | BODY MASS INDEX: 27.91 KG/M2 | OXYGEN SATURATION: 93 %

## 2018-07-11 DIAGNOSIS — Z95.0 CARDIAC PACEMAKER IN SITU: ICD-10-CM

## 2018-07-11 DIAGNOSIS — I48.0 PAF (PAROXYSMAL ATRIAL FIBRILLATION) (HCC): ICD-10-CM

## 2018-07-11 DIAGNOSIS — Z79.01 ANTICOAGULANT LONG-TERM USE: ICD-10-CM

## 2018-07-11 LAB — EKG IMPRESSION: NORMAL

## 2018-07-11 PROCEDURE — 93280 PM DEVICE PROGR EVAL DUAL: CPT | Performed by: NURSE PRACTITIONER

## 2018-07-11 PROCEDURE — 99214 OFFICE O/P EST MOD 30 MIN: CPT | Mod: 25 | Performed by: NURSE PRACTITIONER

## 2018-07-11 PROCEDURE — 93000 ELECTROCARDIOGRAM COMPLETE: CPT | Mod: 59 | Performed by: NURSE PRACTITIONER

## 2018-07-11 RX ORDER — PAROXETINE 10 MG/1
10 TABLET, FILM COATED ORAL DAILY
COMMUNITY
End: 2018-08-06

## 2018-07-11 ASSESSMENT — ENCOUNTER SYMPTOMS
HEARTBURN: 0
PSYCHIATRIC NEGATIVE: 1
FOCAL WEAKNESS: 0
ABDOMINAL PAIN: 0
BLURRED VISION: 0
NAUSEA: 0
BLOOD IN STOOL: 0
PALPITATIONS: 0
CLAUDICATION: 0
WHEEZING: 0
CHILLS: 0
SHORTNESS OF BREATH: 0
VOMITING: 0
FEVER: 0
PND: 0
DOUBLE VISION: 0
HEADACHES: 0
DIZZINESS: 0
LOSS OF CONSCIOUSNESS: 0
SPEECH CHANGE: 0
SORE THROAT: 0
MYALGIAS: 0
COUGH: 0
ORTHOPNEA: 0
BRUISES/BLEEDS EASILY: 0

## 2018-07-12 NOTE — PROGRESS NOTES
Chief Complaint   Patient presents with   • Atrial Fibrillation     dx: PAF       Subjective:   Meron Rice is a 85 y.o. female who presents today for follow up of her recent pacemaker reimplantation 11/2/17 with site and device follow up.  HISTORY OF PRESENT HOSPITALIZATION:  The patient is an 84-year-old  female who is followed longitudinally by Dr. Stanislav Garrett in our office.    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  Therefore,   on September 13, the pacemaker generator leads were removed with recurrent   cellulitis despite antibiotic treatment and negative cultures.  The patient   has been on antibiotic coverage during this period of time without a temporary   device and was admitted electively on 11/01/2017 for reimplantation of   permanent pacemaker system.  The device is a [employer] pulse   generator, model L311, serial #[id number].  Right atrial lead is a Picacho model   7740, serial #[id number].  P-waves were measured at 1.8 millivolts, threshold of   1.1 volts and a pacing impedance at 665 ohms.  RV lead is a Picacho #7741,   serial #[id number].  R-waves are measured at 21 millivolts, threshold 0.5 volts   and impedance of 1021 ohms.    She has done well since last visit 6 months ago. I am not sure she has been transmitting and so will have her do that in 3 months with visit here in 6 months.  Thresholds look good here today. Final programming performed today.  She continues to be weak and uses her walker for ambulatory help.  Pacemaker site on the right looks very good as does the explantation site on the left.  Past Medical History:   Diagnosis Date   • Anemia    • Arrhythmia     atrial fibrillation   • Arthritis     generalized   • ASTHMA     has not needed an inhaler since 2010   • Breath shortness     on exertion   • Bronchitis      in past, not current   • Cancer (HCC) [id number]    Uterus  and Breast   • CATARACT     beginning   • Chronic low back pain    • Depression 5/19/2017   • Foot-drop    • Hypertension    • Other specified symptom associated with female genital organs 1998    fibroids   • Pacemaker     in past, removed due to cellulitis   • Pain     left shoulder    • Unspecified hemorrhagic conditions     post op hyst. Currently on Eliquis    • Unspecified urinary incontinence     lazy bladder; doesn't empty completely   • UTI (urinary tract infection)     frequent      Past Surgical History:   Procedure Laterality Date   • OTHER  09/2017    pacemaker removal    • FEMUR NAILING INTRAMEDULLARY Right 6/3/2015    Procedure: FEMUR NAILING INTRAMEDULLARY;  Surgeon: Deshaun Denis M.D.;  Location: SURGERY [facility];  Service:    • WOUND CLOSURE GENERAL  4/10/2013    Performed by Nano Varela M.D. at SURGERY SAME DAY [facility]   • BREAST IMPLANT REMOVAL  4/10/2013    Performed by Jak Meza M.D. at SURGERY SAME DAY [facility] ORS   • BREAST RECONSTRUCTION  11/26/2012    Performed by Jak Meza M.D. at SURGERY [facility]   • HIP HEMIARTHROPLASTY  5/14/2012    left; Performed by KEITH COWART at SURGERY [facility]   • BREAST IMPLANT REVISION  7/11/2011    Performed by JAK MEZA at SURGERY [facility]   • NIPPLE RECONSTRUCTION  7/11/2011    Performed by JAK MEZA at SURGERY [facility]   • MASTECTOMY  12/15/2010    right   • CATH REMOVAL  12/15/2010    Performed by NANO VARELA at SURGERY [facility] ORS   • BREAST RECONSTRUCTION  12/15/2010    Performed by JAK MEZA at SURGERY [facility] ORS   • LATISSIMUS FLAP  12/15/2010    Performed by JAK MEZA at SURGERY [facility] ORS   • CATH PLACEMENT  10/28/2009    Performed by NANO VARELA at SURGERY SAME DAY [facility]   • MASTECTOMY  9/30/2009    left   • AXILLARY NODE DISSECTION  9/15/2009    Performed by NANO VARELA at SURGERY SAME DAY [facility] ORS   •  OTHER SURGICAL PROCEDURE  2004    bladder repair   • OTHER ORTHOPEDIC SURGERY  2002    laminectomy   • HYSTERECTOMY, TOTAL ABDOMINAL  2000   • GYN SURGERY  1998    excision of fibroids   • APPENDECTOMY  1968   • DILATION AND CURETTAGE  1961   • VEIN LIGAT & STRIP  1972, 1966    x2 bilateral     Family History   Problem Relation Age of Onset   • Diabetes Mother    • Heart Disease Mother    • Heart Disease Father    • Stroke Father    • Hypertension Father    • Diabetes Sister    • Heart Disease Sister    • Cancer Brother      lung   • Diabetes     • Heart Disease     • Stroke     • Cancer     • Hypertension     • Diabetes Other    • Diabetes Child    • Psychiatry Child      Social History     Social History   • Marital status:      Spouse name: N/A   • Number of children: N/A   • Years of education: N/A     Occupational History   • Not on file.     Social History Main Topics   • Smoking status: Former Smoker     Packs/day: 0.10     Years: 1.00     Types: Cigarettes     Quit date: 1/1/1964   • Smokeless tobacco: Never Used      Comment: 50 years ago in 1960  SOCIAL    • Alcohol use Yes      Comment: 3-4 drinks per week    • Drug use: No   • 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      Comment: Retired Nurse     Other Topics Concern   • Not on file     Social History Narrative   • No narrative on file     Allergies   Allergen Reactions   • Alendronate-Butylpar-Propylpar-Saccharin [Fosamax] Diarrhea     .     Outpatient Encounter Prescriptions as of 7/11/2018   Medication Sig Dispense Refill   • PARoxetine (PAXIL) 10 MG Tab Take 10 mg by mouth every day.     • ELIQUIS 5 MG Tab TAKE ONE TABLET BY MOUTH TWICE DAILY  60 Tab 1   • phenazopyridine (PYRIDIUM) 200 MG Tab Take 1 Tab by mouth 3 times a day as needed for Mild Pain (Do not use longer than 2 days.). 6 Tab 0   • metoprolol (LOPRESSOR) 50 MG Tab Take 1.5 Tabs by mouth 2 times a day. 270 Tab 3   • therapeutic multivitamin-minerals (THERAGRAN-M) Tab Take 1 Tab by mouth  "every morning.     • [DISCONTINUED] LORazepam (ATIVAN) 0.5 MG Tab Take 0.5 Tabs by mouth 2 times a day as needed for Anxiety for up to 30 days. 15 Tab 0   • [DISCONTINUED] fluoxetine (PROZAC) 40 MG capsule Take 1 Cap by mouth every day. 90 Cap 1     No facility-administered encounter medications on file as of 7/11/2018.      Review of Systems   Constitutional: Negative for chills and fever.   HENT: Negative for sore throat.         No difficulty swallowing   Eyes: Negative for blurred vision and double vision.   Respiratory: Negative for cough, shortness of breath and wheezing.    Cardiovascular: Negative for chest pain, palpitations, orthopnea, claudication, leg swelling and PND.   Gastrointestinal: Negative for abdominal pain, blood in stool, heartburn, nausea and vomiting.   Genitourinary: Negative for dysuria, frequency, hematuria and urgency.   Musculoskeletal: Negative for myalgias.   Skin: Negative.    Neurological: Negative for dizziness, speech change, focal weakness, loss of consciousness and headaches.   Endo/Heme/Allergies: Does not bruise/bleed easily.   Psychiatric/Behavioral: Negative.         Objective:   /88   Pulse 60   Ht 1.753 m (5' 9\")   Wt 85.5 kg (188 lb 6.4 oz)   SpO2 93%   BMI 27.82 kg/m²     Physical Exam   Constitutional: She is oriented to person, place, and time. She appears well-developed and well-nourished.   HENT:   Head: Normocephalic and atraumatic.   Eyes: Pupils are equal, round, and reactive to light.   Neck: Normal range of motion. Neck supple.   Cardiovascular: Normal rate and regular rhythm.    Pulmonary/Chest: Effort normal and breath sounds normal.   Implantation site on right and explantation site on left look well healed.   Abdominal: Soft. Bowel sounds are normal.   Musculoskeletal: Normal range of motion.   Neurological: She is alert and oriented to person, place, and time.   Skin: Skin is warm and dry.   Psychiatric: She has a normal mood and affect.   Pacer " function intact see flow sheet.    Assessment:     1. PAF (paroxysmal atrial fibrillation) ([facility])  EKG   2. Anticoagulant long-term use     3. Cardiac pacemaker in situ         Medical Decision Making:  Today's Assessment / Status / Plan:     1. PAF 2%  2. OAC on Eliquis.  3. PPM demonstrating good threshold function and sensing. BV 8 years.  PPM telephone check in 3 months.  RTc 6 months per her request to see Dr Garrett.    Collaborating MD CALIN Aldana

## 2018-07-30 DIAGNOSIS — I48.19 PERSISTENT ATRIAL FIBRILLATION (HCC): ICD-10-CM

## 2018-07-31 DIAGNOSIS — I48.19 PERSISTENT ATRIAL FIBRILLATION (HCC): ICD-10-CM

## 2018-08-06 ENCOUNTER — OFFICE VISIT (OUTPATIENT)
Dept: MEDICAL GROUP | Facility: MEDICAL CENTER | Age: 83
End: 2018-08-06
Payer: MEDICARE

## 2018-08-06 VITALS
TEMPERATURE: 98 F | OXYGEN SATURATION: 95 % | HEIGHT: 69 IN | SYSTOLIC BLOOD PRESSURE: 122 MMHG | RESPIRATION RATE: 18 BRPM | HEART RATE: 72 BPM | WEIGHT: 186 LBS | DIASTOLIC BLOOD PRESSURE: 78 MMHG | BODY MASS INDEX: 27.55 KG/M2

## 2018-08-06 DIAGNOSIS — M79.602 PAIN OF LEFT UPPER EXTREMITY: ICD-10-CM

## 2018-08-06 DIAGNOSIS — Z85.3 HX OF BREAST CANCER: ICD-10-CM

## 2018-08-06 DIAGNOSIS — F41.9 ANXIETY: ICD-10-CM

## 2018-08-06 DIAGNOSIS — E04.1 THYROID NODULE: ICD-10-CM

## 2018-08-06 DIAGNOSIS — F32.4 MAJOR DEPRESSIVE DISORDER WITH SINGLE EPISODE, IN PARTIAL REMISSION (HCC): ICD-10-CM

## 2018-08-06 PROCEDURE — 99214 OFFICE O/P EST MOD 30 MIN: CPT | Performed by: FAMILY MEDICINE

## 2018-08-06 RX ORDER — LORAZEPAM 1 MG/1
.5-1 TABLET ORAL
Qty: 30 TAB | Refills: 3 | Status: SHIPPED | OUTPATIENT
Start: 2018-08-06 | End: 2018-09-05

## 2018-08-06 RX ORDER — SERTRALINE HYDROCHLORIDE 25 MG/1
25 TABLET, FILM COATED ORAL DAILY
Qty: 30 TAB | Refills: 3 | Status: SHIPPED | OUTPATIENT
Start: 2018-08-06 | End: 2018-11-18 | Stop reason: SDUPTHER

## 2018-08-06 NOTE — ASSESSMENT & PLAN NOTE
The patient didn't see endocrinology for her complex nodule. She states she is now interested in doing so.

## 2018-08-06 NOTE — ASSESSMENT & PLAN NOTE
The patient describes a long-standing history of left upper extremity pain in the shoulder and humerus and difficulty raising her left arm above her head. She denies chest pain or shortness of breath. There is no trauma.

## 2018-08-06 NOTE — ASSESSMENT & PLAN NOTE
The patient has persistent anxiety. She is using Paxil but doesn't think it is very helpful. She would like to try an alternative agent. The patient is also using Ativan but the current dose does not provide adequate relief. She only uses this about once per night.

## 2018-08-06 NOTE — ASSESSMENT & PLAN NOTE
"The patient also describes depression managed with an SSRI. Please see discussion regarding \"anxiety\" for further information. The patient denies suicidal ideation.  "

## 2018-08-06 NOTE — PROGRESS NOTES
"[facility]  Progress Note  Established Patient    Subjective:   Meron Rice is a 85 y.o. female here today with a chief complaint of anxiety. The patient is alone.     Anxiety  The patient has persistent anxiety. She is using Paxil but doesn't think it is very helpful. She would like to try an alternative agent. The patient is also using Ativan but the current dose does not provide adequate relief. She only uses this about once per night.    Depression  The patient also describes depression managed with an SSRI. Please see discussion regarding \"anxiety\" for further information. The patient denies suicidal ideation.    Hx of breast cancer  The patient has a history of breast cancer. She is to see Dr. Condon (oncology). The last note suggested that there was no recurrence of breast cancer. The patient states that she did not follow up. She does not currently want to go back to oncology.    Pain of left upper extremity  The patient describes a long-standing history of left upper extremity pain in the shoulder and humerus and difficulty raising her left arm above her head. She denies chest pain or shortness of breath. There is no trauma.    Thyroid nodule  The patient didn't see endocrinology for her complex nodule. She states she is now interested in doing so.       Current Outpatient Prescriptions on File Prior to Visit   Medication Sig Dispense Refill   • sulfamethoxazole-trimethoprim (BACTRIM DS) 800-160 MG tablet TAKE ONE TABLET BY MOUTH TWICE DAILY FOR 3 DAYS. 6 Tab 0   • apixaban (ELIQUIS) 5mg Tab Take 1 Tab by mouth 2 Times a Day. 180 Tab 1   • phenazopyridine (PYRIDIUM) 200 MG Tab Take 1 Tab by mouth 3 times a day as needed for Mild Pain (Do not use longer than 2 days.). 6 Tab 0   • metoprolol (LOPRESSOR) 50 MG Tab Take 1.5 Tabs by mouth 2 times a day. 270 Tab 3   • therapeutic multivitamin-minerals (THERAGRAN-M) Tab Take 1 Tab by mouth every morning.       No current facility-administered " medications on file prior to visit.        Past Medical History:   Diagnosis Date   • Anemia    • Arrhythmia     atrial fibrillation   • Arthritis     generalized   • ASTHMA     has not needed an inhaler since 2010   • Breath shortness     on exertion   • Bronchitis      in past, not current   • Cancer (HCC) [id number]    Uterus and Breast   • CATARACT     beginning   • Chronic low back pain    • Depression 5/19/2017   • Foot-drop    • Hypertension    • Other specified symptom associated with female genital organs 1998    fibroids   • Pacemaker     in past, removed due to cellulitis   • Pain     left shoulder    • Unspecified hemorrhagic conditions     post op hyst. Currently on Eliquis    • Unspecified urinary incontinence     lazy bladder; doesn't empty completely   • UTI (urinary tract infection)     frequent        Allergies: Alendronate-butylpar-propylpar-saccharin [fosamax]    Surgical History:  has a past surgical history that includes hysterectomy, total abdominal (2000); vein ligat & strip (1972, 1966); axillary node dissection (9/15/2009); mastectomy (9/30/2009); other orthopedic surgery (2002); gyn surgery (1998); cath placement (10/28/2009); mastectomy (12/15/2010); cath removal (12/15/2010); breast reconstruction (12/15/2010); latissimus flap (12/15/2010); appendectomy (1968); dilation and curettage (1961); breast implant revision (7/11/2011); nipple reconstruction (7/11/2011); hip hemiarthroplasty (5/14/2012); other surgical procedure (2004); breast reconstruction (11/26/2012); wound closure general (4/10/2013); breast implant removal (4/10/2013); femur nailing intramedullary (Right, 6/3/2015); and other (09/2017).    Family History: family history includes Cancer in her brother and unknown relative; Diabetes in her child, mother, other, sister, and unknown relative; Heart Disease in her father, mother, sister, and unknown relative; Hypertension in her father and unknown relative; Psychiatry in her  "child; Stroke in her father and unknown relative.    Social History:  reports that she quit smoking about 54 years ago. Her smoking use included Cigarettes. She has a 0.10 pack-year smoking history. She has never used smokeless tobacco. She reports that she drinks alcohol. She reports that she does not use drugs.    ROS: no CP or SOB.        Objective:     Vitals:    08/06/18 1507   BP: 122/78   Pulse: 72   Resp: 18   Temp: 36.7 °C (98 °F)   SpO2: 95%   Weight: 84.4 kg (186 lb)   Height: 1.753 m (5' 9\")       Physical Exam:  General: alert in no apparent distress.   MSK: No weakness on bilateral shoulder internal or external rotation. No pain on bilateral shoulder internal or external rotation. Neer test positive on the left.      Assessment and Plan:     1. Anxiety  Will transition to zoloft (tapering schedule given for paxil) and slightly increase dose of ativan. Counseled on appropriate use.   - LORazepam (ATIVAN) 1 MG Tab; Take 0.5-1 Tabs by mouth 1 time daily as needed for Anxiety for up to 30 days.  Dispense: 30 Tab; Refill: 3  - sertraline (ZOLOFT) 25 MG tablet; Take 1 Tab by mouth every day.  Dispense: 30 Tab; Refill: 3    2. Major depressive disorder with single episode, in partial remission (HCC)  - sertraline (ZOLOFT) 25 MG tablet; Take 1 Tab by mouth every day.  Dispense: 30 Tab; Refill: 3    3. Pain of left upper extremity  May be frozen shoulder. Will get XR to start. Patient will try Salonpas. If no improvement, may consider XR.   - DX-SHOULDER 2+ LEFT; Future  - DX-HUMERUS 2+ LEFT; Future    4. Hx of breast cancer  - patient declines oncology f/u currently.    5. Thyroid nodule   - f/u endocrinology, number provided.         Followup: Return in about 6 weeks (around 9/17/2018), or if symptoms worsen or fail to improve.         "

## 2018-08-06 NOTE — ASSESSMENT & PLAN NOTE
The patient has a history of breast cancer. She is to see Dr. Condon (oncology). The last note suggested that there was no recurrence of breast cancer. The patient states that she did not follow up. She does not currently want to go back to oncology.

## 2018-08-20 DIAGNOSIS — I10 ESSENTIAL HYPERTENSION: Primary | ICD-10-CM

## 2018-08-20 RX ORDER — METOPROLOL TARTRATE 50 MG/1
75 TABLET, FILM COATED ORAL 2 TIMES DAILY
Qty: 270 TAB | Refills: 3 | Status: ON HOLD | OUTPATIENT
Start: 2018-08-20 | End: 2019-03-13

## 2018-10-09 ENCOUNTER — APPOINTMENT (OUTPATIENT)
Dept: MEDICAL GROUP | Facility: MEDICAL CENTER | Age: 83
End: 2018-10-09
Payer: MEDICARE

## 2018-10-22 ENCOUNTER — HOSPITAL ENCOUNTER (OUTPATIENT)
Dept: RADIOLOGY | Facility: MEDICAL CENTER | Age: 83
End: 2018-10-22
Attending: FAMILY MEDICINE
Payer: MEDICARE

## 2018-10-22 DIAGNOSIS — M79.602 PAIN OF LEFT UPPER EXTREMITY: ICD-10-CM

## 2018-10-22 PROCEDURE — 73030 X-RAY EXAM OF SHOULDER: CPT | Mod: LT

## 2018-10-22 PROCEDURE — 73060 X-RAY EXAM OF HUMERUS: CPT | Mod: LT

## 2018-11-16 ENCOUNTER — TELEPHONE (OUTPATIENT)
Dept: CARDIOLOGY | Facility: MEDICAL CENTER | Age: 83
End: 2018-11-16

## 2018-11-16 NOTE — TELEPHONE ENCOUNTER
Left message for pt. To call. Number given below is constantly busy.    Message   Received: Today   Message Contents   JOHN Monet L.P.N.   Caller: Unspecified (Today, 11:42 AM)             Needs UA ? PCP today??

## 2018-11-16 NOTE — TELEPHONE ENCOUNTER
Pt. Called to report brown/dark red urine with each void since yesterday. She has had this sx. Before and has hx. Of UTI's.  She takes Eliquis 5mg BID and did take her morning dose.  To Neha.

## 2018-11-16 NOTE — TELEPHONE ENCOUNTER
----- Message from Savanna Berg sent at 11/16/2018 11:34 AM PST -----  Regarding: pt on Eliquis and reporting bleeding  GUS/Betzaida      Patient is on Eliquis and said she's noticed bleeding from her bladder. She can be reached at [phone].

## 2018-11-19 ENCOUNTER — OFFICE VISIT (OUTPATIENT)
Dept: URGENT CARE | Facility: CLINIC | Age: 83
End: 2018-11-19
Payer: MEDICARE

## 2018-11-19 ENCOUNTER — HOSPITAL ENCOUNTER (OUTPATIENT)
Facility: MEDICAL CENTER | Age: 83
End: 2018-11-19
Attending: PHYSICIAN ASSISTANT
Payer: MEDICARE

## 2018-11-19 VITALS
BODY MASS INDEX: 27.25 KG/M2 | OXYGEN SATURATION: 97 % | WEIGHT: 184 LBS | RESPIRATION RATE: 15 BRPM | TEMPERATURE: 98.1 F | DIASTOLIC BLOOD PRESSURE: 80 MMHG | HEART RATE: 60 BPM | SYSTOLIC BLOOD PRESSURE: 130 MMHG | HEIGHT: 69 IN

## 2018-11-19 DIAGNOSIS — N30.01 ACUTE CYSTITIS WITH HEMATURIA: ICD-10-CM

## 2018-11-19 DIAGNOSIS — N39.0 RECURRENT UTI: ICD-10-CM

## 2018-11-19 LAB
APPEARANCE UR: NORMAL
BILIRUB UR STRIP-MCNC: NORMAL MG/DL
COLOR UR AUTO: NORMAL
GLUCOSE UR STRIP.AUTO-MCNC: NEGATIVE MG/DL
KETONES UR STRIP.AUTO-MCNC: NEGATIVE MG/DL
LEUKOCYTE ESTERASE UR QL STRIP.AUTO: NORMAL
NITRITE UR QL STRIP.AUTO: NEGATIVE
PH UR STRIP.AUTO: 5.5 [PH] (ref 5–8)
PROT UR QL STRIP: 100 MG/DL
RBC UR QL AUTO: NORMAL
SP GR UR STRIP.AUTO: 1.02
UROBILINOGEN UR STRIP-MCNC: 0.2 MG/DL

## 2018-11-19 PROCEDURE — 99214 OFFICE O/P EST MOD 30 MIN: CPT | Performed by: PHYSICIAN ASSISTANT

## 2018-11-19 PROCEDURE — 87086 URINE CULTURE/COLONY COUNT: CPT

## 2018-11-19 PROCEDURE — 81002 URINALYSIS NONAUTO W/O SCOPE: CPT | Performed by: PHYSICIAN ASSISTANT

## 2018-11-19 RX ORDER — SULFAMETHOXAZOLE AND TRIMETHOPRIM 800; 160 MG/1; MG/1
1 TABLET ORAL EVERY 12 HOURS
Qty: 6 TAB | Refills: 0 | Status: SHIPPED | OUTPATIENT
Start: 2018-11-19 | End: 2018-11-22

## 2018-11-19 ASSESSMENT — ENCOUNTER SYMPTOMS
SHORTNESS OF BREATH: 0
ABDOMINAL PAIN: 0
FEVER: 0
EMPTYING BLADDER: 1
NAUSEA: 0
VOMITING: 0
CHILLS: 0

## 2018-11-20 DIAGNOSIS — N39.0 RECURRENT UTI: ICD-10-CM

## 2018-11-20 DIAGNOSIS — N30.01 ACUTE CYSTITIS WITH HEMATURIA: ICD-10-CM

## 2018-11-20 ASSESSMENT — ENCOUNTER SYMPTOMS
MYALGIAS: 0
BACK PAIN: 0
FLANK PAIN: 0

## 2018-11-20 NOTE — PROGRESS NOTES
"Subjective:   Meron Rice is a 86 y.o. female who presents for Cystitis (started with cloudy urine, then was blood, now looking brown, has been week, usually take bactrim)        Cystitis    This is a new problem. The current episode started today. Associated symptoms include frequency and hematuria. Pertinent negatives include no chills, flank pain, nausea, urgency or vomiting.     Pt notes last 5-7d of dysuria, freq and some darker hematuria noted as well. PMH of recurrent UTI and using bactrim for tx. Past cx from august shows enterococcus - pt reports resolution of s/sx w/ bactrim then and close personal monitoring of s/sx \"I have been taking care of myself with this for many many years, used to be an RN\"    Pt denies fever/chills, denies back or abd pain. Denies nausea/vomiting.       Review of Systems   Constitutional: Negative for chills and fever.   Respiratory: Negative for shortness of breath.    Cardiovascular: Negative for leg swelling.   Gastrointestinal: Negative for abdominal pain, nausea and vomiting.   Genitourinary: Positive for dysuria, frequency and hematuria. Negative for flank pain and urgency.   Musculoskeletal: Negative for back pain and myalgias.   Skin: Negative for rash.     Allergies   Allergen Reactions   • Alendronate-Butylpar-Propylpar-Saccharin [Fosamax] Diarrhea     .      Objective:   /80 (BP Location: Right arm, Patient Position: Sitting, BP Cuff Size: Large adult)   Pulse 60   Temp 36.7 °C (98.1 °F) (Temporal)   Resp 15   Ht 1.753 m (5' 9\")   Wt 83.5 kg (184 lb)   SpO2 97%   BMI 27.17 kg/m²   Physical Exam   Constitutional: She is oriented to person, place, and time. She appears well-developed and well-nourished. No distress.   HENT:   Head: Normocephalic and atraumatic.   Right Ear: External ear normal.   Left Ear: External ear normal.   Nose: Nose normal.   Eyes: Conjunctivae and lids are normal. Right eye exhibits no discharge. Left eye exhibits no discharge. " No scleral icterus.   Neck: Neck supple.   Pulmonary/Chest: Effort normal. No respiratory distress.   Abdominal: Soft. Normal appearance. There is no tenderness. There is no rigidity, no guarding and no CVA tenderness.   Musculoskeletal: Normal range of motion.   Neurological: She is alert and oriented to person, place, and time. She is not disoriented.   Skin: Skin is warm and dry. She is not diaphoretic. No erythema. No pallor.   Psychiatric: Her speech is normal and behavior is normal.   Nursing note and vitals reviewed.    Results for orders placed or performed in visit on 11/19/18   POCT Urinalysis   Result Value Ref Range    POC Color BROWN Negative    POC Appearance CLOUDY Negative    POC Leukocyte Esterase LARGE Negative    POC Nitrites NEGATIVE Negative    POC Urobiligen 0.2 Negative (0.2) mg/dL    POC Protein 100 Negative mg/dL    POC Urine PH 5.5 5.0 - 8.0    POC Blood LARGE Negative    POC Specific Gravity 1.025 <1.005 - >1.030    POC Ketones NEGATIVE Negative mg/dL    POC Bilirubin SMALL Negative mg/dL    POC Glucose NEGATIVE Negative mg/dL     Urine cx pending      Assessment/Plan:   1. Acute cystitis with hematuria  - POCT Urinalysis  - sulfamethoxazole-trimethoprim (BACTRIM DS) 800-160 MG tablet; Take 1 Tab by mouth every 12 hours for 3 days.  Dispense: 6 Tab; Refill: 0  - URINE CULTURE(NEW); Future    2. Recurrent UTI  - sulfamethoxazole-trimethoprim (BACTRIM DS) 800-160 MG tablet; Take 1 Tab by mouth every 12 hours for 3 days.  Dispense: 6 Tab; Refill: 0  - URINE CULTURE(NEW); Future  Supportive care is reviewed with patient/caregiver - recommend to push PO fluids and electrolytes, nsaids/tylenol, rest, full course of abx, probiotics w/ abx, azo/cran, observe for resolution  Return to clinic with lack of resolution or progression of symptoms.  Will call if change of abx is indicated by urine cx    Differential diagnosis, natural history, supportive care, and indications for immediate follow-up  discussed.

## 2018-11-22 LAB
BACTERIA UR CULT: NORMAL
SIGNIFICANT IND 70042: NORMAL
SITE SITE: NORMAL
SOURCE SOURCE: NORMAL

## 2018-12-06 ENCOUNTER — NON-PROVIDER VISIT (OUTPATIENT)
Dept: CARDIOLOGY | Facility: MEDICAL CENTER | Age: 83
End: 2018-12-06
Payer: MEDICARE

## 2018-12-06 ENCOUNTER — OFFICE VISIT (OUTPATIENT)
Dept: CARDIOLOGY | Facility: MEDICAL CENTER | Age: 83
End: 2018-12-06
Payer: MEDICARE

## 2018-12-06 VITALS
OXYGEN SATURATION: 93 % | SYSTOLIC BLOOD PRESSURE: 122 MMHG | DIASTOLIC BLOOD PRESSURE: 70 MMHG | BODY MASS INDEX: 27.25 KG/M2 | HEART RATE: 60 BPM | WEIGHT: 184 LBS | HEIGHT: 69 IN

## 2018-12-06 DIAGNOSIS — I48.0 PAF (PAROXYSMAL ATRIAL FIBRILLATION) (HCC): ICD-10-CM

## 2018-12-06 DIAGNOSIS — Z95.0 CARDIAC PACEMAKER IN SITU: ICD-10-CM

## 2018-12-06 DIAGNOSIS — N39.0 RECURRENT UTI: ICD-10-CM

## 2018-12-06 DIAGNOSIS — Z02.9 ENCOUNTERS FOR ADMINISTRATIVE PURPOSE: ICD-10-CM

## 2018-12-06 DIAGNOSIS — Z79.01 ANTICOAGULANT LONG-TERM USE: ICD-10-CM

## 2018-12-06 PROCEDURE — 93280 PM DEVICE PROGR EVAL DUAL: CPT | Performed by: NURSE PRACTITIONER

## 2018-12-06 PROCEDURE — 99214 OFFICE O/P EST MOD 30 MIN: CPT | Mod: 25 | Performed by: NURSE PRACTITIONER

## 2018-12-06 RX ORDER — FLUOXETINE HYDROCHLORIDE 40 MG/1
CAPSULE ORAL
COMMUNITY
End: 2018-12-11

## 2018-12-06 RX ORDER — LORAZEPAM 1 MG/1
TABLET ORAL
Refills: 1 | COMMUNITY
Start: 2018-10-31 | End: 2018-12-11

## 2018-12-06 ASSESSMENT — ENCOUNTER SYMPTOMS
PND: 0
BLOOD IN STOOL: 0
DOUBLE VISION: 0
ABDOMINAL PAIN: 0
FEVER: 0
ORTHOPNEA: 0
MYALGIAS: 0
SORE THROAT: 0
PALPITATIONS: 0
CLAUDICATION: 0
HEARTBURN: 0
HEADACHES: 0
BLURRED VISION: 0
NAUSEA: 0
WHEEZING: 0
SHORTNESS OF BREATH: 0
BRUISES/BLEEDS EASILY: 0
VOMITING: 0
LOSS OF CONSCIOUSNESS: 0
CHILLS: 0
DIZZINESS: 0
SPEECH CHANGE: 0
FOCAL WEAKNESS: 0
PSYCHIATRIC NEGATIVE: 1
COUGH: 0

## 2018-12-06 NOTE — LETTER
[facility]   [address] [place] [address] [address]  [place] [address]  Phone: [phone]  Fax: [phone]              Meron Rice  11/18/1932    Encounter Date: 12/6/2018    JOHN Monet          PROGRESS NOTE:  Chief Complaint   Patient presents with   • Atrial Fibrillation     F/V DX:PAF       Subjective:   Meron Rice is a 86 y.o. female who presents today for review of her cardiac rhythm status, pacemaker, PAF, and chronic anticoagulation.  Most recent hospital stay 11/2/17 due to pacemaker pocket cellulitis. Explantation of device was performed on 9/13/17. Recent reimplantation on 11/2/17.  Site today looks quite good .  Pacemaker function BSI intact with BV 8.5 years.  Her most recent health issue has been hematuria. She continues on Eliquis 5mgs b.i.d. For her PAF. On review of her device today her AF burden is 1 % with longest episode at 6 minutes.  In review of her UA I did not see an indication of infection, but large blood.  This will need to be repeated and if positive with her CHADS Vasc 2 risk of 2 she needs to continue on OAC.  She will need a urology evaluation if hematuria persists.  Past Medical History:   Diagnosis Date   • Anemia    • Arrhythmia     atrial fibrillation   • Arthritis     generalized   • ASTHMA     has not needed an inhaler since 2010   • Breath shortness     on exertion   • Bronchitis      in past, not current   • Cancer (HCC) [id number]    Uterus and Breast   • CATARACT     beginning   • Chronic low back pain    • Depression 5/19/2017   • Foot-drop    • Hypertension    • Other specified symptom associated with female genital organs 1998    fibroids   • Pacemaker     in past, removed due to cellulitis   • Pain     left shoulder    • Unspecified hemorrhagic conditions     post op hyst. Currently on Eliquis    • Unspecified urinary incontinence     lazy bladder; doesn't empty completely   • UTI (urinary tract infection)     frequent      Past Surgical History:   Procedure Laterality Date   • OTHER  09/2017    pacemaker removal    • FEMUR NAILING INTRAMEDULLARY Right 6/3/2015    Procedure: FEMUR NAILING INTRAMEDULLARY;  Surgeon: Deshaun Denis M.D.;  Location: SURGERY [facility];  Service:    • WOUND CLOSURE GENERAL  4/10/2013    Performed by Nano Varela M.D. at SURGERY SAME DAY [facility] ORS   • BREAST IMPLANT REMOVAL  4/10/2013    Performed by Jak Meza M.D. at SURGERY SAME DAY [facility] ORS   • BREAST RECONSTRUCTION  11/26/2012    Performed by Jak Meza M.D. at SURGERY [facility]   • HIP HEMIARTHROPLASTY  5/14/2012    left; Performed by KEITH COWART at SURGERY [facility]   • BREAST IMPLANT REVISION  7/11/2011    Performed by JAK MEZA at SURGERY [facility]   • NIPPLE RECONSTRUCTION  7/11/2011    Performed by JAK MEZA at SURGERY [facility]   • MASTECTOMY  12/15/2010    right   • CATH REMOVAL  12/15/2010    Performed by NANO VARELA at SURGERY [facility]   • BREAST RECONSTRUCTION  12/15/2010    Performed by JAK MEZA at SURGERY [facility]   • LATISSIMUS FLAP  12/15/2010    Performed by JAK MEZA at SURGERY [facility]   • CATH PLACEMENT  10/28/2009    Performed by NANO VARELA at SURGERY SAME DAY [facility]   • MASTECTOMY  9/30/2009    left   • AXILLARY NODE DISSECTION  9/15/2009    Performed by NANO VARELA at SURGERY SAME DAY [facility] ORS   • OTHER SURGICAL PROCEDURE  2004    bladder repair   • OTHER ORTHOPEDIC SURGERY  2002    laminectomy   • HYSTERECTOMY, TOTAL ABDOMINAL  2000   • GYN SURGERY  1998    excision of fibroids   • APPENDECTOMY  1968   • DILATION AND CURETTAGE  1961   • VEIN LIGAT & STRIP  1972, 1966    x2 bilateral     Family History   Problem Relation Age of Onset   • Diabetes Mother    • Heart Disease Mother    • Heart Disease Father    • Stroke Father    • Hypertension Father    • Diabetes Sister    •  Heart Disease Sister    • Cancer Brother         lung   • Diabetes Unknown    • Heart Disease Unknown    • Stroke Unknown    • Cancer Unknown    • Hypertension Unknown    • Diabetes Other    • Diabetes Child    • Psychiatry Child      Social History     Social History   • Marital status:      Spouse name: N/A   • Number of children: N/A   • Years of education: N/A     Occupational History   • Not on file.     Social History Main Topics   • Smoking status: Former Smoker     Packs/day: 0.10     Years: 1.00     Types: Cigarettes     Quit date: 1/1/1964   • Smokeless tobacco: Never Used      Comment: 50 years ago in 1960  SOCIAL    • Alcohol use Yes      Comment: 3-4 drinks per week    • Drug use: No   • 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      Comment: Retired Nurse     Other Topics Concern   • Not on file     Social History Narrative   • No narrative on file     Allergies   Allergen Reactions   • Alendronate-Butylpar-Propylpar-Saccharin [Fosamax] Diarrhea     .     Outpatient Encounter Prescriptions as of 12/6/2018   Medication Sig Dispense Refill   • LORazepam (ATIVAN) 1 MG Tab TK 1/2 OR 1 T PO QD PRA FOR 30 DAYS  1   • fluoxetine (PROZAC) 40 MG capsule fluoxetine 40 mg capsule     • sertraline (ZOLOFT) 25 MG tablet TAKE 1 TABLET BY MOUTH EVERY DAY 90 Tab 4   • metoprolol (LOPRESSOR) 50 MG Tab Take 1.5 Tabs by mouth 2 times a day. 270 Tab 3   • sulfamethoxazole-trimethoprim (BACTRIM DS) 800-160 MG tablet TAKE ONE TABLET BY MOUTH TWICE DAILY FOR 3 DAYS. 6 Tab 0   • therapeutic multivitamin-minerals (THERAGRAN-M) Tab Take 1 Tab by mouth every morning.     • apixaban (ELIQUIS) 5mg Tab Take 1 Tab by mouth 2 Times a Day. 180 Tab 1   • phenazopyridine (PYRIDIUM) 200 MG Tab Take 1 Tab by mouth 3 times a day as needed for Mild Pain (Do not use longer than 2 days.). (Patient not taking: Reported on 12/6/2018) 6 Tab 0     No facility-administered encounter medications on file as of 12/6/2018.      Review of Systems    "  Constitutional: Negative for chills and fever.        Uses a walker for ambulation.   HENT: Negative for sore throat.         No difficulty swallowing   Eyes: Negative for blurred vision and double vision.   Respiratory: Negative for cough, shortness of breath and wheezing.    Cardiovascular: Negative for chest pain, palpitations, orthopnea, claudication, leg swelling and PND.   Gastrointestinal: Negative for abdominal pain, blood in stool, heartburn, nausea and vomiting.   Genitourinary: Positive for hematuria (improved). Negative for dysuria, frequency and urgency.   Musculoskeletal: Negative for myalgias.   Skin: Negative.    Neurological: Negative for dizziness, speech change, focal weakness, loss of consciousness and headaches.   Endo/Heme/Allergies: Does not bruise/bleed easily.   Psychiatric/Behavioral: Negative.         Objective:   /70 (BP Location: Left arm, Patient Position: Sitting, BP Cuff Size: Adult)   Pulse 60   Ht 1.753 m (5' 9\")   Wt 83.5 kg (184 lb)   SpO2 93%   BMI 27.17 kg/m²      Physical Exam   Constitutional: She is oriented to person, place, and time. She appears well-developed and well-nourished.   HENT:   Head: Normocephalic and atraumatic.   Eyes: Pupils are equal, round, and reactive to light.   Neck: Normal range of motion. Neck supple.   Cardiovascular: Normal rate and regular rhythm.    Pulmonary/Chest: Effort normal and breath sounds normal.   Device site is uncomplicated.   Abdominal: Soft. Bowel sounds are normal.   Musculoskeletal: Normal range of motion. She exhibits edema (mild).   Neurological: She is alert and oriented to person, place, and time.   Skin: Skin is warm and dry.   Psychiatric: She has a normal mood and affect.       Assessment:     1. Cardiac pacemaker in situ     2. PAF (paroxysmal atrial fibrillation) ([facility])     3. Anticoagulant long-term use     4. Recurrent UTI     Hematuria    Medical Decision Making:  Today's Assessment / Status / Plan:     1. " PPM BSI BV 8.5 years. Function intact.  2. PAF continues to have short non-sustained episodes up to 6 minutes in duration.  1% burden.  3. OAC in the form of Eliquis. She is on 5 mgs b.i.d. As renal function intact despite age.  4. Recurrent UTI, not sure hematuria explained on this basis.  Referral to PCP for reevaluation of UA after AB coverage completed.  RTC 4 months for me and 8 months for Dr Garrett.    Collaborating MD SHIVA Disla M.D.  [address]  [address]  [facility] [address]  VIA In Basket

## 2018-12-07 ENCOUNTER — TELEPHONE (OUTPATIENT)
Dept: CARDIOLOGY | Facility: MEDICAL CENTER | Age: 83
End: 2018-12-07

## 2018-12-07 NOTE — TELEPHONE ENCOUNTER
FMLA form for pt's. Daughter, Nyasia, completed and signed. Left message for Nyasia ([phone]) that she may come to office to pick it up.

## 2018-12-07 NOTE — PROGRESS NOTES
Chief Complaint   Patient presents with   • Atrial Fibrillation     F/V DX:PAF       Subjective:   Meron Rice is a 86 y.o. female who presents today for review of her cardiac rhythm status, pacemaker, PAF, and chronic anticoagulation.  Most recent hospital stay 11/2/17 due to pacemaker pocket cellulitis. Explantation of device was performed on 9/13/17. Recent reimplantation on 11/2/17.  Site today looks quite good .  Pacemaker function BSI intact with BV 8.5 years.  Her most recent health issue has been hematuria. She continues on Eliquis 5mgs b.i.d. For her PAF. On review of her device today her AF burden is 1 % with longest episode at 6 minutes.  In review of her UA I did not see an indication of infection, but large blood.  This will need to be repeated and if positive with her CHADS Vasc 2 risk of 2 she needs to continue on OAC.  She will need a urology evaluation if hematuria persists.  Past Medical History:   Diagnosis Date   • Anemia    • Arrhythmia     atrial fibrillation   • Arthritis     generalized   • ASTHMA     has not needed an inhaler since 2010   • Breath shortness     on exertion   • Bronchitis      in past, not current   • Cancer (HCC) [id number]    Uterus and Breast   • CATARACT     beginning   • Chronic low back pain    • Depression 5/19/2017   • Foot-drop    • Hypertension    • Other specified symptom associated with female genital organs 1998    fibroids   • Pacemaker     in past, removed due to cellulitis   • Pain     left shoulder    • Unspecified hemorrhagic conditions     post op hyst. Currently on Eliquis    • Unspecified urinary incontinence     lazy bladder; doesn't empty completely   • UTI (urinary tract infection)     frequent      Past Surgical History:   Procedure Laterality Date   • OTHER  09/2017    pacemaker removal    • FEMUR NAILING INTRAMEDULLARY Right 6/3/2015    Procedure: FEMUR NAILING INTRAMEDULLARY;  Surgeon: Deshaun Denis M.D.;  Location: SURGERY [facility];   Service:    • WOUND CLOSURE GENERAL  4/10/2013    Performed by Nano Varela M.D. at SURGERY SAME DAY [facility] ORS   • BREAST IMPLANT REMOVAL  4/10/2013    Performed by Jak Meza M.D. at SURGERY SAME DAY [facility] ORS   • BREAST RECONSTRUCTION  11/26/2012    Performed by Jak Meza M.D. at SURGERY [facility]   • HIP HEMIARTHROPLASTY  5/14/2012    left; Performed by KEITH COWART at SURGERY [facility] ORS   • BREAST IMPLANT REVISION  7/11/2011    Performed by JAK MEZA at SURGERY [facility]   • NIPPLE RECONSTRUCTION  7/11/2011    Performed by JAK MEZA at SURGERY [facility]   • MASTECTOMY  12/15/2010    right   • CATH REMOVAL  12/15/2010    Performed by NANO VARELA at SURGERY [facility] ORS   • BREAST RECONSTRUCTION  12/15/2010    Performed by JAK MEZA at SURGERY [facility] ORS   • LATISSIMUS FLAP  12/15/2010    Performed by JAK MEZA at SURGERY [facility] ORS   • CATH PLACEMENT  10/28/2009    Performed by NANO VARELA at SURGERY SAME DAY [facility]   • MASTECTOMY  9/30/2009    left   • AXILLARY NODE DISSECTION  9/15/2009    Performed by NANO VARELA at SURGERY SAME DAY [facility]   • OTHER SURGICAL PROCEDURE  2004    bladder repair   • OTHER ORTHOPEDIC SURGERY  2002    laminectomy   • HYSTERECTOMY, TOTAL ABDOMINAL  2000   • GYN SURGERY  1998    excision of fibroids   • APPENDECTOMY  1968   • DILATION AND CURETTAGE  1961   • VEIN LIGAT & STRIP  1972, 1966    x2 bilateral     Family History   Problem Relation Age of Onset   • Diabetes Mother    • Heart Disease Mother    • Heart Disease Father    • Stroke Father    • Hypertension Father    • Diabetes Sister    • Heart Disease Sister    • Cancer Brother         lung   • Diabetes Unknown    • Heart Disease Unknown    • Stroke Unknown    • Cancer Unknown    • Hypertension Unknown    • Diabetes Other    • Diabetes Child    • Psychiatry Child      Social History     Social History    • Marital status:      Spouse name: N/A   • Number of children: N/A   • Years of education: N/A     Occupational History   • Not on file.     Social History Main Topics   • Smoking status: Former Smoker     Packs/day: 0.10     Years: 1.00     Types: Cigarettes     Quit date: 1/1/1964   • Smokeless tobacco: Never Used      Comment: 50 years ago in 1960  SOCIAL    • Alcohol use Yes      Comment: 3-4 drinks per week    • Drug use: No   • 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      Comment: Retired Nurse     Other Topics Concern   • Not on file     Social History Narrative   • No narrative on file     Allergies   Allergen Reactions   • Alendronate-Butylpar-Propylpar-Saccharin [Fosamax] Diarrhea     .     Outpatient Encounter Prescriptions as of 12/6/2018   Medication Sig Dispense Refill   • LORazepam (ATIVAN) 1 MG Tab TK 1/2 OR 1 T PO QD PRA FOR 30 DAYS  1   • fluoxetine (PROZAC) 40 MG capsule fluoxetine 40 mg capsule     • sertraline (ZOLOFT) 25 MG tablet TAKE 1 TABLET BY MOUTH EVERY DAY 90 Tab 4   • metoprolol (LOPRESSOR) 50 MG Tab Take 1.5 Tabs by mouth 2 times a day. 270 Tab 3   • sulfamethoxazole-trimethoprim (BACTRIM DS) 800-160 MG tablet TAKE ONE TABLET BY MOUTH TWICE DAILY FOR 3 DAYS. 6 Tab 0   • therapeutic multivitamin-minerals (THERAGRAN-M) Tab Take 1 Tab by mouth every morning.     • apixaban (ELIQUIS) 5mg Tab Take 1 Tab by mouth 2 Times a Day. 180 Tab 1   • phenazopyridine (PYRIDIUM) 200 MG Tab Take 1 Tab by mouth 3 times a day as needed for Mild Pain (Do not use longer than 2 days.). (Patient not taking: Reported on 12/6/2018) 6 Tab 0     No facility-administered encounter medications on file as of 12/6/2018.      Review of Systems   Constitutional: Negative for chills and fever.        Uses a walker for ambulation.   HENT: Negative for sore throat.         No difficulty swallowing   Eyes: Negative for blurred vision and double vision.   Respiratory: Negative for cough, shortness of breath and wheezing.   "  Cardiovascular: Negative for chest pain, palpitations, orthopnea, claudication, leg swelling and PND.   Gastrointestinal: Negative for abdominal pain, blood in stool, heartburn, nausea and vomiting.   Genitourinary: Positive for hematuria (improved). Negative for dysuria, frequency and urgency.   Musculoskeletal: Negative for myalgias.   Skin: Negative.    Neurological: Negative for dizziness, speech change, focal weakness, loss of consciousness and headaches.   Endo/Heme/Allergies: Does not bruise/bleed easily.   Psychiatric/Behavioral: Negative.         Objective:   /70 (BP Location: Left arm, Patient Position: Sitting, BP Cuff Size: Adult)   Pulse 60   Ht 1.753 m (5' 9\")   Wt 83.5 kg (184 lb)   SpO2 93%   BMI 27.17 kg/m²     Physical Exam   Constitutional: She is oriented to person, place, and time. She appears well-developed and well-nourished.   HENT:   Head: Normocephalic and atraumatic.   Eyes: Pupils are equal, round, and reactive to light.   Neck: Normal range of motion. Neck supple.   Cardiovascular: Normal rate and regular rhythm.    Pulmonary/Chest: Effort normal and breath sounds normal.   Device site is uncomplicated.   Abdominal: Soft. Bowel sounds are normal.   Musculoskeletal: Normal range of motion. She exhibits edema (mild).   Neurological: She is alert and oriented to person, place, and time.   Skin: Skin is warm and dry.   Psychiatric: She has a normal mood and affect.       Assessment:     1. Cardiac pacemaker in situ     2. PAF (paroxysmal atrial fibrillation) ([facility])     3. Anticoagulant long-term use     4. Recurrent UTI     Hematuria    Medical Decision Making:  Today's Assessment / Status / Plan:     1. PPM BSI BV 8.5 years. Function intact.  2. PAF continues to have short non-sustained episodes up to 6 minutes in duration.  1% burden.  3. OAC in the form of Eliquis. She is on 5 mgs b.i.d. As renal function intact despite age.  4. Recurrent UTI, not sure hematuria explained on " this basis.  Referral to PCP for reevaluation of UA after AB coverage completed.  RTC 4 months for me and 8 months for Dr Garrett.    Collaborating MD SHIVA Aldana

## 2018-12-11 ENCOUNTER — OFFICE VISIT (OUTPATIENT)
Dept: MEDICAL GROUP | Facility: MEDICAL CENTER | Age: 83
End: 2018-12-11
Payer: MEDICARE

## 2018-12-11 VITALS
OXYGEN SATURATION: 97 % | TEMPERATURE: 96.9 F | HEART RATE: 58 BPM | BODY MASS INDEX: 27.17 KG/M2 | SYSTOLIC BLOOD PRESSURE: 118 MMHG | DIASTOLIC BLOOD PRESSURE: 74 MMHG | HEIGHT: 69 IN | RESPIRATION RATE: 20 BRPM

## 2018-12-11 DIAGNOSIS — F41.9 ANXIETY: ICD-10-CM

## 2018-12-11 DIAGNOSIS — M25.512 CHRONIC LEFT SHOULDER PAIN: ICD-10-CM

## 2018-12-11 DIAGNOSIS — R31.0 GROSS HEMATURIA: ICD-10-CM

## 2018-12-11 DIAGNOSIS — Z79.01 ANTICOAGULANT LONG-TERM USE: ICD-10-CM

## 2018-12-11 DIAGNOSIS — G89.29 CHRONIC LEFT SHOULDER PAIN: ICD-10-CM

## 2018-12-11 DIAGNOSIS — N39.0 RECURRENT UTI: ICD-10-CM

## 2018-12-11 DIAGNOSIS — Z23 NEED FOR VACCINATION: ICD-10-CM

## 2018-12-11 PROCEDURE — 90662 IIV NO PRSV INCREASED AG IM: CPT | Performed by: INTERNAL MEDICINE

## 2018-12-11 PROCEDURE — G0008 ADMIN INFLUENZA VIRUS VAC: HCPCS | Performed by: INTERNAL MEDICINE

## 2018-12-11 PROCEDURE — 99214 OFFICE O/P EST MOD 30 MIN: CPT | Mod: 25 | Performed by: INTERNAL MEDICINE

## 2018-12-11 RX ORDER — TRAMADOL HYDROCHLORIDE 50 MG/1
50 TABLET ORAL NIGHTLY PRN
Qty: 30 TAB | Refills: 0 | Status: SHIPPED | OUTPATIENT
Start: 2019-01-11 | End: 2019-01-15

## 2018-12-11 RX ORDER — TRAMADOL HYDROCHLORIDE 50 MG/1
50 TABLET ORAL NIGHTLY PRN
Qty: 30 TAB | Refills: 0 | Status: SHIPPED
Start: 2018-12-11 | End: 2018-12-11 | Stop reason: SDUPTHER

## 2018-12-12 ENCOUNTER — TELEPHONE (OUTPATIENT)
Dept: MEDICAL GROUP | Facility: MEDICAL CENTER | Age: 83
End: 2018-12-12

## 2018-12-12 NOTE — TELEPHONE ENCOUNTER
Phone Number Called: [phone] (home)     Message: Pt. Called wanting to know why she wasn't prescribed Lorazepam. I explained that  Dr. Bravo can not prescribe pain medication and benzodiazepines simultaneously. It also explained In the note pt. Verbalized understanding and wanted to try tramadol instead. I informed the pt. And she said ok thank you and hung up the phone.    Left Message for patient to call back: N\A

## 2018-12-12 NOTE — PROGRESS NOTES
Chief Complaint   Patient presents with   • Medication Management     discuss medications,refill   • UTI     discuss bactrim       HISTORY OF PRESENT ILLNESS: Patient is a 86 y.o. female patient who presents today to discuss the evaluation and management of:          1. Gross hematuria    Patient with gross hematuria several weeks ago, seen at  and treated with Bactrim for positive UA.  Her hematuria gradually cleared. Pt is on long term anticoagulation for intermittent Afib/    2. Recurrent UTI    Urine culture from 11/19/18 was negative, but given emperic rx, above.    3. Anticoagulant long-term use    Followed by cardiology, has PAF with rcent pacemaker review showing 1% A fib burden  With longest episode 6 minutes. On Eloquis 5 mg BID.    4. Chronic left shoulder pain    Xray 10/18 showed severe degenerative OA. Pain is interfereing with sleep. Patient has taken tramadol in the past with good results and is requesting a prescription for this.    5. Anxiety    On Zoloft 25 mg, does not feel that it is effective. Has had Ativan 1 mg daily,last taken 6 weeks ago. Discussed that can not prescribe pain medication and benzodiazepines simultaneously, patient prefers to try pain medication.    6. Need for vaccination   requesting flu shot        Patient Active Problem List    Diagnosis Date Noted   • Sick sinus syndrome (HCC) 06/02/2017     Priority: High   • Cardiac pacemaker in situ 06/02/2017     Priority: High   • Atrial flutter (HCC) 06/01/2017     Priority: High   • Osteoporosis 02/04/2014     Priority: Medium   • Essential hypertension 05/14/2012     Priority: Medium   • Chronic hip pain 05/19/2017     Priority: Low   • Depression 05/19/2017     Priority: Low   • Anxiety 02/04/2014     Priority: Low   • Hx of breast cancer 05/14/2012     Priority: Low   • Hx of cancer of uterus 05/14/2012     Priority: Low   • GERD (gastroesophageal reflux disease) 05/14/2012     Priority: Low   • Chronic midline low back pain  without sciatica 05/14/2012     Priority: Low   • Gross hematuria 12/11/2018   • Chronic left shoulder pain 12/11/2018   • Pain of left upper extremity 08/06/2018   • Thyroid nodule 04/19/2018   • Presbycusis 03/22/2018   • PAF (paroxysmal atrial fibrillation) (HCC) 09/08/2017   • S/P ablation of atrial flutter 08/16/2017   • Anticoagulant long-term use 06/16/2017   • Recurrent UTI 05/19/2017   • Neurogenic bladder 03/06/2017   • Nerve pain 02/04/2016        Allergies:Alendronate-butylpar-propylpar-saccharin [fosamax]    Current meds including changes today  Current Outpatient Prescriptions   Medication Sig Dispense Refill   • [START ON 1/11/2019] tramadol (ULTRAM) 50 MG Tab Take 1 Tab by mouth at bedtime as needed for up to 30 days. 30 Tab 0   • sertraline (ZOLOFT) 50 MG Tab Take 1 Tab by mouth every day. 30 Tab 11   • metoprolol (LOPRESSOR) 50 MG Tab Take 1.5 Tabs by mouth 2 times a day. 270 Tab 3   • apixaban (ELIQUIS) 5mg Tab Take 1 Tab by mouth 2 Times a Day. 180 Tab 1   • therapeutic multivitamin-minerals (THERAGRAN-M) Tab Take 1 Tab by mouth every morning.       No current facility-administered medications for this visit.      Social History   Substance Use Topics   • Smoking status: Former Smoker     Packs/day: 0.10     Years: 1.00     Types: Cigarettes     Quit date: 1/1/1964   • Smokeless tobacco: Never Used      Comment: 50 years ago in 1960  SOCIAL    • Alcohol use Yes      Comment: 3-4 drinks per week      Social History     Social History Narrative   • No narrative on file       Family History   Problem Relation Age of Onset   • Diabetes Mother    • Heart Disease Mother    • Heart Disease Father    • Stroke Father    • Hypertension Father    • Diabetes Sister    • Heart Disease Sister    • Cancer Brother         lung   • Diabetes Unknown    • Heart Disease Unknown    • Stroke Unknown    • Cancer Unknown    • Hypertension Unknown    • Diabetes Other    • Diabetes Child    • Psychiatry Child   "      Review of Systems:  No chest pain, No shortness of breath, No dyspnea on exertion  Gastrointestinal ROS: No abdominal pain, No nausea, vomiting, diarrhea, or constipation       Exam:   Blood pressure 118/74, pulse (!) 58, temperature 36.1 °C (96.9 °F), temperature source Temporal, resp. rate 20, height 1.753 m (5' 9\"), SpO2 97 %, not currently breastfeeding.  General:  Overweight female in NAD affect and mood within normal limits  Head is grossly normal.  Neck: Supple without adenopathy  Pulmonary: Clear to ausculation.  Normal effort. No rales, rhonchi, or wheezing.  Cardiovascular: Regular rate and rhythm without murmur.   Extremities: no clubbing, cyanosis, or edema.  Neuro: moves all extremities symmetrically    Please note that this dictation was created using voice recognition software. I have made every reasonable attempt to correct obvious errors, but I expect that there are errors of grammar and possibly content that I did not discover before finalizing the note.    Assessment/Plan:  1. Gross hematuria     Clinically resolved, patient does not desire further workup.    2. Recurrent UTI     Recent culture negative    3. Anticoagulant long-term use    I recommend that given her advanced age and low AF burden, and spontaneous urinary tract bleeding, that she lower her Eloquis to 2.5mg BID. An argument could be made to discontinue altogether given her age, high risk for fall, and low AF burden, but will defer to cardiology.    4. Chronic left shoulder pain    - Consent for Opiate Prescription  - tramadol (ULTRAM) 50 MG Tab; Take 1 Tab by mouth at bedtime as needed for up to 30 days.  Dispense: 30 Tab; Refill: 0   checked, no concerning issues. 2 month supply given  5. Anxiety    Patient willing to increase dose of Zoloft  - sertraline (ZOLOFT) 50 MG Tab; Take 1 Tab by mouth every day.  Dispense: 30 Tab; Refill: 11    6. Need for vaccination    - INFLUENZA VACCINE, HIGH DOSE (65+ ONLY)    Followup: " Return in about 2 months (around 2/11/2019).

## 2018-12-17 ENCOUNTER — TELEPHONE (OUTPATIENT)
Dept: CARDIOLOGY | Facility: MEDICAL CENTER | Age: 83
End: 2018-12-17

## 2018-12-18 NOTE — TELEPHONE ENCOUNTER
Patient Calls     SHAE Monet.   You 1 hour ago (7:44 AM)      PCP should prescribe controlled substances per NOE. (Routing comment)

## 2018-12-18 NOTE — TELEPHONE ENCOUNTER
----- Message from Reyna Phan sent at 12/14/2018 10:33 AM PST -----  Regarding: ativan to help with sleep  Contact: [phone]  GUS/shakir    Pt calling to ask if PB would prescribe lorazapam (Ativan) 1 mg to help her sleep.  Please call Meron at [phone].

## 2019-01-15 ENCOUNTER — HOME HEALTH ADMISSION (OUTPATIENT)
Dept: HOME HEALTH SERVICES | Facility: HOME HEALTHCARE | Age: 84
End: 2019-01-15
Payer: MEDICARE

## 2019-01-15 ENCOUNTER — OFFICE VISIT (OUTPATIENT)
Dept: MEDICAL GROUP | Facility: MEDICAL CENTER | Age: 84
End: 2019-01-15
Payer: MEDICARE

## 2019-01-15 VITALS
BODY MASS INDEX: 27.25 KG/M2 | RESPIRATION RATE: 16 BRPM | WEIGHT: 184 LBS | HEART RATE: 60 BPM | OXYGEN SATURATION: 98 % | DIASTOLIC BLOOD PRESSURE: 64 MMHG | SYSTOLIC BLOOD PRESSURE: 104 MMHG | HEIGHT: 69 IN | TEMPERATURE: 97.2 F

## 2019-01-15 DIAGNOSIS — I10 ESSENTIAL HYPERTENSION: ICD-10-CM

## 2019-01-15 DIAGNOSIS — M79.605 PAIN OF LEFT LOWER EXTREMITY: ICD-10-CM

## 2019-01-15 DIAGNOSIS — F41.9 ANXIETY: ICD-10-CM

## 2019-01-15 DIAGNOSIS — G47.09 OTHER INSOMNIA: ICD-10-CM

## 2019-01-15 DIAGNOSIS — L98.9 SKIN LESION: ICD-10-CM

## 2019-01-15 PROCEDURE — 99214 OFFICE O/P EST MOD 30 MIN: CPT | Performed by: FAMILY MEDICINE

## 2019-01-15 RX ORDER — LORAZEPAM 1 MG/1
1 TABLET ORAL NIGHTLY PRN
Qty: 20 TAB | Refills: 1 | Status: SHIPPED | OUTPATIENT
Start: 2019-01-15 | End: 2019-02-04

## 2019-01-15 ASSESSMENT — PATIENT HEALTH QUESTIONNAIRE - PHQ9
6. FEELING BAD ABOUT YOURSELF - OR THAT YOU ARE A FAILURE OR HAVE LET YOURSELF OR YOUR FAMILY DOWN: NOT AL ALL
5. POOR APPETITE OR OVEREATING: NOT AT ALL
1. LITTLE INTEREST OR PLEASURE IN DOING THINGS: SEVERAL DAYS
7. TROUBLE CONCENTRATING ON THINGS, SUCH AS READING THE NEWSPAPER OR WATCHING TELEVISION: NOT AT ALL
4. FEELING TIRED OR HAVING LITTLE ENERGY: SEVERAL DAYS
SUM OF ALL RESPONSES TO PHQ QUESTIONS 1-9: 4
3. TROUBLE FALLING OR STAYING ASLEEP OR SLEEPING TOO MUCH: NOT AT ALL
2. FEELING DOWN, DEPRESSED, IRRITABLE, OR HOPELESS: SEVERAL DAYS
8. MOVING OR SPEAKING SO SLOWLY THAT OTHER PEOPLE COULD HAVE NOTICED. OR THE OPPOSITE, BEING SO FIGETY OR RESTLESS THAT YOU HAVE BEEN MOVING AROUND A LOT MORE THAN USUAL: SEVERAL DAYS
SUM OF ALL RESPONSES TO PHQ9 QUESTIONS 1 AND 2: 2
9. THOUGHTS THAT YOU WOULD BE BETTER OFF DEAD, OR OF HURTING YOURSELF: NOT AT ALL

## 2019-01-15 NOTE — ASSESSMENT & PLAN NOTE
The patient has peripheral vascular disease and a long-standing left toe wound.  She is interested in wound care.

## 2019-01-15 NOTE — ASSESSMENT & PLAN NOTE
Patient describes some occasional sleep onset insomnia.  She has used Ativan 1 mg as needed for this purpose.  She tolerates this medication well and would like a refill.

## 2019-01-15 NOTE — ASSESSMENT & PLAN NOTE
Patient has anxiety which is controlled with Zoloft 50 mg daily.  She states she still has some episodes of anxiety.  Sometimes she will use Ativan 1 mg as needed for anxiety and sleep.  She tolerates this medication well without adverse effect.  She is requesting a refill.

## 2019-01-15 NOTE — ASSESSMENT & PLAN NOTE
Patient states that in the middle of December she was backing up onto her bed when she fell onto her buttocks.  Since then, she has been having left thigh pain.  She states that it is not very painful when she is sitting, however, if she were to stand up the pain would be 8-10 out of 10 in severity.  For this reason, she has been more sedentary.  Patient does have a history of bilateral hip replacements with bilateral femoral rods.

## 2019-01-15 NOTE — PROGRESS NOTES
[facility]  Progress Note  Established Patient    Subjective:   Meron Rice is a 86 y.o. female here today with a chief complaint of leg pain. The patient is alone.     Pain of left lower extremity  Patient states that in the middle of December she was backing up onto her bed when she fell onto her buttocks.  Since then, she has been having left thigh pain.  She states that it is not very painful when she is sitting, however, if she were to stand up the pain would be 8-10 out of 10 in severity.  For this reason, she has been more sedentary.  Patient does have a history of bilateral hip replacements with bilateral femoral rods.    Anxiety  Patient has anxiety which is controlled with Zoloft 50 mg daily.  She states she still has some episodes of anxiety.  Sometimes she will use Ativan 1 mg as needed for anxiety and sleep.  She tolerates this medication well without adverse effect.  She is requesting a refill.    Essential hypertension  Patient's blood pressure is at goal on her current medication regimen.    Other insomnia  Patient describes some occasional sleep onset insomnia.  She has used Ativan 1 mg as needed for this purpose.  She tolerates this medication well and would like a refill.    Skin lesion  The patient has peripheral vascular disease and a long-standing left toe wound.  She is interested in wound care.       Current Outpatient Prescriptions on File Prior to Visit   Medication Sig Dispense Refill   • sertraline (ZOLOFT) 50 MG Tab Take 1 Tab by mouth every day. 30 Tab 11   • metoprolol (LOPRESSOR) 50 MG Tab Take 1.5 Tabs by mouth 2 times a day. 270 Tab 3   • apixaban (ELIQUIS) 5mg Tab Take 1 Tab by mouth 2 Times a Day. 180 Tab 1   • therapeutic multivitamin-minerals (THERAGRAN-M) Tab Take 1 Tab by mouth every morning.       No current facility-administered medications on file prior to visit.        Past Medical History:   Diagnosis Date   • Anemia    • Arrhythmia     atrial fibrillation    • Arthritis     generalized   • ASTHMA     has not needed an inhaler since 2010   • Breath shortness     on exertion   • Bronchitis      in past, not current   • Cancer (HCC) [id number]    Uterus and Breast   • CATARACT     beginning   • Chronic low back pain    • Depression 5/19/2017   • Foot-drop    • Hypertension    • Other specified symptom associated with female genital organs 1998    fibroids   • Pacemaker     in past, removed due to cellulitis   • Pain     left shoulder    • Unspecified hemorrhagic conditions     post op hyst. Currently on Eliquis    • Unspecified urinary incontinence     lazy bladder; doesn't empty completely   • UTI (urinary tract infection)     frequent        Allergies: Alendronate-butylpar-propylpar-saccharin [fosamax]    Surgical History:  has a past surgical history that includes hysterectomy, total abdominal (2000); vein ligat & strip (1972, 1966); axillary node dissection (9/15/2009); mastectomy (9/30/2009); other orthopedic surgery (2002); gyn surgery (1998); cath placement (10/28/2009); mastectomy (12/15/2010); cath removal (12/15/2010); breast reconstruction (12/15/2010); latissimus flap (12/15/2010); appendectomy (1968); dilation and curettage (1961); breast implant revision (7/11/2011); nipple reconstruction (7/11/2011); hip hemiarthroplasty (5/14/2012); other surgical procedure (2004); breast reconstruction (11/26/2012); wound closure general (4/10/2013); breast implant removal (4/10/2013); femur nailing intramedullary (Right, 6/3/2015); and other (09/2017).    Family History: family history includes Cancer in her brother and unknown relative; Diabetes in her child, mother, other, sister, and unknown relative; Heart Disease in her father, mother, sister, and unknown relative; Hypertension in her father and unknown relative; Psychiatry in her child; Stroke in her father and unknown relative.    Social History:  reports that she quit smoking about 55 years ago. Her smoking use  "included Cigarettes. She has a 0.10 pack-year smoking history. She has never used smokeless tobacco. She reports that she drinks alcohol. She reports that she does not use drugs.    ROS: no fever or nausea.        Objective:     Vitals:    01/15/19 1032   BP: 104/64   BP Location: Right arm   Patient Position: Sitting   BP Cuff Size: Large adult   Pulse: 60   Resp: 16   Temp: 36.2 °C (97.2 °F)   TempSrc: Temporal   SpO2: 98%   Weight: 83.5 kg (184 lb)   Height: 1.753 m (5' 9\")       Physical Exam:  Note: Exam is made difficult by the patient's reluctance to get out of her wheelchair and onto the exam table due to pain.  General: alert in no apparent distress.   Musculoskeletal: There is no tenderness to palpation of the left hip, thigh or knee.  Full range of motion of the left knee.  Unable to complete BRENNAN.   Cardio: trace pedal pulse bilaterally.   Skin: left toe dorsal skin ulcer without bone present, no associated redness, tenderness, warmth, induration or crepitus.         Assessment and Plan:     1. Pain of left lower extremity  Will gex x-rays and refer to home health PT. If concern on x-ray, will send to ortho.   - DX-HIP-BILATERAL-WITH PELVIS-2 VIEWS; Future  - DX-FEMUR-2+ LEFT; Future  - DX-KNEE COMPLETE 4+ LEFT; Future  - REFERRAL TO HOME HEALTH    2. Other insomnia  The patient was counseled on appropriate use of Ativan and was advised to only take the medicine as prescribed. The patient was advised of the risk of sedation and the importance of not taking this medicine with other sedating medicines or alcohol. The patient was counseled not to drive on this medicine.  checked and appropriate.   - LORazepam (ATIVAN) 1 MG Tab; Take 1 Tab by mouth at bedtime as needed (sleep) for up to 20 days.  Dispense: 20 Tab; Refill: 1    3. Skin lesion  Referral to wound care.   - REFERRAL TO HOME HEALTH    4. Anxiety  - Ativan as above, continue SSRI.     5. Essential hypertension  - continue current regimen. "         Followup: Return in about 3 months (around 4/15/2019), or if symptoms worsen or fail to improve.

## 2019-01-28 ENCOUNTER — TELEPHONE (OUTPATIENT)
Dept: MEDICAL GROUP | Facility: MEDICAL CENTER | Age: 84
End: 2019-01-28

## 2019-01-28 DIAGNOSIS — N39.0 RECURRENT UTI: ICD-10-CM

## 2019-01-28 RX ORDER — AMOXICILLIN AND CLAVULANATE POTASSIUM 875; 125 MG/1; MG/1
1 TABLET, FILM COATED ORAL 2 TIMES DAILY
Qty: 10 TAB | Refills: 0 | Status: SHIPPED | OUTPATIENT
Start: 2019-01-28 | End: 2019-02-02

## 2019-01-28 NOTE — TELEPHONE ENCOUNTER
Urine culture shows E. Faecalis. There is no sensitivity that I can see. I will send over an Rx for augmentin 875 BID x 5 days.  I think the patient should likely be seen for this. If not, then she needs to come in if symptoms do not improvement. Also, encourage lots of fluids.

## 2019-01-28 NOTE — TELEPHONE ENCOUNTER
Miles FOREMAN with [facility]. [phone] or cell [phone] Left a mess asking if you'd review pt's urine culture results and provider pt with a rx. Pt. With UTI symptoms.   Please review the labs in Media and advise...

## 2019-02-04 DIAGNOSIS — I48.19 PERSISTENT ATRIAL FIBRILLATION (HCC): ICD-10-CM

## 2019-02-05 RX ORDER — APIXABAN 5 MG/1
TABLET, FILM COATED ORAL
Qty: 180 TAB | Refills: 1 | Status: ON HOLD | OUTPATIENT
Start: 2019-02-05 | End: 2019-03-13

## 2019-02-19 NOTE — TELEPHONE ENCOUNTER
Miles with [facility] left a mess stating that pt completed a 5 day course of antibiotics DX: UTI. Pt felt really well the second day of taking the antibiotics but once she completed the course her symptoms returned (+ odor, cloudy urine and frequency).  Miles asked if you'd order a refill of antibiotics or other advise?

## 2019-02-19 NOTE — TELEPHONE ENCOUNTER
Arrange appt for patient to be seen this week. If I'm not available, please put on one of the available providers' scheduled.

## 2019-02-28 ENCOUNTER — HOSPITAL ENCOUNTER (OUTPATIENT)
Facility: MEDICAL CENTER | Age: 84
End: 2019-02-28
Attending: FAMILY MEDICINE
Payer: MEDICARE

## 2019-02-28 ENCOUNTER — OFFICE VISIT (OUTPATIENT)
Dept: MEDICAL GROUP | Facility: MEDICAL CENTER | Age: 84
End: 2019-02-28
Payer: MEDICARE

## 2019-02-28 VITALS
HEART RATE: 60 BPM | DIASTOLIC BLOOD PRESSURE: 62 MMHG | BODY MASS INDEX: 27.7 KG/M2 | HEIGHT: 69 IN | TEMPERATURE: 97.2 F | OXYGEN SATURATION: 95 % | RESPIRATION RATE: 18 BRPM | SYSTOLIC BLOOD PRESSURE: 100 MMHG | WEIGHT: 187 LBS

## 2019-02-28 DIAGNOSIS — R30.0 DYSURIA: ICD-10-CM

## 2019-02-28 LAB
APPEARANCE UR: NORMAL
BILIRUB UR STRIP-MCNC: NEGATIVE MG/DL
COLOR UR AUTO: NORMAL
GLUCOSE UR STRIP.AUTO-MCNC: NEGATIVE MG/DL
KETONES UR STRIP.AUTO-MCNC: NEGATIVE MG/DL
LEUKOCYTE ESTERASE UR QL STRIP.AUTO: NORMAL
NITRITE UR QL STRIP.AUTO: POSITIVE
PH UR STRIP.AUTO: 5.5 [PH] (ref 5–8)
PROT UR QL STRIP: NORMAL MG/DL
RBC UR QL AUTO: NORMAL
SP GR UR STRIP.AUTO: 1.02
UROBILINOGEN UR STRIP-MCNC: 0.2 MG/DL

## 2019-02-28 PROCEDURE — 87086 URINE CULTURE/COLONY COUNT: CPT

## 2019-02-28 PROCEDURE — 87186 SC STD MICRODIL/AGAR DIL: CPT

## 2019-02-28 PROCEDURE — 87077 CULTURE AEROBIC IDENTIFY: CPT

## 2019-02-28 PROCEDURE — 99000 SPECIMEN HANDLING OFFICE-LAB: CPT | Performed by: FAMILY MEDICINE

## 2019-02-28 PROCEDURE — 99213 OFFICE O/P EST LOW 20 MIN: CPT | Performed by: FAMILY MEDICINE

## 2019-02-28 PROCEDURE — 81002 URINALYSIS NONAUTO W/O SCOPE: CPT | Performed by: FAMILY MEDICINE

## 2019-02-28 RX ORDER — NITROFURANTOIN 25; 75 MG/1; MG/1
100 CAPSULE ORAL 2 TIMES DAILY
Qty: 10 CAP | Refills: 0 | Status: SHIPPED | OUTPATIENT
Start: 2019-02-28 | End: 2019-03-05

## 2019-03-01 DIAGNOSIS — R30.0 DYSURIA: ICD-10-CM

## 2019-03-01 NOTE — PROGRESS NOTES
[facility]  Progress Note  Established Patient    Subjective:   Meron Rice is a 86 y.o. female here today with a chief complaint of dysuria. The patient is alone.     Dysuria  Patient comes in today with dysuria.  She has a history of neurogenic bladder and frequent UTIs.  She was recently treated with Augmentin for an Enterococcus faecalis urinary tract infection.  Patient states that when she was on antibiotics, her symptoms were improving, however, since stopping she has had dysuria without urinary urgency or frequency.  She also notices a foul smell to her urine.  She denies flank pain, nausea and vomiting.  She denies fevers.      Current Outpatient Prescriptions on File Prior to Visit   Medication Sig Dispense Refill   • ELIQUIS 5 MG Tab TAKE 1 TABLET BY MOUTH TWICE DAILY 180 Tab 1   • sertraline (ZOLOFT) 50 MG Tab Take 1 Tab by mouth every day. 30 Tab 11   • metoprolol (LOPRESSOR) 50 MG Tab Take 1.5 Tabs by mouth 2 times a day. 270 Tab 3   • therapeutic multivitamin-minerals (THERAGRAN-M) Tab Take 1 Tab by mouth every morning.       No current facility-administered medications on file prior to visit.        Past Medical History:   Diagnosis Date   • Anemia    • Arrhythmia     atrial fibrillation   • Arthritis     generalized   • ASTHMA     has not needed an inhaler since 2010   • Breath shortness     on exertion   • Bronchitis      in past, not current   • Cancer (HCC) [id number]    Uterus and Breast   • CATARACT     beginning   • Chronic low back pain    • Depression 5/19/2017   • Foot-drop    • Hypertension    • Other specified symptom associated with female genital organs 1998    fibroids   • Pacemaker     in past, removed due to cellulitis   • Pain     left shoulder    • Unspecified hemorrhagic conditions     post op hyst. Currently on Eliquis    • Unspecified urinary incontinence     lazy bladder; doesn't empty completely   • UTI (urinary tract infection)     frequent        Allergies:  "Alendronate-butylpar-propylpar-saccharin [fosamax]    Surgical History:  has a past surgical history that includes hysterectomy, total abdominal (2000); vein ligat & strip (1972, 1966); axillary node dissection (9/15/2009); mastectomy (9/30/2009); other orthopedic surgery (2002); gyn surgery (1998); cath placement (10/28/2009); mastectomy (12/15/2010); cath removal (12/15/2010); breast reconstruction (12/15/2010); latissimus flap (12/15/2010); appendectomy (1968); dilation and curettage (1961); breast implant revision (7/11/2011); nipple reconstruction (7/11/2011); hip hemiarthroplasty (5/14/2012); other surgical procedure (2004); breast reconstruction (11/26/2012); wound closure general (4/10/2013); breast implant removal (4/10/2013); femur nailing intramedullary (Right, 6/3/2015); and other (09/2017).    Family History: family history includes Cancer in her brother and unknown relative; Diabetes in her child, mother, other, sister, and unknown relative; Heart Disease in her father, mother, sister, and unknown relative; Hypertension in her father and unknown relative; Psychiatry in her child; Stroke in her father and unknown relative.    Social History:  reports that she quit smoking about 55 years ago. Her smoking use included Cigarettes. She has a 0.10 pack-year smoking history. She has never used smokeless tobacco. She reports that she drinks alcohol. She reports that she does not use drugs.    ROS: no fever.        Objective:     Vitals:    02/28/19 1432   BP: 100/62   BP Location: Left arm   Patient Position: Sitting   BP Cuff Size: Adult   Pulse: 60   Resp: 18   Temp: 36.2 °C (97.2 °F)   TempSrc: Temporal   SpO2: 95%   Weight: 84.8 kg (187 lb)   Height: 1.753 m (5' 9\")       Physical Exam:  General: alert in no apparent distress.   Abd: soft NTND.   Back: no CVAT.         Assessment and Plan:     1. Dysuria  POC UA concerning for infection. May be treatment failure. Will treat with macrobid while we wait on " UCx. No e/o pyelo. ER precautions discussed.   - POCT Urinalysis  - URINE CULTURE(NEW); Future  - nitrofurantoin monohydr macro (MACROBID) 100 MG Cap; Take 1 Cap by mouth 2 times a day for 5 days.  Dispense: 10 Cap; Refill: 0        Followup: Return if symptoms worsen or fail to improve.

## 2019-03-01 NOTE — ASSESSMENT & PLAN NOTE
Patient comes in today with dysuria.  She has a history of neurogenic bladder and frequent UTIs.  She was recently treated with Augmentin for an Enterococcus faecalis urinary tract infection.  Patient states that when she was on antibiotics, her symptoms were improving, however, since stopping she has had dysuria without urinary urgency or frequency.  She also notices a foul smell to her urine.  She denies flank pain, nausea and vomiting.  She denies fevers.

## 2019-03-06 ENCOUNTER — TELEPHONE (OUTPATIENT)
Dept: MEDICAL GROUP | Facility: MEDICAL CENTER | Age: 84
End: 2019-03-06

## 2019-03-07 RX ORDER — CIPROFLOXACIN 500 MG/1
500 TABLET, FILM COATED ORAL 2 TIMES DAILY
Qty: 14 TAB | Refills: 0 | Status: ON HOLD | OUTPATIENT
Start: 2019-03-07 | End: 2019-03-13

## 2019-03-12 ENCOUNTER — APPOINTMENT (OUTPATIENT)
Dept: RADIOLOGY | Facility: MEDICAL CENTER | Age: 84
End: 2019-03-12
Attending: EMERGENCY MEDICINE
Payer: MEDICARE

## 2019-03-12 ENCOUNTER — HOSPITAL ENCOUNTER (OUTPATIENT)
Facility: MEDICAL CENTER | Age: 84
End: 2019-03-13
Attending: EMERGENCY MEDICINE | Admitting: HOSPITALIST
Payer: MEDICARE

## 2019-03-12 PROBLEM — R19.7 DIARRHEA: Status: ACTIVE | Noted: 2019-03-12

## 2019-03-12 PROBLEM — D75.1 POLYCYTHEMIA: Status: ACTIVE | Noted: 2019-03-12

## 2019-03-12 PROBLEM — R06.02 SHORTNESS OF BREATH: Status: ACTIVE | Noted: 2019-03-12

## 2019-03-12 PROBLEM — I49.3 PVC'S (PREMATURE VENTRICULAR CONTRACTIONS): Status: ACTIVE | Noted: 2019-03-12

## 2019-03-12 LAB
ALBUMIN SERPL BCP-MCNC: 4.2 G/DL (ref 3.2–4.9)
ALBUMIN/GLOB SERPL: 1.1 G/DL
ALP SERPL-CCNC: 105 U/L (ref 30–99)
ALT SERPL-CCNC: 18 U/L (ref 2–50)
ANION GAP SERPL CALC-SCNC: 8 MMOL/L (ref 0–11.9)
APPEARANCE UR: ABNORMAL
AST SERPL-CCNC: 27 U/L (ref 12–45)
BACTERIA #/AREA URNS HPF: ABNORMAL /HPF
BASOPHILS # BLD AUTO: 1.4 % (ref 0–1.8)
BASOPHILS # BLD: 0.1 K/UL (ref 0–0.12)
BILIRUB SERPL-MCNC: 0.6 MG/DL (ref 0.1–1.5)
BILIRUB UR QL STRIP.AUTO: NEGATIVE
BNP SERPL-MCNC: 520 PG/ML (ref 0–100)
BUN SERPL-MCNC: 41 MG/DL (ref 8–22)
CALCIUM SERPL-MCNC: 9.3 MG/DL (ref 8.4–10.2)
CHLORIDE SERPL-SCNC: 107 MMOL/L (ref 96–112)
CO2 SERPL-SCNC: 18 MMOL/L (ref 20–33)
COLOR UR: YELLOW
CREAT SERPL-MCNC: 1.33 MG/DL (ref 0.5–1.4)
EKG IMPRESSION: NORMAL
EOSINOPHIL # BLD AUTO: 0.09 K/UL (ref 0–0.51)
EOSINOPHIL NFR BLD: 1.3 % (ref 0–6.9)
EPI CELLS #/AREA URNS HPF: ABNORMAL /HPF
ERYTHROCYTE [DISTWIDTH] IN BLOOD BY AUTOMATED COUNT: 51.6 FL (ref 35.9–50)
GLOBULIN SER CALC-MCNC: 3.9 G/DL (ref 1.9–3.5)
GLUCOSE SERPL-MCNC: 117 MG/DL (ref 65–99)
GLUCOSE UR STRIP.AUTO-MCNC: NEGATIVE MG/DL
HCT VFR BLD AUTO: 54.9 % (ref 37–47)
HGB BLD-MCNC: 17.5 G/DL (ref 12–16)
IMM GRANULOCYTES # BLD AUTO: 0.01 K/UL (ref 0–0.11)
IMM GRANULOCYTES NFR BLD AUTO: 0.1 % (ref 0–0.9)
KETONES UR STRIP.AUTO-MCNC: NEGATIVE MG/DL
LEUKOCYTE ESTERASE UR QL STRIP.AUTO: ABNORMAL
LYMPHOCYTES # BLD AUTO: 2.83 K/UL (ref 1–4.8)
LYMPHOCYTES NFR BLD: 39.6 % (ref 22–41)
MCH RBC QN AUTO: 31.7 PG (ref 27–33)
MCHC RBC AUTO-ENTMCNC: 31.9 G/DL (ref 33.6–35)
MCV RBC AUTO: 99.5 FL (ref 81.4–97.8)
MICRO URNS: ABNORMAL
MONOCYTES # BLD AUTO: 0.73 K/UL (ref 0–0.85)
MONOCYTES NFR BLD AUTO: 10.2 % (ref 0–13.4)
NEUTROPHILS # BLD AUTO: 3.38 K/UL (ref 2–7.15)
NEUTROPHILS NFR BLD: 47.4 % (ref 44–72)
NITRITE UR QL STRIP.AUTO: POSITIVE
NRBC # BLD AUTO: 0 K/UL
NRBC BLD-RTO: 0 /100 WBC
PH UR STRIP.AUTO: 5 [PH]
PLATELET # BLD AUTO: 154 K/UL (ref 164–446)
PMV BLD AUTO: 9.4 FL (ref 9–12.9)
POTASSIUM SERPL-SCNC: 4.1 MMOL/L (ref 3.6–5.5)
PROT SERPL-MCNC: 8.1 G/DL (ref 6–8.2)
PROT UR QL STRIP: NEGATIVE MG/DL
RBC # BLD AUTO: 5.52 M/UL (ref 4.2–5.4)
RBC # URNS HPF: ABNORMAL /HPF
RBC UR QL AUTO: ABNORMAL
SODIUM SERPL-SCNC: 133 MMOL/L (ref 135–145)
SP GR UR REFRACTOMETRY: 1.02
TROPONIN I SERPL-MCNC: 0.04 NG/ML (ref 0–0.04)
WBC # BLD AUTO: 7.1 K/UL (ref 4.8–10.8)
WBC #/AREA URNS HPF: ABNORMAL /HPF

## 2019-03-12 PROCEDURE — 87086 URINE CULTURE/COLONY COUNT: CPT

## 2019-03-12 PROCEDURE — 71045 X-RAY EXAM CHEST 1 VIEW: CPT

## 2019-03-12 PROCEDURE — 700102 HCHG RX REV CODE 250 W/ 637 OVERRIDE(OP): Performed by: EMERGENCY MEDICINE

## 2019-03-12 PROCEDURE — 700102 HCHG RX REV CODE 250 W/ 637 OVERRIDE(OP): Performed by: HOSPITALIST

## 2019-03-12 PROCEDURE — 87186 SC STD MICRODIL/AGAR DIL: CPT

## 2019-03-12 PROCEDURE — 99285 EMERGENCY DEPT VISIT HI MDM: CPT

## 2019-03-12 PROCEDURE — G0378 HOSPITAL OBSERVATION PER HR: HCPCS

## 2019-03-12 PROCEDURE — 84484 ASSAY OF TROPONIN QUANT: CPT

## 2019-03-12 PROCEDURE — 85025 COMPLETE CBC W/AUTO DIFF WBC: CPT

## 2019-03-12 PROCEDURE — A9270 NON-COVERED ITEM OR SERVICE: HCPCS | Performed by: EMERGENCY MEDICINE

## 2019-03-12 PROCEDURE — 93005 ELECTROCARDIOGRAM TRACING: CPT | Performed by: EMERGENCY MEDICINE

## 2019-03-12 PROCEDURE — A9270 NON-COVERED ITEM OR SERVICE: HCPCS | Performed by: HOSPITALIST

## 2019-03-12 PROCEDURE — 99220 PR INITIAL OBSERVATION CARE,LEVL III: CPT | Performed by: HOSPITALIST

## 2019-03-12 PROCEDURE — 94760 N-INVAS EAR/PLS OXIMETRY 1: CPT

## 2019-03-12 PROCEDURE — 80053 COMPREHEN METABOLIC PANEL: CPT

## 2019-03-12 PROCEDURE — 83880 ASSAY OF NATRIURETIC PEPTIDE: CPT

## 2019-03-12 PROCEDURE — 87077 CULTURE AEROBIC IDENTIFY: CPT

## 2019-03-12 PROCEDURE — 81001 URINALYSIS AUTO W/SCOPE: CPT

## 2019-03-12 RX ORDER — AMOXICILLIN 250 MG
2 CAPSULE ORAL 2 TIMES DAILY
Status: DISCONTINUED | OUTPATIENT
Start: 2019-03-12 | End: 2019-03-12

## 2019-03-12 RX ORDER — ONDANSETRON 2 MG/ML
4 INJECTION INTRAMUSCULAR; INTRAVENOUS EVERY 4 HOURS PRN
Status: DISCONTINUED | OUTPATIENT
Start: 2019-03-12 | End: 2019-03-13 | Stop reason: HOSPADM

## 2019-03-12 RX ORDER — ACETAMINOPHEN 325 MG/1
650 TABLET ORAL ONCE
Status: COMPLETED | OUTPATIENT
Start: 2019-03-12 | End: 2019-03-12

## 2019-03-12 RX ORDER — BISACODYL 10 MG
10 SUPPOSITORY, RECTAL RECTAL
Status: DISCONTINUED | OUTPATIENT
Start: 2019-03-12 | End: 2019-03-12

## 2019-03-12 RX ORDER — FUROSEMIDE 10 MG/ML
20 INJECTION INTRAMUSCULAR; INTRAVENOUS
Status: DISCONTINUED | OUTPATIENT
Start: 2019-03-12 | End: 2019-03-13

## 2019-03-12 RX ORDER — POLYETHYLENE GLYCOL 3350 17 G/17G
1 POWDER, FOR SOLUTION ORAL
Status: DISCONTINUED | OUTPATIENT
Start: 2019-03-12 | End: 2019-03-12

## 2019-03-12 RX ORDER — ONDANSETRON 4 MG/1
4 TABLET, ORALLY DISINTEGRATING ORAL EVERY 4 HOURS PRN
Status: DISCONTINUED | OUTPATIENT
Start: 2019-03-12 | End: 2019-03-13 | Stop reason: HOSPADM

## 2019-03-12 RX ADMIN — APIXABAN 5 MG: 5 TABLET, FILM COATED ORAL at 22:55

## 2019-03-12 RX ADMIN — ACETAMINOPHEN 650 MG: 325 TABLET, FILM COATED ORAL at 20:21

## 2019-03-12 ASSESSMENT — ENCOUNTER SYMPTOMS
CLAUDICATION: 0
NAUSEA: 0
PALPITATIONS: 0
MYALGIAS: 0
PND: 0
FEVER: 0
DEPRESSION: 0
DOUBLE VISION: 0
BLOOD IN STOOL: 0
SPEECH CHANGE: 0
PHOTOPHOBIA: 0
VOMITING: 0
HEADACHES: 0
NECK PAIN: 0
WEAKNESS: 1
BACK PAIN: 0
SHORTNESS OF BREATH: 1
SPUTUM PRODUCTION: 0
HEARTBURN: 0
DIZZINESS: 0
ORTHOPNEA: 0
TREMORS: 0
MEMORY LOSS: 0
SORE THROAT: 0
HEMOPTYSIS: 0
CHILLS: 0
DIARRHEA: 1
ABDOMINAL PAIN: 0
SENSORY CHANGE: 0
STRIDOR: 0
BLURRED VISION: 0
CONSTIPATION: 0
COUGH: 0
EYE PAIN: 0
NERVOUS/ANXIOUS: 0
TINGLING: 0

## 2019-03-12 ASSESSMENT — COGNITIVE AND FUNCTIONAL STATUS - GENERAL
STANDING UP FROM CHAIR USING ARMS: A LITTLE
SUGGESTED CMS G CODE MODIFIER MOBILITY: CJ
DAILY ACTIVITIY SCORE: 21
MOVING TO AND FROM BED TO CHAIR: A LITTLE
CLIMB 3 TO 5 STEPS WITH RAILING: A LITTLE
DRESSING REGULAR LOWER BODY CLOTHING: A LITTLE
SUGGESTED CMS G CODE MODIFIER DAILY ACTIVITY: CJ
TOILETING: A LITTLE
HELP NEEDED FOR BATHING: A LITTLE
MOBILITY SCORE: 20
WALKING IN HOSPITAL ROOM: A LITTLE

## 2019-03-12 ASSESSMENT — COPD QUESTIONNAIRES
COPD SCREENING SCORE: 6
HAVE YOU SMOKED AT LEAST 100 CIGARETTES IN YOUR ENTIRE LIFE: YES
DURING THE PAST 4 WEEKS HOW MUCH DID YOU FEEL SHORT OF BREATH: SOME OF THE TIME
IN THE PAST 12 MONTHS DO YOU DO LESS THAN YOU USED TO BECAUSE OF YOUR BREATHING PROBLEMS: AGREE
DO YOU EVER COUGH UP ANY MUCUS OR PHLEGM?: NO/ONLY WITH OCCASIONAL COLDS OR INFECTIONS

## 2019-03-12 ASSESSMENT — LIFESTYLE VARIABLES
ON A TYPICAL DAY WHEN YOU DRINK ALCOHOL HOW MANY DRINKS DO YOU HAVE: 1
ALCOHOL_USE: YES
TOTAL SCORE: 0
HAVE PEOPLE ANNOYED YOU BY CRITICIZING YOUR DRINKING: NO
TOTAL SCORE: 0
AVERAGE NUMBER OF DAYS PER WEEK YOU HAVE A DRINK CONTAINING ALCOHOL: 3
EVER_SMOKED: YES
HAVE YOU EVER FELT YOU SHOULD CUT DOWN ON YOUR DRINKING: NO
TOTAL SCORE: 0
EVER FELT BAD OR GUILTY ABOUT YOUR DRINKING: NO
CONSUMPTION TOTAL: NEGATIVE
EVER HAD A DRINK FIRST THING IN THE MORNING TO STEADY YOUR NERVES TO GET RID OF A HANGOVER: NO
HOW MANY TIMES IN THE PAST YEAR HAVE YOU HAD 5 OR MORE DRINKS IN A DAY: 0
EVER_SMOKED: YES

## 2019-03-12 ASSESSMENT — PATIENT HEALTH QUESTIONNAIRE - PHQ9
SUM OF ALL RESPONSES TO PHQ9 QUESTIONS 1 AND 2: 0
1. LITTLE INTEREST OR PLEASURE IN DOING THINGS: NOT AT ALL
2. FEELING DOWN, DEPRESSED, IRRITABLE, OR HOPELESS: NOT AT ALL

## 2019-03-12 NOTE — ED TRIAGE NOTES
"Chief Complaint   Patient presents with   • UTI     Pt is currently taking abx for UTI, states she is having diarrhea, chills and SOB.    • Shortness of Breath     SOB x 5 days   • Diarrhea     diarrhea x 5 days     /42   Pulse 79   Temp 36.6 °C (97.9 °F) (Oral)   Resp 18   Ht 1.727 m (5' 8\")   Wt 83.2 kg (183 lb 6.8 oz)   SpO2 95%   BMI 27.89 kg/m²     "

## 2019-03-13 ENCOUNTER — APPOINTMENT (OUTPATIENT)
Dept: CARDIOLOGY | Facility: MEDICAL CENTER | Age: 84
End: 2019-03-13
Attending: HOSPITALIST
Payer: MEDICARE

## 2019-03-13 VITALS
TEMPERATURE: 98.1 F | RESPIRATION RATE: 18 BRPM | DIASTOLIC BLOOD PRESSURE: 57 MMHG | HEART RATE: 70 BPM | OXYGEN SATURATION: 96 % | WEIGHT: 183.42 LBS | SYSTOLIC BLOOD PRESSURE: 107 MMHG | HEIGHT: 69 IN | BODY MASS INDEX: 27.17 KG/M2

## 2019-03-13 PROBLEM — Z71.89 ADVANCED CARE PLANNING/COUNSELING DISCUSSION: Status: ACTIVE | Noted: 2019-03-13

## 2019-03-13 PROBLEM — I27.20 PULMONARY HYPERTENSION (HCC): Status: ACTIVE | Noted: 2019-03-12

## 2019-03-13 LAB
ALBUMIN SERPL BCP-MCNC: 3.4 G/DL (ref 3.2–4.9)
ALBUMIN/GLOB SERPL: 1.2 G/DL
ALP SERPL-CCNC: 80 U/L (ref 30–99)
ALT SERPL-CCNC: 14 U/L (ref 2–50)
ANION GAP SERPL CALC-SCNC: 6 MMOL/L (ref 0–11.9)
AST SERPL-CCNC: 19 U/L (ref 12–45)
BASOPHILS # BLD AUTO: 1.4 % (ref 0–1.8)
BASOPHILS # BLD: 0.09 K/UL (ref 0–0.12)
BILIRUB SERPL-MCNC: 0.7 MG/DL (ref 0.1–1.5)
BUN SERPL-MCNC: 44 MG/DL (ref 8–22)
CALCIUM SERPL-MCNC: 8.7 MG/DL (ref 8.4–10.2)
CHLORIDE SERPL-SCNC: 111 MMOL/L (ref 96–112)
CO2 SERPL-SCNC: 19 MMOL/L (ref 20–33)
CREAT SERPL-MCNC: 1.28 MG/DL (ref 0.5–1.4)
D DIMER PPP IA.FEU-MCNC: 0.46 UG/ML (FEU) (ref 0–0.5)
EOSINOPHIL # BLD AUTO: 0.23 K/UL (ref 0–0.51)
EOSINOPHIL NFR BLD: 3.7 % (ref 0–6.9)
ERYTHROCYTE [DISTWIDTH] IN BLOOD BY AUTOMATED COUNT: 51.2 FL (ref 35.9–50)
GLOBULIN SER CALC-MCNC: 2.8 G/DL (ref 1.9–3.5)
GLUCOSE SERPL-MCNC: 120 MG/DL (ref 65–99)
HCT VFR BLD AUTO: 45 % (ref 37–47)
HGB BLD-MCNC: 14.6 G/DL (ref 12–16)
IMM GRANULOCYTES # BLD AUTO: 0.02 K/UL (ref 0–0.11)
IMM GRANULOCYTES NFR BLD AUTO: 0.3 % (ref 0–0.9)
LV EJECT FRACT  99904: 55
LV EJECT FRACT MOD 2C 99903: 52.88
LV EJECT FRACT MOD 4C 99902: 61.76
LV EJECT FRACT MOD BP 99901: 59.13
LYMPHOCYTES # BLD AUTO: 3.17 K/UL (ref 1–4.8)
LYMPHOCYTES NFR BLD: 50.7 % (ref 22–41)
MAGNESIUM SERPL-MCNC: 1.6 MG/DL (ref 1.5–2.5)
MCH RBC QN AUTO: 31.9 PG (ref 27–33)
MCHC RBC AUTO-ENTMCNC: 32.4 G/DL (ref 33.6–35)
MCV RBC AUTO: 98.5 FL (ref 81.4–97.8)
MONOCYTES # BLD AUTO: 0.88 K/UL (ref 0–0.85)
MONOCYTES NFR BLD AUTO: 14.1 % (ref 0–13.4)
NEUTROPHILS # BLD AUTO: 1.86 K/UL (ref 2–7.15)
NEUTROPHILS NFR BLD: 29.8 % (ref 44–72)
NRBC # BLD AUTO: 0 K/UL
NRBC BLD-RTO: 0 /100 WBC
PLATELET # BLD AUTO: 144 K/UL (ref 164–446)
PMV BLD AUTO: 10.3 FL (ref 9–12.9)
POTASSIUM SERPL-SCNC: 4 MMOL/L (ref 3.6–5.5)
PROT SERPL-MCNC: 6.2 G/DL (ref 6–8.2)
RBC # BLD AUTO: 4.57 M/UL (ref 4.2–5.4)
SODIUM SERPL-SCNC: 136 MMOL/L (ref 135–145)
WBC # BLD AUTO: 6.3 K/UL (ref 4.8–10.8)

## 2019-03-13 PROCEDURE — 85025 COMPLETE CBC W/AUTO DIFF WBC: CPT

## 2019-03-13 PROCEDURE — 83735 ASSAY OF MAGNESIUM: CPT

## 2019-03-13 PROCEDURE — G0378 HOSPITAL OBSERVATION PER HR: HCPCS

## 2019-03-13 PROCEDURE — 700102 HCHG RX REV CODE 250 W/ 637 OVERRIDE(OP): Performed by: HOSPITALIST

## 2019-03-13 PROCEDURE — A9270 NON-COVERED ITEM OR SERVICE: HCPCS | Performed by: HOSPITALIST

## 2019-03-13 PROCEDURE — 85379 FIBRIN DEGRADATION QUANT: CPT

## 2019-03-13 PROCEDURE — 80053 COMPREHEN METABOLIC PANEL: CPT

## 2019-03-13 PROCEDURE — 99217 PR OBSERVATION CARE DISCHARGE: CPT | Mod: 25 | Performed by: INTERNAL MEDICINE

## 2019-03-13 PROCEDURE — 93306 TTE W/DOPPLER COMPLETE: CPT | Mod: 26 | Performed by: INTERNAL MEDICINE

## 2019-03-13 PROCEDURE — 93306 TTE W/DOPPLER COMPLETE: CPT

## 2019-03-13 PROCEDURE — 99497 ADVNCD CARE PLAN 30 MIN: CPT | Performed by: INTERNAL MEDICINE

## 2019-03-13 PROCEDURE — 96374 THER/PROPH/DIAG INJ IV PUSH: CPT

## 2019-03-13 PROCEDURE — 700111 HCHG RX REV CODE 636 W/ 250 OVERRIDE (IP): Performed by: HOSPITALIST

## 2019-03-13 PROCEDURE — 36415 COLL VENOUS BLD VENIPUNCTURE: CPT

## 2019-03-13 RX ORDER — METOPROLOL TARTRATE 50 MG/1
75 TABLET, FILM COATED ORAL 2 TIMES DAILY
COMMUNITY
End: 2019-08-14 | Stop reason: SDUPTHER

## 2019-03-13 RX ORDER — NAPROXEN SODIUM 220 MG
440 TABLET ORAL 2 TIMES DAILY PRN
Status: ON HOLD | COMMUNITY
End: 2019-03-13

## 2019-03-13 RX ORDER — CIPROFLOXACIN 500 MG/1
500 TABLET, FILM COATED ORAL 2 TIMES DAILY
Status: ON HOLD | COMMUNITY
Start: 2019-03-09 | End: 2019-03-13

## 2019-03-13 RX ADMIN — FUROSEMIDE 20 MG: 10 INJECTION, SOLUTION INTRAMUSCULAR; INTRAVENOUS at 06:28

## 2019-03-13 RX ADMIN — METOPROLOL TARTRATE 75 MG: 50 TABLET, FILM COATED ORAL at 06:27

## 2019-03-13 RX ADMIN — SERTRALINE HYDROCHLORIDE 50 MG: 50 TABLET ORAL at 06:26

## 2019-03-13 RX ADMIN — APIXABAN 5 MG: 5 TABLET, FILM COATED ORAL at 06:26

## 2019-03-13 ASSESSMENT — CHA2DS2 SCORE
PRIOR STROKE OR TIA OR THROMBOEMBOLISM: NO
CHA2DS2 VASC SCORE: 4
SEX: FEMALE
HYPERTENSION: YES
AGE 65 TO 74: NO
AGE 75 OR GREATER: YES
VASCULAR DISEASE: NO
DIABETES: NO
CHF OR LEFT VENTRICULAR DYSFUNCTION: NO

## 2019-03-13 NOTE — ASSESSMENT & PLAN NOTE
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  18 Minutes devoted to this activity separate from E&M

## 2019-03-13 NOTE — ED PROVIDER NOTES
ED Provider Note    CHIEF COMPLAINT  Chief Complaint   Patient presents with   • UTI     Pt is currently taking abx for UTI, states she is having diarrhea, chills and SOB.    • Shortness of Breath     SOB x 5 days   • Diarrhea     diarrhea x 5 days       HPI  Meron Rice is a 86 y.o. female who presents to the emergency department chief complaint of 5 days of fatigue shortness of breath and diarrhea.  She is currently being treated with ciprofloxacin for urinary tract infection when she gets frequent bouts of.  She states she actually had ciprofloxacin followed by another antibiotic and she is on her second round of ciprofloxacin.  She denies any flank pain or abdominal pain any nausea or vomiting fevers or chills but she states she just feels very tired has been extremely short of breath with ambulation and has had multiple bouts of diarrhea over the last 5 days.  She denies any blood in her stool she denies any chest pain she states she does feel like she cannot catch her breath when she walks with a walker very slowly.  Family endorses that she is been extremely short of breath the last few days    REVIEW OF SYSTEMS  Positives as above. Pertinent negatives include chest pain fevers chills flank pain back pain leg swelling  All other review of systems are negative    PAST MEDICAL HISTORY   has a past medical history of Anemia; Arrhythmia; Arthritis; ASTHMA; Breath shortness; Bronchitis ( ); Cancer ([facility]) ([id number]); CATARACT; Chronic low back pain; Depression (5/19/2017); Foot-drop; Hypertension; Other specified symptom associated with female genital organs (1998); Pacemaker; Pain; Unspecified hemorrhagic conditions; Unspecified urinary incontinence; and UTI (urinary tract infection).    SOCIAL HISTORY  Social History     Social History Main Topics   • Smoking status: Former Smoker     Packs/day: 0.10     Years: 1.00     Types: Cigarettes     Quit date: 1/1/1964   • Smokeless tobacco: Never Used      Comment:  "50 years ago in 1960  SOCIAL    • Alcohol use Yes      Comment: 3-4 drinks per week    • Drug use: No   • 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      Comment: Retired Nurse       SURGICAL HISTORY   has a past surgical history that includes hysterectomy, total abdominal (2000); vein ligat & strip (1972, 1966); axillary node dissection (9/15/2009); mastectomy (9/30/2009); other orthopedic surgery (2002); gyn surgery (1998); cath placement (10/28/2009); mastectomy (12/15/2010); cath removal (12/15/2010); breast reconstruction (12/15/2010); latissimus flap (12/15/2010); appendectomy (1968); dilation and curettage (1961); breast implant revision (7/11/2011); nipple reconstruction (7/11/2011); hip hemiarthroplasty (5/14/2012); other surgical procedure (2004); breast reconstruction (11/26/2012); wound closure general (4/10/2013); breast implant removal (4/10/2013); femur nailing intramedullary (Right, 6/3/2015); and other (09/2017).    CURRENT MEDICATIONS  Home Medications    **Home medications have not yet been reviewed for this encounter**         ALLERGIES  Allergies   Allergen Reactions   • Alendronate-Butylpar-Propylpar-Saccharin [Fosamax] Diarrhea     .       PHYSICAL EXAM  VITAL SIGNS: /42   Pulse 79   Temp 36.6 °C (97.9 °F) (Oral)   Resp 18   Ht 1.727 m (5' 8\")   Wt 83.2 kg (183 lb 6.8 oz)   SpO2 95%   BMI 27.89 kg/m²    Pulse ox interpretation: I interpret this pulse ox as normal.  Constitutional: Alert in no apparent distress.  HENT: Normocephalic atraumatic, MMM  Eyes: PER, Conjunctiva normal, Non-icteric.   Neck: Normal range of motion, No tenderness, Supple, No stridor.    Cardiovascular: Regular rate and rhythm, no murmurs.   Thorax & Lungs: Diminished breath sounds at the bilateral bases, No respiratory distress, No wheezing, No chest tenderness.   Abdomen: Bowel sounds normal, Soft, No tenderness, No pulsatile masses. No peritoneal signs.  Skin: Warm, Dry, No erythema, No rash.   Back: No bony " tenderness, No CVA tenderness.   Extremities: Intact distal pulses, No edema, No tenderness, No cyanosis, left lower extremity drop foot which is chronic  Neurologic: Alert and oriented x3, No focal deficits noted.         DIFFERENTIAL DIAGNOSIS AND WORK UP PLAN    This is a 86 y.o. female who presents with shortness of breath diarrhea recent urinary tract infection she is on the appropriate oral antibiotics according to her recent urine culture concerning for C. difficile infection especially after 3 bouts of oral antibiotics in the setting of diarrhea concern for dehydration or elect laterality sent for fluid overload with CHF exacerbation her EKG is concerning she has multiple PVCs in the setting of her pacemaker  She denies any recent chest pain    DIAGNOSTIC STUDIES / PROCEDURES    EKG  Results for orders placed or performed during the hospital encounter of 19   EKG (NOW)   Result Value Ref Range    Report       [facility]    Test Date:  2019  Pt Name:    CHANNING LARSON              Department: [facility]  MRN:        [id number]                      Room:  Gender:     Female                       Technician: GIOVANNY  :        [id number]                   Requested By:IVETTE TRAN  Order #:    [id number]                    Reading MD: Ivette Tran MD    Measurements  Intervals                                Axis  Rate:       114                          P:  ND:                                      QRS:        -19  QRSD:       90                           T:          -27  QT:         440  QTc:        607    Interpretive Statements  Atrial sensed ventricular paced complexes but 2 runs of PVCs  On the paced rhythm no change that I can see from prior except for potential  T  wave inversion in lead III only otherwise difficult to assess secondary to  the  paced rhythm and the PVCs but no signs of acute STEMI  Compared to ECG 2018 16:07:50      Electr onically  Signed On 3- 21:02:13 PDT by Ivette Disla MD         LABS  Pertinent Lab Findings  CBC with an elevated hemoglobin and a mildly low platelet count CMP with evidence of acidosis and elevated BUN concerning for some dehydration she does however have an elevated BNP contaminated urine sample and a troponin is detectable but not elevated      RADIOLOGY  DX-CHEST-PORTABLE (1 VIEW)   Final Result      1.  Enlarged cardiac silhouette with probable vascular congestion and unilateral left-sided edema.      EC-ECHOCARDIOGRAM COMPLETE W/ CONT    (Results Pending)     The radiologist's interpretation of all radiological studies have been reviewed by me.      COURSE & MEDICAL DECISION MAKING  Pertinent Labs & Imaging studies reviewed. (See chart for details)    7:58 PM  Spoke w Dr Lomeli who has accepted the patient     I discussed with the family my concern for fluid overload in the setting of possible C. difficile infection especially with diarrhea and multiple bouts of antibiotics.  She does have evidence of an elevated BNP and fluid on her chest x-ray she will be admitted to hospital for further management they understand the plan    DISPOSITION:  Patient will be admitted to Dr AWAD in guarded condition.      FINAL IMPRESSION  1. Fluid overload  2. JAMES  3. Diarrhea  4. UTI        Electronically signed by: Ivette Disla, 3/12/2019 7:06 PM    This dictation has been created using voice recognition software and/or scribes. The accuracy of the dictation is limited by the abilities of the software and the expertise of the scribes. I expect there may be some errors of grammar and possibly content. I made every attempt to manually correct the errors within my dictation. However, errors related to voice recognition software and/or scribes may still exist and should be interpreted within the appropriate context.

## 2019-03-13 NOTE — ASSESSMENT & PLAN NOTE
Likely due to diarrhea and decreased oral intake  Will await echo but may order fluids.  She is a little hypotensive

## 2019-03-13 NOTE — ASSESSMENT & PLAN NOTE
CXR interpreted by radiology as possible pulmonary edema  Echo confirms my suspicion of pulmonary hypertension  Daughter has observed irregular breathing and snoring while patient sleeps  Is likely that this is also contributing to patient's subjective dyspnea  Sleep study as outpatient is recommended

## 2019-03-13 NOTE — ASSESSMENT & PLAN NOTE
Recent urine culture 2/28+ for Citrobacter amalonaticus -sensitive to Cipro  Completed 7-day course  Urinalysis is still abnormal-culture is pending

## 2019-03-13 NOTE — PROGRESS NOTES
2 RN skin assessment complete, lower extremities cool, dusky, red. Scab to 2nd toe L foot. Otherwise WDL.

## 2019-03-13 NOTE — PROGRESS NOTES
Tele strip at 2209 shows Afib, HR of 80     [place] -  QRS 0.08  QT -     Telemetry Summary     Rhythm Afib/ Paced  Rate 70-80  Ectopy rare PV, per AISHA Stack     Telemetry monitoring strips placed in patient's chart.

## 2019-03-13 NOTE — H&P
Hospital Medicine History & Physical Note    Date of Service  3/12/2019    Primary Care Physician  Wang Disla M.D.    Consultants  None    Code Status  Full Code    Chief Complaint  Chief Complaint   Patient presents with   • UTI     Pt is currently taking abx for UTI, states she is having diarrhea, chills and SOB.    • Shortness of Breath     SOB x 5 days   • Diarrhea     diarrhea x 5 days       History of Presenting Illness  Dwayne is a very pleasant 86 y.o. female with a past medical history of hypertension, sick sinus syndrome status post pacemaker placement in 2017, history of uterine and breast cancer in remission, presented to the emergency room on 3/12/2019 shortness of breath which has been progressively worsening over the past 5 days. patient reports fatigue, shortness of breath, exacerbated during exertion, but denies orthopnea. In addition Patient has been complaining of multiple bouts of watery diarrhea, today she reported having at least 4 episodes.  About 5 days ago she was placed on oral ciprofloxacin for 7 days total, today is day 6.  Otherwise she denies having any abdominal pain, dysuria, chest pain shortness of breath or nausea vomiting.          Review of Systems  Review of Systems   Constitutional: Positive for malaise/fatigue. Negative for chills and fever.   HENT: Negative for congestion, hearing loss, sore throat and tinnitus.    Eyes: Negative for blurred vision, double vision, photophobia and pain.   Respiratory: Positive for shortness of breath. Negative for cough, hemoptysis, sputum production and stridor.    Cardiovascular: Negative for chest pain, palpitations, orthopnea, claudication, leg swelling and PND.   Gastrointestinal: Positive for diarrhea. Negative for abdominal pain, blood in stool, constipation, heartburn, melena, nausea and vomiting.   Genitourinary: Negative for dysuria, frequency and urgency.   Musculoskeletal: Negative for back pain, myalgias and neck pain.    Neurological: Positive for weakness. Negative for dizziness, tingling, tremors, sensory change, speech change and headaches.   Psychiatric/Behavioral: Negative for depression, memory loss and suicidal ideas. The patient is not nervous/anxious.        Past Medical History  Past Medical History:   Diagnosis Date   • Depression 5/19/2017   • Other specified symptom associated with female genital organs 1998    fibroids   • Anemia    • Arrhythmia     atrial fibrillation   • Arthritis     generalized   • ASTHMA     has not needed an inhaler since 2010   • Breath shortness     on exertion   • Bronchitis      in past, not current   • Cancer (HCC) [id number]    Uterus and Breast   • CATARACT     beginning   • Chronic low back pain    • Foot-drop    • Hypertension    • Pacemaker     in past, removed due to cellulitis   • Pain     left shoulder    • Unspecified hemorrhagic conditions     post op hyst. Currently on Eliquis    • Unspecified urinary incontinence     lazy bladder; doesn't empty completely   • UTI (urinary tract infection)     frequent        Surgical History   has a past surgical history that includes hysterectomy, total abdominal (2000); vein ligat & strip (1972, 1966); axillary node dissection (9/15/2009); mastectomy (9/30/2009); other orthopedic surgery (2002); gyn surgery (1998); cath placement (10/28/2009); mastectomy (12/15/2010); cath removal (12/15/2010); breast reconstruction (12/15/2010); latissimus flap (12/15/2010); appendectomy (1968); dilation and curettage (1961); breast implant revision (7/11/2011); nipple reconstruction (7/11/2011); hip hemiarthroplasty (5/14/2012); other surgical procedure (2004); breast reconstruction (11/26/2012); wound closure general (4/10/2013); breast implant removal (4/10/2013); femur nailing intramedullary (Right, 6/3/2015); and other (09/2017).    Family History  family history includes Cancer in her brother and unknown relative; Diabetes in her child, mother, other,  sister, and unknown relative; Heart Disease in her father, mother, sister, and unknown relative; Hypertension in her father and unknown relative; Psychiatry in her child; Stroke in her father and unknown relative.    Social History   reports that she quit smoking about 55 years ago. Her smoking use included Cigarettes. She has a 0.10 pack-year smoking history. She has never used smokeless tobacco. She reports that she drinks alcohol. She reports that she does not use drugs.    Allergies  Allergies   Allergen Reactions   • Alendronate-Butylpar-Propylpar-Saccharin [Fosamax] Diarrhea     .       Medications  Prior to Admission medications    Medication Sig Start Date End Date Taking? Authorizing Provider   ciprofloxacin (CIPRO) 500 MG Tab Take 1 Tab by mouth 2 times a day for 7 days. 3/7/19 3/14/19  Wang Disla M.D.   ELIQUIS 5 MG Tab TAKE 1 TABLET BY MOUTH TWICE DAILY 2/5/19   Neha Kern, A.P.N.   sertraline (ZOLOFT) 50 MG Tab Take 1 Tab by mouth every day. 12/11/18   Annemarie Bravo   metoprolol (LOPRESSOR) 50 MG Tab Take 1.5 Tabs by mouth 2 times a day. 8/20/18   Neha ROLDAN Kern, A.P.N.   therapeutic multivitamin-minerals (THERAGRAN-M) Tab Take 1 Tab by mouth every morning.    Physician Outpatient       Physical Exam  Temp:  [36.6 °C (97.9 °F)] 36.6 °C (97.9 °F)  Pulse:  [66-79] 67  Resp:  [18-25] 25  BP: (137)/(42) 137/42  SpO2:  [94 %-96 %] 94 %  Physical Exam   Constitutional: She is oriented to person, place, and time. She appears well-developed and well-nourished. No distress.   HENT:   Head: Normocephalic and atraumatic.   Mouth/Throat: No oropharyngeal exudate.   Eyes: Pupils are equal, round, and reactive to light. Conjunctivae are normal. Right eye exhibits no discharge. No scleral icterus.   Neck: Neck supple. No JVD present. No thyromegaly present.   Cardiovascular: Normal rate and intact distal pulses.    No murmur heard.  Pulmonary/Chest: Effort normal and breath sounds normal. No stridor.  No respiratory distress. She has no wheezes. She has no rales.   Abdominal: Soft. Bowel sounds are normal. She exhibits no distension. There is no tenderness. There is no rebound.   Musculoskeletal: Normal range of motion. She exhibits no edema.   Neurological: She is alert and oriented to person, place, and time. No cranial nerve deficit.   Skin: Skin is warm. She is not diaphoretic. No erythema.   Psychiatric: She has a normal mood and affect. Her behavior is normal. Thought content normal.   Nursing note and vitals reviewed.      Laboratory:  Recent Labs      03/12/19   1855   WBC  7.1   RBC  5.52*   HEMOGLOBIN  17.5*   HEMATOCRIT  54.9*   MCV  99.5*   MCH  31.7   MCHC  31.9*   RDW  51.6*   PLATELETCT  154*   MPV  9.4     Recent Labs      03/12/19   1855   SODIUM  133*   POTASSIUM  4.1   CHLORIDE  107   CO2  18*   GLUCOSE  117*   BUN  41*   CREATININE  1.33   CALCIUM  9.3     Recent Labs      03/12/19   1855   ALTSGPT  18   ASTSGOT  27   ALKPHOSPHAT  105*   TBILIRUBIN  0.6   GLUCOSE  117*         Recent Labs      03/12/19   1855   TROPONINI  0.04   BNPBTYPENAT  520*       Urinalysis:          Imaging:  DX-CHEST-PORTABLE (1 VIEW)   Final Result      1.  Enlarged cardiac silhouette with probable vascular congestion and unilateral left-sided edema.      EC-ECHOCARDIOGRAM COMPLETE W/ CONT    (Results Pending)       Assessment/Plan:  I anticipate this patient will require at least two midnights for appropriate medical management, necessitating inpatient admission.    * Shortness of breath   Assessment & Plan    Most likely 2/2 to pulmonary edema.  Last echocardiogram in 2017 showed preserved LVEF  Admit   CXR: Enlarged cardiac silhouette with probable vascular congestion and unilateral left-sided edema.  IV lasix 20mg BID started  I/Os,   Continue RT protocol, duo nebs, Pep therapy if warranted, and incentive spirometry.            Diarrhea   Assessment & Plan    R/o CDIFF, as patient has recent antibiotics use  for a UTI     Cardiac pacemaker in situ- (present on admission)   Assessment & Plan    Placed 2017  Defer to outpatient cardiology     Hyponatremia- (present on admission)   Assessment & Plan    CTM closely. Repeat bmp in the am      PVC's (premature ventricular contractions)   Assessment & Plan    intermitant PVCs noted  Potassium WNL  Check magnesium levels.  Check echo.  Consider pacemaker interogation.    Pulse generator is a [employer] model L311  Serial # [id number]  LEAD INFORMATION:  1)Right atrial lead is a [employer] model # 7740 , serial # [id number] ,P wave 1.8 millivolts, threshold 1.1 Volts, pacing impedance 665 Ohms.  2)Right ventricular lead is a [employer] model # 7741 , serial # [id number] ,R wave 21 millivolts, threshold 0.5 Volts, pacing impedance 1021 Ohms.         Polycythemia   Assessment & Plan    CTM, from prior baseline     JAMES (acute kidney injury) (HCC)- (present on admission)   Assessment & Plan    Pre-renal vs cardiorenal? Will trial IV lasix, if renal functions worsen we will hold off diuretics.  Recheck bmp in the am         VTE prophylaxis: Prophylaxis: apixaban

## 2019-03-13 NOTE — PROGRESS NOTES
Will hold lasix dose for now due to hypotension, patient is not having any sob and she is in RA not in distress.   Will continue close monitoring of her bp if better will give iv lasix if not could try po lasix.

## 2019-03-13 NOTE — CARE PLAN
Problem: Safety  Goal: Will remain free from injury    Intervention: Provide assistance with mobility  Bed in low position, traded socks on, call light within reach. Pt instructed to call nurse for any further needs. Bed alarm in place        Problem: Knowledge Deficit  Goal: Knowledge of disease process/condition, treatment plan, diagnostic tests, and medications will improve    Intervention: Assess knowledge level of disease process/condition, treatment plan, diagnostic tests, and medications  POC discussed with pt, pt verbalized understanding.  Pt had formed stool, no Cdiff sample need at this time, moved off isolation

## 2019-03-13 NOTE — PROGRESS NOTES
Pt up to [facility] at this time. Special contact precautions in place for rule out c. Diff. Pt provided with sandwich tray at this time. Ate 100%. Reports she hadn't eaten much today. Denies nausea at this time. Denies needs. Continued need for stool specimen. Will continue to monitor.

## 2019-03-13 NOTE — PROGRESS NOTES
Received report from Yvette FOREMAN. Assumed care. This pt is AOx4, denies pain, Patient and RN discussed plan of care including C-diff r/o, need to send stool sample: questions answered. Chart reviewed. Call light in place, fall precautions in place, patient educated on importance of calling for assistance. No additional needs at this time.

## 2019-03-13 NOTE — ASSESSMENT & PLAN NOTE
intermitant PVCs noted  Potassium WNL  Check magnesium levels.  Check echo.  Consider pacemaker interogation.    Pulse generator is a [place] model L311  Serial # [id number]  LEAD INFORMATION:  1)Right atrial lead is a [place] model # 7740 , serial # [id number] ,P wave 1.8 millivolts, threshold 1.1 Volts, pacing impedance 665 Ohms.  2)Right ventricular lead is a [place] [place] model # 7741 , serial # [id number] ,R wave 21 millivolts, threshold 0.5 Volts, pacing impedance 1021 Ohms.

## 2019-03-13 NOTE — CARE PLAN
Problem: Communication  Goal: The ability to communicate needs accurately and effectively will improve  Outcome: PROGRESSING AS EXPECTED  Calls appropriately, communicates needs to staff    Problem: Safety  Goal: Will remain free from injury  Outcome: PROGRESSING AS EXPECTED  Remains free from injury  Goal: Will remain free from falls  Outcome: PROGRESSING AS EXPECTED  Remains free from falls    Problem: Infection  Goal: Will remain free from infection  Outcome: NOT MET  UTI    Problem: Bowel/Gastric:  Goal: Normal bowel function is maintained or improved  Outcome: PROGRESSING AS EXPECTED  No diarrhea this shift

## 2019-03-14 ENCOUNTER — TELEPHONE (OUTPATIENT)
Dept: CARDIOLOGY | Facility: MEDICAL CENTER | Age: 84
End: 2019-03-14

## 2019-03-14 NOTE — PROGRESS NOTES
Discharging patient home per MD order.  Pt demonstrated understanding of discharge instructions, follow up appointments, home medications, prescriptions. Ambulating without assistance, voiding without difficulty, pain well controlled, tolerating oral medications, tolerating diet. Pt verbalized understanding of discharge instructions and educational handouts, all questions answered.  Pt discharged off unit with hospital escort at 1800.

## 2019-03-14 NOTE — DISCHARGE SUMMARY
Discharge Summary    CHIEF COMPLAINT ON ADMISSION  Chief Complaint   Patient presents with   • UTI     Pt is currently taking abx for UTI, states she is having diarrhea, chills and SOB.    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  She then developed 5 days of diarrhea and progressive shortness of breath over the last 3-5 days, she was having chills but no documented fever.     Initial chest x-ray showed what appeared to be pulmonary vascular congestion and mild cardiomegaly, BNP was 521 previous BNP last April was 214.  Troponin 0 0.04 she has chronic kidney disease stage III which was at her baseline.  She had mild hyperglycemia. Chemistry profile was otherwise unremarkable.  She was admitted to be ruled out for C. difficile but had no bowel movement overnight and then subsequently the following a.m. produced a formed bowel movement which was not appropriate for assay, her appetite was good and she tolerated her diet well.  Urinalysis on presentation was somewhat abnormal, however patient did not have any urinary urgency or dysuria.  I reviewed the chest x-ray images, feeling that the pulmonary vascular congestion appeared to be more consistent with pulmonary hypertension rather than pulmonary edema.  The admitting physician had placed the patient on Lasix which was not given due to mild hypotension.  Patient did not appear to be hypervolemic, with improved oral intake and cessation of diarrhea-her hypotension resolved.  Echocardiogram revealed normal EF,  but moderate pulmonary hypertension.     I discussed these findings with the patient, daughter was at bedside.  Patient likely has underlying sleep apnea which has been indolent likely over a number of years-the relationship to this, her cardiac arrhythmias, and her dyspnea was discussed.  Patient was resistant to the idea of possibly having sleep apnea, but the daughter is fairly certain that the patient does and was able to demonstrate a convincing imitation of patient's snoring and breathing patterns.  It turns out patient is overall just resistant to having to use CPAP.  I discussed cardiac and pulmonary consequences of untreated sleep apnea and have strongly encouraged the patient to follow-up with her primary care provider and have a sleep study scheduled.    As there was nothing further to offer to treat patient's dyspnea, as her diarrhea had resolved and her oral intake was improved I felt she could be discharged home with the above care plan.    As of the following day patient's urine culture is growing greater than 100,000 CFU of lactose fermenting gram-negative charo.  Given patient's lack of symptomatology, poor consequences of Cipro therapy recently-I would prefer to wait for the sensitivity and then will contact the patient to initiate appropriate antibiotic therapy.      It was noted during her admission that patient had advanced directives on file which were approximately 10 years old and indicated a general wish toward DNR.  I took this opportunity to discuss her advanced directives and wishes and to complete POLST form with her.  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  She also does not wish any feeding tubes, due to the detailed nature of this discussion as well as education on the POLST form  itself 18 minutes were devoted solely to this activity and are separate from the patient's medical management for her acute presentation.      Therefore, she is discharged in good and stable condition to home with close outpatient follow-up.      Discharge Date  3/13/2019    FOLLOW UP ITEMS POST DISCHARGE  PCP  Sleep Study    DISCHARGE DIAGNOSES  Principal Problem:    Pulmonary hypertension (HCC) POA: Unknown  Active Problems:    Hyponatremia POA: Yes    Cardiac pacemaker in situ POA: Yes    Diarrhea POA: Unknown    UTI (urinary tract infection) POA: Yes    JAMES (acute kidney injury) (HCC) POA: Yes    Polycythemia POA: Unknown    PVC's (premature ventricular contractions) POA: Unknown    Advanced care planning/counseling discussion POA: Unknown  Resolved Problems:    * No resolved hospital problems. *      FOLLOW UP  Future Appointments  Date Time Provider Department Center   4/9/2019 4:00 PM JOHN Monet [facility] None   5/9/2019 1:40 PM Wang Disla M.D. [facility]     Wang Disla M.D.  [address]  [address] [place] [id number]  [phone]            MEDICATIONS ON DISCHARGE     Medication List      CONTINUE taking these medications      Instructions   ELIQUIS 5mg Tabs  Generic drug:  apixaban   Take 5 mg by mouth 2 Times a Day.  Dose:  5 mg     metoprolol 50 MG Tabs  Commonly known as:  LOPRESSOR   Take 75 mg by mouth 2 times a day.  Dose:  75 mg     PROBIOTIC PO   Take 2 Caps by mouth every day.  Dose:  2 Cap     sertraline 50 MG Tabs  Commonly known as:  ZOLOFT   Take 50 mg by mouth every evening.  Dose:  50 mg     therapeutic multivitamin-minerals Tabs   Take 1 Tab by mouth every morning.  Dose:  1 Tab        STOP taking these medications    ALEVE 220 MG tablet  Generic drug:  naproxen     ciprofloxacin 500 MG Tabs  Commonly known as:  CIPRO            Allergies  Allergies   Allergen Reactions   • Alendronate-Butylpar-Propylpar-Saccharin [Fosamax] Diarrhea and Nausea     .        DIET  Cardiac    ACTIVITY  AS tolerated    CONSULTATIONS  none    PROCEDURES  none    LABORATORY  Lab Results   Component Value Date    SODIUM 136 03/13/2019    POTASSIUM 4.0 03/13/2019    CHLORIDE 111 03/13/2019    CO2 19 (L) 03/13/2019    GLUCOSE 120 (H) 03/13/2019    BUN 44 (H) 03/13/2019    CREATININE 1.28 03/13/2019    CREATININE 1.2 05/09/2009        Lab Results   Component Value Date    WBC 6.3 03/13/2019    HEMOGLOBIN 14.6 03/13/2019    HEMATOCRIT 45.0 03/13/2019    PLATELETCT 144 (L) 03/13/2019

## 2019-03-14 NOTE — TELEPHONE ENCOUNTER
FYI--device transmitted via home monitor.  AF @ 5% with longest 15 hours-- in AF today at time of transmission since 1 am

## 2019-03-14 NOTE — CARE PLAN
Problem: Discharge Barriers/Planning  Goal: Patient's continuum of care needs will be met    Intervention: Assess potential discharge barriers on admission and throughout hospital stay  All discharge criteria met

## 2019-03-14 NOTE — DISCHARGE INSTRUCTIONS
Diarrhea, Adult  Introduction  Diarrhea is when you have loose and water poop (stool) often. Diarrhea can make you feel weak and cause you to get dehydrated. Dehydration can make you tired and thirsty, make you have a dry mouth, and make it so you pee (urinate) less often. Diarrhea often lasts 2-3 days. However, it can last longer if it is a sign of something more serious. It is important to treat your diarrhea as told by your doctor.  Follow these instructions at home:  Eating and drinking  Follow these recommendations as told by your doctor:  · Take an oral rehydration solution (ORS). This is a drink that is sold at pharmacies and stores.  · Drink clear fluids, such as:  ¨ Water.  ¨ Ice chips.  ¨ Diluted fruit juice.  ¨ Low-calorie sports drinks.  · Eat bland, easy-to-digest foods in small amounts as you are able. These foods include:  ¨ Bananas.  ¨ Applesauce.  ¨ Rice.  ¨ Low-fat (lean) meats.  ¨ Toast.  ¨ Crackers.  · Avoid drinking fluids that have a lot of sugar or caffeine in them.  · Avoid alcohol.  · Avoid spicy or fatty foods.  General instructions  · Drink enough fluid to keep your pee (urine) clear or pale yellow.  · Wash your hands often. If you cannot use soap and water, use hand .  · Make sure that all people in your home wash their hands well and often.  · Take over-the-counter and prescription medicines only as told by your doctor.  · Rest at home while you get better.  · Watch your condition for any changes.  · Take a warm bath to help with any burning or pain from having diarrhea.  · Keep all follow-up visits as told by your doctor. This is important.  Contact a doctor if:  · You have a fever.  · Your diarrhea gets worse.  · You have new symptoms.  · You cannot keep fluids down.  · You feel light-headed or dizzy.  · You have a headache.  · You have muscle cramps.  Get help right away if:  · You have chest pain.  · You feel very weak or you pass out (faint).  · You have bloody or black  poop or poop that look like tar.  · You have very bad pain, cramping, or bloating in your belly (abdomen).  · You have trouble breathing or you are breathing very quickly.  · Your heart is beating very quickly.  · Your skin feels cold and clammy.  · You feel confused.  · You have signs of dehydration, such as:  ¨ Dark pee, hardly any pee, or no pee.  ¨ Cracked lips.  ¨ Dry mouth.  ¨ Sunken eyes.  ¨ Sleepiness.  ¨ Weakness.  This information is not intended to replace advice given to you by your health care provider. Make sure you discuss any questions you have with your health care provider.  Document Released: 06/05/2009 Document Revised: 07/07/2017 Document Reviewed: 08/23/2016  © 2017 Elsevier  Shortness of Breath  Shortness of breath means you have trouble breathing. Shortness of breath needs medical care right away.  HOME CARE   · Do not smoke.  · Avoid being around chemicals or things (paint fumes, dust) that may bother your breathing.  · Rest as needed. Slowly begin your normal activities.  · Only take medicines as told by your doctor.  · Keep all doctor visits as told.  GET HELP RIGHT AWAY IF:   · Your shortness of breath gets worse.  · You feel lightheaded, pass out (faint), or have a cough that is not helped by medicine.  · You cough up blood.  · You have pain with breathing.  · You have pain in your chest, arms, shoulders, or belly (abdomen).  · You have a fever.  · You cannot walk up stairs or exercise the way you normally do.  · You do not get better in the time expected.  · You have a hard time doing normal activities even with rest.  · You have problems with your medicines.  · You have any new symptoms.  MAKE SURE YOU:  · Understand these instructions.  · Will watch your condition.  · Will get help right away if you are not doing well or get worse.  This information is not intended to replace advice given to you by your health care provider. Make sure you discuss any questions you have with your  health care provider.  Document Released: 06/05/2009 Document Revised: 12/23/2014 Document Reviewed: 03/04/2013  [employer] Patient Education © 2017 [employer]  Discharge Instructions    Discharged to home by car with relative. Discharged via wheelchair, hospital escort: Yes.  Special equipment needed: Not Applicable    Be sure to schedule a follow-up appointment with your primary care doctor or any specialists as instructed.     Discharge Plan:   Diet Plan: Discussed  Activity Level: Discussed  Confirmed Follow up Appointment: Patient to Call and Schedule Appointment  Confirmed Symptoms Management: Discussed  Medication Reconciliation Updated: Yes  Influenza Vaccine Indication: Not indicated: Previously immunized this influenza season and > 8 years of age    I understand that a diet low in cholesterol, fat, and sodium is recommended for good health. Unless I have been given specific instructions below for another diet, I accept this instruction as my diet prescription.   Other diet: regular as tolerated    Special Instructions: None    · Is patient discharged on Warfarin / Coumadin?   No     Depression / Suicide Risk    As you are discharged from this [facility] facility, it is important to learn how to keep safe from harming yourself.    Recognize the warning signs:  · Abrupt changes in personality, positive or negative- including increase in energy   · Giving away possessions  · Change in eating patterns- significant weight changes-  positive or negative  · Change in sleeping patterns- unable to sleep or sleeping all the time   · Unwillingness or inability to communicate  · Depression  · Unusual sadness, discouragement and loneliness  · Talk of wanting to die  · Neglect of personal appearance   · Rebelliousness- reckless behavior  · Withdrawal from people/activities they love  · Confusion- inability to concentrate     If you or a loved one observes any of these behaviors or has concerns about  self-harm, here's what you can do:  · Talk about it- your feelings and reasons for harming yourself  · Remove any means that you might use to hurt yourself (examples: pills, rope, extension cords, firearm)  · Get professional help from the community (Mental Health, Substance Abuse, psychological counseling)  · Do not be alone:Call your Safe Contact- someone whom you trust who will be there for you.  · Call your local CRISIS HOTLINE 949-2653 or 782-935-3593  · Call your local Children's Mobile Crisis Response Team [place] (091) 199-9501 or www.[employer]  · Call the toll free National Suicide Prevention Hotlines   · National Suicide Prevention Lifeline 437-773-HPOQ (0500)  · National Hope Line Network 800-SUICIDE (952-4040)

## 2019-03-15 NOTE — TELEPHONE ENCOUNTER
Message   Received: Today   Message Contents   JOHN Monet L.P.N.   Caller: Unspecified (Yesterday,  4:39 PM)             Divina or Divina need to see her next week for AF.

## 2019-03-15 NOTE — PROGRESS NOTES
3/15/2019  Urine culture finalized today + for E coli (R) Cipro   (S) Cephalosporins  Contacted patient over phone and discussed atb, diarrhea, risk of C dif    Eat plenty cheese, yogurt  No Antacid meds for 1 month    Ceftin 500mg #10 (BID)  Called to [phone]

## 2019-03-21 DIAGNOSIS — G47.00 INSOMNIA, UNSPECIFIED TYPE: ICD-10-CM

## 2019-03-22 NOTE — TELEPHONE ENCOUNTER
Was the patient seen in the last year in this department? Yes    Does patient have an active prescription for medications requested? No     Received Request Via: Pharmacy       Future Appointments       Provider Department [place]    3/26/2019 4:00 PM ARIANNA Mcdonald [facility]     5/9/2019 1:40 PM Wang Disla M.D. [facility]

## 2019-03-25 RX ORDER — LORAZEPAM 1 MG/1
TABLET ORAL
Qty: 20 TAB | Refills: 0 | Status: SHIPPED
Start: 2019-03-25 | End: 2019-04-10

## 2019-03-25 NOTE — TELEPHONE ENCOUNTER
Controlled Substance Assessment:     - Is the medication, if previously prescribed, working as intended and expected to treat   the patients symptoms: yes.     - Does the patient have a history of substance abuse: no.     - has the patient demonstrated aberrant behavior or intoxication: no.     - Is there reason to believe that the patient is not using medication as prescribed or diverting the medication: no.     - Is there reason to believe that the patient is currently misusing this medication or addicted to the controlled substance: no.     - Is it necessary to verify that controlled substances, other that those authorized under the treatment plan, are not present in the body of this patient: no.    - Are there any blood or urine test that indicate inappropriate use of the medication or other controlled substances : no.     - I have reviewed the . Does the  report irregular/inconsistent medication use or indicate that the medication is being used inappropriately or that the patient is using another controlled substance not prescribed or known to me: no.     - Is there reason to believe that the patient is using other drugs, including alcohol, prescription or illicit drugs, that may interact negatively with controlled substance or have not been prescribed by a practitioner who is treating the patient: no.     - Are there any known early refill attempts or claims that medication has been lost or stolen: no.     - Are there are any major changes in the patient's mental or physical health that would affect the medical appropriateness of this medication: no.     - Has the patient increased the dose of medication without provider authorization: no.    - Has the patient been reluctant to cooperate with any exam, analysis or test recommended by me: no.     - Has the patient demonstrated reluctance to stop or reduce use of the medicine: no.     - Has the patient requested or demanded a controlled substance that is  likely to be abused or cause dependency or addiction: no.     - Is there any other evidence that the patient is chronically using opioids, misusing, abusing, illegally using or addicted to any drug or failing to comply with the instructions of the practitioner concerning the use of the controlled substance: no    - Are there any other factors that the practitioner determines is necessary to make an informed professional judgment concerning the medical appropriateness of the prescription: no.

## 2019-03-26 ENCOUNTER — OFFICE VISIT (OUTPATIENT)
Dept: CARDIOLOGY | Facility: MEDICAL CENTER | Age: 84
End: 2019-03-26
Payer: MEDICARE

## 2019-03-26 VITALS
OXYGEN SATURATION: 90 % | HEIGHT: 69 IN | WEIGHT: 183 LBS | DIASTOLIC BLOOD PRESSURE: 70 MMHG | SYSTOLIC BLOOD PRESSURE: 110 MMHG | BODY MASS INDEX: 27.11 KG/M2 | HEART RATE: 60 BPM

## 2019-03-26 DIAGNOSIS — Z95.0 PACEMAKER: ICD-10-CM

## 2019-03-26 DIAGNOSIS — Z79.01 CHRONIC ANTICOAGULATION: ICD-10-CM

## 2019-03-26 DIAGNOSIS — Z86.79 S/P ABLATION OF ATRIAL FLUTTER: ICD-10-CM

## 2019-03-26 DIAGNOSIS — I10 ESSENTIAL HYPERTENSION: ICD-10-CM

## 2019-03-26 DIAGNOSIS — I49.5 SICK SINUS SYNDROME (HCC): ICD-10-CM

## 2019-03-26 DIAGNOSIS — I48.0 PAROXYSMAL ATRIAL FIBRILLATION (HCC): ICD-10-CM

## 2019-03-26 DIAGNOSIS — Z98.890 S/P ABLATION OF ATRIAL FLUTTER: ICD-10-CM

## 2019-03-26 DIAGNOSIS — I48.3 TYPICAL ATRIAL FLUTTER (HCC): ICD-10-CM

## 2019-03-26 PROCEDURE — 99214 OFFICE O/P EST MOD 30 MIN: CPT | Mod: 24,25 | Performed by: NURSE PRACTITIONER

## 2019-03-26 PROCEDURE — 93288 INTERROG EVL PM/LDLS PM IP: CPT | Performed by: NURSE PRACTITIONER

## 2019-03-26 ASSESSMENT — ENCOUNTER SYMPTOMS
HEARTBURN: 0
SHORTNESS OF BREATH: 0
COUGH: 0
SORE THROAT: 0
VOMITING: 0
LOSS OF CONSCIOUSNESS: 0
WHEEZING: 0
BLURRED VISION: 0
TINGLING: 0
WEAKNESS: 0
ABDOMINAL PAIN: 0
ORTHOPNEA: 0
SPEECH CHANGE: 0
PALPITATIONS: 0
DOUBLE VISION: 0
CHILLS: 0
STRIDOR: 0
HEADACHES: 0
DIZZINESS: 0
NAUSEA: 0
BLOOD IN STOOL: 0
SENSORY CHANGE: 0
PND: 0
TREMORS: 0
WEIGHT LOSS: 0
HEMOPTYSIS: 0
DIARRHEA: 0
SPUTUM PRODUCTION: 0
FEVER: 0
FOCAL WEAKNESS: 0

## 2019-03-26 NOTE — LETTER
[facility]   [address]  [place] [address]  Phone: [phone]  Fax: [phone]              Meron Worley Dwayne  11/18/1932    Encounter Date: 3/26/2019    ARIANNA Mcdonald          PROGRESS NOTE:  No notes on file      Wang Disla M.D.  [address]  [address]  [place] [address]  VIA In Basket

## 2019-03-27 DIAGNOSIS — R82.90 FOUL SMELLING URINE: ICD-10-CM

## 2019-03-27 DIAGNOSIS — N39.0 RECURRENT UTI: ICD-10-CM

## 2019-04-10 ENCOUNTER — OFFICE VISIT (OUTPATIENT)
Dept: CARDIOLOGY | Facility: MEDICAL CENTER | Age: 84
End: 2019-04-10
Payer: MEDICARE

## 2019-04-10 VITALS
OXYGEN SATURATION: 96 % | DIASTOLIC BLOOD PRESSURE: 60 MMHG | BODY MASS INDEX: 27.11 KG/M2 | HEIGHT: 69 IN | SYSTOLIC BLOOD PRESSURE: 110 MMHG | WEIGHT: 183 LBS | HEART RATE: 56 BPM

## 2019-04-10 DIAGNOSIS — Z95.0 CARDIAC PACEMAKER IN SITU: ICD-10-CM

## 2019-04-10 DIAGNOSIS — Z79.01 ANTICOAGULANT LONG-TERM USE: ICD-10-CM

## 2019-04-10 DIAGNOSIS — I48.0 PAF (PAROXYSMAL ATRIAL FIBRILLATION) (HCC): ICD-10-CM

## 2019-04-10 PROCEDURE — 93280 PM DEVICE PROGR EVAL DUAL: CPT | Performed by: NURSE PRACTITIONER

## 2019-04-10 PROCEDURE — 99214 OFFICE O/P EST MOD 30 MIN: CPT | Mod: 25 | Performed by: NURSE PRACTITIONER

## 2019-04-10 RX ORDER — SERTRALINE HYDROCHLORIDE 25 MG/1
TABLET, FILM COATED ORAL
Refills: 4 | COMMUNITY
Start: 2019-03-25 | End: 2019-12-11

## 2019-04-10 RX ORDER — CEFUROXIME AXETIL 500 MG/1
TABLET ORAL
Refills: 0 | COMMUNITY
Start: 2019-03-15 | End: 2019-04-10

## 2019-04-10 ASSESSMENT — ENCOUNTER SYMPTOMS
BLURRED VISION: 0
PALPITATIONS: 0
FEVER: 0
NAUSEA: 0
PND: 0
SPEECH CHANGE: 0
BLOOD IN STOOL: 0
PSYCHIATRIC NEGATIVE: 1
ABDOMINAL PAIN: 0
ORTHOPNEA: 0
CHILLS: 0
HEARTBURN: 0
HEADACHES: 0
SHORTNESS OF BREATH: 0
DIZZINESS: 0
COUGH: 0
MYALGIAS: 0
DOUBLE VISION: 0
VOMITING: 0
SORE THROAT: 0
LOSS OF CONSCIOUSNESS: 0
BRUISES/BLEEDS EASILY: 0
WHEEZING: 0
CLAUDICATION: 0
FOCAL WEAKNESS: 0

## 2019-04-10 NOTE — LETTER
[facility]   [address]  [place] [address]  Phone: [phone]  Fax: [phone]              Meron Worley Dwayne  11/18/1932    Encounter Date: 4/10/2019    JOHN Monet          PROGRESS NOTE:  No notes on file      Wang Disla M.D.  [address]  [address]  [facility] [place] [facility] [place] [address]  VIA In Basket

## 2019-04-10 NOTE — PROGRESS NOTES
Chief Complaint   Patient presents with   • Atrial Fibrillation     follow up       Subjective:   Meron Rice is a 86 y.o. female who presents today for review of her cardiac status.  She was recently admitted once again 3/12/19 for UTI. She has recurrent UTI's it appears.  She had more Af during this period of time as well.  Her AF burden is 5 %. Longest episode is 12 minutes.  She is feeling well today but still has some frequency.   I have recommended urology with the continued issues.  Device interrogation reveals intact function . BV  8 years.       Past Medical History:   Diagnosis Date   • Anemia    • Arrhythmia     atrial fibrillation   • Arthritis     generalized   • ASTHMA     has not needed an inhaler since 2010   • Breath shortness     on exertion   • Bronchitis      in past, not current   • Cancer (HCC) [id number]    Uterus and Breast   • CATARACT     beginning   • Chronic low back pain    • Depression 5/19/2017   • Foot-drop    • Hypertension    • Other specified symptom associated with female genital organs 1998    fibroids   • Pacemaker     in past, removed due to cellulitis   • Pain     left shoulder    • Unspecified hemorrhagic conditions     post op hyst. Currently on Eliquis    • Unspecified urinary incontinence     lazy bladder; doesn't empty completely   • UTI (urinary tract infection)     frequent      Past Surgical History:   Procedure Laterality Date   • OTHER  09/2017    pacemaker removal    • FEMUR NAILING INTRAMEDULLARY Right 6/3/2015    Procedure: FEMUR NAILING INTRAMEDULLARY;  Surgeon: Deshaun Denis M.D.;  Location: SURGERY [facility];  Service:    • WOUND CLOSURE GENERAL  4/10/2013    Performed by Nano Riley M.D. at SURGERY SAME DAY [facility]   • BREAST IMPLANT REMOVAL  4/10/2013    Performed by Ele Meza M.D. at SURGERY SAME DAY [facility]   • BREAST RECONSTRUCTION  11/26/2012    Performed by Ele Meza M.D. at SURGERY [facility]   • HIP  HEMIARTHROPLASTY  5/14/2012    left; Performed by KEITH COWART at SURGERY [facility] ORS   • BREAST IMPLANT REVISION  7/11/2011    Performed by JAK ROSALES at SURGERY [facility] ORS   • NIPPLE RECONSTRUCTION  7/11/2011    Performed by JAK ROSALES at SURGERY [facility] ORS   • MASTECTOMY  12/15/2010    right   • CATH REMOVAL  12/15/2010    Performed by DIPAK VARELA at SURGERY [facility] ORS   • BREAST RECONSTRUCTION  12/15/2010    Performed by JAK ROSALES at SURGERY [facility] ORS   • LATISSIMUS FLAP  12/15/2010    Performed by JAK ROSALES at SURGERY [facility] ORS   • CATH PLACEMENT  10/28/2009    Performed by DIPAK VARELA at SURGERY SAME DAY [facility] ORS   • MASTECTOMY  9/30/2009    left   • AXILLARY NODE DISSECTION  9/15/2009    Performed by DIPAK VARELA at SURGERY SAME DAY [facility] ORS   • OTHER SURGICAL PROCEDURE  2004    bladder repair   • OTHER ORTHOPEDIC SURGERY  2002    laminectomy   • HYSTERECTOMY, TOTAL ABDOMINAL  2000   • GYN SURGERY  1998    excision of fibroids   • APPENDECTOMY  1968   • DILATION AND CURETTAGE  1961   • VEIN LIGAT & STRIP  1972, 1966    x2 bilateral     Family History   Problem Relation Age of Onset   • Diabetes Mother    • Heart Disease Mother    • Heart Disease Father    • Stroke Father    • Hypertension Father    • Diabetes Sister    • Heart Disease Sister    • Cancer Brother         lung   • Diabetes Unknown    • Heart Disease Unknown    • Stroke Unknown    • Cancer Unknown    • Hypertension Unknown    • Diabetes Other    • Diabetes Child    • Psychiatry Child      Social History     Social History   • Marital status:      Spouse name: N/A   • Number of children: N/A   • Years of education: N/A     Occupational History   • Not on file.     Social History Main Topics   • Smoking status: Former Smoker     Packs/day: 0.10     Years: 1.00     Types: Cigarettes     Quit date: 1/1/1964   • Smokeless tobacco: Never Used      Comment:  50 years ago in 1960  SOCIAL    • Alcohol use Yes      Comment: 3-4 drinks per week    • Drug use: No   • 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      Comment: Retired Nurse     Other Topics Concern   • Not on file     Social History Narrative   • No narrative on file     Allergies   Allergen Reactions   • Alendronate-Butylpar-Propylpar-Saccharin [Fosamax] Diarrhea and Nausea     .     Outpatient Encounter Prescriptions as of 4/10/2019   Medication Sig Dispense Refill   • sertraline (ZOLOFT) 25 MG tablet TK 1 T PO QD  4   • apixaban (ELIQUIS) 5mg Tab Take 5 mg by mouth 2 Times a Day.     • metoprolol (LOPRESSOR) 50 MG Tab Take 75 mg by mouth 2 times a day.     • Probiotic Product (PROBIOTIC PO) Take 2 Caps by mouth every day.     • therapeutic multivitamin-minerals (THERAGRAN-M) Tab Take 1 Tab by mouth every morning.     • [DISCONTINUED] cefUROXime (CEFTIN) 500 MG Tab TK 1 T PO  BID X 5 DAYS  0   • [DISCONTINUED] LORazepam (ATIVAN) 1 MG Tab TAKE 1 TABLET BY MOUTH EVERY NIGHT AT BEDTIME AS NEEDED FOR SLEEP FOR UP TO 20 DAYS (Patient not taking: Reported on 3/26/2019) 20 Tab 0   • [DISCONTINUED] sertraline (ZOLOFT) 50 MG Tab Take 50 mg by mouth every evening.       No facility-administered encounter medications on file as of 4/10/2019.      Review of Systems   Constitutional: Negative for chills and fever.   HENT: Negative for sore throat.         No difficulty swallowing   Eyes: Negative for blurred vision and double vision.   Respiratory: Negative for cough, shortness of breath and wheezing.    Cardiovascular: Negative for chest pain, palpitations, orthopnea, claudication, leg swelling and PND.   Gastrointestinal: Negative for abdominal pain, blood in stool, heartburn, nausea and vomiting.   Genitourinary: Negative for dysuria, frequency, hematuria and urgency.   Musculoskeletal: Negative for myalgias.   Skin: Negative.    Neurological: Negative for dizziness, speech change, focal weakness, loss of consciousness and  "headaches.   Endo/Heme/Allergies: Does not bruise/bleed easily.   Psychiatric/Behavioral: Negative.         Objective:   /60 (BP Location: Left arm, Patient Position: Sitting, BP Cuff Size: Adult)   Pulse (!) 56   Ht 1.753 m (5' 9\")   Wt 83 kg (183 lb)   SpO2 96%   BMI 27.02 kg/m²     Physical Exam   Constitutional: She is oriented to person, place, and time. She appears well-developed and well-nourished.   HENT:   Head: Normocephalic and atraumatic.   Eyes: Pupils are equal, round, and reactive to light.   Neck: Normal range of motion. Neck supple.   Cardiovascular: Normal rate and regular rhythm.    Pulmonary/Chest: Effort normal and breath sounds normal.   Abdominal: Soft. Bowel sounds are normal.   Musculoskeletal: Normal range of motion.   Neurological: She is alert and oriented to person, place, and time.   Skin: Skin is warm and dry.   Psychiatric: She has a normal mood and affect.       Assessment:     1. PAF (paroxysmal atrial fibrillation) (HCC)     2. Anticoagulant long-term use     3. Cardiac pacemaker in situ         Medical Decision Making:  Today's Assessment / Status / Plan:     1. PAF AF burden 5 %.  2. OAC on Eliquis . No bleeding issues.  3. PPM BSI BV 8 years.  RTC 3 months    Collaborating MD  To  "

## 2019-04-11 ENCOUNTER — TELEPHONE (OUTPATIENT)
Dept: CARDIOLOGY | Facility: MEDICAL CENTER | Age: 84
End: 2019-04-11

## 2019-04-11 NOTE — TELEPHONE ENCOUNTER
Ms Rice saw SIMBA yesterday but didn't check out. Peter notes say 3mos with her and 6mos with Dr. Garrett. I left her a vm to call [phone] for scheduling-dl

## 2019-05-21 ENCOUNTER — TELEPHONE (OUTPATIENT)
Dept: CARDIOLOGY | Facility: MEDICAL CENTER | Age: 84
End: 2019-05-21

## 2019-05-21 NOTE — TELEPHONE ENCOUNTER
Patient's device transmitted via home monitor--she had AFib with RVR episode 4/28-4/29.  Normal rhythm at time of transmission.

## 2019-07-17 ENCOUNTER — TELEPHONE (OUTPATIENT)
Dept: CARDIOLOGY | Facility: MEDICAL CENTER | Age: 84
End: 2019-07-17

## 2019-07-17 NOTE — TELEPHONE ENCOUNTER
Fyi:  Remote transmission today  Presenting rhythm today: PAF  AT/AF [place]: 3 % of the time since 4-2019    To be scanned into media for review

## 2019-08-02 ENCOUNTER — OFFICE VISIT (OUTPATIENT)
Dept: CARDIOLOGY | Facility: MEDICAL CENTER | Age: 84
End: 2019-08-02
Payer: MEDICARE

## 2019-08-02 VITALS
OXYGEN SATURATION: 96 % | DIASTOLIC BLOOD PRESSURE: 60 MMHG | HEART RATE: 60 BPM | BODY MASS INDEX: 27.02 KG/M2 | HEIGHT: 69 IN | SYSTOLIC BLOOD PRESSURE: 110 MMHG

## 2019-08-02 DIAGNOSIS — I48.0 PAF (PAROXYSMAL ATRIAL FIBRILLATION) (HCC): ICD-10-CM

## 2019-08-02 DIAGNOSIS — Z79.01 ANTICOAGULANT LONG-TERM USE: ICD-10-CM

## 2019-08-02 DIAGNOSIS — Z95.0 CARDIAC PACEMAKER IN SITU: ICD-10-CM

## 2019-08-02 PROCEDURE — 99214 OFFICE O/P EST MOD 30 MIN: CPT | Mod: 25 | Performed by: NURSE PRACTITIONER

## 2019-08-02 PROCEDURE — 93280 PM DEVICE PROGR EVAL DUAL: CPT | Performed by: NURSE PRACTITIONER

## 2019-08-02 ASSESSMENT — ENCOUNTER SYMPTOMS
SPEECH CHANGE: 0
BLURRED VISION: 0
LOSS OF CONSCIOUSNESS: 0
FOCAL WEAKNESS: 0
SORE THROAT: 0
HEADACHES: 0
SHORTNESS OF BREATH: 0
MYALGIAS: 0
DOUBLE VISION: 0
PND: 0
ORTHOPNEA: 0
VOMITING: 0
COUGH: 0
FEVER: 0
BLOOD IN STOOL: 0
NAUSEA: 0
DIZZINESS: 0
PALPITATIONS: 0
CHILLS: 0
PSYCHIATRIC NEGATIVE: 1
CLAUDICATION: 0
HEARTBURN: 0
WHEEZING: 0
ABDOMINAL PAIN: 0
BRUISES/BLEEDS EASILY: 0

## 2019-08-02 NOTE — PROGRESS NOTES
Chief Complaint   Patient presents with   • Atrial Fibrillation     FV       Subjective:   Meron Rice is a 86 y.o. female who presents today for review of her cardiac rhythm status.  She has had noted increase in her episodes of AF over the past month.  On July 23-24 she had 12 hours of AF. She continues on Metoprolol. The rates were in the 70-80 bpm range. She was unaware of the episodes.   She continues on Eliquis at 5 mgs b.i.d. In review of her CHADsvasc score she is high at 3-5 %.   She has had some vaginal spotting intermittently as well , but refuses to consider seeing someone about this issue.  Her weight is 83 Kg and her Creatinine is below 1.5 so she appears to be on the right dose of Eliquis.  Her AF treatment may be simply using the BB for rate control and continuing OAC.  Will continue to follow her transtelephonic data.  Past Medical History:   Diagnosis Date   • Anemia    • Arrhythmia     atrial fibrillation   • Arthritis     generalized   • ASTHMA     has not needed an inhaler since 2010   • Breath shortness     on exertion   • Bronchitis      in past, not current   • Cancer (HCC) [id number]    Uterus and Breast   • CATARACT     beginning   • Chronic low back pain    • Depression 5/19/2017   • Foot-drop    • Hypertension    • Other specified symptom associated with female genital organs 1998    fibroids   • Pacemaker     in past, removed due to cellulitis   • Pain     left shoulder    • Unspecified hemorrhagic conditions     post op hyst. Currently on Eliquis    • Unspecified urinary incontinence     lazy bladder; doesn't empty completely   • UTI (urinary tract infection)     frequent      Past Surgical History:   Procedure Laterality Date   • OTHER  09/2017    pacemaker removal    • FEMUR NAILING INTRAMEDULLARY Right 6/3/2015    Procedure: FEMUR NAILING INTRAMEDULLARY;  Surgeon: Deshaun Denis M.D.;  Location: SURGERY [facility];  Service:    • WOUND CLOSURE GENERAL  4/10/2013     Performed by Nano Varela M.D. at SURGERY SAME DAY [facility]   • BREAST IMPLANT REMOVAL  4/10/2013    Performed by Jak Meza M.D. at SURGERY [facility] DAY [facility]   • BREAST RECONSTRUCTION  11/26/2012    Performed by Jak Meza M.D. at SURGERY [facility]   • HIP HEMIARTHROPLASTY  5/14/2012    left; Performed by KEITH COWART at SURGERY [facility]   • BREAST IMPLANT REVISION  7/11/2011    Performed by JAK MEZA at SURGERY [facility]   • NIPPLE RECONSTRUCTION  7/11/2011    Performed by JAK MEZA at SURGERY [facility]   • MASTECTOMY  12/15/2010    right   • CATH REMOVAL  12/15/2010    Performed by NANO VARELA at SURGERY [facility]S   • BREAST RECONSTRUCTION  12/15/2010    Performed by JAK MEZA at SURGERY [facility]   • LATISSIMUS FLAP  12/15/2010    Performed by JAK MEZA at SURGERY [facility]   • CATH PLACEMENT  10/28/2009    Performed by NANO VARELA at SURGERY SAME DAY [facility]   • MASTECTOMY  9/30/2009    left   • AXILLARY NODE DISSECTION  9/15/2009    Performed by NANO VARELA at SURGERY SAME DAY [facility]   • OTHER SURGICAL PROCEDURE  2004    bladder repair   • OTHER ORTHOPEDIC SURGERY  2002    laminectomy   • HYSTERECTOMY, TOTAL ABDOMINAL  2000   • GYN SURGERY  1998    excision of fibroids   • APPENDECTOMY  1968   • DILATION AND CURETTAGE  1961   • VEIN LIGAT & STRIP  1972, 1966    x2 bilateral     Family History   Problem Relation Age of Onset   • Diabetes Mother    • Heart Disease Mother    • Heart Disease Father    • Stroke Father    • Hypertension Father    • Diabetes Sister    • Heart Disease Sister    • Cancer Brother         lung   • Diabetes Other    • Heart Disease Other    • Stroke Other    • Cancer Other    • Hypertension Other    • Diabetes Other    • Diabetes Child    • Psychiatric Illness Child      Social History     Socioeconomic History   • Marital status:      Spouse name:  Not on file   • Number of children: Not on file   • Years of education: Not on file   • Highest education level: Not on file   Occupational History   • Not on file   Social Needs   • Financial resource strain: Not on file   • Food insecurity:     Worry: Not on file     Inability: Not on file   • Transportation needs:     Medical: Not on file     Non-medical: Not on file   Tobacco Use   • Smoking status: Former Smoker     Packs/day: 0.10     Years: 1.00     Pack years: 0.10     Types: Cigarettes     Last attempt to quit: 1964     Years since quittin.6   • Smokeless tobacco: Never Used   • Tobacco comment: 50 years ago in   SOCIAL    Substance and Sexual Activity   • Alcohol use: Yes     Comment: 3-4 drinks per week    • Drug use: No   • 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     Comment: Retired Nurse   Lifestyle   • Physical activity:     Days per week: Not on file     Minutes per session: Not on file   • Stress: Not on file   Relationships   • Social connections:     Talks on phone: Not on file     Gets together: Not on file     Attends Latter day service: Not on file     Active member of club or organization: Not on file     Attends meetings of clubs or organizations: Not on file     Relationship status: Not on file   • Intimate partner violence:     Fear of current or ex partner: Not on file     Emotionally abused: Not on file     Physically abused: Not on file     Forced sexual activity: Not on file   Other Topics Concern   • Not on file   Social History Narrative   • Not on file     Allergies   Allergen Reactions   • Alendronate-Butylpar-Propylpar-Saccharin [Fosamax] Diarrhea and Nausea     .     Outpatient Encounter Medications as of 2019   Medication Sig Dispense Refill   • apixaban (ELIQUIS) 5mg Tab Take 5 mg by mouth 2 Times a Day.     • metoprolol (LOPRESSOR) 50 MG Tab Take 75 mg by mouth 2 times a day.     • Probiotic Product (PROBIOTIC PO) Take 2 Caps by mouth every day.     • therapeutic  "multivitamin-minerals (THERAGRAN-M) Tab Take 1 Tab by mouth every morning.     • sertraline (ZOLOFT) 25 MG tablet TK 1 T PO QD  4     No facility-administered encounter medications on file as of 8/2/2019.      Review of Systems   Constitutional: Negative for chills and fever.   HENT: Negative for sore throat.         No difficulty swallowing   Eyes: Negative for blurred vision and double vision.   Respiratory: Negative for cough, shortness of breath and wheezing.    Cardiovascular: Negative for chest pain, palpitations, orthopnea, claudication, leg swelling and PND.   Gastrointestinal: Negative for abdominal pain, blood in stool, heartburn, nausea and vomiting.   Genitourinary: Negative for dysuria, frequency, hematuria and urgency.        Intermittent pink vaginal spotting. \" I don't want to have anything done about this issue.\"   Musculoskeletal: Negative for myalgias.   Skin: Negative.    Neurological: Negative for dizziness, speech change, focal weakness, loss of consciousness and headaches.   Endo/Heme/Allergies: Does not bruise/bleed easily.   Psychiatric/Behavioral: Negative.         Objective:   /60 (BP Location: Left arm, Patient Position: Sitting, BP Cuff Size: Adult)   Pulse 60   Ht 1.753 m (5' 9\")   SpO2 96%   BMI 27.02 kg/m²     Physical Exam   Constitutional: She is oriented to person, place, and time. She appears well-developed and well-nourished.   HENT:   Head: Normocephalic and atraumatic.   Eyes: Pupils are equal, round, and reactive to light.   Neck: Normal range of motion. Neck supple.   Cardiovascular: Normal rate and regular rhythm.   Pulmonary/Chest: Effort normal and breath sounds normal.   Device site right chest uncomplicated.  Bilateral mastectomy.   Abdominal: Soft. Bowel sounds are normal.   Musculoskeletal: Normal range of motion.   Neurological: She is alert and oriented to person, place, and time.   Skin: Skin is warm and dry.   Psychiatric: She has a normal mood and " affect.       Assessment:     1. PAF (paroxysmal atrial fibrillation) (HCC)     2. Cardiac pacemaker in situ     3. Anticoagulant long-term use         Medical Decision Making:  Today's Assessment / Status / Plan:     1. PAF up to 9%  Longest episode 12 hours.  2. PPM BSI   BV stable at 7 years.  Function intact.  3. OAC on eliquis as noted above on correct dose for her weight and renal function.  RTC 3 months for Divina QUESADA    Collaborating MD [place]

## 2019-08-09 ENCOUNTER — TELEPHONE (OUTPATIENT)
Dept: CARDIOLOGY | Facility: MEDICAL CENTER | Age: 84
End: 2019-08-09

## 2019-08-13 NOTE — TELEPHONE ENCOUNTER
"Spoke with pt. She is requesting that Neha send in an RX for Prozac. She tried Zoloft \"but it didn't help at at\". Advised pt. She should try to get this from her PCP but she doesn't currently have one.  She has appointment with new PCP/JOSE Gonzalez in Dec.   To Neha.  "

## 2019-08-14 ENCOUNTER — TELEPHONE (OUTPATIENT)
Dept: CARDIOLOGY | Facility: MEDICAL CENTER | Age: 84
End: 2019-08-14

## 2019-08-14 DIAGNOSIS — I10 ESSENTIAL HYPERTENSION: ICD-10-CM

## 2019-08-14 DIAGNOSIS — I48.19 PERSISTENT ATRIAL FIBRILLATION (HCC): ICD-10-CM

## 2019-08-14 NOTE — TELEPHONE ENCOUNTER
SHAE Monet.  Betzaida Mcgee L.P.N. 8 hours ago (7:39 AM)      I did not order SSRI This needs to come from whomever ordered the first one or her PCP.    Routing comment      Contacted pt and notified her of this. Suggested that she try to get in with her PCP sooner than her current December appt or see if they can schedule her with another provider. She agrees she will do this.

## 2019-08-15 RX ORDER — APIXABAN 5 MG/1
TABLET, FILM COATED ORAL
Qty: 180 TAB | Refills: 3 | Status: SHIPPED | OUTPATIENT
Start: 2019-08-15 | End: 2019-11-22

## 2019-08-15 RX ORDER — METOPROLOL TARTRATE 50 MG/1
TABLET, FILM COATED ORAL
Qty: 270 TAB | Refills: 3 | Status: SHIPPED | OUTPATIENT
Start: 2019-08-15 | End: 2020-04-09 | Stop reason: SDUPTHER

## 2019-08-27 ENCOUNTER — TELEPHONE (OUTPATIENT)
Dept: CARDIOLOGY | Facility: MEDICAL CENTER | Age: 84
End: 2019-08-27

## 2019-10-18 ENCOUNTER — TELEPHONE (OUTPATIENT)
Dept: CARDIOLOGY | Facility: MEDICAL CENTER | Age: 84
End: 2019-10-18

## 2019-10-18 NOTE — TELEPHONE ENCOUNTER
----- Message from Rose Mendes, Med Ass't sent at 10/18/2019 12:23 PM PDT -----    Patient would like to be referred to home health. Please give her a call she has some questions.

## 2019-10-18 NOTE — TELEPHONE ENCOUNTER
Patient is feeling like she needs some home care and would like to get it through a certain home care other than [facility], but doesn't know the name off hand.  She is feeling very weak, and although she knows that it all comes with age, she is anxious to get in to see Divina and discuss.  She is not sure exactly what she needs.  No openings with Divina available until 11/5 and Meron also is on a wait list for a new PCP.  I could not get anything more specific.  To Divina to advise if you can, otherwise she will wait for appt.

## 2019-10-21 NOTE — TELEPHONE ENCOUNTER
She would need to get a home health referral through PCP.  My only other thought before appt would be to send device transmission to see arrhythmia status, and if her AF rates are controled.    Thank you,  esthela

## 2019-10-29 NOTE — TELEPHONE ENCOUNTER
Patients device is monitored-- last full device transmission 10/7 in AF at that time.  Ventricular rates mainly in the 70 bpm range.

## 2019-11-08 ENCOUNTER — TELEPHONE (OUTPATIENT)
Dept: CARDIOLOGY | Facility: MEDICAL CENTER | Age: 84
End: 2019-11-08

## 2019-11-08 NOTE — TELEPHONE ENCOUNTER
Patient's device transmitted via home monitor.  She is having AF with RVR x 2-3 days.  With HR at times >150 bpm.  Patient was scheduled with Galo 11/6, however was a no show. Previous Neha patient.

## 2019-11-08 NOTE — TELEPHONE ENCOUNTER
Attempted to call patient to assess for symptoms and confirm that she's taking medications.  Unable to reach patient on home phone and unable to leave a message.  Called mobile phone number and unable to reach patient or daughter.  Left voicemail requesting a return phone call.

## 2019-11-18 ENCOUNTER — TELEPHONE (OUTPATIENT)
Dept: CARDIOLOGY | Facility: MEDICAL CENTER | Age: 84
End: 2019-11-18

## 2019-11-18 NOTE — TELEPHONE ENCOUNTER
GUS/shakir    Pt calling to discuss current condition, lots of weakness, having a very hard time getting around.  Please call Meron

## 2019-11-18 NOTE — TELEPHONE ENCOUNTER
"Spoke with pt's. Daughter, Rena. Pt. Recently [place] Details are sketchy but she apparently used O2 while she was there. Since arrival home pt. \"can barely get around\". Per Rena pt's. Dementia has worsened, which is the reason appointment was forgotten. Pt's. Legs are edematous and she is short of breath.   On 11-8-19 Makayla noted pt. Had AF with RVR for 2-3 days. Nurse left messages for pt. To call.  No openings available this week with EP APN's. Scheduled pt. To see Dr. Garrett on Fri. (She is DS pt.)   "

## 2019-11-22 ENCOUNTER — OFFICE VISIT (OUTPATIENT)
Dept: CARDIOLOGY | Facility: MEDICAL CENTER | Age: 84
End: 2019-11-22
Payer: MEDICARE

## 2019-11-22 VITALS
HEIGHT: 69 IN | WEIGHT: 183 LBS | BODY MASS INDEX: 27.11 KG/M2 | HEART RATE: 74 BPM | OXYGEN SATURATION: 93 % | DIASTOLIC BLOOD PRESSURE: 76 MMHG | SYSTOLIC BLOOD PRESSURE: 126 MMHG

## 2019-11-22 DIAGNOSIS — F41.9 ANXIETY: ICD-10-CM

## 2019-11-22 DIAGNOSIS — Z86.79 S/P ABLATION OF ATRIAL FLUTTER: ICD-10-CM

## 2019-11-22 DIAGNOSIS — I48.0 PAF (PAROXYSMAL ATRIAL FIBRILLATION) (HCC): ICD-10-CM

## 2019-11-22 DIAGNOSIS — I50.9 CONGESTIVE HEART FAILURE, UNSPECIFIED HF CHRONICITY, UNSPECIFIED HEART FAILURE TYPE (HCC): ICD-10-CM

## 2019-11-22 DIAGNOSIS — Z98.890 S/P ABLATION OF ATRIAL FLUTTER: ICD-10-CM

## 2019-11-22 DIAGNOSIS — Z95.0 CARDIAC PACEMAKER IN SITU: ICD-10-CM

## 2019-11-22 DIAGNOSIS — I49.5 SICK SINUS SYNDROME (HCC): ICD-10-CM

## 2019-11-22 DIAGNOSIS — I50.21 ACUTE SYSTOLIC CONGESTIVE HEART FAILURE (HCC): ICD-10-CM

## 2019-11-22 PROCEDURE — 99214 OFFICE O/P EST MOD 30 MIN: CPT | Mod: 25 | Performed by: INTERNAL MEDICINE

## 2019-11-22 PROCEDURE — 93280 PM DEVICE PROGR EVAL DUAL: CPT | Performed by: INTERNAL MEDICINE

## 2019-11-22 RX ORDER — POTASSIUM CHLORIDE 20 MEQ/1
20 TABLET, EXTENDED RELEASE ORAL DAILY
Qty: 90 TAB | Refills: 3 | Status: SHIPPED | OUTPATIENT
Start: 2019-11-22 | End: 2020-04-09 | Stop reason: SDUPTHER

## 2019-11-22 RX ORDER — FUROSEMIDE 40 MG/1
40 TABLET ORAL DAILY
Qty: 180 TAB | Refills: 3 | Status: SHIPPED | OUTPATIENT
Start: 2019-11-22 | End: 2019-12-30 | Stop reason: SDUPTHER

## 2019-11-22 NOTE — PROGRESS NOTES
Chief Complaint   Patient presents with   • Atrial Fibrillation     F/V DX:PAF       Subjective:   Meron Rice is a 87 y.o. female who presents today with history of permanent pacemaker and paroxysmal atrial fibrillation.  Followed by Shaunna Kern.  Called Betzaida with symptoms of fatigue and shortness of breath.  Appears to be in heart failure.  Significant atrial fibrillation.  Not on diuretics.  No chest pain.  Some worsening dementia.  Rate responsiveness not on.  Lower extremity edema.    Past Medical History:   Diagnosis Date   • Anemia    • Arrhythmia     atrial fibrillation   • Arthritis     generalized   • ASTHMA     has not needed an inhaler since 2010   • Breath shortness     on exertion   • Bronchitis      in past, not current   • Cancer (HCC) [id number]    Uterus and Breast   • CATARACT     beginning   • Chronic low back pain    • Depression 5/19/2017   • Foot-drop    • Hypertension    • Other specified symptom associated with female genital organs 1998    fibroids   • Pacemaker     in past, removed due to cellulitis   • Pain     left shoulder    • Unspecified hemorrhagic conditions     post op hyst. Currently on Eliquis    • Unspecified urinary incontinence     lazy bladder; doesn't empty completely   • UTI (urinary tract infection)     frequent      Past Surgical History:   Procedure Laterality Date   • OTHER  09/2017    pacemaker removal    • FEMUR NAILING INTRAMEDULLARY Right 6/3/2015    Procedure: FEMUR NAILING INTRAMEDULLARY;  Surgeon: Deshaun Denis M.D.;  Location: SURGERY [facility];  Service:    • WOUND CLOSURE GENERAL  4/10/2013    Performed by Nano Riley M.D. at SURGERY SAME DAY [facility]   • BREAST IMPLANT REMOVAL  4/10/2013    Performed by Ele Meza M.D. at SURGERY SAME DAY [facility]   • BREAST RECONSTRUCTION  11/26/2012    Performed by Ele Meza M.D. at SURGERY [facility]   • HIP HEMIARTHROPLASTY  5/14/2012    left; Performed by SOFYA  KEITH LONGORIA at SURGERY [facility]   • BREAST IMPLANT REVISION  7/11/2011    Performed by JAK ROSALES at SURGERY [facility]   • NIPPLE RECONSTRUCTION  7/11/2011    Performed by JAK ROSALES at SURGERY [facility]   • MASTECTOMY  12/15/2010    right   • CATH REMOVAL  12/15/2010    Performed by DIPAK VARELA at SURGERY [facility]   • BREAST RECONSTRUCTION  12/15/2010    Performed by JAK ROSALES at SURGERY [facility]   • LATISSIMUS FLAP  12/15/2010    Performed by JAK ROSALES at SURGERY [facility]   • CATH PLACEMENT  10/28/2009    Performed by DIPAK VARELA at SURGERY SAME DAY [facility]   • MASTECTOMY  9/30/2009    left   • AXILLARY NODE DISSECTION  9/15/2009    Performed by DIPAK VARELA at SURGERY SAME DAY [facility]   • OTHER SURGICAL PROCEDURE  2004    bladder repair   • OTHER ORTHOPEDIC SURGERY  2002    laminectomy   • HYSTERECTOMY, TOTAL ABDOMINAL  2000   • GYN SURGERY  1998    excision of fibroids   • APPENDECTOMY  1968   • DILATION AND CURETTAGE  1961   • VEIN LIGAT & STRIP  1972, 1966    x2 bilateral     Family History   Problem Relation Age of Onset   • Diabetes Mother    • Heart Disease Mother    • Heart Disease Father    • Stroke Father    • Hypertension Father    • Diabetes Sister    • Heart Disease Sister    • Cancer Brother         lung   • Diabetes Other    • Heart Disease Other    • Stroke Other    • Cancer Other    • Hypertension Other    • Diabetes Other    • Diabetes Child    • Psychiatric Illness Child      Social History     Socioeconomic History   • Marital status:      Spouse name: Not on file   • Number of children: Not on file   • Years of education: Not on file   • Highest education level: Not on file   Occupational History   • Not on file   Social Needs   • Financial resource strain: Not on file   • Food insecurity:     Worry: Not on file     Inability: Not on file   • Transportation needs:     Medical: Not on file      Non-medical: Not on file   Tobacco Use   • Smoking status: Former Smoker     Packs/day: 0.10     Years: 1.00     Pack years: 0.10     Types: Cigarettes     Last attempt to quit: 1964     Years since quittin.9   • Smokeless tobacco: Never Used   • Tobacco comment: 50 years ago in 1960  SOCIAL    Substance and Sexual Activity   • Alcohol use: Yes     Comment: 3-4 drinks per week    • Drug use: No   • 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     Comment: Retired Nurse   Lifestyle   • Physical activity:     Days per week: Not on file     Minutes per session: Not on file   • Stress: Not on file   Relationships   • Social connections:     Talks on phone: Not on file     Gets together: Not on file     Attends Scientology service: Not on file     Active member of club or organization: Not on file     Attends meetings of clubs or organizations: Not on file     Relationship status: Not on file   • Intimate partner violence:     Fear of current or ex partner: Not on file     Emotionally abused: Not on file     Physically abused: Not on file     Forced sexual activity: Not on file   Other Topics Concern   • Not on file   Social History Narrative   • Not on file     Allergies   Allergen Reactions   • Alendronate-Butylpar-Propylpar-Saccharin [Fosamax] Diarrhea and Nausea     .     Outpatient Encounter Medications as of 2019   Medication Sig Dispense Refill   • furosemide (LASIX) 40 MG Tab Take 1 Tab by mouth every day. 180 Tab 3   • potassium chloride SA (KDUR) 20 MEQ Tab CR Take 1 Tab by mouth every day. 90 Tab 3   • metoprolol (LOPRESSOR) 50 MG Tab TAKE ONE AND ONE-HALF TABLETS BY MOUTH TWICE DAILY 270 Tab 3   • apixaban (ELIQUIS) 5mg Tab Take 5 mg by mouth 2 Times a Day.     • therapeutic multivitamin-minerals (THERAGRAN-M) Tab Take 1 Tab by mouth every morning.     • [DISCONTINUED] ELIQUIS 5 MG Tab TAKE 1 TABLET BY MOUTH TWICE DAILY 180 Tab 3   • sertraline (ZOLOFT) 25 MG tablet TK 1 T PO QD  4   • Probiotic Product  "(PROBIOTIC PO) Take 2 Caps by mouth every day.       No facility-administered encounter medications on file as of 11/22/2019.      ROS     Objective:   /76 (BP Location: Left arm, Patient Position: Sitting, BP Cuff Size: Adult)   Pulse 74   Ht 1.753 m (5' 9\")   Wt 83 kg (183 lb)   SpO2 93%   BMI 27.02 kg/m²     Physical Exam   Constitutional: She is oriented to person, place, and time.   Frail   HENT:   Head: Normocephalic and atraumatic.   Eyes: Pupils are equal, round, and reactive to light. EOM are normal.   Neck: Normal range of motion. Neck supple.   Cardiovascular: Normal rate, normal heart sounds and intact distal pulses. An irregularly irregular rhythm present. Exam reveals no gallop and no friction rub.   No murmur heard.  Pulmonary/Chest: Effort normal and breath sounds normal.   Abdominal: Soft. Bowel sounds are normal.   Musculoskeletal: Normal range of motion.         General: Edema present.   Neurological: She is alert and oriented to person, place, and time. No cranial nerve deficit.   Skin: Skin is warm.   Psychiatric: She has a normal mood and affect. Her behavior is normal. Judgment and thought content normal.       Assessment:     1. PAF (paroxysmal atrial fibrillation) (HCC)  EC-ECHOCARDIOGRAM COMPLETE W/O CONT    Comp Metabolic Panel    proBrain Natriuretic Peptide, NT   2. Acute systolic congestive heart failure (HCC)     3. Anxiety     4. Cardiac pacemaker in situ     5. Sick sinus syndrome (HCC)     6. S/P ablation of atrial flutter         Medical Decision Making:  Today's Assessment / Status / Plan:   1.  Congestive heart failure.  Start Lasix 80 mg for 3 days then down to 40 mg Potassium chloride repletion.  Check CMP and BNP.  If not improved, told daughter to bring the patient in the ER.  Check echocardiogram.  Referral to congestive heart failure clinic.  2.  Atrial fibrillation.  On beta-blockers.  If some improvement in the future could consider AV node ablation. Increased " burden of a fib seen.  3.  Anticoagulation continue.  4.  Lower extremity edema.  5.  Follow-up with us in 3 to 4 weeks.  Urgent referral to congestive heart failure clinic.

## 2019-12-03 ENCOUNTER — TELEPHONE (OUTPATIENT)
Dept: CARDIOLOGY | Facility: MEDICAL CENTER | Age: 84
End: 2019-12-03

## 2019-12-03 NOTE — TELEPHONE ENCOUNTER
LM to remind patient to complete non-fasting labs before upcoming appt with SANGITA Rodriguez on 12/06.

## 2019-12-11 ENCOUNTER — HOSPITAL ENCOUNTER (OUTPATIENT)
Dept: LAB | Facility: MEDICAL CENTER | Age: 84
End: 2019-12-11
Attending: INTERNAL MEDICINE
Payer: MEDICARE

## 2019-12-11 ENCOUNTER — OFFICE VISIT (OUTPATIENT)
Dept: CARDIOLOGY | Facility: MEDICAL CENTER | Age: 84
End: 2019-12-11
Payer: MEDICARE

## 2019-12-11 VITALS
OXYGEN SATURATION: 94 % | SYSTOLIC BLOOD PRESSURE: 102 MMHG | DIASTOLIC BLOOD PRESSURE: 70 MMHG | WEIGHT: 183 LBS | HEIGHT: 69 IN | BODY MASS INDEX: 27.11 KG/M2 | HEART RATE: 73 BPM

## 2019-12-11 DIAGNOSIS — R06.02 SOB (SHORTNESS OF BREATH): ICD-10-CM

## 2019-12-11 DIAGNOSIS — I48.0 PAF (PAROXYSMAL ATRIAL FIBRILLATION) (HCC): ICD-10-CM

## 2019-12-11 DIAGNOSIS — Z95.0 CARDIAC PACEMAKER IN SITU: ICD-10-CM

## 2019-12-11 DIAGNOSIS — Z79.01 ANTICOAGULANT LONG-TERM USE: ICD-10-CM

## 2019-12-11 DIAGNOSIS — I49.5 SICK SINUS SYNDROME (HCC): ICD-10-CM

## 2019-12-11 DIAGNOSIS — R60.0 BILATERAL LEG EDEMA: ICD-10-CM

## 2019-12-11 LAB
ALBUMIN SERPL BCP-MCNC: 4.3 G/DL (ref 3.2–4.9)
ALBUMIN/GLOB SERPL: 1.5 G/DL
ALP SERPL-CCNC: 87 U/L (ref 30–99)
ALT SERPL-CCNC: 14 U/L (ref 2–50)
ANION GAP SERPL CALC-SCNC: 14 MMOL/L (ref 0–11.9)
AST SERPL-CCNC: 28 U/L (ref 12–45)
BILIRUB SERPL-MCNC: 0.8 MG/DL (ref 0.1–1.5)
BUN SERPL-MCNC: 37 MG/DL (ref 8–22)
CALCIUM SERPL-MCNC: 9.5 MG/DL (ref 8.5–10.5)
CHLORIDE SERPL-SCNC: 103 MMOL/L (ref 96–112)
CO2 SERPL-SCNC: 23 MMOL/L (ref 20–33)
CREAT SERPL-MCNC: 1.57 MG/DL (ref 0.5–1.4)
GLOBULIN SER CALC-MCNC: 2.9 G/DL (ref 1.9–3.5)
GLUCOSE SERPL-MCNC: 106 MG/DL (ref 65–99)
NT-PROBNP SERPL IA-MCNC: 3127 PG/ML (ref 0–125)
POTASSIUM SERPL-SCNC: 4.2 MMOL/L (ref 3.6–5.5)
PROT SERPL-MCNC: 7.2 G/DL (ref 6–8.2)
SODIUM SERPL-SCNC: 140 MMOL/L (ref 135–145)

## 2019-12-11 PROCEDURE — 83880 ASSAY OF NATRIURETIC PEPTIDE: CPT | Mod: GA

## 2019-12-11 PROCEDURE — 80053 COMPREHEN METABOLIC PANEL: CPT

## 2019-12-11 PROCEDURE — 99214 OFFICE O/P EST MOD 30 MIN: CPT | Performed by: NURSE PRACTITIONER

## 2019-12-11 PROCEDURE — 36415 COLL VENOUS BLD VENIPUNCTURE: CPT | Mod: GA

## 2019-12-11 RX ORDER — LORAZEPAM 0.5 MG/1
1 TABLET ORAL PRN
COMMUNITY
End: 2019-12-17

## 2019-12-11 ASSESSMENT — ENCOUNTER SYMPTOMS
MYALGIAS: 0
PALPITATIONS: 1
PND: 0
ORTHOPNEA: 0
ABDOMINAL PAIN: 0
CLAUDICATION: 0
FEVER: 0
DIZZINESS: 0
SHORTNESS OF BREATH: 1
COUGH: 0

## 2019-12-11 ASSESSMENT — MINNESOTA LIVING WITH HEART FAILURE QUESTIONNAIRE (MLHF)
GIVING YOU SIDE EFFECTS FROM TREATMENTS: 0
DIFFICULTY WITH SEXUAL ACTIVITIES: 0
DIFFICULTY SOCIALIZING WITH FAMILY OR FRIENDS: 5
WALKING ABOUT OR CLIMBING STAIRS DIFFICULT: 5
DIFFICULTY SLEEPING WELL AT NIGHT: 2
MAKING YOU SHORT OF BREATH: 5
LOSS OF SELF CONTROL IN YOUR LIFE: 5
HAVING TO SIT OR LIE DOWN DURING THE DAY: 4
SWELLING IN ANKLES OR LEGS: 0
TOTAL_SCORE: 65
MAKING YOU STAY IN A HOSPITAL: 0
TIRED, FATIGUED OR LOW ON ENERGY: 4
DIFFICULTY TO CONCENTRATE OR REMEMBERING THINGS: 3
MAKING YOU WORRY: 4
DIFFICULTY GOING AWAY FROM HOME: 5
EATING LESS FOODS YOU LIKE: 3
MAKING YOU FEEL DEPRESSED: 5
DIFFICULTY WORKING TO EARN A LIVING: 0
FEELING LIKE A BURDEN TO FAMILY AND FRIENDS: 5
WORKING AROUND THE HOUSE OR YARD DIFFICULT: 5
COSTING YOU MONEY FOR MEDICAL CARE: 0
DIFFICULTY WITH RECREATIONAL PASTIMES, SPORTS, HOBBIES: 5

## 2019-12-11 NOTE — PROGRESS NOTES
Chief Complaint   Patient presents with   • Congestive Heart Failure       Subjective:   Meron Rice is a 87 y.o. female who presents today for follow-up on her lower extremity edema.    Patient of Dr. Garrett.  She was last seen in clinic on 11/22/2019.  During that visit, patient was volume overloaded and started on diuretics.  Patient was referred over to the heart failure clinic for further management.  Patient was also sent for lab testing and echocardiogram.    Patient plans on getting her labs done today, and her echo was canceled because of scheduling conflict.    Patient reports since starting the diuretic, her lower extremity edema has improved and she reports her shortness of breath has slightly improved.  She continues to have shortness of breath with ADLs and exertion, mild lower extremity edema, occasional palpitations and fatigue.    She denies chest pain, orthopnea, PND or dizziness/lightheadedness.    She is not weighing herself at home.    Additonally, patient has the following medical problems:    -Atrial fibrillation: Taking Eliquis    -Pacemaker: Last device check 11/22/2019.    -Hypertension    -Depression: Currently not taking any antidepressive medications    -Anemia    -Asthma    -History of chronic UTIs    -History of breast and uterus cancer, with hysterectomy and mastectomy    Past Medical History:   Diagnosis Date   • Anemia    • Arrhythmia     atrial fibrillation   • Arthritis     generalized   • ASTHMA     has not needed an inhaler since 2010   • Breath shortness     on exertion   • Bronchitis      in past, not current   • Cancer (HCC) [id number]    Uterus and Breast   • CATARACT     beginning   • Chronic low back pain    • Depression 5/19/2017   • Foot-drop    • Hypertension    • Other specified symptom associated with female genital organs 1998    fibroids   • Pacemaker     in past, removed due to cellulitis   • Pain     left shoulder    • Unspecified hemorrhagic conditions     post  op hyst. Currently on Eliquis    • Unspecified urinary incontinence     lazy bladder; doesn't empty completely   • UTI (urinary tract infection)     frequent      Past Surgical History:   Procedure Laterality Date   • OTHER  09/2017    pacemaker removal    • FEMUR NAILING INTRAMEDULLARY Right 6/3/2015    Procedure: FEMUR NAILING INTRAMEDULLARY;  Surgeon: Deshaun Denis M.D.;  Location: SURGERY [facility];  Service:    • WOUND CLOSURE GENERAL  4/10/2013    Performed by Nano Varela M.D. at SURGERY SAME DAY [facility] ORS   • BREAST IMPLANT REMOVAL  4/10/2013    Performed by Jak Meza M.D. at SURGERY SAME DAY [facility]   • BREAST RECONSTRUCTION  11/26/2012    Performed by Jak Meza M.D. at SURGERY [facility]   • HIP HEMIARTHROPLASTY  5/14/2012    left; Performed by KEITH COWART at SURGERY [facility]   • BREAST IMPLANT REVISION  7/11/2011    Performed by JAK MEZA at SURGERY [facility]   • NIPPLE RECONSTRUCTION  7/11/2011    Performed by JAK MEZA at SURGERY [facility]   • MASTECTOMY  12/15/2010    right   • CATH REMOVAL  12/15/2010    Performed by NANO VARELA at SURGERY [facility]   • BREAST RECONSTRUCTION  12/15/2010    Performed by JAK MEZA at SURGERY [facility]   • LATISSIMUS FLAP  12/15/2010    Performed by JAK MEZA at SURGERY [facility]   • CATH PLACEMENT  10/28/2009    Performed by NANO VARELA at SURGERY SAME DAY [facility] ORS   • MASTECTOMY  9/30/2009    left   • AXILLARY NODE DISSECTION  9/15/2009    Performed by NANO VARELA at SURGERY SAME DAY [facility] ORS   • OTHER SURGICAL PROCEDURE  2004    bladder repair   • OTHER ORTHOPEDIC SURGERY  2002    laminectomy   • HYSTERECTOMY, TOTAL ABDOMINAL  2000   • GYN SURGERY  1998    excision of fibroids   • APPENDECTOMY  1968   • DILATION AND CURETTAGE  1961   • PACEMAKER INSERTION     • VEIN LIGAT & STRIP  1972, 1966    x2 bilateral     Family History    Problem Relation Age of Onset   • Diabetes Mother    • Heart Disease Mother    • Heart Disease Father    • Stroke Father    • Hypertension Father    • Diabetes Sister    • Heart Disease Sister    • Diabetes Other    • Heart Disease Other    • Stroke Other    • Cancer Other    • Hypertension Other    • Diabetes Other    • Diabetes Child    • Psychiatric Illness Child    • Hypertension Sister    • Kidney Disease Sister    • Stroke Sister    • Lung Cancer Brother    • Bipolar disorder Brother      Social History     Socioeconomic History   • Marital status:      Spouse name: Not on file   • Number of children: Not on file   • Years of education: Not on file   • Highest education level: Not on file   Occupational History   • Not on file   Social Needs   • Financial resource strain: Not on file   • Food insecurity:     Worry: Not on file     Inability: Not on file   • Transportation needs:     Medical: Not on file     Non-medical: Not on file   Tobacco Use   • Smoking status: Former Smoker     Packs/day: 0.10     Years: 1.00     Pack years: 0.10     Types: Cigarettes     Last attempt to quit: 1964     Years since quittin.9   • Smokeless tobacco: Never Used   • Tobacco comment: 50 years ago in 1960  SOCIAL    Substance and Sexual Activity   • Alcohol use: Yes     Comment: 3-4 drinks per week, glass of wine   • Drug use: No   • 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     Comment: Retired Nurse   Lifestyle   • Physical activity:     Days per week: Not on file     Minutes per session: Not on file   • Stress: Not on file   Relationships   • Social connections:     Talks on phone: Not on file     Gets together: Not on file     Attends [employer] service: Not on file     Active member of club or organization: Not on file     Attends meetings of clubs or organizations: Not on file     Relationship status: Not on file   • Intimate partner violence:     Fear of current or ex partner: Not on file     Emotionally abused: Not  "on file     Physically abused: Not on file     Forced sexual activity: Not on file   Other Topics Concern   • Not on file   Social History Narrative   • Not on file     Allergies   Allergen Reactions   • Alendronate-Butylpar-Propylpar-Saccharin [Fosamax] Diarrhea and Nausea     .     Outpatient Encounter Medications as of 12/11/2019   Medication Sig Dispense Refill   • furosemide (LASIX) 40 MG Tab Take 1 Tab by mouth every day. 180 Tab 3   • potassium chloride SA (KDUR) 20 MEQ Tab CR Take 1 Tab by mouth every day. 90 Tab 3   • metoprolol (LOPRESSOR) 50 MG Tab TAKE ONE AND ONE-HALF TABLETS BY MOUTH TWICE DAILY 270 Tab 3   • apixaban (ELIQUIS) 5mg Tab Take 5 mg by mouth 2 Times a Day.     • Probiotic Product (PROBIOTIC PO) Take 2 Caps by mouth every day.     • therapeutic multivitamin-minerals (THERAGRAN-M) Tab Take 1 Tab by mouth every morning.     • LORazepam (ATIVAN) 0.5 MG Tab Take 1 Tab by mouth as needed.     • [DISCONTINUED] sertraline (ZOLOFT) 25 MG tablet TK 1 T PO QD  4     No facility-administered encounter medications on file as of 12/11/2019.      Review of Systems   Constitutional: Positive for malaise/fatigue. Negative for fever.   Respiratory: Positive for shortness of breath. Negative for cough.    Cardiovascular: Positive for palpitations and leg swelling. Negative for chest pain, orthopnea, claudication and PND.   Gastrointestinal: Negative for abdominal pain.   Musculoskeletal: Negative for myalgias.   Neurological: Negative for dizziness.   All other systems reviewed and are negative.       Objective:   /70 (BP Location: Right arm, Patient Position: Sitting, BP Cuff Size: Adult)   Pulse 73   Ht 1.753 m (5' 9\")   Wt 83 kg (183 lb)   SpO2 94%   BMI 27.02 kg/m²      Physical Exam   Constitutional: She is oriented to person, place, and time. She appears well-developed and well-nourished.   Currently in a wheelchair   HENT:   Head: Normocephalic and atraumatic.   Eyes: Pupils are equal, " round, and reactive to light. EOM are normal.   Neck: Normal range of motion. Neck supple. JVD present.   Cardiovascular: Normal rate, regular rhythm and normal heart sounds.   Pulmonary/Chest: Effort normal. No respiratory distress. She has no wheezes. She has rales (Bilateral lower lobes).   Abdominal: Soft. Bowel sounds are normal.   Musculoskeletal:         General: Edema (Bilateral 1+ lower extremity edema) present.      Comments: Lower extremity venous stasis changes and some left lower extremity scabs present   Neurological: She is alert and oriented to person, place, and time.   Skin: Skin is warm and dry.   Psychiatric: She has a normal mood and affect. Her behavior is normal.   Vitals reviewed.    Lab Results   Component Value Date/Time    CHOLSTRLTOT 176 02/06/2013 12:44 PM     (H) 02/06/2013 12:44 PM    HDL 58 02/06/2013 12:44 PM    TRIGLYCERIDE 91 02/06/2013 12:44 PM       Lab Results   Component Value Date/Time    SODIUM 136 03/13/2019 05:17 AM    POTASSIUM 4.0 03/13/2019 05:17 AM    CHLORIDE 111 03/13/2019 05:17 AM    CO2 19 (L) 03/13/2019 05:17 AM    GLUCOSE 120 (H) 03/13/2019 05:17 AM    BUN 44 (H) 03/13/2019 05:17 AM    CREATININE 1.28 03/13/2019 05:17 AM    CREATININE 1.2 05/09/2009 04:10 AM     Lab Results   Component Value Date/Time    ALKPHOSPHAT 80 03/13/2019 05:17 AM    ASTSGOT 19 03/13/2019 05:17 AM    ALTSGPT 14 03/13/2019 05:17 AM    TBILIRUBIN 0.7 03/13/2019 05:17 AM      Transthoracic Echo Report 5/19/2017  1. The anterior mitral valve leaflet that is prolapsing against the   posterior causing eccentric posteriorly directed mitral regurgitation.   This is not seen in most of the views but could be consistent with severe mitral regurgitation. If clinically suspected recommend UNA.     2. Severely dilated left atrium.     3. Enlarged right atrium.     4. Left ventricular ejection fraction is visually estimated to be 55%.   Normal regional wall motion. Grade III diastolic  dysfunction.      5. Estimated right ventricular systolic pressure  is 50 mmHg.        Transesophageal Echo Report 6/1/2017  No left atrial thrombus present.  No thrombus detected in the left atrial appendage.  Normal left ventricular size and systolic function.     Transthoracic Echo Report 3/13/2019  Grossly normal left ventricular systolic function. Left ventricular   ejection fraction is visually estimated to be 55%.  Indeterminate diastolic function.  Aortic sclerosis without stenosis.  Estimated right ventricular systolic pressure is 41 mmHg + JVP.    Assessment:     1. Bilateral leg edema     2. SOB (shortness of breath)     3. PAF (paroxysmal atrial fibrillation) ([facility])     4. Cardiac pacemaker in situ     5. Sick sinus syndrome ([facility])     6. Anticoagulant long-term use         Medical Decision Making:  Today's Assessment / Status / Plan:   1.  Lower extremity edema and shortness of breath:  -Pending echocardiogram, patient to reschedule  -Continue furosemide 40 mg daily, patient reports she appears to be diuresing appropriately  -Continue potassium 20 mEq daily  -Get labs done today CMP and BNP  -Start monitoring weights at home daily. Call office if weight increasing greater than 3 lbs in 1 day or greater than 5 lbs in 1 week.   -Reinforced s/sx of worsening symptoms with patient and weight monitoring. Pt verbalizes understanding. Pt to call office or RTC if present.   -Monitor Sodium intake, maintain 2000 mg per day  -Increase Hydration, about 50-60 oz of water per day    2.  Atrial fibrillation:  -Has follow-up with EP on 12/17/2019  -Continue Eliquis 5 mg twice a day  -Continue metoprolol 75 mg twice a day  -Has pacemaker    FU in clinic in 2 weeks with HF MD after Echo. Sooner if needed.    Patient verbalizes understanding and agrees with the plan of care.     Collaborating MD:  Preston Batista MD

## 2019-12-11 NOTE — PATIENT INSTRUCTIONS
Get lab done today   Monitor Sodium intake, maintain 2000 mg per day  Increase Hydration, about 50-60 oz of water per day

## 2019-12-17 ENCOUNTER — OFFICE VISIT (OUTPATIENT)
Dept: CARDIOLOGY | Facility: MEDICAL CENTER | Age: 84
End: 2019-12-17
Payer: MEDICARE

## 2019-12-17 VITALS
HEIGHT: 69 IN | DIASTOLIC BLOOD PRESSURE: 70 MMHG | HEART RATE: 68 BPM | BODY MASS INDEX: 25.18 KG/M2 | WEIGHT: 170 LBS | SYSTOLIC BLOOD PRESSURE: 118 MMHG | OXYGEN SATURATION: 92 %

## 2019-12-17 DIAGNOSIS — Z79.01 ANTICOAGULANT LONG-TERM USE: ICD-10-CM

## 2019-12-17 DIAGNOSIS — Z86.79 S/P ABLATION OF ATRIAL FLUTTER: ICD-10-CM

## 2019-12-17 DIAGNOSIS — Z98.890 S/P ABLATION OF ATRIAL FLUTTER: ICD-10-CM

## 2019-12-17 DIAGNOSIS — Z95.0 CARDIAC PACEMAKER IN SITU: ICD-10-CM

## 2019-12-17 DIAGNOSIS — I48.11 LONGSTANDING PERSISTENT ATRIAL FIBRILLATION (HCC): ICD-10-CM

## 2019-12-17 DIAGNOSIS — I10 ESSENTIAL HYPERTENSION: ICD-10-CM

## 2019-12-17 DIAGNOSIS — I50.9 CONGESTIVE HEART FAILURE, UNSPECIFIED HF CHRONICITY, UNSPECIFIED HEART FAILURE TYPE (HCC): ICD-10-CM

## 2019-12-17 DIAGNOSIS — I49.5 SICK SINUS SYNDROME (HCC): ICD-10-CM

## 2019-12-17 DIAGNOSIS — I27.20 PULMONARY HYPERTENSION (HCC): ICD-10-CM

## 2019-12-17 PROCEDURE — 93288 INTERROG EVL PM/LDLS PM IP: CPT | Performed by: NURSE PRACTITIONER

## 2019-12-17 PROCEDURE — 99214 OFFICE O/P EST MOD 30 MIN: CPT | Mod: 25 | Performed by: NURSE PRACTITIONER

## 2019-12-17 ASSESSMENT — ENCOUNTER SYMPTOMS
WEAKNESS: 0
SHORTNESS OF BREATH: 0
CHILLS: 0
VOMITING: 0
DOUBLE VISION: 0
ABDOMINAL PAIN: 0
SPEECH CHANGE: 0
WEIGHT LOSS: 0
DIZZINESS: 0
SPUTUM PRODUCTION: 0
PALPITATIONS: 0
TINGLING: 0
STRIDOR: 0
LOSS OF CONSCIOUSNESS: 0
BLOOD IN STOOL: 0
HEMOPTYSIS: 0
SENSORY CHANGE: 0
WHEEZING: 0
BLURRED VISION: 0
TREMORS: 0
PND: 0
HEARTBURN: 0
DIARRHEA: 0
ORTHOPNEA: 0
NAUSEA: 0
HEADACHES: 0
COUGH: 0
FEVER: 0
FOCAL WEAKNESS: 0
SORE THROAT: 0

## 2019-12-17 NOTE — PATIENT INSTRUCTIONS
Keep Metoprolol 1.5 tab (75 mg every AM)  Increase PM Metoprolol to 2 tabs (100 mg ) every PM.  If feels poorly or with dizziness stop the extra 1/2 additional tab in the PM (25 mg)    Home device transmission in 2 weeks

## 2019-12-18 NOTE — PROGRESS NOTES
Cardiology/Electrophysiology Follow-up Note      Subjective:   Chief Complaint:   Chief Complaint   Patient presents with   • Atrial Fibrillation     F/V DX: PAF       Meron Rice is a 86 y.o. female who presents today for follow up paroxysmal atrial fibrillation, [place] PPM and recent heart failure exacerbation.    She is followed by Dr. Garrett has seen Jazmin in the CHF clinic.  Past medical history also significant for SSS S/P PPM, device infection with reimplantation of device, chronic AF,  hypertension, atrial flutter S/P ablation, pulmonary hypertension, chronic anticoagulation, history of breast cancer S/P bilateral mastectomy.       Today in follow up she is accompanied by her daughter.  She reports that she is starting to feel a littler better but still doesn't feel back to her usual stamina.  She has been weighing herself everyday and reports continued daily weight loss.  Her weight is down about 13 pounds on our scales here.  She reports that she is feeling less fast heart rates and palpitations.   tells me that she has been feeling well overall from a cardiac standpoint.  She denies chest pain, dizziness, pre syncope or syncope, dyspnea, PND, orthopnea.      Patient endorses medication compliance        Past Medical History:   Diagnosis Date   • Anemia    • Arrhythmia     atrial fibrillation   • Arthritis     generalized   • ASTHMA     has not needed an inhaler since 2010   • Breath shortness     on exertion   • Bronchitis      in past, not current   • Cancer (HCC) 6710-4321    Uterus and Breast   • CATARACT     beginning   • Chronic low back pain    • Depression 5/19/2017   • Foot-drop    • Hypertension    • Other specified symptom associated with female genital organs 1998    fibroids   • Pacemaker     in past, removed due to cellulitis   • Pain     left shoulder    • Unspecified hemorrhagic conditions     post op hyst. Currently on Eliquis    • Unspecified urinary incontinence     lazy bladder;  doesn't empty completely   • UTI (urinary tract infection)     frequent      Past Surgical History:   Procedure Laterality Date   • OTHER  09/2017    pacemaker removal    • FEMUR NAILING INTRAMEDULLARY Right 6/3/2015    Procedure: FEMUR NAILING INTRAMEDULLARY;  Surgeon: Deshaun Denis M.D.;  Location: [facility];  Service:    • WOUND CLOSURE GENERAL  4/10/2013    Performed by Nano Varela M.D. at SURGERY SAME DAY [facility] ORS   • BREAST IMPLANT REMOVAL  4/10/2013    Performed by Jak Meza M.D. at SURGERY SAME DAY [facility]   • BREAST RECONSTRUCTION  11/26/2012    Performed by Jak Meza M.D. at SURGERY [facility]   • HIP HEMIARTHROPLASTY  5/14/2012    left; Performed by KEITH COWART at SURGERY [facility]   • BREAST IMPLANT REVISION  7/11/2011    Performed by JAK MEZA at SURGERY [facility]   • NIPPLE RECONSTRUCTION  7/11/2011    Performed by JAK MEZA at SURGERY [facility]   • MASTECTOMY  12/15/2010    right   • CATH REMOVAL  12/15/2010    Performed by NANO VARELA at SURGERY [facility]   • BREAST RECONSTRUCTION  12/15/2010    Performed by JAK MEZA at SURGERY [facility]   • LATISSIMUS FLAP  12/15/2010    Performed by JAK MEZA at SURGERY [facility]   • CATH PLACEMENT  10/28/2009    Performed by NANO VARELA at SURGERY SAME DAY [facility]   • MASTECTOMY  9/30/2009    left   • AXILLARY NODE DISSECTION  9/15/2009    Performed by NANO VARELA at SURGERY SAME DAY [facility] ORS   • OTHER SURGICAL PROCEDURE  2004    bladder repair   • OTHER ORTHOPEDIC SURGERY  2002    laminectomy   • HYSTERECTOMY, TOTAL ABDOMINAL  2000   • GYN SURGERY  1998    excision of fibroids   • APPENDECTOMY  1968   • DILATION AND CURETTAGE  1961   • PACEMAKER INSERTION     • VEIN LIGAT & STRIP  1972, 1966    x2 bilateral     Family History   Problem Relation Age of Onset   • Diabetes Mother    • Heart Disease Mother    •  Heart Disease Father    • Stroke Father    • Hypertension Father    • Diabetes Sister    • Heart Disease Sister    • Diabetes Other    • Heart Disease Other    • Stroke Other    • Cancer Other    • Hypertension Other    • Diabetes Other    • Diabetes Child    • Psychiatric Illness Child    • Hypertension Sister    • Kidney Disease Sister    • Stroke Sister    • Lung Cancer Brother    • Bipolar disorder Brother      Social History     Socioeconomic History   • Marital status:      Spouse name: Not on file   • Number of children: Not on file   • Years of education: Not on file   • Highest education level: Not on file   Occupational History   • Not on file   Social Needs   • Financial resource strain: Not on file   • Food insecurity:     Worry: Not on file     Inability: Not on file   • Transportation needs:     Medical: Not on file     Non-medical: Not on file   Tobacco Use   • Smoking status: Former Smoker     Packs/day: 0.10     Years: 1.00     Pack years: 0.10     Types: Cigarettes     Last attempt to quit: 1964     Years since quittin.9   • Smokeless tobacco: Never Used   • Tobacco comment: 50 years ago in   SOCIAL    Substance and Sexual Activity   • Alcohol use: Yes     Comment: 3-4 drinks per week, glass of wine   • Drug use: No   • 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     Comment: Retired Nurse   Lifestyle   • Physical activity:     Days per week: Not on file     Minutes per session: Not on file   • Stress: Not on file   Relationships   • Social connections:     Talks on phone: Not on file     Gets together: Not on file     Attends [employer] service: Not on file     Active member of club or organization: Not on file     Attends meetings of clubs or organizations: Not on file     Relationship status: Not on file   • Intimate partner violence:     Fear of current or ex partner: Not on file     Emotionally abused: Not on file     Physically abused: Not on file     Forced sexual activity: Not on file  "  Other Topics Concern   • Not on file   Social History Narrative   • Not on file     Allergies   Allergen Reactions   • Alendronate-Butylpar-Propylpar-Saccharin [Fosamax] Diarrhea and Nausea     .       Current Outpatient Medications   Medication Sig Dispense Refill   • furosemide (LASIX) 40 MG Tab Take 1 Tab by mouth every day. 180 Tab 3   • potassium chloride SA (KDUR) 20 MEQ Tab CR Take 1 Tab by mouth every day. 90 Tab 3   • metoprolol (LOPRESSOR) 50 MG Tab TAKE ONE AND ONE-HALF TABLETS BY MOUTH TWICE DAILY 270 Tab 3   • apixaban (ELIQUIS) 5mg Tab Take 5 mg by mouth 2 Times a Day.     • Probiotic Product (PROBIOTIC PO) Take 2 Caps by mouth every day.     • therapeutic multivitamin-minerals (THERAGRAN-M) Tab Take 1 Tab by mouth every morning.       No current facility-administered medications for this visit.        Review of Systems   Constitutional: Positive for malaise/fatigue. Negative for chills, fever and weight loss.        Generalized weakness    HENT: Negative for congestion, nosebleeds, sore throat and tinnitus.    Eyes: Negative for blurred vision and double vision.   Respiratory: Negative for cough, hemoptysis, sputum production, shortness of breath, wheezing and stridor.    Cardiovascular: Positive for leg swelling (Reports starting to improve. ). Negative for chest pain, palpitations, orthopnea and PND.   Gastrointestinal: Negative for abdominal pain, blood in stool, diarrhea, heartburn, melena, nausea and vomiting.   Genitourinary: Negative for dysuria.   Skin: Negative for rash.   Neurological: Negative for dizziness, tingling, tremors, sensory change, speech change, focal weakness, loss of consciousness, weakness and headaches.     All others systems reviewed and negative.     Objective:     /70 (BP Location: Left arm, Patient Position: Sitting, BP Cuff Size: Adult)   Pulse 68   Ht 1.753 m (5' 9\")   Wt 77.1 kg (170 lb)   SpO2 92%  Body mass index is 25.1 kg/m².    Physical Exam "   Constitutional: She is oriented to person, place, and time and well-developed, well-nourished, and in no distress.   HENT:   Head: Normocephalic and atraumatic.   Eyes: Pupils are equal, round, and reactive to light. Conjunctivae and EOM are normal.   Neck: Normal range of motion. Neck supple. No JVD present.   Cardiovascular: Normal rate, normal heart sounds and intact distal pulses. An irregularly irregular rhythm present. Exam reveals no gallop and no friction rub.   No murmur heard.  Pulses:       Radial pulses are 2+ on the right side and 2+ on the left side.        Dorsalis pedis pulses are 1+ on the right side and 1+ on the left side.   Pulmonary/Chest: Effort normal and breath sounds normal. No respiratory distress. She has no wheezes. She has no rales. She exhibits no tenderness.   Abdominal: Soft. Bowel sounds are normal. There is no tenderness.   Musculoskeletal: Normal range of motion.         General: Edema (BLE generalized trace to 1+) present.   Neurological: She is alert and oriented to person, place, and time.   Skin: Skin is warm and dry. No rash noted. No erythema.   Psychiatric: Mood, memory, affect and judgment normal.         Cardiac Imaging and Procedures Review:    Echo dated 3/13/19:   Left Ventricle  Normal left ventricular chamber size. Mild concentric left ventricular   hypertrophy. Grossly normal left ventricular systolic function. Left   ventricular ejection fraction is visually estimated to be 55%. Grossly   normal regional wall motion. Indeterminate diastolic function.    Right Ventricle  Right ventricle not well visualized.    Right Atrium  The right atrium is normal in size. Inferior vena cava is not well   visualized.    Left Atrium  The left atrium is normal in size. Left atrial volume index is 30 mL/sq   m.    Mitral Valve  Mitral annular calcification. No mitral stenosis. Trace mitral   regurgitation.    Aortic Valve  Tricuspid aortic valve. Aortic sclerosis without stenosis.  Mild aortic   insufficiency. Pressure half time is  450 msec.    Tricuspid Valve  Structurally normal tricuspid valve. Mild to moderate tricuspid   regurgitation. Estimated right ventricular systolic pressure is 41 mmHg   + JVP.    Pulmonic Valve  The pulmonic valve is not well visualized.  Trace pulmonic   insufficiency.    Pericardium  Normal pericardium without effusion.    Aorta  The aortic root is normal. Ascending aorta diameter is 3.1 cm.      Labs (personally reviewed and notable for):   Lab Results   Component Value Date/Time    SODIUM 136 03/13/2019 05:17 AM    POTASSIUM 4.0 03/13/2019 05:17 AM    CHLORIDE 111 03/13/2019 05:17 AM    CO2 19 (L) 03/13/2019 05:17 AM    GLUCOSE 120 (H) 03/13/2019 05:17 AM    BUN 44 (H) 03/13/2019 05:17 AM    CREATININE 1.28 03/13/2019 05:17 AM            Lab Results   Component Value Date/Time    WBC 6.3 03/13/2019 05:17 AM    RBC 4.57 03/13/2019 05:17 AM    HEMOGLOBIN 14.6 03/13/2019 05:17 AM    HEMATOCRIT 45.0 03/13/2019 05:17 AM    MCV 98.5 (H) 03/13/2019 05:17 AM    MCH 31.9 03/13/2019 05:17 AM    MCHC 32.4 (L) 03/13/2019 05:17 AM    MPV 10.3 03/13/2019 05:17 AM    NEUTSPOLYS 29.80 (L) 03/13/2019 05:17 AM    LYMPHOCYTES 50.70 (H) 03/13/2019 05:17 AM    MONOCYTES 14.10 (H) 03/13/2019 05:17 AM    EOSINOPHILS 3.70 03/13/2019 05:17 AM    BASOPHILS 1.40 03/13/2019 05:17 AM      PT/INR:   Lab Results   Component Value Date/Time    PROTHROMBTM 15.2 (H) 04/12/2018 06:26 PM    INR 1.21 (H) 04/12/2018 06:26 PM       Assessment:     1. Longstanding persistent atrial fibrillation     2. Cardiac pacemaker in situ     3. Congestive heart failure, unspecified HF chronicity, unspecified heart failure type ([facility])     4. Anticoagulant long-term use     5. Essential hypertension     6. Pulmonary hypertension ([facility])     7. S/P ablation of atrial flutter     8. Sick sinus syndrome ([facility])         Medical Decision Making:  Today's Assessment / Status / Plan:   1. Persistent Afib:  - Somewhat  Improved rate control but would like to try and get ventricular rate more consistently controled.  Will do trial of increasing Metoprolol in the PM (to 75 mg) and if tolerates well can do trial of increasing AM dose potentially.  She will send in home transmission to 2 weeks to assess AF control.  Can also consider utility of AV nkechi ablation in future if needed.  - We also discussed that continued diuresis and improvement in heart failure may help improve AF status as well.  - She will continue Eliquis.       2. [place] PPM:  - Lead sensing and RV thresholds are stable, device is working normally.  - Continues AF as above.   - Battery longevity 4 years.     3. CHF:  - last LVEF 55%.  Indeterminate diastolic function.  Elevated RVSP.   - Now established with CHF clinic.  Has responded well to diuresis.  Discussed continued diuresis with Lasix 40 mg and potassium.  - On labs BNP elevated. 3127, Cr 1.57.  Will need to monitor renal function closely.  Recheck BMP in about 2 weeks.       3. Hypertension:  - Blood pressure is well controled.  Continue Metoprolol with changes as above. Continue Lasix.     4. Anticoagulation:  - She is tolerating eliquis without reported evidence of internal bleeding.  Continue.     Plan reviewed in detail with the patient and she verbalizes understanding and is in agreement.   RTC in 4 weeks for reivew, sooner if clinical condition changes  Collaborating MD/ADD: GOOD Jasso.

## 2019-12-24 ENCOUNTER — TELEPHONE (OUTPATIENT)
Dept: CARDIOLOGY | Facility: MEDICAL CENTER | Age: 84
End: 2019-12-24

## 2019-12-24 NOTE — TELEPHONE ENCOUNTER
ED      Patient's daughter, Nyasia states she is returning a call from ISAÍAS's nurse (no notes found in epic) about FMLA paperwork. Nyasia's #[phone].

## 2019-12-26 ENCOUNTER — APPOINTMENT (OUTPATIENT)
Dept: CARDIOLOGY | Facility: MEDICAL CENTER | Age: 84
End: 2019-12-26
Payer: MEDICARE

## 2019-12-30 ENCOUNTER — HOSPITAL ENCOUNTER (OUTPATIENT)
Dept: LAB | Facility: MEDICAL CENTER | Age: 84
End: 2019-12-30
Attending: INTERNAL MEDICINE
Payer: MEDICARE

## 2019-12-30 ENCOUNTER — OFFICE VISIT (OUTPATIENT)
Dept: MEDICAL GROUP | Facility: MEDICAL CENTER | Age: 84
End: 2019-12-30
Payer: MEDICARE

## 2019-12-30 VITALS
TEMPERATURE: 97.7 F | SYSTOLIC BLOOD PRESSURE: 100 MMHG | HEART RATE: 89 BPM | DIASTOLIC BLOOD PRESSURE: 56 MMHG | BODY MASS INDEX: 26.18 KG/M2 | OXYGEN SATURATION: 94 % | WEIGHT: 177.25 LBS

## 2019-12-30 DIAGNOSIS — I48.0 PAF (PAROXYSMAL ATRIAL FIBRILLATION) (HCC): ICD-10-CM

## 2019-12-30 DIAGNOSIS — R68.89 COLD INTOLERANCE: ICD-10-CM

## 2019-12-30 DIAGNOSIS — L98.9 SKIN LESION: ICD-10-CM

## 2019-12-30 DIAGNOSIS — F33.1 MODERATE EPISODE OF RECURRENT MAJOR DEPRESSIVE DISORDER (HCC): ICD-10-CM

## 2019-12-30 DIAGNOSIS — L98.499 ARTERIAL INSUFFICIENCY WITH ISCHEMIC ULCER (HCC): ICD-10-CM

## 2019-12-30 DIAGNOSIS — I10 ESSENTIAL HYPERTENSION: ICD-10-CM

## 2019-12-30 DIAGNOSIS — I87.2 VENOUS STASIS DERMATITIS OF BOTH LOWER EXTREMITIES: ICD-10-CM

## 2019-12-30 DIAGNOSIS — I27.20 PULMONARY HYPERTENSION (HCC): ICD-10-CM

## 2019-12-30 DIAGNOSIS — R31.0 GROSS HEMATURIA: ICD-10-CM

## 2019-12-30 DIAGNOSIS — N17.9 ACUTE KIDNEY INJURY (HCC): ICD-10-CM

## 2019-12-30 DIAGNOSIS — I77.1 ARTERIAL INSUFFICIENCY WITH ISCHEMIC ULCER (HCC): ICD-10-CM

## 2019-12-30 DIAGNOSIS — N39.0 RECURRENT UTI: ICD-10-CM

## 2019-12-30 DIAGNOSIS — I50.33 ACUTE ON CHRONIC DIASTOLIC CONGESTIVE HEART FAILURE (HCC): ICD-10-CM

## 2019-12-30 DIAGNOSIS — I50.9 CONGESTIVE HEART FAILURE, UNSPECIFIED HF CHRONICITY, UNSPECIFIED HEART FAILURE TYPE (HCC): ICD-10-CM

## 2019-12-30 DIAGNOSIS — Z79.01 ANTICOAGULANT LONG-TERM USE: ICD-10-CM

## 2019-12-30 DIAGNOSIS — Z95.0 CARDIAC PACEMAKER IN SITU: ICD-10-CM

## 2019-12-30 DIAGNOSIS — D69.6 THROMBOCYTOPENIA (HCC): ICD-10-CM

## 2019-12-30 DIAGNOSIS — E04.1 THYROID NODULE: ICD-10-CM

## 2019-12-30 DIAGNOSIS — M81.0 AGE-RELATED OSTEOPOROSIS WITHOUT CURRENT PATHOLOGICAL FRACTURE: ICD-10-CM

## 2019-12-30 DIAGNOSIS — Z23 NEED FOR INFLUENZA VACCINATION: ICD-10-CM

## 2019-12-30 DIAGNOSIS — N17.9 AKI (ACUTE KIDNEY INJURY) (HCC): ICD-10-CM

## 2019-12-30 DIAGNOSIS — I49.5 SICK SINUS SYNDROME (HCC): ICD-10-CM

## 2019-12-30 DIAGNOSIS — Z85.3 HX OF BREAST CANCER: ICD-10-CM

## 2019-12-30 PROBLEM — H91.10 PRESBYCUSIS: Status: RESOLVED | Noted: 2018-03-22 | Resolved: 2019-12-30

## 2019-12-30 PROBLEM — G89.29 CHRONIC LEFT SHOULDER PAIN: Status: RESOLVED | Noted: 2018-12-11 | Resolved: 2019-12-30

## 2019-12-30 PROBLEM — M79.602 PAIN OF LEFT UPPER EXTREMITY: Status: RESOLVED | Noted: 2018-08-06 | Resolved: 2019-12-30

## 2019-12-30 PROBLEM — M79.605 PAIN OF LEFT LOWER EXTREMITY: Status: RESOLVED | Noted: 2019-01-15 | Resolved: 2019-12-30

## 2019-12-30 PROBLEM — M25.512 CHRONIC LEFT SHOULDER PAIN: Status: RESOLVED | Noted: 2018-12-11 | Resolved: 2019-12-30

## 2019-12-30 PROBLEM — R19.7 DIARRHEA: Status: RESOLVED | Noted: 2019-03-12 | Resolved: 2019-12-30

## 2019-12-30 PROBLEM — G89.29 CHRONIC HIP PAIN: Status: RESOLVED | Noted: 2017-05-19 | Resolved: 2019-12-30

## 2019-12-30 PROBLEM — D75.1 POLYCYTHEMIA: Status: RESOLVED | Noted: 2019-03-12 | Resolved: 2019-12-30

## 2019-12-30 PROBLEM — N31.9 NEUROGENIC BLADDER: Status: RESOLVED | Noted: 2017-03-06 | Resolved: 2019-12-30

## 2019-12-30 PROBLEM — M25.559 CHRONIC HIP PAIN: Status: RESOLVED | Noted: 2017-05-19 | Resolved: 2019-12-30

## 2019-12-30 PROBLEM — R30.0 DYSURIA: Status: RESOLVED | Noted: 2019-02-28 | Resolved: 2019-12-30

## 2019-12-30 LAB
ALBUMIN SERPL BCP-MCNC: 3.8 G/DL (ref 3.2–4.9)
ALBUMIN/GLOB SERPL: 1.2 G/DL
ALP SERPL-CCNC: 82 U/L (ref 30–99)
ALT SERPL-CCNC: 13 U/L (ref 2–50)
ANION GAP SERPL CALC-SCNC: 10 MMOL/L (ref 0–11.9)
AST SERPL-CCNC: 22 U/L (ref 12–45)
BASOPHILS # BLD AUTO: 1.1 % (ref 0–1.8)
BASOPHILS # BLD: 0.08 K/UL (ref 0–0.12)
BILIRUB SERPL-MCNC: 0.8 MG/DL (ref 0.1–1.5)
BUN SERPL-MCNC: 42 MG/DL (ref 8–22)
CALCIUM SERPL-MCNC: 9.3 MG/DL (ref 8.5–10.5)
CHLORIDE SERPL-SCNC: 104 MMOL/L (ref 96–112)
CO2 SERPL-SCNC: 22 MMOL/L (ref 20–33)
CREAT SERPL-MCNC: 1.54 MG/DL (ref 0.5–1.4)
EOSINOPHIL # BLD AUTO: 0.1 K/UL (ref 0–0.51)
EOSINOPHIL NFR BLD: 1.4 % (ref 0–6.9)
ERYTHROCYTE [DISTWIDTH] IN BLOOD BY AUTOMATED COUNT: 59 FL (ref 35.9–50)
GLOBULIN SER CALC-MCNC: 3.1 G/DL (ref 1.9–3.5)
GLUCOSE SERPL-MCNC: 157 MG/DL (ref 65–99)
HCT VFR BLD AUTO: 45.9 % (ref 37–47)
HGB BLD-MCNC: 14.5 G/DL (ref 12–16)
IMM GRANULOCYTES # BLD AUTO: 0.02 K/UL (ref 0–0.11)
IMM GRANULOCYTES NFR BLD AUTO: 0.3 % (ref 0–0.9)
LYMPHOCYTES # BLD AUTO: 2.6 K/UL (ref 1–4.8)
LYMPHOCYTES NFR BLD: 36.8 % (ref 22–41)
MCH RBC QN AUTO: 30.7 PG (ref 27–33)
MCHC RBC AUTO-ENTMCNC: 31.6 G/DL (ref 33.6–35)
MCV RBC AUTO: 97 FL (ref 81.4–97.8)
MONOCYTES # BLD AUTO: 0.63 K/UL (ref 0–0.85)
MONOCYTES NFR BLD AUTO: 8.9 % (ref 0–13.4)
NEUTROPHILS # BLD AUTO: 3.64 K/UL (ref 2–7.15)
NEUTROPHILS NFR BLD: 51.5 % (ref 44–72)
NRBC # BLD AUTO: 0 K/UL
NRBC BLD-RTO: 0 /100 WBC
PLATELET # BLD AUTO: 155 K/UL (ref 164–446)
PMV BLD AUTO: 10.8 FL (ref 9–12.9)
POTASSIUM SERPL-SCNC: 3.7 MMOL/L (ref 3.6–5.5)
PROT SERPL-MCNC: 6.9 G/DL (ref 6–8.2)
RBC # BLD AUTO: 4.73 M/UL (ref 4.2–5.4)
SODIUM SERPL-SCNC: 136 MMOL/L (ref 135–145)
TSH SERPL DL<=0.005 MIU/L-ACNC: 3.61 UIU/ML (ref 0.38–5.33)
WBC # BLD AUTO: 7.1 K/UL (ref 4.8–10.8)

## 2019-12-30 PROCEDURE — 84443 ASSAY THYROID STIM HORMONE: CPT

## 2019-12-30 PROCEDURE — G0008 ADMIN INFLUENZA VIRUS VAC: HCPCS | Performed by: INTERNAL MEDICINE

## 2019-12-30 PROCEDURE — 36415 COLL VENOUS BLD VENIPUNCTURE: CPT

## 2019-12-30 PROCEDURE — 85025 COMPLETE CBC W/AUTO DIFF WBC: CPT

## 2019-12-30 PROCEDURE — 80053 COMPREHEN METABOLIC PANEL: CPT

## 2019-12-30 PROCEDURE — 99205 OFFICE O/P NEW HI 60 MIN: CPT | Mod: 25 | Performed by: INTERNAL MEDICINE

## 2019-12-30 PROCEDURE — 90662 IIV NO PRSV INCREASED AG IM: CPT | Performed by: INTERNAL MEDICINE

## 2019-12-30 RX ORDER — FLUOXETINE HYDROCHLORIDE 20 MG/1
20 CAPSULE ORAL DAILY
Qty: 90 CAP | Refills: 3 | Status: SHIPPED | OUTPATIENT
Start: 2019-12-30 | End: 2020-01-01

## 2019-12-30 RX ORDER — FLUOXETINE HYDROCHLORIDE 20 MG/1
20 CAPSULE ORAL DAILY
Qty: 90 CAP | Refills: 3 | Status: SHIPPED | OUTPATIENT
Start: 2019-12-30 | End: 2019-12-30 | Stop reason: SDUPTHER

## 2019-12-30 RX ORDER — FUROSEMIDE 40 MG/1
40 TABLET ORAL DAILY
Qty: 180 TAB | Refills: 3 | Status: SHIPPED | OUTPATIENT
Start: 2019-12-30 | End: 2020-04-09 | Stop reason: SDUPTHER

## 2019-12-30 NOTE — ASSESSMENT & PLAN NOTE
Chronic and ongoing issue.  She is working closely with cardiology to slowly increase AV nkechi blocking medications to due to worsening A. fib/tachycardia which could potentially have caused the heart failure exacerbation recently.  Repeat echo ordered and pending to be completed 2020.  Pacemaker will  around , I did discuss with patient that she needs to consider how she would want pacemaker treated in the future if she would take a comfort/hospice approach.  Also, we have repeat kidney function ordered as her last creatinine was greater than 1.5 and if it remains above 1.5 we will need to cut down her apixaban to 2.5 mg twice daily.

## 2019-12-30 NOTE — TELEPHONE ENCOUNTER
Good Morning,     Just wanted to send an update.  I returned the phone call of the patient's daughter, Nyasia.  I left a message stating the [facility] paperwork would not be ready until next week (per note on the paperwork).     Thank you,   Shaila

## 2019-12-30 NOTE — ASSESSMENT & PLAN NOTE
Chronic problem requires additional valuation.  We will update her kidney function of her serum creatinine remains above 1.5 then will decrease her apixaban to 2.5 mg twice daily.

## 2019-12-30 NOTE — ASSESSMENT & PLAN NOTE
Previous problem, patient denies prior work up. Will check FILI's and venous ultrasound and refer her to wound care for possible debridement/evaluation. She denies prior infections related to her foot ulcers. The skin changes are making it more difficult for her to ambulate.

## 2019-12-30 NOTE — TELEPHONE ENCOUNTER
Lm again, need to know nature of paperwork, fmla, disability short or long, for self or family member? Will start on paperwork when hears back.

## 2019-12-30 NOTE — ASSESSMENT & PLAN NOTE
Previous problem, presumably stable.  Patient declines additional evaluation with ultrasound.  She would be interested in having her thyroid levels checked due to possibility of abnormal thyroid contributing to cold intolerance symptoms.

## 2019-12-30 NOTE — ASSESSMENT & PLAN NOTE
Chronic and stable.  She reports about 4 years of life left on her pacemaker.  I did discuss with her that as we have conversations regarding end-of-life planning we will need to understand what her preferences would be for her pacemaker as this could continue her heartbeat it would need to be stopped manually with a magnet or disengaged by cardiology if she wanted off in the future.

## 2019-12-30 NOTE — ASSESSMENT & PLAN NOTE
Chronic problem that requires close monitoring.  She has borderline low blood pressure thus will need to continue to follow closely to ensure that she is tolerating her current dose of the metoprolol tartrate 50 mg twice daily MI: Recent decline could be due to addition of the Lasix 40 mg daily for worsening lower extremity edema.  Okay to continue at this time and we will follow-up on her kidney function electrolytes.

## 2019-12-30 NOTE — ASSESSMENT & PLAN NOTE
Previous problem, currently decompensated.  Will reinitiate Prozac 20 mg daily if she took this in the past and it was helpful.  We will follow-up with her in 1 month to see how she is doing on it.

## 2019-12-30 NOTE — ASSESSMENT & PLAN NOTE
Previous problem which has given her several complications in the past.  In future episodes of dysuria will definitely need to obtain a culture and identify sensitivities due to previous resistance to multiple bugs.

## 2019-12-30 NOTE — ASSESSMENT & PLAN NOTE
Chronic problem of unknown severity. Will update CBC to check on this problem. No known history of liver disease.

## 2019-12-30 NOTE — ASSESSMENT & PLAN NOTE
Chronic problem of unknown severity.  Patient declines further bone density testing and declines osteoporosis treatment including calcium, vitamin D, and antiresorptive therapy.  She has a history of a fragility fracture in the left hip and continues to be high risk for subsequent fractures due to her declining mobility and fall risk for transfers between wheelchair and walker.  She would only qualify for Prolia at this juncture due to progressive JAMES on CKD likely cardiorenal syndrome from her congestive heart failure.  We will continue to discuss at future appointments.

## 2019-12-30 NOTE — ASSESSMENT & PLAN NOTE
Chronic and ongoing issue.  She has history of elevated pulmonary pressures.  Sounds like she was suggested to undergo a sleep study back in March but does not look like this was ever completed.  She has been started on diuretic therapy due to clinical signs and symptoms of congestive heart failure.

## 2019-12-30 NOTE — ASSESSMENT & PLAN NOTE
No in decompensated problem.  We will evaluate her thyroid to ensure this is stable but I suspect this is poor circulation from her venous and arterial disease.

## 2019-12-30 NOTE — ASSESSMENT & PLAN NOTE
Chronic and ongoing issue.  She has been advised to have evaluation in the past but declines.  She does not want a meet with urology and she does not want undergo cystoscopy.  She also wants down her Eliquis and is afraid they would tell her to stop it.  We will check a CBC to ensure there is no anemia and continue discussions about her hematuria at future follow-up.

## 2019-12-30 NOTE — PROGRESS NOTES
Subjective:     CC:  Diagnoses of Acute kidney injury (HCC), Thrombocytopenia (HCC), Need for influenza vaccination, Pulmonary hypertension (HCC), Essential hypertension, PAF (paroxysmal atrial fibrillation) (HCC), Congestive heart failure, unspecified HF chronicity, unspecified heart failure type (HCC), Venous stasis dermatitis of both lower extremities, Arterial insufficiency with ischemic ulcer (HCC), Cold intolerance, Moderate episode of recurrent major depressive disorder (HCC), Age-related osteoporosis without current pathological fracture, Thyroid nodule, JAMES (acute kidney injury) (HCC), Anticoagulant long-term use, Cardiac pacemaker in situ, Gross hematuria, Hx of breast cancer, Acute on chronic diastolic congestive heart failure (HCC), Recurrent UTI, Sick sinus syndrome (HCC), and Skin lesion were pertinent to this visit.    HISTORY OF THE PRESENT ILLNESS: Patient is a 87 y.o. female. This pleasant patient is here today to establish care and discuss the below stated chronic medical conditions. She is accompanied by a close family friend, Leighann, who is also a nurse.    Problem   Pulmonary Hypertension ([facility])    Echo (3/2019):  Estimated right ventricular systolic pressure is 41 mmHg (previously 50 mmHg in May 2017).    She has longstanding history of pulmonary hypertension.  It appears to improved between 2017 and 2019 but there is plan for repeat echocardiogram on January 1, 2020 due to worsening congestive heart failure.  He is taking diuretic therapy at this time.        Sick sinus syndrome (HCC)    See above under paroxysmal A. fib.  Status post permanent pacemaker in 2017 due to sick sinus syndrome and paroxysmal A. fib.     Cardiac Pacemaker in Situ    She is status post permanent pacemaker placement in 2017.  She follows with electrophysiology/cardiology.     Thrombocytopenia (HCC)       Ref. Range 3/12/2019 18:55 3/13/2019 05:17   Platelet Count Latest Ref Range: 164 - 446 K/uL 154 (L) 144 (L)     She  appears to have a longstanding history of low platelets.  She is not on an antiplatelet medication but does take Eliquis for stroke prophylaxis.  She is recently had challenges with bruising and skin tears with bleeding especially in the left upper extremity and the bilateral feet.       Osteoporosis    Bone Density (8/2012):  FINDINGS:  The mean bone mineral density for the lumbar spine is 1.232 g/cm2, which corresponds to a T score of 1.7 and a Z score of 4.4.    The proximal right femur has a mean bone mineral density of 0.686 g/cm2, with a T score of -2.1 and a Z score of -0.1.      Impression       According to the World Health Organization classification, bone mineral density of this patient is osteopenic in the left femur, within normal limits in the spine.  Note that spine density likely is elevated from degenerative disk disease.     She did suffer a displaced left femoral neck fracture in May 2012, sounded to be a fragility fracture as a description was fracture secondary to a fall outside in her garden.  She was recommended to go on Actonel but does not look like she is currently taking.  She is not taking any calcium or vitamin D at this time.  She does not desire further bone density studies at this time.     Essential Hypertension    Has a documented history of hypertension however her blood pressure has been running on the low end the month of December and 2019.  She is frail and a fall risk due to the edema and ulcerations in her bilateral feet.  Her current medication includes metoprolol tartrate 50 mg twice daily which is being used for rate control with her paroxysmal A. fib.  Lasix 40 mg daily was added about a month ago due to clinical symptoms of congestive heart failure.  I would be hesitant to initiate other antihypertensives due to her soft blood pressure and risk for inducing orthostasis.     Depression    She reports prior history of anxiety for which she took sertraline however she  reported this felt more like a placebo.  In the past she is used Prozac and noted good results with this.  She lost her  in 2017 and has had some challenges with her mood since that time.  She would be interested in going back on an antidepressant at this time.  No SI or HI.     Hx of Breast Cancer    Reports history of breast cancer treated with bilateral mastectomy complicated by left upper extremity lymphedema.  No recent follow-up with oncology.     Venous Stasis Dermatitis of Both Lower Extremities   Arterial Insufficiency With Ischemic Ulcer (Hcc)    She has ulceration on multiple areas on bilateral feet.  On the right foot at the tip of the greater helix as well as the dorsum of the second and third toe in the midline she has ulcerations and on the left foot on the dorsum of the second and third toe in the midline she has ulcerations.  She also has darkening and disfigured bilateral greater toenails.  She also has deep purple discoloration of left greater than right extremity that improves with elevation but capillary refill does appear preserved at this time.  Due to tense edema it is difficult to palpate pedal pulses or posterior tibialis pulses.     Cold Intolerance    She reports challenges with cold intolerance both during the day and at night.  She will pile on several blankets to it does not seem to make a difference.  Not just in her lower extremities but throughout her body.  No prior evaluation for this issue.  She does admit to poor circulation problems over the past 18 months.  She has not had her thyroid checked in the past 2-1/2 years.  No heat intolerance or diaphoresis.  No night sweats.     Acute On Chronic Diastolic Congestive Heart Failure (Hcc)    Echo (March 2019):  CONCLUSIONS  Grossly normal left ventricular systolic function. Left ventricular ejection fraction is visually estimated to be 55%. Indeterminate diastolic function. Aortic sclerosis without stenosis. Estimated right  ventricular systolic pressure is 41 mmHg + JVP.    Echo (May 2017):  Left ventricular ejection fraction is visually estimated to be 55%. Normal regional wall motion. Grade III diastolic dysfunction.    Clinical diagnosis of congestive heart failure determined by cardiology in November 2019.  She is initiated on furosemide 80 mg x 3 days followed by furosemide 40 mg thereafter with potassium supplementation.  She is established with a congestive heart failure clinic.  She has an echo ordered and this is pending to be completed on January 1.  She initially had a weight loss of 13 pounds from December 11 until December 17 however on December 30 she gained back 7 pounds.  She is not following a low-sodium diet at this time and admits to eating Chinese food on [place] as well as drinking 1 to 2 glasses of wine regularly and eating salty foods such as top Ramen and other canned/boxed foods at home.  She does appear to be compliant with her diuretics.  She admits that she continues to add salt to her food and is currently using the pink Himalayan salt that is in the house.  She is deconditioned and more winded when ambulating.  Ambulating is become quite difficult with the edema in the lower extremities specially when she is at home alone.  She is on metoprolol tartrate 50 mg twice daily.  She did develop JAMES likely from initiation of the diuretics and last check on kidney function was about 2 weeks ago.     Skin Lesion    Bilateral ulcerations and wounds on toes, especially right foot greater hallux at the distal aspect but also early signs of ulceration on the tops the 2nd and 3rd toes bilaterally. Has been developing since January 2019 and progressively worsening.     Gross Hematuria    She has had hematuria noted on 5 separate urinalysis since November 2018.  There was no blood noted on a prior urine study in 2017.  She does endorse gross hematuria at times and mild hematuria at other times.  She was recommended to  see urology but declines that she does not want to undergo a cystoscopy and she does not want to go off the apixaban.  No anemia on last blood count in March 2019.  She does report a history of frequent UTIs and has been septic in the hospital at least 3 times as a result.  No history of primary bladder or kidney cancer.     Thyroid Nodule    Thyroid US (April 2018):  FINDINGS:  The thyroid gland is heterogeneous.  Vascularity is normal.     The right lobe of the thyroid gland measures 2.03 cm x 4.81 cm x 2.00 cm.  The left lobe of the thyroid gland measures 1.32 cm x 4.25 cm x 1.16 cm.  The isthmus measures 0.36 cm.     A right thyroid lobe nodule is once again noted and measures 2.8 x 1.1 x 2.4 cm. Previous measurements were 2.7 x 1.3 x 2.4 cm. There again appear to be hypoechoic or cystic areas within the dominant nodule which was also described previously.     A subtle small nodule identified in the left lobe measuring 4 mm.     IMPRESSION:  No significant change in complex nodule in the right lobe by comparison with previous ultrasound report from 2009.    Previous problem with no recent change after a 9-year period.  She declines any additional evaluation at this time.     PAF (paroxysmal atrial fibrillation) (HCC)    He has a prior history of paroxysmal atrial fibrillation as well as sick sinus syndrome and underwent pacemaker placement in 2017.  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  She does continue to follow with cardiology and has approximately 4 years of life left on her pacemaker, approximate expiration 2023.  Current rate control approach includes metoprolol tartrate 50 mg twice daily.  She is also taking apixaban 5 mg twice daily for stroke prophylaxis.     Anticoagulant Long-Term Use       Ref. Range 12/11/2019 11:05   Creatinine Latest Ref Range: 0.50 - 1.40 mg/dL 1.57 (H)       She is taking Eliquis 5 mg twice  daily for stroke prophylaxis due to history of paroxysmal A. fib.  Weight is 80 kg however serum creatinine was 1.57 two weeks ago and this is above the criteria for using Eliquis at this dose.  We will recheck her kidney function and if her creatinine remains above 1.5 then we will decrease her Eliquis to 2.5 mg twice daily.     Recurrent Uti    She reports several issues with recurrent UTIs which have made her septic and put her in the hospital.  Also has had hematuria on several recent urinalysis.  She was recommended to follow-up with urology back in April 2018 but declined at that time.  She has grown multiple bacteria in the past and is resistant to several antibiotics.     Acute Kidney Injury (Hcc)       Ref. Range 3/12/2019 18:55 3/13/2019 05:17 12/11/2019 11:05   Creatinine Latest Ref Range: 0.50 - 1.40 mg/dL 1.33 1.28 1.57 (H)   GFR If Non  Latest Ref Range: >60 mL/min/1.73 m 2 38 (A) 40 (A) 31 (A)     She had normal kidney function in April 2018 and since March 2019 she has had ongoing JAMES with fluctuations of GFR between 30 and 40.  She was diagnosed with heart failure exacerbation due to worsening lower extremity edema and was initiated on diuretics in the form of Lasix 40 mg daily which she is continued on.  She is on potassium supplementation while taking the Lasix and is also on Eliquis 5 mg twice daily for stroke prophylaxis.  No other renally excreted medications at this time.       Allergies: Alendronate-butylpar-propylpar-saccharin [fosamax]    Current Outpatient Medications Ordered in Epic   Medication Sig Dispense Refill   • FLUoxetine (PROZAC) 20 MG Cap Take 1 Cap by mouth every day. 90 Cap 3   • furosemide (LASIX) 40 MG Tab Take 1 Tab by mouth every day. 180 Tab 3   • potassium chloride SA (KDUR) 20 MEQ Tab CR Take 1 Tab by mouth every day. 90 Tab 3   • metoprolol (LOPRESSOR) 50 MG Tab TAKE ONE AND ONE-HALF TABLETS BY MOUTH TWICE DAILY 270 Tab 3   • apixaban (ELIQUIS) 5mg Tab  Take 5 mg by mouth 2 Times a Day.     • Probiotic Product (PROBIOTIC PO) Take 2 Caps by mouth every day.     • therapeutic multivitamin-minerals (THERAGRAN-M) Tab Take 1 Tab by mouth every morning.       No current [facility]-ordered facility-administered medications on file.        Past Medical History:   Diagnosis Date   • Anemia    • Anxiety 2/4/2014   • Arrhythmia     atrial fibrillation   • Arthritis     generalized   • ASTHMA     has not needed an inhaler since 2010   • Breath shortness     on exertion   • Bronchitis      in past, not current   • Cancer (HCC) [id number]    Uterus and Breast   • CATARACT     beginning   • Chronic hip pain 5/19/2017   • Chronic left shoulder pain 12/11/2018   • Chronic low back pain    • Chronic midline low back pain without sciatica 5/14/2012   • Depression 5/19/2017   • Diarrhea 3/12/2019   • Dysuria 2/28/2019   • Foot-drop    • GERD (gastroesophageal reflux disease) 5/14/2012   • Hypertension    • Hyponatremia 5/14/2012   • Nerve pain 2/4/2016   • Neurogenic bladder 3/6/2017   • Other insomnia 2/4/2014   • Other specified symptom associated with female genital organs 1998    fibroids   • Pacemaker     in past, removed due to cellulitis   • Pain     left shoulder    • Pain of left lower extremity 1/15/2019   • Pain of left upper extremity 8/6/2018   • Polycythemia 3/12/2019   • Presbycusis 3/22/2018   • Thyroid disease     thryoid nodule   • Unspecified hemorrhagic conditions     post op hyst. Currently on Eliquis    • Unspecified urinary incontinence     lazy bladder; doesn't empty completely   • UTI (urinary tract infection)     frequent        Past Surgical History:   Procedure Laterality Date   • OTHER  09/2017    pacemaker removal    • FEMUR NAILING INTRAMEDULLARY Right 6/3/2015    Procedure: FEMUR NAILING INTRAMEDULLARY;  Surgeon: Deshaun Denis M.D.;  Location: SURGERY [facility];  Service:    • WOUND CLOSURE GENERAL  4/10/2013    Performed by Nano Riley M.D. at  SURGERY SAME DAY [facility]   • BREAST IMPLANT REMOVAL  4/10/2013    Performed by Jak Rosales M.D. at SURGERY SAME DAY [facility]   • BREAST RECONSTRUCTION  2012    Performed by Jak Rosales M.D. at SURGERY [facility]   • HIP HEMIARTHROPLASTY  2012    left; Performed by KEITH COWART at SURGERY [facility]   • BREAST IMPLANT REVISION  2011    Performed by JAK ROSALES at SURGERY [facility]   • NIPPLE RECONSTRUCTION  2011    Performed by JAK ROSALES at SURGERY [facility]   • MASTECTOMY  12/15/2010    right   • CATH REMOVAL  12/15/2010    Performed by DIPAK VARELA at SURGERY [facility]   • BREAST RECONSTRUCTION  12/15/2010    Performed by JAK ROSALES at SURGERY [facility]   • LATISSIMUS FLAP  12/15/2010    Performed by JAK ROSALES at SURGERY [facility]   • CATH PLACEMENT  10/28/2009    Performed by DIPAK VARELA at SURGERY SAME DAY [facility]   • MASTECTOMY  2009    left   • AXILLARY NODE DISSECTION  9/15/2009    Performed by DIPAK VARELA at SURGERY SAME DAY [facility]   • OTHER SURGICAL PROCEDURE      bladder repair   • OTHER ORTHOPEDIC SURGERY      laminectomy   • HYSTERECTOMY, TOTAL ABDOMINAL     • GYN SURGERY      excision of fibroids   • APPENDECTOMY     • DILATION AND CURETTAGE     • PACEMAKER INSERTION     • VEIN LIGAT & STRIP  , 1966    x2 bilateral       Social History     Tobacco Use   • Smoking status: Former Smoker     Packs/day: 0.10     Years: 1.00     Pack years: 0.10     Types: Cigarettes     Last attempt to quit: 1964     Years since quittin.0   • Smokeless tobacco: Never Used   • Tobacco comment: 50 years ago in 1960  SOCIAL    Substance Use Topics   • Alcohol use: Yes     Comment: 3-4 drinks per week, glass of wine   • Drug use: No       Social History     Patient does not qualify to have social determinant information on file (likely too  young).   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 She was  for 49 years before her  passed away in 2017. She is from [place] originally. She has worked in the inpatient setting and in hospice care.       Family History   Problem Relation Age of Onset   • Diabetes Mother    • Heart Disease Mother    • Heart Disease Father    • Stroke Father    • Hypertension Father    • Diabetes Sister    • Heart Disease Sister    • Diabetes Other    • Heart Disease Other    • Stroke Other    • Cancer Other    • Hypertension Other    • Diabetes Other    • Diabetes Child    • Psychiatric Illness Child    • Hypertension Sister    • Kidney Disease Sister    • Stroke Sister    • Lung Cancer Brother    • Bipolar disorder Brother        Health Maintenance: Completed    ROS:   As above in the HPI All Others Reviewed and Negative  Objective:     Exam: /56   Pulse 89   Temp 36.5 °C (97.7 °F) (Temporal)   Wt 80.4 kg (177 lb 4 oz)   SpO2 94%  Body mass index is 26.18 kg/m².    General: Normal appearing. No distress. Appears stated age.  EENT: Eyes conjunctiva clear lids without ptosis, extraocular movements intact, ears normal shape and contour, moderate left cerumen obstruction and normal external ear canal on the right, right tympanic membrane is benign, oropharynx is without erythema, edema or exudates. Fair dentition.   Neck: Supple without JVD. Thyroid is not enlarged.  Pulmonary: Decreased air movement bibasilar. normal effort. No rales, ronchi, or wheezing. No cough.  Cardiovascular: Regular rate and irregular rhythm without murmur.  1-2+ tense lower extremity edema in the left greater than right leg, pretibial.  Purple discoloration of left leg consistent with venous stasis dermatitis.  Multiple ulcerations on bilateral toes as described below.  Abdomen: Soft, nontender, nondistended. Normal bowel sounds.   Neurologic: No  resting tremor, no increased tone or rigidity.  Lymph: No cervical or supraclavicular lymph nodes are palpable  Skin: She has 4 skin tears on the left upper extremity with serous drainage.  She has darkening abnormalities of bilateral greater hallux toenails as well as ulceration at the distal aspect of the right foot greater helix as well as on the top MTPs on the second and third toe and bilateral feet.  She has purple discoloration of the left greater than right foot and decreased warmth.  Normal capillary refill.  Musculoskeletal: Inhibited gait, ambulating with a wheelchair more recently and normally with a walker at home.  She has extremity edema and purple discoloration of bilateral lower extremities.  Psych: Normal mood and affect. Alert and oriented x3. Judgment and insight is normal.    Assessment & Plan:   87 y.o. female with the following -    Problem List Items Addressed This Visit     Sick sinus syndrome (HCC)    Relevant Medications    furosemide (LASIX) 40 MG Tab    Cardiac pacemaker in situ     Chronic and stable.  She reports about 4 years of life left on her pacemaker.  I did discuss with her that as we have conversations regarding end-of-life planning we will need to understand what her preferences would be for her pacemaker as this could continue her heartbeat it would need to be stopped manually with a magnet or disengaged by cardiology if she wanted off in the future.         Pulmonary hypertension (HCC)     Chronic and ongoing issue.  She has history of elevated pulmonary pressures.  Sounds like she was suggested to undergo a sleep study back in March but does not look like this was ever completed.  She has been started on diuretic therapy due to clinical signs and symptoms of congestive heart failure.         Relevant Medications    furosemide (LASIX) 40 MG Tab    Other Relevant Orders    REFERRAL TO HOME HEALTH    Comp Metabolic Panel    Essential hypertension     Chronic problem that  requires close monitoring.  She has borderline low blood pressure thus will need to continue to follow closely to ensure that she is tolerating her current dose of the metoprolol tartrate 50 mg twice daily MI: Recent decline could be due to addition of the Lasix 40 mg daily for worsening lower extremity edema.  Okay to continue at this time and we will follow-up on her kidney function electrolytes.         Relevant Medications    furosemide (LASIX) 40 MG Tab    Other Relevant Orders    REFERRAL TO HOME HEALTH    Comp Metabolic Panel    Osteoporosis     Chronic problem of unknown severity.  Patient declines further bone density testing and declines osteoporosis treatment including calcium, vitamin D, and antiresorptive therapy.  She has a history of a fragility fracture in the left hip and continues to be high risk for subsequent fractures due to her declining mobility and fall risk for transfers between wheelchair and walker.  She would only qualify for Prolia at this juncture due to progressive JAMES on CKD likely cardiorenal syndrome from her congestive heart failure.  We will continue to discuss at future appointments.         Thrombocytopenia (HCC)     Chronic problem of unknown severity. Will update CBC to check on this problem. No known history of liver disease.         Relevant Orders    CBC WITH DIFFERENTIAL    REFERRAL TO HOME HEALTH    Recurrent UTI     Previous problem which has given her several complications in the past.  In future episodes of dysuria will definitely need to obtain a culture and identify sensitivities due to previous resistance to multiple bugs.         Acute kidney injury (HCC)     Chronic and ongoing issue.  GFR down to 30 on most recent check on December 11.  We will recheck her creatinine and electrolytes at this time to make sure she is still appropriate to continue on the furosemide at the current dose.         Anticoagulant long-term use     Chronic problem requires additional  valuation.  We will update her kidney function of her serum creatinine remains above 1.5 then will decrease her apixaban to 2.5 mg twice daily.         PAF (paroxysmal atrial fibrillation) (HCC)     Chronic and ongoing issue.  She is working closely with cardiology to slowly increase AV nkechi blocking medications to due to worsening A. fib/tachycardia which could potentially have caused the heart failure exacerbation recently.  Repeat echo ordered and pending to be completed 2020.  Pacemaker will  around , I did discuss with patient that she needs to consider how she would want pacemaker treated in the future if she would take a comfort/hospice approach.  Also, we have repeat kidney function ordered as her last creatinine was greater than 1.5 and if it remains above 1.5 we will need to cut down her apixaban to 2.5 mg twice daily.         Relevant Medications    furosemide (LASIX) 40 MG Tab    Other Relevant Orders    REFERRAL TO HOME HEALTH    Comp Metabolic Panel    Thyroid nodule     Previous problem, presumably stable.  Patient declines additional evaluation with ultrasound.  She would be interested in having her thyroid levels checked due to possibility of abnormal thyroid contributing to cold intolerance symptoms.         Gross hematuria     Chronic and ongoing issue.  She has been advised to have evaluation in the past but declines.  She does not want a meet with urology and she does not want undergo cystoscopy.  She also wants down her Eliquis and is afraid they would tell her to stop it.  We will check a CBC to ensure there is no anemia and continue discussions about her hematuria at future follow-up.         Relevant Orders    CBC WITH DIFFERENTIAL    Skin lesion     Previous problem, patient denies prior work up. Will check FILI's and venous ultrasound and refer her to wound care for possible debridement/evaluation. She denies prior infections related to her foot ulcers. The skin  changes are making it more difficult for her to ambulate.         Acute on chronic diastolic congestive heart failure (HCC)     Relatively new decompensated problem.  Clinical diagnosis of congestive heart failure due to exam and elevated BNP.  Initial improvement in weight on diuretics however she has since worsened due to not following a low-sodium diet (eating Chinese food, adding salt to diet, eating salt rich foods such as Ramen noodles).  Repeat echo to be completed in 2 days.  We are can update her kidney function due to recent JAMES as we may need to lower her Eliquis dose if her creatinine remains above 1.5.  Unclear if she had congestive nephropathy or if this was prerenal azotemia from overdiuresis, however on exam she is still fluid up so this seems less likely.  I will notify cardiology so they are aware of her weight gain in case they want to modify her Lasix, hopefully will have her lab results in the next day.         Relevant Medications    furosemide (LASIX) 40 MG Tab    Other Relevant Orders    CBC WITH DIFFERENTIAL    REFERRAL TO HOME HEALTH    Venous stasis dermatitis of both lower extremities    Relevant Orders    LOWER EXTREMITY VENOUS DUPLEX    REFERRAL TO WOUND CLINIC    Arterial insufficiency with ischemic ulcer (HCC)     New problem that is never been evaluated.  We will obtain arterial brachial index to identify if she has any upstream arterial blockages contributing to the symmetric formation of these ulcerations.  Also refer her to wound care to assist with both ulcerations on her bilateral feet as well as multiple skin tears on the left upper extremity.         Relevant Medications    furosemide (LASIX) 40 MG Tab    Other Relevant Orders    US-EXTREMITY ARTERY LOWER BILAT W/FILI (COMBO)    REFERRAL TO WOUND CLINIC    Cold intolerance     No in decompensated problem.  We will evaluate her thyroid to ensure this is stable but I suspect this is poor circulation from her venous and arterial  disease.         Relevant Orders    TSH WITH REFLEX TO FT4    Hx of breast cancer     Previous problem, presumably stable.  She declines recent follow-up with oncology.         Depression     Previous problem, currently decompensated.  Will reinitiate Prozac 20 mg daily if she took this in the past and it was helpful.  We will follow-up with her in 1 month to see how she is doing on it.         Relevant Medications    FLUoxetine (PROZAC) 20 MG Cap      Other Visit Diagnoses     Need for influenza vaccination        Relevant Orders    INFLUENZA VACCINE, HIGH DOSE (65+ ONLY) (Completed)         Return in about 1 month (around 1/30/2020).    Please note that this dictation was created using voice recognition software. I have made every reasonable attempt to correct obvious errors, but I expect that there are errors of grammar and possibly content that I did not discover before finalizing the note.

## 2019-12-30 NOTE — ASSESSMENT & PLAN NOTE
Relatively new decompensated problem.  Clinical diagnosis of congestive heart failure due to exam and elevated BNP.  Initial improvement in weight on diuretics however she has since worsened due to not following a low-sodium diet (eating Chinese food, adding salt to diet, eating salt rich foods such as Ramen noodles).  Repeat echo to be completed in 2 days.  We are can update her kidney function due to recent JAMES as we may need to lower her Eliquis dose if her creatinine remains above 1.5.  Unclear if she had congestive nephropathy or if this was prerenal azotemia from overdiuresis, however on exam she is still fluid up so this seems less likely.  I will notify cardiology so they are aware of her weight gain in case they want to modify her Lasix, hopefully will have her lab results in the next day.

## 2019-12-30 NOTE — ASSESSMENT & PLAN NOTE
New problem that is never been evaluated.  We will obtain arterial brachial index to identify if she has any upstream arterial blockages contributing to the symmetric formation of these ulcerations.  Also refer her to wound care to assist with both ulcerations on her bilateral feet as well as multiple skin tears on the left upper extremity.

## 2019-12-30 NOTE — ASSESSMENT & PLAN NOTE
Chronic and ongoing issue.  GFR down to 30 on most recent check on December 11.  We will recheck her creatinine and electrolytes at this time to make sure she is still appropriate to continue on the furosemide at the current dose.

## 2019-12-31 DIAGNOSIS — I48.0 PAF (PAROXYSMAL ATRIAL FIBRILLATION) (HCC): ICD-10-CM

## 2019-12-31 DIAGNOSIS — N17.9 AKI (ACUTE KIDNEY INJURY) (HCC): ICD-10-CM

## 2019-12-31 NOTE — RESULT ENCOUNTER NOTE
Can you also call them? Not sure if they have [facility] set up yet.    Claus Chance and Rena,    I am not sure if you have access to my chart yet so I will also have my medical assistant call you.  Overall, the lab work is about the same with ongoing kidney dysfunction and normal thyroid and blood counts.  Because of the kidney function I recommend decreasing the Eliquis to 2.5 mg twice daily.  You can try and break in half what you have left and I will send in a new prescription for this lower dose.  Let me know what follow-up questions you have for me.  I have also sent a message to Dr. Mancia so he is aware of everything we discussed in clinic yesterday.    Have a happy new year,  Dr. Cole

## 2019-12-31 NOTE — TELEPHONE ENCOUNTER
Lm again as below.     Paperwork appears to be FMLA for pt dtr to asssit with exacerbations but please verify before completing paperwork and monse on forms. Paperwork completed otherwise and to EA desk to sign.

## 2019-12-31 NOTE — TELEPHONE ENCOUNTER
Spoke to dtr Nyasia. Paperwork is FMLA for dtr to take care of pt with exacerbations. Call pt after signed before faxed.

## 2020-01-01 ENCOUNTER — APPOINTMENT (OUTPATIENT)
Dept: MEDICAL GROUP | Facility: MEDICAL CENTER | Age: 85
End: 2020-01-01
Payer: MEDICARE

## 2020-01-01 ENCOUNTER — HOSPITAL ENCOUNTER (OUTPATIENT)
Facility: MEDICAL CENTER | Age: 85
End: 2020-11-13
Attending: EMERGENCY MEDICINE | Admitting: HOSPITALIST
Payer: MEDICARE

## 2020-01-01 ENCOUNTER — OFFICE VISIT (OUTPATIENT)
Dept: CARDIOLOGY | Facility: MEDICAL CENTER | Age: 85
End: 2020-01-01
Payer: MEDICARE

## 2020-01-01 ENCOUNTER — HOSPITAL ENCOUNTER (OUTPATIENT)
Dept: RADIOLOGY | Facility: MEDICAL CENTER | Age: 85
End: 2020-12-22
Attending: NURSE PRACTITIONER
Payer: MEDICARE

## 2020-01-01 ENCOUNTER — HOSPITAL ENCOUNTER (OUTPATIENT)
Dept: CARDIOLOGY | Facility: MEDICAL CENTER | Age: 85
End: 2020-01-01
Attending: INTERNAL MEDICINE
Payer: MEDICARE

## 2020-01-01 ENCOUNTER — TELEPHONE (OUTPATIENT)
Dept: CARDIOLOGY | Facility: MEDICAL CENTER | Age: 85
End: 2020-01-01

## 2020-01-01 ENCOUNTER — HOSPITAL ENCOUNTER (OUTPATIENT)
Dept: RADIOLOGY | Facility: MEDICAL CENTER | Age: 85
End: 2020-01-01
Attending: NURSE PRACTITIONER
Payer: MEDICARE

## 2020-01-01 ENCOUNTER — HOSPITAL ENCOUNTER (OUTPATIENT)
Dept: LAB | Facility: MEDICAL CENTER | Age: 85
End: 2020-10-20
Attending: INTERNAL MEDICINE
Payer: MEDICARE

## 2020-01-01 ENCOUNTER — APPOINTMENT (OUTPATIENT)
Dept: RADIOLOGY | Facility: MEDICAL CENTER | Age: 85
End: 2020-01-01
Attending: EMERGENCY MEDICINE
Payer: MEDICARE

## 2020-01-01 VITALS
HEART RATE: 64 BPM | RESPIRATION RATE: 14 BRPM | HEIGHT: 69 IN | SYSTOLIC BLOOD PRESSURE: 116 MMHG | OXYGEN SATURATION: 94 % | BODY MASS INDEX: 25.04 KG/M2 | DIASTOLIC BLOOD PRESSURE: 72 MMHG

## 2020-01-01 VITALS
BODY MASS INDEX: 24.81 KG/M2 | HEART RATE: 61 BPM | SYSTOLIC BLOOD PRESSURE: 116 MMHG | HEIGHT: 69 IN | OXYGEN SATURATION: 95 % | DIASTOLIC BLOOD PRESSURE: 72 MMHG

## 2020-01-01 VITALS
SYSTOLIC BLOOD PRESSURE: 113 MMHG | OXYGEN SATURATION: 95 % | HEIGHT: 68 IN | DIASTOLIC BLOOD PRESSURE: 69 MMHG | TEMPERATURE: 97.3 F | BODY MASS INDEX: 25.69 KG/M2 | RESPIRATION RATE: 18 BRPM | HEART RATE: 77 BPM | WEIGHT: 169.53 LBS

## 2020-01-01 DIAGNOSIS — R53.1 WEAKNESS: ICD-10-CM

## 2020-01-01 DIAGNOSIS — Z79.899 HIGH RISK MEDICATION USE: ICD-10-CM

## 2020-01-01 DIAGNOSIS — Z79.01 ANTICOAGULANT LONG-TERM USE: ICD-10-CM

## 2020-01-01 DIAGNOSIS — I49.3 PVC'S (PREMATURE VENTRICULAR CONTRACTIONS): ICD-10-CM

## 2020-01-01 DIAGNOSIS — R06.83 SNORES: ICD-10-CM

## 2020-01-01 DIAGNOSIS — I50.32 CHRONIC DIASTOLIC CONGESTIVE HEART FAILURE (HCC): ICD-10-CM

## 2020-01-01 DIAGNOSIS — I48.0 PAF (PAROXYSMAL ATRIAL FIBRILLATION) (HCC): ICD-10-CM

## 2020-01-01 DIAGNOSIS — I10 ESSENTIAL HYPERTENSION: ICD-10-CM

## 2020-01-01 DIAGNOSIS — R06.81 APNEIC EPISODE: ICD-10-CM

## 2020-01-01 DIAGNOSIS — I27.20 PULMONARY HYPERTENSION (HCC): ICD-10-CM

## 2020-01-01 DIAGNOSIS — D69.6 THROMBOCYTOPENIA (HCC): ICD-10-CM

## 2020-01-01 DIAGNOSIS — R06.02 SOB (SHORTNESS OF BREATH): ICD-10-CM

## 2020-01-01 DIAGNOSIS — R23.0 EXTREMITY CYANOSIS: ICD-10-CM

## 2020-01-01 DIAGNOSIS — N39.0 ACUTE URINARY TRACT INFECTION: ICD-10-CM

## 2020-01-01 DIAGNOSIS — I50.9 CONGESTIVE HEART FAILURE, UNSPECIFIED HF CHRONICITY, UNSPECIFIED HEART FAILURE TYPE (HCC): ICD-10-CM

## 2020-01-01 DIAGNOSIS — Z98.890 S/P ABLATION OF ATRIAL FLUTTER: ICD-10-CM

## 2020-01-01 DIAGNOSIS — Z95.0 CARDIAC PACEMAKER IN SITU: ICD-10-CM

## 2020-01-01 DIAGNOSIS — R06.02 SHORTNESS OF BREATH: ICD-10-CM

## 2020-01-01 DIAGNOSIS — Z86.79 S/P ABLATION OF ATRIAL FLUTTER: ICD-10-CM

## 2020-01-01 DIAGNOSIS — I73.9 PAD (PERIPHERAL ARTERY DISEASE) (HCC): ICD-10-CM

## 2020-01-01 DIAGNOSIS — N17.9 AKI (ACUTE KIDNEY INJURY) (HCC): ICD-10-CM

## 2020-01-01 DIAGNOSIS — I49.5 SICK SINUS SYNDROME (HCC): ICD-10-CM

## 2020-01-01 LAB
ALBUMIN SERPL BCP-MCNC: 3.2 G/DL (ref 3.2–4.9)
ALBUMIN SERPL BCP-MCNC: 4 G/DL (ref 3.2–4.9)
ALBUMIN/GLOB SERPL: 1.2 G/DL
ALBUMIN/GLOB SERPL: 1.2 G/DL
ALP SERPL-CCNC: 103 U/L (ref 30–99)
ALP SERPL-CCNC: 79 U/L (ref 30–99)
ALT SERPL-CCNC: 12 U/L (ref 2–50)
ALT SERPL-CCNC: 16 U/L (ref 2–50)
ANION GAP SERPL CALC-SCNC: 10 MMOL/L (ref 7–16)
ANION GAP SERPL CALC-SCNC: 11 MMOL/L (ref 7–16)
ANION GAP SERPL CALC-SCNC: 16 MMOL/L (ref 7–16)
ANION GAP SERPL CALC-SCNC: 16 MMOL/L (ref 7–16)
APPEARANCE UR: CLEAR
AST SERPL-CCNC: 18 U/L (ref 12–45)
AST SERPL-CCNC: 25 U/L (ref 12–45)
BACTERIA #/AREA URNS HPF: ABNORMAL /HPF
BACTERIA BLD CULT: NORMAL
BACTERIA BLD CULT: NORMAL
BASOPHILS # BLD AUTO: 0.5 % (ref 0–1.8)
BASOPHILS # BLD AUTO: 0.9 % (ref 0–1.8)
BASOPHILS # BLD AUTO: 1.6 % (ref 0–1.8)
BASOPHILS # BLD: 0.05 K/UL (ref 0–0.12)
BASOPHILS # BLD: 0.1 K/UL (ref 0–0.12)
BASOPHILS # BLD: 0.15 K/UL (ref 0–0.12)
BILIRUB SERPL-MCNC: 0.5 MG/DL (ref 0.1–1.5)
BILIRUB SERPL-MCNC: 0.7 MG/DL (ref 0.1–1.5)
BILIRUB UR QL STRIP.AUTO: NEGATIVE
BUN SERPL-MCNC: 20 MG/DL (ref 8–22)
BUN SERPL-MCNC: 30 MG/DL (ref 8–22)
BUN SERPL-MCNC: 38 MG/DL (ref 8–22)
BUN SERPL-MCNC: 47 MG/DL (ref 8–22)
CALCIUM SERPL-MCNC: 10.4 MG/DL (ref 8.5–10.5)
CALCIUM SERPL-MCNC: 8.6 MG/DL (ref 8.4–10.2)
CALCIUM SERPL-MCNC: 8.7 MG/DL (ref 8.4–10.2)
CALCIUM SERPL-MCNC: 9.6 MG/DL (ref 8.4–10.2)
CHLORIDE SERPL-SCNC: 100 MMOL/L (ref 96–112)
CHLORIDE SERPL-SCNC: 107 MMOL/L (ref 96–112)
CHLORIDE SERPL-SCNC: 109 MMOL/L (ref 96–112)
CHLORIDE SERPL-SCNC: 97 MMOL/L (ref 96–112)
CO2 SERPL-SCNC: 19 MMOL/L (ref 20–33)
CO2 SERPL-SCNC: 19 MMOL/L (ref 20–33)
CO2 SERPL-SCNC: 20 MMOL/L (ref 20–33)
CO2 SERPL-SCNC: 20 MMOL/L (ref 20–33)
COLOR UR: YELLOW
COVID ORDER STATUS COVID19: NORMAL
CREAT SERPL-MCNC: 1.02 MG/DL (ref 0.5–1.4)
CREAT SERPL-MCNC: 1.37 MG/DL (ref 0.5–1.4)
CREAT SERPL-MCNC: 1.75 MG/DL (ref 0.5–1.4)
CREAT SERPL-MCNC: 1.92 MG/DL (ref 0.5–1.4)
EKG IMPRESSION: NORMAL
EOSINOPHIL # BLD AUTO: 0.08 K/UL (ref 0–0.51)
EOSINOPHIL # BLD AUTO: 0.1 K/UL (ref 0–0.51)
EOSINOPHIL # BLD AUTO: 0.2 K/UL (ref 0–0.51)
EOSINOPHIL NFR BLD: 0.8 % (ref 0–6.9)
EOSINOPHIL NFR BLD: 1.1 % (ref 0–6.9)
EOSINOPHIL NFR BLD: 1.8 % (ref 0–6.9)
EPI CELLS #/AREA URNS HPF: ABNORMAL /HPF
ERYTHROCYTE [DISTWIDTH] IN BLOOD BY AUTOMATED COUNT: 46.6 FL (ref 35.9–50)
ERYTHROCYTE [DISTWIDTH] IN BLOOD BY AUTOMATED COUNT: 47.9 FL (ref 35.9–50)
ERYTHROCYTE [DISTWIDTH] IN BLOOD BY AUTOMATED COUNT: 48.4 FL (ref 35.9–50)
GLOBULIN SER CALC-MCNC: 2.6 G/DL (ref 1.9–3.5)
GLOBULIN SER CALC-MCNC: 3.4 G/DL (ref 1.9–3.5)
GLUCOSE SERPL-MCNC: 107 MG/DL (ref 65–99)
GLUCOSE SERPL-MCNC: 121 MG/DL (ref 65–99)
GLUCOSE SERPL-MCNC: 136 MG/DL (ref 65–99)
GLUCOSE SERPL-MCNC: 96 MG/DL (ref 65–99)
GLUCOSE UR STRIP.AUTO-MCNC: NEGATIVE MG/DL
HCT VFR BLD AUTO: 43.8 % (ref 37–47)
HCT VFR BLD AUTO: 52.2 % (ref 37–47)
HCT VFR BLD AUTO: 54.7 % (ref 37–47)
HGB BLD-MCNC: 14.1 G/DL (ref 12–16)
HGB BLD-MCNC: 16.9 G/DL (ref 12–16)
HGB BLD-MCNC: 17.5 G/DL (ref 12–16)
IMM GRANULOCYTES # BLD AUTO: 0.02 K/UL (ref 0–0.11)
IMM GRANULOCYTES # BLD AUTO: 0.03 K/UL (ref 0–0.11)
IMM GRANULOCYTES # BLD AUTO: 0.04 K/UL (ref 0–0.11)
IMM GRANULOCYTES NFR BLD AUTO: 0.2 % (ref 0–0.9)
IMM GRANULOCYTES NFR BLD AUTO: 0.3 % (ref 0–0.9)
IMM GRANULOCYTES NFR BLD AUTO: 0.4 % (ref 0–0.9)
KETONES UR STRIP.AUTO-MCNC: NEGATIVE MG/DL
LACTATE BLD-SCNC: 1.1 MMOL/L (ref 0.5–2)
LACTATE BLD-SCNC: 2.3 MMOL/L (ref 0.5–2)
LACTATE BLD-SCNC: 2.9 MMOL/L (ref 0.5–2)
LEUKOCYTE ESTERASE UR QL STRIP.AUTO: NEGATIVE
LYMPHOCYTES # BLD AUTO: 2.55 K/UL (ref 1–4.8)
LYMPHOCYTES # BLD AUTO: 2.59 K/UL (ref 1–4.8)
LYMPHOCYTES # BLD AUTO: 2.96 K/UL (ref 1–4.8)
LYMPHOCYTES NFR BLD: 25.7 % (ref 22–41)
LYMPHOCYTES NFR BLD: 26.1 % (ref 22–41)
LYMPHOCYTES NFR BLD: 27.6 % (ref 22–41)
MAGNESIUM SERPL-MCNC: 1.6 MG/DL (ref 1.5–2.5)
MCH RBC QN AUTO: 31.1 PG (ref 27–33)
MCH RBC QN AUTO: 31.2 PG (ref 27–33)
MCH RBC QN AUTO: 31.6 PG (ref 27–33)
MCHC RBC AUTO-ENTMCNC: 32 G/DL (ref 33.6–35)
MCHC RBC AUTO-ENTMCNC: 32.2 G/DL (ref 33.6–35)
MCHC RBC AUTO-ENTMCNC: 32.4 G/DL (ref 33.6–35)
MCV RBC AUTO: 96.3 FL (ref 81.4–97.8)
MCV RBC AUTO: 96.7 FL (ref 81.4–97.8)
MCV RBC AUTO: 98.9 FL (ref 81.4–97.8)
MICRO URNS: ABNORMAL
MONOCYTES # BLD AUTO: 0.78 K/UL (ref 0–0.85)
MONOCYTES # BLD AUTO: 0.92 K/UL (ref 0–0.85)
MONOCYTES # BLD AUTO: 1.06 K/UL (ref 0–0.85)
MONOCYTES NFR BLD AUTO: 10.5 % (ref 0–13.4)
MONOCYTES NFR BLD AUTO: 8.1 % (ref 0–13.4)
MONOCYTES NFR BLD AUTO: 8.5 % (ref 0–13.4)
NEUTROPHILS # BLD AUTO: 5.63 K/UL (ref 2–7.15)
NEUTROPHILS # BLD AUTO: 6.28 K/UL (ref 2–7.15)
NEUTROPHILS # BLD AUTO: 7.12 K/UL (ref 2–7.15)
NEUTROPHILS NFR BLD: 61 % (ref 44–72)
NEUTROPHILS NFR BLD: 62.2 % (ref 44–72)
NEUTROPHILS NFR BLD: 62.7 % (ref 44–72)
NITRITE UR QL STRIP.AUTO: POSITIVE
NRBC # BLD AUTO: 0 K/UL
NRBC BLD-RTO: 0 /100 WBC
NT-PROBNP SERPL IA-MCNC: 476 PG/ML (ref 0–125)
NT-PROBNP SERPL IA-MCNC: 6510 PG/ML (ref 0–125)
PH UR STRIP.AUTO: 5.5 [PH] (ref 5–8)
PLATELET # BLD AUTO: 138 K/UL (ref 164–446)
PLATELET # BLD AUTO: 177 K/UL (ref 164–446)
PMV BLD AUTO: 10.3 FL (ref 9–12.9)
PMV BLD AUTO: 9.9 FL (ref 9–12.9)
POTASSIUM SERPL-SCNC: 3.9 MMOL/L (ref 3.6–5.5)
POTASSIUM SERPL-SCNC: 4.1 MMOL/L (ref 3.6–5.5)
POTASSIUM SERPL-SCNC: 4.3 MMOL/L (ref 3.6–5.5)
POTASSIUM SERPL-SCNC: 4.6 MMOL/L (ref 3.6–5.5)
PROT SERPL-MCNC: 5.8 G/DL (ref 6–8.2)
PROT SERPL-MCNC: 7.4 G/DL (ref 6–8.2)
PROT UR QL STRIP: NEGATIVE MG/DL
RBC # BLD AUTO: 4.53 M/UL (ref 4.2–5.4)
RBC # BLD AUTO: 5.42 M/UL (ref 4.2–5.4)
RBC # BLD AUTO: 5.53 M/UL (ref 4.2–5.4)
RBC # URNS HPF: ABNORMAL /HPF
RBC UR QL AUTO: NEGATIVE
SARS-COV-2 RNA RESP QL NAA+PROBE: NOTDETECTED
SIGNIFICANT IND 70042: NORMAL
SIGNIFICANT IND 70042: NORMAL
SITE SITE: NORMAL
SITE SITE: NORMAL
SODIUM SERPL-SCNC: 132 MMOL/L (ref 135–145)
SODIUM SERPL-SCNC: 136 MMOL/L (ref 135–145)
SODIUM SERPL-SCNC: 137 MMOL/L (ref 135–145)
SODIUM SERPL-SCNC: 139 MMOL/L (ref 135–145)
SOURCE SOURCE: NORMAL
SOURCE SOURCE: NORMAL
SP GR UR STRIP.AUTO: 1.02
SPECIMEN SOURCE: NORMAL
TROPONIN T SERPL-MCNC: 30 NG/L (ref 6–19)
TROPONIN T SERPL-MCNC: 46 NG/L (ref 6–19)
WBC # BLD AUTO: 10.1 K/UL (ref 4.8–10.8)
WBC # BLD AUTO: 11.3 K/UL (ref 4.8–10.8)
WBC # BLD AUTO: 9.2 K/UL (ref 4.8–10.8)
WBC #/AREA URNS HPF: ABNORMAL /HPF

## 2020-01-01 PROCEDURE — 99214 OFFICE O/P EST MOD 30 MIN: CPT | Performed by: INTERNAL MEDICINE

## 2020-01-01 PROCEDURE — G0378 HOSPITAL OBSERVATION PER HR: HCPCS

## 2020-01-01 PROCEDURE — 700102 HCHG RX REV CODE 250 W/ 637 OVERRIDE(OP): Performed by: HOSPITALIST

## 2020-01-01 PROCEDURE — 84484 ASSAY OF TROPONIN QUANT: CPT

## 2020-01-01 PROCEDURE — A9270 NON-COVERED ITEM OR SERVICE: HCPCS | Performed by: HOSPITALIST

## 2020-01-01 PROCEDURE — 36415 COLL VENOUS BLD VENIPUNCTURE: CPT

## 2020-01-01 PROCEDURE — 99285 EMERGENCY DEPT VISIT HI MDM: CPT

## 2020-01-01 PROCEDURE — 700105 HCHG RX REV CODE 258: Performed by: HOSPITALIST

## 2020-01-01 PROCEDURE — 93971 EXTREMITY STUDY: CPT | Mod: LT

## 2020-01-01 PROCEDURE — 80048 BASIC METABOLIC PNL TOTAL CA: CPT

## 2020-01-01 PROCEDURE — 85025 COMPLETE CBC W/AUTO DIFF WBC: CPT

## 2020-01-01 PROCEDURE — 700111 HCHG RX REV CODE 636 W/ 250 OVERRIDE (IP): Performed by: HOSPITALIST

## 2020-01-01 PROCEDURE — 99217 PR OBSERVATION CARE DISCHARGE: CPT | Performed by: HOSPITALIST

## 2020-01-01 PROCEDURE — 93005 ELECTROCARDIOGRAM TRACING: CPT | Performed by: EMERGENCY MEDICINE

## 2020-01-01 PROCEDURE — 83880 ASSAY OF NATRIURETIC PEPTIDE: CPT

## 2020-01-01 PROCEDURE — C9803 HOPD COVID-19 SPEC COLLECT: HCPCS | Performed by: EMERGENCY MEDICINE

## 2020-01-01 PROCEDURE — 700105 HCHG RX REV CODE 258: Performed by: EMERGENCY MEDICINE

## 2020-01-01 PROCEDURE — 99226 PR SUBSEQUENT OBSERVATION CARE,LEVEL III: CPT | Performed by: HOSPITALIST

## 2020-01-01 PROCEDURE — 700111 HCHG RX REV CODE 636 W/ 250 OVERRIDE (IP): Performed by: EMERGENCY MEDICINE

## 2020-01-01 PROCEDURE — 51701 INSERT BLADDER CATHETER: CPT

## 2020-01-01 PROCEDURE — 83605 ASSAY OF LACTIC ACID: CPT

## 2020-01-01 PROCEDURE — 99220 PR INITIAL OBSERVATION CARE,LEVL III: CPT | Mod: AI | Performed by: HOSPITALIST

## 2020-01-01 PROCEDURE — 80053 COMPREHEN METABOLIC PANEL: CPT

## 2020-01-01 PROCEDURE — 96365 THER/PROPH/DIAG IV INF INIT: CPT

## 2020-01-01 PROCEDURE — 36415 COLL VENOUS BLD VENIPUNCTURE: CPT | Mod: XU

## 2020-01-01 PROCEDURE — 83735 ASSAY OF MAGNESIUM: CPT

## 2020-01-01 PROCEDURE — 85048 AUTOMATED LEUKOCYTE COUNT: CPT

## 2020-01-01 PROCEDURE — 96367 TX/PROPH/DG ADDL SEQ IV INF: CPT

## 2020-01-01 PROCEDURE — 93926 LOWER EXTREMITY STUDY: CPT | Mod: LT

## 2020-01-01 PROCEDURE — 99214 OFFICE O/P EST MOD 30 MIN: CPT | Performed by: NURSE PRACTITIONER

## 2020-01-01 PROCEDURE — 85041 AUTOMATED RBC COUNT: CPT

## 2020-01-01 PROCEDURE — U0003 INFECTIOUS AGENT DETECTION BY NUCLEIC ACID (DNA OR RNA); SEVERE ACUTE RESPIRATORY SYNDROME CORONAVIRUS 2 (SARS-COV-2) (CORONAVIRUS DISEASE [COVID-19]), AMPLIFIED PROBE TECHNIQUE, MAKING USE OF HIGH THROUGHPUT TECHNOLOGIES AS DESCRIBED BY CMS-2020-01-R: HCPCS

## 2020-01-01 PROCEDURE — 85014 HEMATOCRIT: CPT

## 2020-01-01 PROCEDURE — 87040 BLOOD CULTURE FOR BACTERIA: CPT

## 2020-01-01 PROCEDURE — 81001 URINALYSIS AUTO W/SCOPE: CPT

## 2020-01-01 PROCEDURE — 71045 X-RAY EXAM CHEST 1 VIEW: CPT

## 2020-01-01 PROCEDURE — 85018 HEMOGLOBIN: CPT

## 2020-01-01 PROCEDURE — 93922 UPR/L XTREMITY ART 2 LEVELS: CPT

## 2020-01-01 RX ORDER — CEFDINIR 300 MG/1
300 CAPSULE ORAL EVERY 12 HOURS
Qty: 6 CAP | Refills: 0 | Status: SHIPPED | OUTPATIENT
Start: 2020-01-01 | End: 2020-01-01

## 2020-01-01 RX ORDER — ACETAMINOPHEN 325 MG/1
650 TABLET ORAL EVERY 6 HOURS PRN
Status: DISCONTINUED | OUTPATIENT
Start: 2020-01-01 | End: 2020-01-01 | Stop reason: HOSPADM

## 2020-01-01 RX ORDER — METOPROLOL TARTRATE 50 MG/1
50 TABLET, FILM COATED ORAL 2 TIMES DAILY
Status: DISCONTINUED | OUTPATIENT
Start: 2020-01-01 | End: 2020-01-01 | Stop reason: HOSPADM

## 2020-01-01 RX ORDER — CEFDINIR 300 MG/1
300 CAPSULE ORAL EVERY 12 HOURS
Status: DISCONTINUED | OUTPATIENT
Start: 2020-01-01 | End: 2020-01-01 | Stop reason: HOSPADM

## 2020-01-01 RX ORDER — OMEPRAZOLE 20 MG/1
CAPSULE, DELAYED RELEASE ORAL
Status: SHIPPED | COMMUNITY
End: 2020-01-01

## 2020-01-01 RX ORDER — ONDANSETRON 4 MG/1
4 TABLET, ORALLY DISINTEGRATING ORAL EVERY 4 HOURS PRN
Status: DISCONTINUED | OUTPATIENT
Start: 2020-01-01 | End: 2020-01-01 | Stop reason: HOSPADM

## 2020-01-01 RX ORDER — SODIUM CHLORIDE 9 MG/ML
1000 INJECTION, SOLUTION INTRAVENOUS CONTINUOUS
Status: DISCONTINUED | OUTPATIENT
Start: 2020-01-01 | End: 2020-01-01

## 2020-01-01 RX ORDER — BISACODYL 10 MG
10 SUPPOSITORY, RECTAL RECTAL
Status: DISCONTINUED | OUTPATIENT
Start: 2020-01-01 | End: 2020-01-01

## 2020-01-01 RX ORDER — FUROSEMIDE 40 MG/1
20 TABLET ORAL DAILY
Qty: 180 TAB | Refills: 3 | Status: SHIPPED | OUTPATIENT
Start: 2020-01-01 | End: 2021-01-01

## 2020-01-01 RX ORDER — FERROUS GLUCONATE 324(38)MG
TABLET ORAL
Status: SHIPPED | COMMUNITY
End: 2020-01-01

## 2020-01-01 RX ORDER — POLYETHYLENE GLYCOL 3350 17 G/17G
1 POWDER, FOR SOLUTION ORAL
Status: DISCONTINUED | OUTPATIENT
Start: 2020-01-01 | End: 2020-01-01

## 2020-01-01 RX ORDER — METOPROLOL TARTRATE 50 MG/1
50 TABLET, FILM COATED ORAL 2 TIMES DAILY
Qty: 60 TAB | Refills: 11 | Status: SHIPPED | OUTPATIENT
Start: 2020-01-01 | End: 2021-01-01

## 2020-01-01 RX ORDER — SODIUM CHLORIDE 9 MG/ML
1000 INJECTION, SOLUTION INTRAVENOUS ONCE
Status: COMPLETED | OUTPATIENT
Start: 2020-01-01 | End: 2020-01-01

## 2020-01-01 RX ORDER — ONDANSETRON 2 MG/ML
4 INJECTION INTRAMUSCULAR; INTRAVENOUS EVERY 4 HOURS PRN
Status: DISCONTINUED | OUTPATIENT
Start: 2020-01-01 | End: 2020-01-01 | Stop reason: HOSPADM

## 2020-01-01 RX ORDER — SODIUM CHLORIDE 9 MG/ML
1000 INJECTION, SOLUTION INTRAVENOUS ONCE
Status: DISCONTINUED | OUTPATIENT
Start: 2020-01-01 | End: 2020-01-01

## 2020-01-01 RX ORDER — AMOXICILLIN 250 MG
1 CAPSULE ORAL 2 TIMES DAILY
Status: DISCONTINUED | OUTPATIENT
Start: 2020-01-01 | End: 2020-01-01 | Stop reason: HOSPADM

## 2020-01-01 RX ORDER — M-VIT,TX,IRON,MINS/CALC/FOLIC 27MG-0.4MG
1 TABLET ORAL EVERY MORNING
Status: DISCONTINUED | OUTPATIENT
Start: 2020-01-01 | End: 2020-01-01 | Stop reason: HOSPADM

## 2020-01-01 RX ORDER — AMOXICILLIN 250 MG
2 CAPSULE ORAL 2 TIMES DAILY
Status: DISCONTINUED | OUTPATIENT
Start: 2020-01-01 | End: 2020-01-01

## 2020-01-01 RX ORDER — SPIRONOLACTONE 25 MG/1
12.5 TABLET ORAL DAILY
Qty: 30 TAB | Refills: 11 | Status: SHIPPED | OUTPATIENT
Start: 2020-01-01 | End: 2021-01-01

## 2020-01-01 RX ADMIN — SENNOSIDES-DOCUSATE SODIUM TAB 8.6-50 MG 1 TABLET: 8.6-5 TAB at 17:21

## 2020-01-01 RX ADMIN — METOPROLOL TARTRATE 50 MG: 50 TABLET, FILM COATED ORAL at 05:19

## 2020-01-01 RX ADMIN — CEFTRIAXONE SODIUM 1 G: 1 INJECTION, POWDER, FOR SOLUTION INTRAMUSCULAR; INTRAVENOUS at 14:48

## 2020-01-01 RX ADMIN — APIXABAN 2.5 MG: 5 TABLET, FILM COATED ORAL at 17:21

## 2020-01-01 RX ADMIN — MULTIPLE VITAMINS W/ MINERALS TAB 1 TABLET: TAB at 05:06

## 2020-01-01 RX ADMIN — CEFTRIAXONE SODIUM 1 G: 1 INJECTION, POWDER, FOR SOLUTION INTRAMUSCULAR; INTRAVENOUS at 05:19

## 2020-01-01 RX ADMIN — SENNOSIDES-DOCUSATE SODIUM TAB 8.6-50 MG 1 TABLET: 8.6-5 TAB at 11:49

## 2020-01-01 RX ADMIN — APIXABAN 2.5 MG: 5 TABLET, FILM COATED ORAL at 17:18

## 2020-01-01 RX ADMIN — APIXABAN 2.5 MG: 5 TABLET, FILM COATED ORAL at 05:08

## 2020-01-01 RX ADMIN — APIXABAN 2.5 MG: 5 TABLET, FILM COATED ORAL at 17:04

## 2020-01-01 RX ADMIN — SODIUM CHLORIDE 1000 ML: 9 INJECTION, SOLUTION INTRAVENOUS at 23:54

## 2020-01-01 RX ADMIN — APIXABAN 2.5 MG: 5 TABLET, FILM COATED ORAL at 05:06

## 2020-01-01 RX ADMIN — MULTIPLE VITAMINS W/ MINERALS TAB 1 TABLET: TAB at 05:19

## 2020-01-01 RX ADMIN — CEFDINIR 300 MG: 300 CAPSULE ORAL at 05:05

## 2020-01-01 RX ADMIN — SODIUM CHLORIDE 1000 ML: 9 INJECTION, SOLUTION INTRAVENOUS at 14:48

## 2020-01-01 RX ADMIN — SODIUM CHLORIDE 1000 ML: 9 INJECTION, SOLUTION INTRAVENOUS at 05:05

## 2020-01-01 RX ADMIN — SODIUM CHLORIDE 1000 ML: 9 INJECTION, SOLUTION INTRAVENOUS at 15:43

## 2020-01-01 RX ADMIN — SODIUM CHLORIDE 1000 ML: 9 INJECTION, SOLUTION INTRAVENOUS at 05:15

## 2020-01-01 RX ADMIN — SENNOSIDES-DOCUSATE SODIUM TAB 8.6-50 MG 1 TABLET: 8.6-5 TAB at 05:05

## 2020-01-01 RX ADMIN — METOPROLOL TARTRATE 50 MG: 50 TABLET, FILM COATED ORAL at 05:09

## 2020-01-01 RX ADMIN — APIXABAN 2.5 MG: 5 TABLET, FILM COATED ORAL at 05:19

## 2020-01-01 RX ADMIN — APIXABAN 2.5 MG: 5 TABLET, FILM COATED ORAL at 17:29

## 2020-01-01 RX ADMIN — CEFDINIR 300 MG: 300 CAPSULE ORAL at 17:18

## 2020-01-01 RX ADMIN — CEFTRIAXONE SODIUM 1 G: 1 INJECTION, POWDER, FOR SOLUTION INTRAMUSCULAR; INTRAVENOUS at 05:11

## 2020-01-01 RX ADMIN — SODIUM CHLORIDE 1000 ML: 9 INJECTION, SOLUTION INTRAVENOUS at 10:42

## 2020-01-01 RX ADMIN — MULTIPLE VITAMINS W/ MINERALS TAB 1 TABLET: TAB at 05:09

## 2020-01-01 RX ADMIN — CEFDINIR 300 MG: 300 CAPSULE ORAL at 17:21

## 2020-01-01 ASSESSMENT — PATIENT HEALTH QUESTIONNAIRE - PHQ9
2. FEELING DOWN, DEPRESSED, IRRITABLE, OR HOPELESS: NOT AT ALL
SUM OF ALL RESPONSES TO PHQ9 QUESTIONS 1 AND 2: 0
1. LITTLE INTEREST OR PLEASURE IN DOING THINGS: NOT AT ALL

## 2020-01-01 ASSESSMENT — ENCOUNTER SYMPTOMS
NERVOUS/ANXIOUS: 0
CARDIOVASCULAR NEGATIVE: 1
SPUTUM PRODUCTION: 0
SPUTUM PRODUCTION: 0
ORTHOPNEA: 0
PND: 0
FLANK PAIN: 0
FEVER: 0
LOSS OF CONSCIOUSNESS: 0
SENSORY CHANGE: 0
DIARRHEA: 0
LOSS OF CONSCIOUSNESS: 0
EYE DISCHARGE: 0
BRUISES/BLEEDS EASILY: 0
SORE THROAT: 0
TREMORS: 0
FEVER: 0
FEVER: 0
WEAKNESS: 0
EYES NEGATIVE: 1
MYALGIAS: 1
CHILLS: 0
HEMOPTYSIS: 0
HEMOPTYSIS: 0
CHILLS: 0
GASTROINTESTINAL NEGATIVE: 1
DIZZINESS: 0
TINGLING: 0
BRUISES/BLEEDS EASILY: 0
SEIZURES: 0
CONSTITUTIONAL NEGATIVE: 1
PALPITATIONS: 0
CHILLS: 0
WHEEZING: 0
ROS GI COMMENTS: ANOREXIA
NAUSEA: 1
CLAUDICATION: 0
EYE PAIN: 0
VOMITING: 0
ABDOMINAL PAIN: 0
COUGH: 0
ABDOMINAL PAIN: 0
COUGH: 0
PALPITATIONS: 0
ORTHOPNEA: 0
FOCAL WEAKNESS: 0
RESPIRATORY NEGATIVE: 1
WEAKNESS: 1
BLOOD IN STOOL: 0
MYALGIAS: 0
STRIDOR: 0
VOMITING: 0
SHORTNESS OF BREATH: 0
COUGH: 0
PALPITATIONS: 0
NAUSEA: 0
STRIDOR: 0
HEARTBURN: 0
FEVER: 0
PND: 0
SPEECH CHANGE: 0
MYALGIAS: 0
SHORTNESS OF BREATH: 1
DIZZINESS: 0
NEUROLOGICAL NEGATIVE: 1
EYE REDNESS: 0
MUSCULOSKELETAL NEGATIVE: 1
CLAUDICATION: 0
SHORTNESS OF BREATH: 0
HALLUCINATIONS: 0
SORE THROAT: 0
BLURRED VISION: 0

## 2020-01-01 ASSESSMENT — CHA2DS2 SCORE
AGE 65 TO 74: NO
CHA2DS2 VASC SCORE: 5
VASCULAR DISEASE: NO
AGE 75 OR GREATER: YES
HYPERTENSION: YES
SEX: FEMALE
CHF OR LEFT VENTRICULAR DYSFUNCTION: YES
DIABETES: NO
PRIOR STROKE OR TIA OR THROMBOEMBOLISM: NO

## 2020-01-01 ASSESSMENT — FIBROSIS 4 INDEX: FIB4 SCORE: 3.42

## 2020-01-06 ENCOUNTER — HOSPITAL ENCOUNTER (OUTPATIENT)
Dept: CARDIOLOGY | Facility: MEDICAL CENTER | Age: 85
End: 2020-01-06
Attending: INTERNAL MEDICINE
Payer: MEDICARE

## 2020-01-06 DIAGNOSIS — I48.0 PAF (PAROXYSMAL ATRIAL FIBRILLATION) (HCC): ICD-10-CM

## 2020-01-06 LAB
LV EJECT FRACT  99904: 55
LV EJECT FRACT MOD 2C 99903: 55.33
LV EJECT FRACT MOD 4C 99902: 57.88
LV EJECT FRACT MOD BP 99901: 57.22

## 2020-01-06 PROCEDURE — 93306 TTE W/DOPPLER COMPLETE: CPT

## 2020-01-06 PROCEDURE — 93306 TTE W/DOPPLER COMPLETE: CPT | Mod: 26 | Performed by: INTERNAL MEDICINE

## 2020-01-07 ENCOUNTER — OFFICE VISIT (OUTPATIENT)
Dept: CARDIOLOGY | Facility: MEDICAL CENTER | Age: 85
End: 2020-01-07
Payer: MEDICARE

## 2020-01-07 ENCOUNTER — APPOINTMENT (OUTPATIENT)
Dept: WOUND CARE | Facility: MEDICAL CENTER | Age: 85
End: 2020-01-07
Attending: INTERNAL MEDICINE
Payer: MEDICARE

## 2020-01-07 VITALS
SYSTOLIC BLOOD PRESSURE: 106 MMHG | WEIGHT: 175 LBS | HEART RATE: 74 BPM | OXYGEN SATURATION: 93 % | BODY MASS INDEX: 25.92 KG/M2 | DIASTOLIC BLOOD PRESSURE: 68 MMHG | HEIGHT: 69 IN

## 2020-01-07 DIAGNOSIS — N17.9 ACUTE KIDNEY INJURY (HCC): ICD-10-CM

## 2020-01-07 DIAGNOSIS — I27.20 PULMONARY HYPERTENSION (HCC): ICD-10-CM

## 2020-01-07 DIAGNOSIS — Z79.899 HIGH RISK MEDICATION USE: ICD-10-CM

## 2020-01-07 DIAGNOSIS — Z79.01 ANTICOAGULANT LONG-TERM USE: ICD-10-CM

## 2020-01-07 DIAGNOSIS — L98.499 ARTERIAL INSUFFICIENCY WITH ISCHEMIC ULCER (HCC): ICD-10-CM

## 2020-01-07 DIAGNOSIS — I77.1 ARTERIAL INSUFFICIENCY WITH ISCHEMIC ULCER (HCC): ICD-10-CM

## 2020-01-07 DIAGNOSIS — I50.33 ACUTE ON CHRONIC DIASTOLIC CONGESTIVE HEART FAILURE (HCC): ICD-10-CM

## 2020-01-07 DIAGNOSIS — I10 ESSENTIAL HYPERTENSION: ICD-10-CM

## 2020-01-07 DIAGNOSIS — Z95.0 CARDIAC PACEMAKER IN SITU: ICD-10-CM

## 2020-01-07 PROCEDURE — 99214 OFFICE O/P EST MOD 30 MIN: CPT | Performed by: INTERNAL MEDICINE

## 2020-01-07 ASSESSMENT — ENCOUNTER SYMPTOMS
GASTROINTESTINAL NEGATIVE: 1
FEVER: 0
CLAUDICATION: 0
SHORTNESS OF BREATH: 0
PND: 0
MUSCULOSKELETAL NEGATIVE: 1
CARDIOVASCULAR NEGATIVE: 1
CONSTITUTIONAL NEGATIVE: 1
CHILLS: 0
RESPIRATORY NEGATIVE: 1
SORE THROAT: 0
EYES NEGATIVE: 1
WEAKNESS: 0
SPUTUM PRODUCTION: 0
BRUISES/BLEEDS EASILY: 0
DIZZINESS: 0
HEMOPTYSIS: 0
STRIDOR: 0
LOSS OF CONSCIOUSNESS: 0
ORTHOPNEA: 0
COUGH: 0
NEUROLOGICAL NEGATIVE: 1
WHEEZING: 0
PALPITATIONS: 0

## 2020-01-07 NOTE — PROGRESS NOTES
Chief Complaint   Patient presents with   • Atrial Fibrillation     F/V: 1 MO       Subjective:   Meron Rice is a 87 y.o. female who presents today as a follow-up for her high risk medication and paroxysmal atrial fibrillation sick sinus syndrome status post pacemaker placement with acute on chronic diastolic dysfunction.  She is now down to her dry weight of about 180 pounds.  She has some trace lower extremity lymphedema but no pamela edema.  She has no shortness of breath.  Her blood pressures been controlled.  Her most recent labs are appropriate.  Her last pacemaker interrogation showed a V pacing percentage of 37%.    Past Medical History:   Diagnosis Date   • Anemia    • Anxiety 2/4/2014   • Arrhythmia     atrial fibrillation   • Arthritis     generalized   • ASTHMA     has not needed an inhaler since 2010   • Breath shortness     on exertion   • Bronchitis      in past, not current   • Cancer (HCC) [id number]    Uterus and Breast   • CATARACT     beginning   • Chronic hip pain 5/19/2017   • Chronic left shoulder pain 12/11/2018   • Chronic low back pain    • Chronic midline low back pain without sciatica 5/14/2012   • Depression 5/19/2017   • Diarrhea 3/12/2019   • Dysuria 2/28/2019   • Foot-drop    • GERD (gastroesophageal reflux disease) 5/14/2012   • Hypertension    • Hyponatremia 5/14/2012   • Nerve pain 2/4/2016   • Neurogenic bladder 3/6/2017   • Other insomnia 2/4/2014   • Other specified symptom associated with female genital organs 1998    fibroids   • Pacemaker     in past, removed due to cellulitis   • Pain     left shoulder    • Pain of left lower extremity 1/15/2019   • Pain of left upper extremity 8/6/2018   • Polycythemia 3/12/2019   • Presbycusis 3/22/2018   • Thyroid disease     thryoid nodule   • Unspecified hemorrhagic conditions     post op hyst. Currently on Eliquis    • Unspecified urinary incontinence     lazy bladder; doesn't empty completely   • UTI (urinary tract infection)      frequent      Past Surgical History:   Procedure Laterality Date   • OTHER  09/2017    pacemaker removal    • FEMUR NAILING INTRAMEDULLARY Right 6/3/2015    Procedure: FEMUR NAILING INTRAMEDULLARY;  Surgeon: Deshaun Denis M.D.;  Location: SURGERY [facility];  Service:    • WOUND CLOSURE GENERAL  4/10/2013    Performed by Nano Varela M.D. at SURGERY SAME DAY [facility] ORS   • BREAST IMPLANT REMOVAL  4/10/2013    Performed by Jak Meza M.D. at SURGERY SAME DAY [facility] ORS   • BREAST RECONSTRUCTION  11/26/2012    Performed by Jak Meza M.D. at SURGERY [facility]   • HIP HEMIARTHROPLASTY  5/14/2012    left; Performed by KEITH COWART at SURGERY [facility]   • BREAST IMPLANT REVISION  7/11/2011    Performed by JAK MEZA at SURGERY [facility]   • NIPPLE RECONSTRUCTION  7/11/2011    Performed by JAK MEZA at SURGERY [facility]   • MASTECTOMY  12/15/2010    right   • CATH REMOVAL  12/15/2010    Performed by NANO VARELA at SURGERY [facility]   • BREAST RECONSTRUCTION  12/15/2010    Performed by JAK MEZA at SURGERY [facility]   • LATISSIMUS FLAP  12/15/2010    Performed by JAK MEZA at SURGERY [facility]   • CATH PLACEMENT  10/28/2009    Performed by NANO VARELA at SURGERY SAME DAY [facility]   • MASTECTOMY  9/30/2009    left   • AXILLARY NODE DISSECTION  9/15/2009    Performed by NANO VARELA at SURGERY SAME DAY [facility] ORS   • OTHER SURGICAL PROCEDURE  2004    bladder repair   • OTHER ORTHOPEDIC SURGERY  2002    laminectomy   • HYSTERECTOMY, TOTAL ABDOMINAL  2000   • GYN SURGERY  1998    excision of fibroids   • APPENDECTOMY  1968   • DILATION AND CURETTAGE  1961   • PACEMAKER INSERTION     • VEIN LIGAT & STRIP  1972, 1966    x2 bilateral     Family History   Problem Relation Age of Onset   • Diabetes Mother    • Heart Disease Mother    • Heart Disease Father    • Stroke Father    • Hypertension Father     • Diabetes Sister    • Heart Disease Sister    • Diabetes Other    • Heart Disease Other    • Stroke Other    • Cancer Other    • Hypertension Other    • Diabetes Other    • Diabetes Child    • Psychiatric Illness Child    • Hypertension Sister    • Kidney Disease Sister    • Stroke Sister    • Lung Cancer Brother    • Bipolar disorder Brother      Social History     Socioeconomic History   • Marital status:      Spouse name: Not on file   • Number of children: Not on file   • Years of education: Not on file   • Highest education level: Not on file   Occupational History   • Not on file   Social Needs   • Financial resource strain: Not on file   • Food insecurity:     Worry: Not on file     Inability: Not on file   • Transportation needs:     Medical: Not on file     Non-medical: Not on file   Tobacco Use   • Smoking status: Former Smoker     Packs/day: 0.10     Years: 1.00     Pack years: 0.10     Types: Cigarettes     Last attempt to quit: 1964     Years since quittin.0   • Smokeless tobacco: Never Used   • Tobacco comment: 50 years ago in 1960  SOCIAL    Substance and Sexual Activity   • Alcohol use: Yes     Comment: 3-4 drinks per week, glass of wine   • Drug use: No   • 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     Comment: Retired Nurse   Lifestyle   • Physical activity:     Days per week: Not on file     Minutes per session: Not on file   • Stress: Not on file   Relationships   • Social connections:     Talks on phone: Not on file     Gets together: Not on file     Attends Taoist service: Not on file     Active member of club or organization: Not on file     Attends meetings of clubs or organizations: Not on file     Relationship status: Not on file   • Intimate partner violence:     Fear of current or ex partner: Not on file     Emotionally abused: Not on file     Physically abused: Not on file     Forced sexual activity: Not on file   Other Topics Concern   • Not on file   Social History Narrative     Retired nurse, 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 She was  for 49 years before her  passed away in 2017. She is from [place] originally. She has worked in the inpatient setting and in hospice care.     Allergies   Allergen Reactions   • Alendronate-Butylpar-Propylpar-Saccharin [Fosamax] Diarrhea and Nausea     .     Outpatient Encounter Medications as of 1/7/2020   Medication Sig Dispense Refill   • apixaban (ELIQUIS) 5mg Tab Take 0.5 Tabs by mouth 2 Times a Day. 90 Tab 3   • FLUoxetine (PROZAC) 20 MG Cap Take 1 Cap by mouth every day. 90 Cap 3   • furosemide (LASIX) 40 MG Tab Take 1 Tab by mouth every day. 180 Tab 3   • potassium chloride SA (KDUR) 20 MEQ Tab CR Take 1 Tab by mouth every day. 90 Tab 3   • metoprolol (LOPRESSOR) 50 MG Tab TAKE ONE AND ONE-HALF TABLETS BY MOUTH TWICE DAILY 270 Tab 3   • Probiotic Product (PROBIOTIC PO) Take 2 Caps by mouth every day.     • therapeutic multivitamin-minerals (THERAGRAN-M) Tab Take 1 Tab by mouth every morning.       No facility-administered encounter medications on file as of 1/7/2020.      Review of Systems   Constitutional: Negative.  Negative for chills, fever and malaise/fatigue.   HENT: Negative.  Negative for sore throat.    Eyes: Negative.    Respiratory: Negative.  Negative for cough, hemoptysis, sputum production, shortness of breath, wheezing and stridor.    Cardiovascular: Negative.  Negative for chest pain, palpitations, orthopnea, claudication, leg swelling and PND.   Gastrointestinal: Negative.    Genitourinary: Negative.    Musculoskeletal: Negative.    Skin: Negative.    Neurological: Negative.  Negative for dizziness, loss of consciousness and weakness.   Endo/Heme/Allergies: Negative.  Does not bruise/bleed easily.   All other systems reviewed and are negative.       Objective:   /68 (BP Location: Right arm, Patient Position: Sitting, BP Cuff Size: Adult)   Pulse 74   Ht 1.753  "m (5' 9\")   Wt 79.4 kg (175 lb)   SpO2 93%   BMI 25.84 kg/m²     Physical Exam   Constitutional: She appears well-developed and well-nourished. No distress.   HENT:   Head: Normocephalic and atraumatic.   Right Ear: External ear normal.   Left Ear: External ear normal.   Nose: Nose normal.   Mouth/Throat: No oropharyngeal exudate.   Eyes: Pupils are equal, round, and reactive to light. Conjunctivae and EOM are normal. Right eye exhibits no discharge. Left eye exhibits no discharge. No scleral icterus.   Neck: Neck supple. No JVD present.   Cardiovascular: Normal rate, regular rhythm and intact distal pulses. Exam reveals no gallop and no friction rub.   No murmur heard.  Pulmonary/Chest: Effort normal. No stridor. No respiratory distress. She has no wheezes. She has no rales. She exhibits no tenderness.   Abdominal: Soft. She exhibits no distension. There is no guarding.   Musculoskeletal: Normal range of motion.         General: No tenderness, deformity or edema.   Neurological: She is alert. She has normal reflexes. She displays normal reflexes. No cranial nerve deficit. She exhibits normal muscle tone. Coordination normal.   Skin: Skin is warm and dry. No rash noted. She is not diaphoretic. No erythema. No pallor.   Psychiatric: She has a normal mood and affect. Her behavior is normal. Judgment and thought content normal.   Nursing note and vitals reviewed.      Assessment:     1. Cardiac pacemaker in situ     2. Arterial insufficiency with ischemic ulcer (HCC)     3. Anticoagulant long-term use     4. Acute on chronic diastolic congestive heart failure (HCC)     5. Acute kidney injury (HCC)     6. Essential hypertension     7. Pulmonary hypertension (HCC)     8. High risk medication use         Medical Decision Making:  Today's Assessment / Status / Plan:     87-year-old female with paroxysmal atrial fibrillation high blood pressure pacemaker lower extremity edema and diastolic dysfunction.  At this point she " is doing well.  I will make no changes to her medical therapy.  We will see her back in 6 months.    1. SSS s/p PPM    - 37% V-paced    - F/U device clinic    2. LE Edema    - cont lasix 40    - cont KCL    3. A-fib    - cont apixaban 5 bid    4. CHFpEF    - meds per above    5. High Risk Meds    - labs reviewed

## 2020-01-08 NOTE — TELEPHONE ENCOUNTER
Isma was accidentally put in shredder by another employee, veronica, dtr notified. Spoke to dtr, pt cant walk, needs assistance with daily care, no longer cooks for self. This added to paperwork, dtr will come to finalize and  1/15

## 2020-01-08 NOTE — TELEPHONE ENCOUNTER
DS      Patient's daughter Nyasia is calling about [facility] paperwork, she said it accidentally got shredded. She would like a call back at [phone].

## 2020-02-10 PROBLEM — N18.30 CKD (CHRONIC KIDNEY DISEASE) STAGE 3, GFR 30-59 ML/MIN: Status: ACTIVE | Noted: 2020-02-10

## 2020-02-10 PROBLEM — I50.32 CHRONIC DIASTOLIC CONGESTIVE HEART FAILURE (HCC): Status: ACTIVE | Noted: 2020-02-10

## 2020-04-09 ENCOUNTER — TELEMEDICINE (OUTPATIENT)
Dept: CARDIOLOGY | Facility: MEDICAL CENTER | Age: 85
End: 2020-04-09
Payer: MEDICARE

## 2020-04-09 VITALS
BODY MASS INDEX: 24.88 KG/M2 | DIASTOLIC BLOOD PRESSURE: 82 MMHG | WEIGHT: 168 LBS | SYSTOLIC BLOOD PRESSURE: 126 MMHG | HEIGHT: 69 IN | HEART RATE: 66 BPM

## 2020-04-09 DIAGNOSIS — I49.3 PVC'S (PREMATURE VENTRICULAR CONTRACTIONS): ICD-10-CM

## 2020-04-09 DIAGNOSIS — Z98.890 S/P ABLATION OF ATRIAL FLUTTER: ICD-10-CM

## 2020-04-09 DIAGNOSIS — I48.0 PAF (PAROXYSMAL ATRIAL FIBRILLATION) (HCC): ICD-10-CM

## 2020-04-09 DIAGNOSIS — N18.30 CKD (CHRONIC KIDNEY DISEASE) STAGE 3, GFR 30-59 ML/MIN: ICD-10-CM

## 2020-04-09 DIAGNOSIS — I10 ESSENTIAL HYPERTENSION: ICD-10-CM

## 2020-04-09 DIAGNOSIS — L98.499 ARTERIAL INSUFFICIENCY WITH ISCHEMIC ULCER (HCC): ICD-10-CM

## 2020-04-09 DIAGNOSIS — Z95.0 CARDIAC PACEMAKER IN SITU: ICD-10-CM

## 2020-04-09 DIAGNOSIS — I77.1 ARTERIAL INSUFFICIENCY WITH ISCHEMIC ULCER (HCC): ICD-10-CM

## 2020-04-09 DIAGNOSIS — I49.5 SICK SINUS SYNDROME (HCC): ICD-10-CM

## 2020-04-09 DIAGNOSIS — I27.20 PULMONARY HYPERTENSION (HCC): ICD-10-CM

## 2020-04-09 DIAGNOSIS — D69.6 THROMBOCYTOPENIA (HCC): ICD-10-CM

## 2020-04-09 DIAGNOSIS — Z79.01 ANTICOAGULANT LONG-TERM USE: ICD-10-CM

## 2020-04-09 DIAGNOSIS — Z86.79 S/P ABLATION OF ATRIAL FLUTTER: ICD-10-CM

## 2020-04-09 DIAGNOSIS — I50.32 CHRONIC DIASTOLIC CONGESTIVE HEART FAILURE (HCC): ICD-10-CM

## 2020-04-09 DIAGNOSIS — I50.9 CONGESTIVE HEART FAILURE, UNSPECIFIED HF CHRONICITY, UNSPECIFIED HEART FAILURE TYPE (HCC): ICD-10-CM

## 2020-04-09 PROCEDURE — 99214 OFFICE O/P EST MOD 30 MIN: CPT | Mod: 95,CR | Performed by: INTERNAL MEDICINE

## 2020-04-09 RX ORDER — POTASSIUM CHLORIDE 20 MEQ/1
20 TABLET, EXTENDED RELEASE ORAL DAILY
Qty: 90 TAB | Refills: 3 | Status: SHIPPED | OUTPATIENT
Start: 2020-04-09 | End: 2020-01-01

## 2020-04-09 RX ORDER — FUROSEMIDE 40 MG/1
40 TABLET ORAL DAILY
Qty: 180 TAB | Refills: 3 | Status: ON HOLD | OUTPATIENT
Start: 2020-04-09 | End: 2020-01-01 | Stop reason: SDUPTHER

## 2020-04-09 RX ORDER — METOPROLOL TARTRATE 50 MG/1
75 TABLET, FILM COATED ORAL 2 TIMES DAILY
Qty: 270 TAB | Refills: 3 | Status: SHIPPED | OUTPATIENT
Start: 2020-04-09 | End: 2020-07-16 | Stop reason: SDUPTHER

## 2020-04-09 ASSESSMENT — ENCOUNTER SYMPTOMS
PND: 0
SPUTUM PRODUCTION: 0
FEVER: 0
BRUISES/BLEEDS EASILY: 0
EYES NEGATIVE: 1
DIZZINESS: 0
RESPIRATORY NEGATIVE: 1
ORTHOPNEA: 0
PALPITATIONS: 0
LOSS OF CONSCIOUSNESS: 0
SHORTNESS OF BREATH: 0
WHEEZING: 0
COUGH: 0
GASTROINTESTINAL NEGATIVE: 1
NEUROLOGICAL NEGATIVE: 1
WEAKNESS: 0
STRIDOR: 0
HEMOPTYSIS: 0
CHILLS: 0
CLAUDICATION: 0
SORE THROAT: 0
CARDIOVASCULAR NEGATIVE: 1
MUSCULOSKELETAL NEGATIVE: 1

## 2020-04-09 ASSESSMENT — FIBROSIS 4 INDEX: FIB4 SCORE: 3.42

## 2020-04-09 NOTE — PROGRESS NOTES
Chief Complaint   Patient presents with   • Atrial Fibrillation       Subjective:   Meron Rice is a 87 y.o. female who presents today As a follow-up for her hypertension sick sinus syndrome pacemaker paroxysmal atrial fibrillation and high risk medication usage.  Since she was last seen she is doing well.  She has stable lower extremity edema.  She is taking furosemide and potassium for this.  She is checking her blood pressures at home which are averaging about 120-130/80.  She is only complaining of lack of energy.  She does need refills of her medications today.    Past Medical History:   Diagnosis Date   • Anemia    • Anxiety 2/4/2014   • Arrhythmia     atrial fibrillation   • Arthritis     generalized   • ASTHMA     has not needed an inhaler since 2010   • Cancer ([facility]) [id number]    Uterus and Breast   • CATARACT     beginning   • Chronic diastolic congestive heart failure ([facility]) 2/10/2020    Echo (March 2019): CONCLUSIONS Grossly normal left ventricular systolic function. Left ventricular ejection fraction is visually estimated to be 55%. Indeterminate diastolic function. Aortic sclerosis without stenosis. Estimated right ventricular systolic pressure is 41 mmHg + JVP.  Echo (May 2017): Left ventricular ejection fraction is visually estimated to be 55%. Normal regional wall motion. Gr   • Chronic hip pain 5/19/2017   • Chronic left shoulder pain 12/11/2018   • Chronic midline low back pain without sciatica 5/14/2012   • CKD (chronic kidney disease) stage 3, GFR 30-59 ml/min ([facility]) 2/10/2020      Ref. Range 3/12/2019 18:55 3/13/2019 05:17 12/11/2019 11:05 Creatinine Latest Ref Range: 0.50 - 1.40 mg/dL 1.33 1.28 1.57 (H) GFR If Non  Latest Ref Range: >60 mL/min/1.73 m 2 38 (A) 40 (A) 31 (A)  She had normal kidney function in April 2018 and since March 2019 she has had ongoing JAMES with fluctuations of GFR between 30 and 40.  She was diagnosed with heart failure exacerbation   • Depression 5/19/2017    • Diarrhea 3/12/2019   • Foot-drop    • GERD (gastroesophageal reflux disease) 5/14/2012   • Hypertension    • Neurogenic bladder 3/6/2017   • Other insomnia 2/4/2014   • Pacemaker     in past, removed due to cellulitis   • Presbycusis 3/22/2018   • Thyroid nodule    • Unspecified hemorrhagic conditions     post op hyst. Currently on Eliquis    • Unspecified urinary incontinence     lazy bladder; doesn't empty completely   • UTI (urinary tract infection)     frequent      Past Surgical History:   Procedure Laterality Date   • OTHER  09/2017    pacemaker removal    • FEMUR NAILING INTRAMEDULLARY Right 6/3/2015    Procedure: FEMUR NAILING INTRAMEDULLARY;  Surgeon: Deshaun Denis M.D.;  Location: SURGERY [facility];  Service:    • WOUND CLOSURE GENERAL  4/10/2013    Performed by Nano Varela M.D. at SURGERY SAME DAY [facility] ORS   • BREAST IMPLANT REMOVAL  4/10/2013    Performed by Jak Meza M.D. at SURGERY SAME DAY [facility] ORS   • BREAST RECONSTRUCTION  11/26/2012    Performed by Jak Meza M.D. at SURGERY [facility]   • HIP HEMIARTHROPLASTY  5/14/2012    left; Performed by KEITH COWART at SURGERY [facility]   • BREAST IMPLANT REVISION  7/11/2011    Performed by JAK MEZA at SURGERY [facility]   • NIPPLE RECONSTRUCTION  7/11/2011    Performed by JAK MEZA at SURGERY [facility]   • MASTECTOMY  12/15/2010    right   • CATH REMOVAL  12/15/2010    Performed by NANO VARELA at SURGERY [facility] ORS   • BREAST RECONSTRUCTION  12/15/2010    Performed by JAK MEZA at SURGERY [facility]   • LATISSIMUS FLAP  12/15/2010    Performed by JAK MEZA at SURGERY [facility] ORS   • CATH PLACEMENT  10/28/2009    Performed by NANO VARELA at SURGERY SAME DAY [facility] ORS   • MASTECTOMY  9/30/2009    left   • AXILLARY NODE DISSECTION  9/15/2009    Performed by NANO VARELA at SURGERY SAME DAY [facility] ORS   • OTHER SURGICAL  PROCEDURE      bladder repair   • OTHER ORTHOPEDIC SURGERY      laminectomy   • HYSTERECTOMY, TOTAL ABDOMINAL     • GYN SURGERY      excision of fibroids   • APPENDECTOMY     • DILATION AND CURETTAGE     • PACEMAKER INSERTION     • VEIN LIGAT & STRIP  , 1966    x2 bilateral     Family History   Problem Relation Age of Onset   • Diabetes Mother    • Heart Disease Mother    • Heart Disease Father    • Stroke Father    • Hypertension Father    • Diabetes Sister    • Heart Disease Sister    • Diabetes Other    • Heart Disease Other    • Stroke Other    • Cancer Other    • Hypertension Other    • Diabetes Other    • Diabetes Child    • Psychiatric Illness Child    • Hypertension Sister    • Kidney Disease Sister    • Stroke Sister    • Lung Cancer Brother    • Bipolar disorder Brother      Social History     Socioeconomic History   • Marital status:      Spouse name: Not on file   • Number of children: Not on file   • Years of education: Not on file   • Highest education level: Not on file   Occupational History   • Not on file   Social Needs   • Financial resource strain: Not on file   • Food insecurity     Worry: Not on file     Inability: Not on file   • Transportation needs     Medical: Not on file     Non-medical: Not on file   Tobacco Use   • Smoking status: Former Smoker     Packs/day: 0.10     Years: 1.00     Pack years: 0.10     Types: Cigarettes     Last attempt to quit: 1964     Years since quittin.3   • Smokeless tobacco: Never Used   • Tobacco comment: 50 years ago in 1960  SOCIAL    Substance and Sexual Activity   • Alcohol use: Yes     Comment: 3-4 drinks per week, glass of wine   • Drug use: No   • 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     Comment: Retired Nurse   Lifestyle   • Physical activity     Days per week: Not on file     Minutes per session: Not on file   • Stress: Not on file   Relationships   • Social connections     Talks on phone: Not on file     Gets  together: Not on file     Attends Taoist service: Not on file     Active member of club or organization: Not on file     Attends meetings of clubs or organizations: Not on file     Relationship status: Not on file   • Intimate partner violence     Fear of current or ex partner: Not on file     Emotionally abused: Not on file     Physically abused: Not on file     Forced sexual activity: Not on file   Other Topics Concern   • Not on file   Social History Narrative    Retired nurse, 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 She was  for 49 years before her  passed away in 2017. She is from [place] originally. She has worked in the inpatient setting and in hospice care.     Allergies   Allergen Reactions   • Alendronate-Butylpar-Propylpar-Saccharin [Fosamax] Diarrhea and Nausea     .   • Atorvastatin      Causes low quality     Outpatient Encounter Medications as of 4/9/2020   Medication Sig Dispense Refill   • potassium chloride SA (KDUR) 20 MEQ Tab CR Take 1 Tab by mouth every day. 90 Tab 3   • metoprolol (LOPRESSOR) 50 MG Tab Take 1.5 Tabs by mouth 2 Times a Day. 270 Tab 3   • furosemide (LASIX) 40 MG Tab Take 1 Tab by mouth every day. 180 Tab 3   • apixaban (ELIQUIS) 5mg Tab Take 0.5 Tabs by mouth 2 Times a Day. 90 Tab 3   • Probiotic Product (PROBIOTIC PO) Take 2 Caps by mouth every day.     • therapeutic multivitamin-minerals (THERAGRAN-M) Tab Take 1 Tab by mouth every morning.     • FLUoxetine (PROZAC) 20 MG Cap Take 1 Cap by mouth every day. (Patient not taking: Reported on 4/9/2020) 90 Cap 3   • [DISCONTINUED] furosemide (LASIX) 40 MG Tab Take 1 Tab by mouth every day. 180 Tab 3   • [DISCONTINUED] potassium chloride SA (KDUR) 20 MEQ Tab CR Take 1 Tab by mouth every day. 90 Tab 3   • [DISCONTINUED] metoprolol (LOPRESSOR) 50 MG Tab TAKE ONE AND ONE-HALF TABLETS BY MOUTH TWICE DAILY 270 Tab 3     No facility-administered encounter medications on file as  "of 4/9/2020.      Review of Systems   Constitutional: Positive for malaise/fatigue. Negative for chills and fever.   HENT: Negative.  Negative for sore throat.    Eyes: Negative.    Respiratory: Negative.  Negative for cough, hemoptysis, sputum production, shortness of breath, wheezing and stridor.    Cardiovascular: Negative.  Negative for chest pain, palpitations, orthopnea, claudication, leg swelling and PND.   Gastrointestinal: Negative.    Genitourinary: Negative.    Musculoskeletal: Negative.    Skin: Negative.    Neurological: Negative.  Negative for dizziness, loss of consciousness and weakness.   Endo/Heme/Allergies: Negative.  Does not bruise/bleed easily.   All other systems reviewed and are negative.       Objective:   /82 (BP Location: Right arm, Patient Position: Sitting, BP Cuff Size: Adult)   Pulse 66   Ht 1.753 m (5' 9\")   Wt 76.2 kg (168 lb)   BMI 24.81 kg/m²     Physical Exam   Constitutional: She appears well-developed and well-nourished. No distress.   HENT:   Head: Normocephalic and atraumatic.   Right Ear: External ear normal.   Left Ear: External ear normal.   Nose: Nose normal.   Mouth/Throat: No oropharyngeal exudate.   Eyes: Pupils are equal, round, and reactive to light. Conjunctivae and EOM are normal. Right eye exhibits no discharge. Left eye exhibits no discharge. No scleral icterus.   Neck: Neck supple. No JVD present.   Cardiovascular: Normal rate, regular rhythm and intact distal pulses. Exam reveals no gallop and no friction rub.   No murmur heard.  Pulmonary/Chest: Effort normal. No stridor.   Abdominal: Soft.   Musculoskeletal: Normal range of motion.         General: No tenderness, deformity or edema.   Neurological: She is alert. She has normal reflexes. She displays normal reflexes. No cranial nerve deficit. She exhibits normal muscle tone. Coordination normal.   Skin: Skin is warm and dry. No rash noted. She is not diaphoretic. No erythema. No pallor.   Psychiatric: " She has a normal mood and affect. Her behavior is normal. Judgment and thought content normal.   Nursing note and vitals reviewed.      Assessment:     1. Chronic diastolic congestive heart failure ([facility])     2. CKD (chronic kidney disease) stage 3, GFR 30-59 ml/min ([facility])     3. Cardiac pacemaker in situ     4. Arterial insufficiency with ischemic ulcer ([facility])     5. Anticoagulant long-term use     6. Essential hypertension  metoprolol (LOPRESSOR) 50 MG Tab   7. PAF (paroxysmal atrial fibrillation) ([facility])  potassium chloride SA (KDUR) 20 MEQ Tab CR   8. Pulmonary hypertension ([facility])  potassium chloride SA (KDUR) 20 MEQ Tab CR   9. PVC's (premature ventricular contractions)  potassium chloride SA (KDUR) 20 MEQ Tab CR   10. S/P ablation of atrial flutter     11. Sick sinus syndrome ([facility])     12. Thrombocytopenia ([facility])     13. Congestive heart failure, unspecified HF chronicity, unspecified heart failure type ([facility])  furosemide (LASIX) 40 MG Tab       Medical Decision Making:  Today's Assessment / Status / Plan:   87-year-old female with paroxysmal atrial fibrillation high blood pressure pacemaker lower extremity edema diastolic dysfunction high risk medication usage.  She is doing well.  I reviewed her labs.  I refilled her medications.  We will see her back in 6 months.    1. SSS s/p PPM    - 37% V-paced    - F/U device clinic     2. LE Edema    - cont lasix 40    - cont KCL     3. A-fib    - cont apixaban 5 bid     4. CHFpEF    - meds per above     5. High Risk Meds    - labs reviewed    This encounter was conducted via [employer].   Verbal consent was obtained. Patient's identity was verified.

## 2020-07-12 DIAGNOSIS — I48.0 PAF (PAROXYSMAL ATRIAL FIBRILLATION) (HCC): ICD-10-CM

## 2020-07-14 ENCOUNTER — TELEPHONE (OUTPATIENT)
Dept: CARDIOLOGY | Facility: MEDICAL CENTER | Age: 85
End: 2020-07-14

## 2020-07-14 DIAGNOSIS — I49.3 PVC'S (PREMATURE VENTRICULAR CONTRACTIONS): ICD-10-CM

## 2020-07-14 DIAGNOSIS — I27.20 PULMONARY HYPERTENSION (HCC): ICD-10-CM

## 2020-07-14 DIAGNOSIS — I48.0 PAF (PAROXYSMAL ATRIAL FIBRILLATION) (HCC): ICD-10-CM

## 2020-07-14 DIAGNOSIS — N17.9 AKI (ACUTE KIDNEY INJURY) (HCC): ICD-10-CM

## 2020-07-14 DIAGNOSIS — I10 ESSENTIAL HYPERTENSION: ICD-10-CM

## 2020-07-14 DIAGNOSIS — I50.9 CONGESTIVE HEART FAILURE, UNSPECIFIED HF CHRONICITY, UNSPECIFIED HEART FAILURE TYPE (HCC): ICD-10-CM

## 2020-07-14 RX ORDER — APIXABAN 2.5 MG/1
TABLET, FILM COATED ORAL
Qty: 90 TAB | Refills: 1 | Status: SHIPPED | OUTPATIENT
Start: 2020-07-14 | End: 2020-07-16

## 2020-07-14 NOTE — TELEPHONE ENCOUNTER
PURA Figueredo called from [facility] to advise Pt need their apixaban (ELIQUIS) 2.5mg Tab (pending) and metoprolol (LOPRESSOR) 50 MG Tab to be refilled., (they never received the RX for any of the medications back in April, they will need to be resent for the potassium chloride SA (KDUR) 20 MEQ Tab CR and furosemide (LASIX) 40 MG Tab.  They are all out of their medication.     Thank you,  Neela JOHN

## 2020-07-16 ENCOUNTER — TELEPHONE (OUTPATIENT)
Dept: CARDIOLOGY | Facility: MEDICAL CENTER | Age: 85
End: 2020-07-16

## 2020-07-16 DIAGNOSIS — I10 ESSENTIAL HYPERTENSION: ICD-10-CM

## 2020-07-16 RX ORDER — METOPROLOL TARTRATE 50 MG/1
75 TABLET, FILM COATED ORAL 2 TIMES DAILY
Qty: 270 TAB | Refills: 0 | Status: SHIPPED | OUTPATIENT
Start: 2020-07-16 | End: 2020-01-01

## 2020-07-16 NOTE — TELEPHONE ENCOUNTER
Medication   Received: Today   Message Contents   KEVIN Oneill said the pharmacy messed up her Metoprolol prescription.  She sounds confused, she didn't even know the name of her cardiologist.  Can you please give her a call.     Thanks      [id number] - [place], [place] - [address] AT [facility] [address] [phone] (Phone)  [phone] (Fax)     Called [facility] for clarification. Spoke with pharmD. Verified all Rx and enough refills until patient to be seen again in October.     Pt called. No answer, voicemail left with above information and schedulers contact information to call to scheduled her October f/u appt.

## 2020-10-12 PROBLEM — R06.02 SOB (SHORTNESS OF BREATH): Status: ACTIVE | Noted: 2020-01-01

## 2020-10-12 PROBLEM — Z79.899 HIGH RISK MEDICATION USE: Status: ACTIVE | Noted: 2020-01-01

## 2020-10-12 NOTE — PROGRESS NOTES
Chief Complaint   Patient presents with   • Pulmonary Hypertension     & Cardiac pacemaker in situ   • Atrial Fibrillation     PAF (paroxysmal atrial fibrillation)       Subjective:   Meron Rice is a 87 y.o. female who presents today follow-up for her pacemaker hypertension hyperlipidemia paroxysmal atrial fibrillation and heart failure with preserved ejection fraction.  She felt like she was retaining fluid a couple of weeks ago but is since gone away.  She continues to do well on the furosemide but is concerned about the polyuria.  Her blood pressures running borderline low.  Her last pacemaker check in December showed 100% atrial fibrillation with borderline low heart rate.  She is complaining of a lack of energy and overall fatigue.    Past Medical History:   Diagnosis Date   • Anemia    • Anxiety 2/4/2014   • Arrhythmia     atrial fibrillation   • Arthritis     generalized   • ASTHMA     has not needed an inhaler since 2010   • Cancer (HCC) [id number]    Uterus and Breast   • CATARACT     beginning   • Chronic diastolic congestive heart failure (HCC) 2/10/2020    Echo (March 2019): CONCLUSIONS Grossly normal left ventricular systolic function. Left ventricular ejection fraction is visually estimated to be 55%. Indeterminate diastolic function. Aortic sclerosis without stenosis. Estimated right ventricular systolic pressure is 41 mmHg + JVP.  Echo (May 2017): Left ventricular ejection fraction is visually estimated to be 55%. Normal regional wall motion. Gr   • Chronic hip pain 5/19/2017   • Chronic left shoulder pain 12/11/2018   • Chronic midline low back pain without sciatica 5/14/2012   • CKD (chronic kidney disease) stage 3, GFR 30-59 ml/min 2/10/2020      Ref. Range 3/12/2019 18:55 3/13/2019 05:17 12/11/2019 11:05 Creatinine Latest Ref Range: 0.50 - 1.40 mg/dL 1.33 1.28 1.57 (H) GFR If Non  Latest Ref Range: >60 mL/min/1.73 m 2 38 (A) 40 (A) 31 (A)  She had normal kidney function in April  2018 and since March 2019 she has had ongoing JAMES with fluctuations of GFR between 30 and 40.  She was diagnosed with heart failure exacerbation   • Depression 5/19/2017   • Diarrhea 3/12/2019   • Foot-drop    • GERD (gastroesophageal reflux disease) 5/14/2012   • Hypertension    • Neurogenic bladder 3/6/2017   • Other insomnia 2/4/2014   • Pacemaker     in past, removed due to cellulitis   • Presbycusis 3/22/2018   • Thyroid nodule    • Unspecified hemorrhagic conditions     post op hyst. Currently on Eliquis    • Unspecified urinary incontinence     lazy bladder; doesn't empty completely   • UTI (urinary tract infection)     frequent      Past Surgical History:   Procedure Laterality Date   • OTHER  09/2017    pacemaker removal    • FEMUR NAILING INTRAMEDULLARY Right 6/3/2015    Procedure: FEMUR NAILING INTRAMEDULLARY;  Surgeon: Deshaun Denis M.D.;  Location: [facility];  Service:    • WOUND CLOSURE GENERAL  4/10/2013    Performed by Nano Varela M.D. at SURGERY SAME DAY [facility]S   • BREAST IMPLANT REMOVAL  4/10/2013    Performed by Jak Meza M.D. at SURGERY SAME DAY [facility]   • BREAST RECONSTRUCTION  11/26/2012    Performed by Jak Meza M.D. at SURGERY [facility]   • HIP HEMIARTHROPLASTY  5/14/2012    left; Performed by KEITH COWART at SURGERY [facility]   • BREAST IMPLANT REVISION  7/11/2011    Performed by JAK MEZA at SURGERY [facility]   • NIPPLE RECONSTRUCTION  7/11/2011    Performed by JAK MEZA at SURGERY [facility]   • MASTECTOMY  12/15/2010    right   • CATH REMOVAL  12/15/2010    Performed by NANO VARELA at SURGERY [facility]   • BREAST RECONSTRUCTION  12/15/2010    Performed by JAK MEZA at SURGERY [facility]   • LATISSIMUS FLAP  12/15/2010    Performed by JAK MEZA at SURGERY [facility]   • CATH PLACEMENT  10/28/2009    Performed by NANO VARELA at SURGERY SAME DAY  [facility] ORS   • MASTECTOMY  2009    left   • AXILLARY NODE DISSECTION  9/15/2009    Performed by DIPAK VARELA at SURGERY SAME DAY [facility] ORS   • OTHER SURGICAL PROCEDURE      bladder repair   • OTHER ORTHOPEDIC SURGERY      laminectomy   • HYSTERECTOMY, TOTAL ABDOMINAL     • GYN SURGERY      excision of fibroids   • APPENDECTOMY     • DILATION AND CURETTAGE     • PACEMAKER INSERTION     • VEIN LIGAT & STRIP  , 1966    x2 bilateral     Family History   Problem Relation Age of Onset   • Diabetes Mother    • Heart Disease Mother    • Heart Disease Father    • Stroke Father    • Hypertension Father    • Diabetes Sister    • Heart Disease Sister    • Diabetes Other    • Heart Disease Other    • Stroke Other    • Cancer Other    • Hypertension Other    • Diabetes Other    • Diabetes Child    • Psychiatric Illness Child    • Hypertension Sister    • Kidney Disease Sister    • Stroke Sister    • Lung Cancer Brother    • Bipolar disorder Brother      Social History     Socioeconomic History   • Marital status:      Spouse name: Not on file   • Number of children: Not on file   • Years of education: Not on file   • Highest education level: Not on file   Occupational History   • Not on file   Social Needs   • Financial resource strain: Not on file   • Food insecurity     Worry: Not on file     Inability: Not on file   • Transportation needs     Medical: Not on file     Non-medical: Not on file   Tobacco Use   • Smoking status: Former Smoker     Packs/day: 0.10     Years: 1.00     Pack years: 0.10     Types: Cigarettes     Quit date: 1964     Years since quittin.8   • Smokeless tobacco: Never Used   • Tobacco comment: 50 years ago in 1960  SOCIAL    Substance and Sexual Activity   • Alcohol use: Yes     Comment: 3-4 drinks per week, glass of wine   • Drug use: No   • 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     Comment: Retired Nurse   Lifestyle   • Physical activity     Days per  week: Not on file     Minutes per session: Not on file   • Stress: Not on file   Relationships   • Social connections     Talks on phone: Not on file     Gets together: Not on file     Attends Yazidi service: Not on file     Active member of club or organization: Not on file     Attends meetings of clubs or organizations: Not on file     Relationship status: Not on file   • Intimate partner violence     Fear of current or ex partner: Not on file     Emotionally abused: Not on file     Physically abused: Not on file     Forced sexual activity: Not on file   Other Topics Concern   • Not on file   Social History Narrative    Retired nurse, 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 She was  for 49 years before her  passed away in 2017. She is from [place] originally. She has worked in the inpatient setting and in hospice care.     Allergies   Allergen Reactions   • Alendronate-Butylpar-Propylpar-Saccharin [Fosamax] Diarrhea and Nausea     .   • Atorvastatin      Causes low quality     Outpatient Encounter Medications as of 10/12/2020   Medication Sig Dispense Refill   • metoprolol (LOPRESSOR) 50 MG Tab Take 1 Tab by mouth 2 times a day. 60 Tab 11   • spironolactone (ALDACTONE) 25 MG Tab Take 0.5 Tabs by mouth every day. 30 Tab 11   • apixaban (ELIQUIS) 2.5mg Tab Take 1 Tab by mouth 2 Times a Day. 180 Tab 0   • furosemide (LASIX) 40 MG Tab Take 1 Tab by mouth every day. 180 Tab 3   • Probiotic Product (PROBIOTIC PO) Take 2 Caps by mouth every day.     • [DISCONTINUED] metoprolol (LOPRESSOR) 50 MG Tab Take 1.5 Tabs by mouth 2 Times a Day. 270 Tab 0   • potassium chloride SA (KDUR) 20 MEQ Tab CR Take 1 Tab by mouth every day. (Patient not taking: Reported on 10/12/2020) 90 Tab 3   • FLUoxetine (PROZAC) 20 MG Cap Take 1 Cap by mouth every day. (Patient not taking: Reported on 4/9/2020) 90 Cap 3   • therapeutic multivitamin-minerals (THERAGRAN-M) Tab Take 1 Tab by  "mouth every morning.       No facility-administered encounter medications on file as of 10/12/2020.      Review of Systems   Constitutional: Negative.  Negative for chills, fever and malaise/fatigue.   HENT: Negative.  Negative for sore throat.    Eyes: Negative.    Respiratory: Negative.  Negative for cough, hemoptysis, sputum production, shortness of breath, wheezing and stridor.    Cardiovascular: Negative.  Negative for chest pain, palpitations, orthopnea, claudication, leg swelling and PND.   Gastrointestinal: Negative.    Genitourinary: Negative.    Musculoskeletal: Negative.    Skin: Negative.    Neurological: Negative.  Negative for dizziness, loss of consciousness and weakness.   Endo/Heme/Allergies: Negative.  Does not bruise/bleed easily.   All other systems reviewed and are negative.       Objective:   /72 (BP Location: Right arm, Patient Position: Sitting, BP Cuff Size: Adult)   Pulse 61   Ht 1.753 m (5' 9\")   SpO2 95%   BMI 24.81 kg/m²     Physical Exam   Constitutional: She appears well-developed and well-nourished. No distress.   HENT:   Head: Normocephalic and atraumatic.   Right Ear: External ear normal.   Left Ear: External ear normal.   Nose: Nose normal.   Mouth/Throat: No oropharyngeal exudate.   Eyes: Pupils are equal, round, and reactive to light. Conjunctivae and EOM are normal. Right eye exhibits no discharge. Left eye exhibits no discharge. No scleral icterus.   Neck: Neck supple. No JVD present.   Cardiovascular: Normal rate, regular rhythm and intact distal pulses. Exam reveals no gallop and no friction rub.   No murmur heard.  Pulmonary/Chest: Effort normal. No stridor. No respiratory distress. She has no wheezes. She has no rales. She exhibits no tenderness.   Abdominal: Soft. She exhibits no distension. There is no guarding.   Musculoskeletal: Normal range of motion.         General: No tenderness, deformity or edema.   Neurological: She is alert. She has normal reflexes. " She displays normal reflexes. No cranial nerve deficit. She exhibits normal muscle tone. Coordination normal.   Skin: Skin is warm and dry. No rash noted. She is not diaphoretic. No erythema. No pallor.   Psychiatric: She has a normal mood and affect. Her behavior is normal. Judgment and thought content normal.   Nursing note and vitals reviewed.      Assessment:     1. Anticoagulant long-term use     2. Cardiac pacemaker in situ     3. Chronic diastolic congestive heart failure (HCC)     4. Essential hypertension  metoprolol (LOPRESSOR) 50 MG Tab    spironolactone (ALDACTONE) 25 MG Tab   5. PAF (paroxysmal atrial fibrillation) ([facility])  Basic Metabolic Panel    proBrain Natriuretic Peptide, NT    CBC W/ DIFF W/O PLATELETS    spironolactone (ALDACTONE) 25 MG Tab   6. Pulmonary hypertension ([facility])  Basic Metabolic Panel    proBrain Natriuretic Peptide, NT   7. PVC's (premature ventricular contractions)  proBrain Natriuretic Peptide, NT    spironolactone (ALDACTONE) 25 MG Tab   8. S/P ablation of atrial flutter     9. Sick sinus syndrome (HCC)     10. Thrombocytopenia ([facility])  CBC W/ DIFF W/O PLATELETS   11. High risk medication use  Basic Metabolic Panel    proBrain Natriuretic Peptide, NT    CBC W/ DIFF W/O PLATELETS   12. SOB (shortness of breath)  Basic Metabolic Panel    proBrain Natriuretic Peptide, NT    CBC W/ DIFF W/O PLATELETS       Medical Decision Making:  Today's Assessment / Status / Plan:     87-year-old female with sick sinus syndrome status post pacemaker doing well.  I will increase her dose of diuretics by adding on 12 and half of spironolactone and checking labs in 1 week.  In the meantime I would like to reduce her metoprolol down to 50 twice per day.  I like to see her back in 2 months.    1. SSS s/p PPM    - 100% V-paced    - reduce metop to 50 BID    - F/U device clinic     2. LE Edema    - cont lasix 40    - start ariana 12.5    - cont KCL    - labs in one week     3. A-fib    - cont apixaban 5  bid     4. CHFpEF    - meds per above     5. High Risk Meds    - labs reviewed

## 2020-11-10 PROBLEM — E87.20 LACTIC ACIDOSIS: Status: ACTIVE | Noted: 2020-01-01

## 2020-11-10 PROBLEM — R79.89 ELEVATED TROPONIN: Status: ACTIVE | Noted: 2020-01-01

## 2020-11-10 NOTE — ED TRIAGE NOTES
"Chief Complaint   Patient presents with   • Fatigue     x 1 month, reports is getting more difficult to get out of bed   • Shortness of Breath     Pt reports has felt short of breath x 1 month   • Diarrhea     x 1 week   • Loss of Appetite     \"food tastes funny\"     /45   Pulse 79   Temp 36.4 °C (97.5 °F) (Temporal)   Resp 18   Ht 1.727 m (5' 8\")   Wt 76.9 kg (169 lb 8.5 oz)   SpO2 92%   BMI 25.78 kg/m²       "

## 2020-11-10 NOTE — ASSESSMENT & PLAN NOTE
She was started on ceftriaxone in the emergency department  Latest urinary cultures, December 2019, E. coli ceftriaxone sensitive  Continue ceftriaxone for now follow-up cultures and sensitivities.

## 2020-11-10 NOTE — ED PROVIDER NOTES
"ED Provider Note    CHIEF COMPLAINT  Chief Complaint   Patient presents with   • Fatigue     x 1 month, reports is getting more difficult to get out of bed   • Shortness of Breath     Pt reports has felt short of breath x 1 month   • Diarrhea     x 1 week   • Loss of Appetite     \"food tastes funny\"       HPI  Meron Rice is a 87 y.o. female with a history of atrial fibrillation, chronic anticoagulation with apixaban, status post ablation, sick sinus syndrome, multiple PVCs, hypertension, hyperlipidemia, asthma, anemia, anxiety, GERD, pulmonary hypertension, chronic diastolic congestive heart failure, stage III chronic kidney disease, neurogenic bladder, status post pacemaker placement, dementia who presents with complaints of worsening fatigue, shortness of breath, and weakness over the last 1 month.  The patient recently saw her cardiologist for a checkup.  According to Dr. Mancia's note, her metoprolol was reduced, and she was started on diuretics with 12-1/2 mg of spironolactone in addition to her continuation of Lasix 40 mg daily..  The patient notes that since then she has had increased weakness, shortness of breath, decreased appetite, and fatigue.  She denies any fever, shaking chills, sore throat, productive cough or congestion.  She has had decreased appetite, with nausea, had an episode of vomiting several days ago, but none since, denies diarrhea.    REVIEW OF SYSTEMS  See HPI for further details. All other systems are negative.     PAST MEDICAL HISTORY  Past Medical History:   Diagnosis Date   • Anemia    • Anxiety 2/4/2014   • Arrhythmia     atrial fibrillation   • Arthritis     generalized   • ASTHMA     has not needed an inhaler since 2010   • Cancer (HCC) [id number]    Uterus and Breast   • CATARACT     beginning   • Chronic diastolic congestive heart failure (HCC) 2/10/2020    Echo (March 2019): CONCLUSIONS Grossly normal left ventricular systolic function. Left ventricular ejection fraction is " visually estimated to be 55%. Indeterminate diastolic function. Aortic sclerosis without stenosis. Estimated right ventricular systolic pressure is 41 mmHg + JVP.  Echo (May 2017): Left ventricular ejection fraction is visually estimated to be 55%. Normal regional wall motion. Gr   • Chronic hip pain 5/19/2017   • Chronic left shoulder pain 12/11/2018   • Chronic midline low back pain without sciatica 5/14/2012   • CKD (chronic kidney disease) stage 3, GFR 30-59 ml/min 2/10/2020      Ref. Range 3/12/2019 18:55 3/13/2019 05:17 12/11/2019 11:05 Creatinine Latest Ref Range: 0.50 - 1.40 mg/dL 1.33 1.28 1.57 (H) GFR If Non  Latest Ref Range: >60 mL/min/1.73 m 2 38 (A) 40 (A) 31 (A)  She had normal kidney function in April 2018 and since March 2019 she has had ongoing JAMES with fluctuations of GFR between 30 and 40.  She was diagnosed with heart failure exacerbation   • Depression 5/19/2017   • Diarrhea 3/12/2019   • Foot-drop    • GERD (gastroesophageal reflux disease) 5/14/2012   • Hypertension    • Neurogenic bladder 3/6/2017   • Other insomnia 2/4/2014   • Pacemaker     in past, removed due to cellulitis   • Presbycusis 3/22/2018   • Thyroid nodule    • Unspecified hemorrhagic conditions     post op hyst. Currently on Eliquis    • Unspecified urinary incontinence     lazy bladder; doesn't empty completely   • UTI (urinary tract infection)     frequent        FAMILY HISTORY  Family History   Problem Relation Age of Onset   • Diabetes Mother    • Heart Disease Mother    • Heart Disease Father    • Stroke Father    • Hypertension Father    • Diabetes Sister    • Heart Disease Sister    • Diabetes Other    • Heart Disease Other    • Stroke Other    • Cancer Other    • Hypertension Other    • Diabetes Other    • Diabetes Child    • Psychiatric Illness Child    • Hypertension Sister    • Kidney Disease Sister    • Stroke Sister    • Lung Cancer Brother    • Bipolar disorder Brother        SOCIAL  HISTORY  Social History     Tobacco Use   • Smoking status: Former Smoker     Packs/day: 0.10     Years: 1.00     Pack years: 0.10     Types: Cigarettes     Quit date: 1964     Years since quittin.8   • Smokeless tobacco: Never Used   • Tobacco comment: 50 years ago in 1960  SOCIAL    Substance Use Topics   • Alcohol use: Yes   • Drug use: No      Social History     Substance and Sexual Activity   Drug Use No       SURGICAL HISTORY  Past Surgical History:   Procedure Laterality Date   • OTHER  2017    pacemaker removal    • FEMUR NAILING INTRAMEDULLARY Right 6/3/2015    Procedure: FEMUR NAILING INTRAMEDULLARY;  Surgeon: Deshaun Denis M.D.;  Location: SURGERY [facility];  Service:    • WOUND CLOSURE GENERAL  4/10/2013    Performed by Nano Varela M.D. at SURGERY SAME DAY [facility] ORS   • BREAST IMPLANT REMOVAL  4/10/2013    Performed by Jak Meza M.D. at SURGERY SAME DAY [facility] ORS   • BREAST RECONSTRUCTION  2012    Performed by Jak Meza M.D. at SURGERY [facility]   • HIP HEMIARTHROPLASTY  2012    left; Performed by KEITH COWART at SURGERY [facility] ORS   • BREAST IMPLANT REVISION  2011    Performed by JAK MEZA at SURGERY [facility]   • NIPPLE RECONSTRUCTION  2011    Performed by JAK MEZA at SURGERY [facility]   • MASTECTOMY  12/15/2010    right   • CATH REMOVAL  12/15/2010    Performed by NANO VARELA at SURGERY [facility] ORS   • BREAST RECONSTRUCTION  12/15/2010    Performed by JAK MEZA at SURGERY [facility] ORS   • LATISSIMUS FLAP  12/15/2010    Performed by JAK MEZA at SURGERY [facility] ORS   • CATH PLACEMENT  10/28/2009    Performed by NANO VARELA at SURGERY SAME DAY [facility] ORS   • MASTECTOMY  2009    left   • AXILLARY NODE DISSECTION  9/15/2009    Performed by NANO VARELA at SURGERY SAME DAY [facility] ORS   • OTHER SURGICAL PROCEDURE  2004    bladder repair   •  "OTHER ORTHOPEDIC SURGERY  2002    laminectomy   • HYSTERECTOMY, TOTAL ABDOMINAL  2000   • GYN SURGERY  1998    excision of fibroids   • APPENDECTOMY  1968   • DILATION AND CURETTAGE  1961   • PACEMAKER INSERTION     • VEIN LIGAT & STRIP  1972, 1966    x2 bilateral       CURRENT MEDICATIONS  Home Medications     Reviewed by Miley Garcia PhT (Pharmacy Tech) on 11/10/20 at 1107  Med List Status: Complete    Medication Last Dose Status    apixaban (ELIQUIS) 2.5mg Tab 11/10/2020 Active    FLUoxetine (PROZAC) 20 MG Cap Pt not taking Active    furosemide (LASIX) 40 MG Tab 11/9/2020 Active    Loperamide HCl (IMODIUM PO) 11/9/2020 Active    metoprolol (LOPRESSOR) 50 MG Tab 11/10/2020 Active    potassium chloride SA (KDUR) 20 MEQ Tab CR > 2 weeks Active    spironolactone (ALDACTONE) 25 MG Tab 11/9/2020 Active    therapeutic multivitamin-minerals (THERAGRAN-M) Tab 11/9/2020 Active                ALLERGIES  Allergies   Allergen Reactions   • Alendronate-Butylpar-Propylpar-Saccharin [Fosamax] Diarrhea and Nausea     .   • Atorvastatin      Causes low quality       PHYSICAL EXAM0  VITAL SIGNS: Blood Pressure 111/61   Pulse 70   Temperature 36.4 °C (97.5 °F) (Temporal)   Respiration 19   Height 1.727 m (5' 8\")   Weight 76.9 kg (169 lb 8.5 oz)   Oxygen Saturation 96%   Body Mass Index 25.78 kg/m²   Constitutional: Awake, alert, in no acute distress, Non-toxic appearance.   HENT: Atraumatic. Bilateral external ears normal, mucous membranes dry, throat nonerythematous without exudates, nose is normal.  Eyes: PERRL, EOMI, conjunctiva moist, noninjected.  Neck: Nontender, Normal range of motion, No nuchal rigidity, No stridor.   Lymphatic: No lymphadenopathy noted.   Cardiovascular: Irregular rate and rhythm, rate in the 70s, no murmurs, rubs, gallops.   Thorax & Lungs:  Good breath sounds bilaterally, no wheezes, rales, or retractions.  No chest tenderness.  Abdomen: Bowel sounds normal, Soft, nontender, nondistended, no " rebound, guarding, masses.  Back: No CVA or spinal tenderness.  Extremities: Intact distal pulses, No edema, No tenderness.   Skin: Warm, Dry, No rashes.   Musculoskeletal: No joint swelling or tenderness.  Neurologic: Alert & oriented x 3, sensory and motor function normal. No focal deficits.   Psychiatric: Affect normal, Judgment normal, Mood normal.     EKG  EKG Interpretation:  Interpreted by me    Rhythm: Atrial fibrillation/flutter with ventricular paced complexes  Rate: 79  Intervals: QTc interval normal  Axis: Left axis deviation  Ectopy: none  Conduction: normal  ST Segments: no evidence of elevation or depression  T Waves: Inverted T waves in leads II, 3, aVF,  Q Waves: none  Clinical Impression: No acute injury or ischemic pattern.   Comparison to previous EKG from February 2019 shows atrial sensed ventricular paced complexes    LABS  Labs Reviewed   CBC WITH DIFFERENTIAL - Abnormal; Notable for the following components:       Result Value    WBC 11.3 (*)     RBC 5.42 (*)     Hemoglobin 16.9 (*)     Hematocrit 52.2 (*)     MCHC 32.4 (*)     Monos (Absolute) 0.92 (*)     All other components within normal limits   COMP METABOLIC PANEL - Abnormal; Notable for the following components:    Glucose 121 (*)     Bun 38 (*)     Creatinine 1.75 (*)     Alkaline Phosphatase 103 (*)     All other components within normal limits   LACTIC ACID - Abnormal; Notable for the following components:    Lactic Acid 2.3 (*)     All other components within normal limits   URINALYSIS,CULTURE IF INDICATED - Abnormal; Notable for the following components:    Nitrite Positive (*)     All other components within normal limits    Narrative:     Indication for culture:->Patient WITHOUT an indwelling Presley  catheter in place with new onset of Dysuria, Frequency,  Urgency, and/or Suprapubic pain   ESTIMATED GFR - Abnormal; Notable for the following components:    GFR If  33 (*)     GFR If Non  27 (*)      "All other components within normal limits   PROBRAIN NATRIURETIC PEPTIDE, NT - Abnormal; Notable for the following components:    NT-proBNP 6510 (*)     All other components within normal limits   TROPONIN - Abnormal; Notable for the following components:    Troponin T 46 (*)     All other components within normal limits   URINE MICROSCOPIC (W/UA) - Abnormal; Notable for the following components:    WBC 5-10 (*)     Bacteria Many (*)     All other components within normal limits    Narrative:     Indication for culture:->Patient WITHOUT an indwelling Presley  catheter in place with new onset of Dysuria, Frequency,  Urgency, and/or Suprapubic pain   BLOOD CULTURE    Narrative:     Per Hospital Policy: Only change Specimen Src: to \"Line\" if  specified by physician order.   BLOOD CULTURE    Narrative:     Per Hospital Policy: Only change Specimen Src: to \"Line\" if  specified by physician order.   COVID/SARS COV-2    Narrative:     Collected By:ANDREW HAAS  Is patient being admitted?->Yes  Does this patient meet criteria for Rush/Cepheid per [facility]  Inpatient Workflow? (See workflow link below)->Yes  Expected turn around time?->Rush (Cepheid 2-4 hours)  Is this the patients First SARS CoV-2 test?->Unknown  Is this patient employed in healthcare?->No  Is the patient symptomatic as defined by the CDC?->No  Is the patient hospitalized?->No  Is the patient a resident in a congregate care setting?->No  Is the patient pregnant?->No   SARS-COV-2, PCR (IN-HOUSE)    Narrative:     Collected By:ANDREW HAAS  Is patient being admitted?->Yes  Does this patient meet criteria for Rush/Cepheid per [facility]  Inpatient Workflow? (See workflow link below)->Yes  Expected turn around time?->Rush (Cepheid 2-4 hours)  Is this the patients First SARS CoV-2 test?->Unknown  Is this patient employed in healthcare?->No  Is the patient symptomatic as defined by the CDC?->No  Is the patient hospitalized?->No  Is the patient a " resident in a congregate care setting?->No  Is the patient pregnant?->No       All labs reviewed by me.      RADIOLOGY/PROCEDURES  DX-CHEST-PORTABLE (1 VIEW)   Final Result      Mild cardiomegaly.          The radiologist's interpretations of all radiological studies have been reviewed by me.        COURSE & MEDICAL DECISION MAKING  Pertinent Labs & Imaging studies reviewed. (See chart for details)  The patient presents with the above complaints.  She has been having worsening symptoms over the past 1 month including increased fatigue, weakness, and shortness of breath.  EKG shows atrial fibrillation which is chronic.  Chest x-ray shows cardiomegaly without obvious pulmonary edema.    CBC showed a white 11,300, hemoglobin 16.9, normal differential, chemistry shows a CO2 of 20, anion gap 16, BUN 30, creatinine 1.75, glucose 121, liver function test normal, troponin 46, proBNP 6510, lactic acid elevated 2.3.  Covid was negative.  A cath urine specimen was obtained which was positive for nitrites, 5-10 WBC, many bacteria.  The patient may have an underlying urinary tract infection.  She was given Rocephin 1 g IVPB.  Given her symptoms she will be admitted.  She may need a repeat echocardiogram, last one in January showed a EF of 55%.  Findings were discussed with the patient she is agreeable to admission.  Case discussed with the hospitalist Dr. Mcclure.    FINAL IMPRESSION  1. Weakness    2. Shortness of breath    3. Acute urinary tract infection          Electronically signed by: Conrado Hassan M.D., 11/10/2020 10:06 AM

## 2020-11-10 NOTE — ED NOTES
Primary Contact:  Nyasia, daughter #[phone].  Daughter is going home, will stay in contact via telephone, would like to be updated w/ test results and POC.  Pt gave verbal permission for Nyasia to receive updates.

## 2020-11-10 NOTE — H&P
"Hospital Medicine History & Physical Note    Date of Service  11/10/2020    Primary Care Physician  Shyann Cole D.O.     Consultants  None       Code Status  Full Code    Chief Complaint  Chief Complaint   Patient presents with   • Fatigue     x 1 month, reports is getting more difficult to get out of bed   • Shortness of Breath     Pt reports has felt short of breath x 1 month   • Diarrhea     x 1 week   • Loss of Appetite     \"food tastes funny\"       History of Presenting Illness  87 y.o. female with a past medical history of paroxysmal atrial fibrillation on anticoagulation with apixaban, chronic kidney disease stage III-IV, hypertension, chronic diastolic heart failure who presented 11/10/2020 with progressive generalized weakness and easy fatigability over the past 1 month.  Patient reports that she has been having progressive worsening fatigue.  She denies having fevers chills she does report having episodes of shortness of breath nearly a month ago but that has improved with decreasing the dose of her diuretics.  She described a metallic taste and altered sensation of food but denies having cough fever or contact with sick people.  She does report having previous recurrent urinary tract infections she denies having urgency and dysuria but reports having intermittent frequency.      Review of Systems  Review of Systems   Constitutional: Positive for malaise/fatigue. Negative for chills and fever.   HENT: Negative for congestion and sore throat.    Eyes: Negative for pain, discharge and redness.   Respiratory: Negative for cough, hemoptysis, sputum production, shortness of breath and stridor.    Cardiovascular: Negative for chest pain, palpitations and leg swelling.   Gastrointestinal: Positive for nausea. Negative for abdominal pain, blood in stool, diarrhea and vomiting.        Anorexia   Genitourinary: Positive for frequency. Negative for flank pain, hematuria and urgency.   Musculoskeletal: " Negative for myalgias.   Skin: Negative.    Neurological: Negative for speech change, focal weakness, seizures and loss of consciousness.   Endo/Heme/Allergies: Does not bruise/bleed easily.   Psychiatric/Behavioral: Negative for hallucinations and suicidal ideas. The patient is not nervous/anxious.      Past Medical History  Chronic diastolic congestive heart failure (HCC)   CKD (chronic kidney disease) stage 4  Hypertension  Pacemaker  Chronic diarrhea   Recurrent UTI (urinary tract infection).    Surgical History   has a past surgical history that includes hysterectomy, total abdominal (2000); vein ligat & strip (1972, 1966); axillary node dissection (9/15/2009); mastectomy (9/30/2009); other orthopedic surgery (2002); gyn surgery (1998); cath placement (10/28/2009); mastectomy (12/15/2010); cath removal (12/15/2010); breast reconstruction (12/15/2010); latissimus flap (12/15/2010); appendectomy (1968); dilation and curettage (1961); breast implant revision (7/11/2011); nipple reconstruction (7/11/2011); hip hemiarthroplasty (5/14/2012); other surgical procedure (2004); breast reconstruction (11/26/2012); wound closure general (4/10/2013); breast implant removal (4/10/2013); femur nailing intramedullary (Right, 6/3/2015); other (09/2017); and pacemaker insertion.     Family History  family history includes Bipolar disorder in her brother; Cancer in an other family member; Diabetes in her child, mother, sister, and other family members; Heart Disease in her father, mother, sister, and another family member; Hypertension in her father, sister, and another family member; Kidney Disease in her sister; Lung Cancer in her brother; Psychiatric Illness in her child; Stroke in her father, sister, and another family member.     Social History   reports that she quit smoking about 56 years ago. Her smoking use included cigarettes. She has a 0.10 pack-year smoking history. She has never used smokeless tobacco. She reports  current alcohol use. She reports that she does not use drugs.     Allergies  Allergies   Allergen Reactions   • Alendronate-Butylpar-Propylpar-Saccharin [Fosamax] Diarrhea and Nausea     .   • Atorvastatin      Causes low quality     Medications  Prior to Admission Medications   Prescriptions Last Dose Informant Patient Reported? Taking?   FLUoxetine (PROZAC) 20 MG Cap Pt not taking Family Member No No   Sig: Take 1 Cap by mouth every day.   Patient not taking: Reported on 4/9/2020   Loperamide HCl (IMODIUM PO) 11/9/2020 at 1500 Family Member Yes Yes   Sig: Take 1 Tab by mouth as needed (For diarrhea).   apixaban (ELIQUIS) 2.5mg Tab 11/10/2020 at 0600 Family Member No No   Sig: Take 1 Tab by mouth 2 Times a Day.   furosemide (LASIX) 40 MG Tab 11/9/2020 at 0600 Family Member No No   Sig: Take 1 Tab by mouth every day.   metoprolol (LOPRESSOR) 50 MG Tab 11/10/2020 at 0600 Family Member No No   Sig: Take 1 Tab by mouth 2 times a day.   potassium chloride SA (KDUR) 20 MEQ Tab CR > 2 weeks at Stopped Family Member No No   Sig: Take 1 Tab by mouth every day.   Patient taking differently: Take 20 mEq by mouth every day. Pt was told by here doctor to stop 2 weeks   spironolactone (ALDACTONE) 25 MG Tab 11/9/2020 at 0600 Family Member No No   Sig: Take 0.5 Tabs by mouth every day.   therapeutic multivitamin-minerals (THERAGRAN-M) Tab 11/9/2020 at 0600 Family Member Yes No   Sig: Take 1 Tab by mouth every morning.      Facility-Administered Medications: None     Physical Exam  Temp:  [36.4 °C (97.5 °F)] 36.4 °C (97.5 °F)  Pulse:  [66-79] 70  Resp:  [15-28] 15  BP: ()/(45-76) 96/52  SpO2:  [92 %-97 %] 97 %    Physical Exam  Constitutional:       General: She is not in acute distress.     Appearance: She is not toxic-appearing.   HENT:      Head: Normocephalic and atraumatic.      Right Ear: External ear normal.      Left Ear: External ear normal.      Nose: No congestion or rhinorrhea.      Mouth/Throat:      Mouth:  Mucous membranes are dry.      Pharynx: No oropharyngeal exudate or posterior oropharyngeal erythema.   Eyes:      General: No scleral icterus.        Right eye: No discharge.         Left eye: No discharge.      Conjunctiva/sclera: Conjunctivae normal.      Pupils: Pupils are equal, round, and reactive to light.   Neck:      Musculoskeletal: Neck supple. No neck rigidity or muscular tenderness.   Cardiovascular:      Rate and Rhythm: Rhythm irregular.      Heart sounds: No friction rub. No gallop.       Comments: Irregular irregularity  Pulmonary:      Breath sounds: No stridor. No wheezing or rhonchi.   Abdominal:      General: There is no distension.      Palpations: Abdomen is soft.      Tenderness: There is no abdominal tenderness. There is no guarding or rebound.   Musculoskeletal:         General: No swelling.      Right lower leg: No edema.      Left lower leg: No edema.      Comments: Right-sided foot drop, chronic   Skin:     General: Skin is dry.      Capillary Refill: Capillary refill takes 2 to 3 seconds.      Coloration: Skin is pale. Skin is not jaundiced.      Findings: No bruising or erythema.   Neurological:      Mental Status: She is alert and oriented to person, place, and time.      Cranial Nerves: No cranial nerve deficit.      Sensory: No sensory deficit.      Motor: No weakness.   Psychiatric:         Mood and Affect: Mood normal.         Judgment: Judgment normal.         Laboratory:  Recent Labs     11/10/20  0945   WBC 11.3*   RBC 5.42*   HEMOGLOBIN 16.9*   HEMATOCRIT 52.2*   MCV 96.3   MCH 31.2   MCHC 32.4*   RDW 47.9   PLATELETCT 177   MPV 10.3     Recent Labs     11/10/20  0945   SODIUM 136   POTASSIUM 3.9   CHLORIDE 100   CO2 20   GLUCOSE 121*   BUN 38*   CREATININE 1.75*   CALCIUM 9.6     Recent Labs     11/10/20  0945   ALTSGPT 16   ASTSGOT 25   ALKPHOSPHAT 103*   TBILIRUBIN 0.7   GLUCOSE 121*         Recent Labs     11/10/20  0945   NTPROBNP 6510*         Recent Labs      11/10/20  0945   TROPONINT 46*       Imaging:  DX-CHEST-PORTABLE (1 VIEW)   Final Result      Mild cardiomegaly.        I personally reviewed patient's EKG shows V paced rhythm and most beats T wave inversion in lead II, III which are seen on previous EKGs.  No ST elevation.  QTc 452.    Assessment/Plan:  I anticipate this patient is appropriate for observation status at this time.    Urinary tract infection with hx of previous rerurrent UTIs- (present on admission)  Assessment & Plan  She was started on ceftriaxone in the emergency department  Latest urinary cultures, December 2019, E. coli ceftriaxone sensitive  Continue ceftriaxone for now follow-up cultures and sensitivities.    Lactic acidosis- (present on admission)  Assessment & Plan  Likely secondary to intravascular volume depletion secondary to reduced oral intake and overdiuresis.  Expect to improve with rehydration    Elevated troponin- (present on admission)  Assessment & Plan  In the indeterminate range, in the setting of chronic kidney disease stage IV  Denies chest pain.  EKG shows V paced rhythm and most beats T wave inversion in lead II, III which are seen on previous EKGs.  No ST elevation.  QTc 452.  Stat EKG, troponin for chest pain or shortness of breath     Chronic diastolic congestive heart failure (HCC)- (present on admission)  Assessment & Plan  Does not appear to be an heart failure exacerbation, in fact appears on the dry side with lactic acidosis.  I am holding home diuretics at this point, with gentle rehydration.    Watch for volume overload closely, especially while off diuretics.    CKD (chronic kidney disease) stage 3, GFR 30-59 ml/min- (present on admission)  Assessment & Plan  Avoid nephrotoxins as much as possible, renally dose medications, monitor inputs and outputs      PAF (paroxysmal atrial fibrillation) (HCC)- (present on admission)  Assessment & Plan  Currently rate controlled with metoprolol.  Resume home  apixaban.    Essential hypertension- (present on admission)  Assessment & Plan  Resume home metoprolol with hold parameters.

## 2020-11-10 NOTE — ASSESSMENT & PLAN NOTE
Likely secondary to intravascular volume depletion secondary to reduced oral intake and overdiuresis.  Expect to improve with rehydration

## 2020-11-10 NOTE — ASSESSMENT & PLAN NOTE
In the indeterminate range, in the setting of chronic kidney disease stage IV  Denies chest pain.  EKG shows V paced rhythm and most beats T wave inversion in lead II, III which are seen on previous EKGs.  No ST elevation.  QTc 452.  Stat EKG, troponin for chest pain or shortness of breath

## 2020-11-10 NOTE — ASSESSMENT & PLAN NOTE
Does not appear to be an heart failure exacerbation, in fact appears on the dry side with lactic acidosis.  I am holding home diuretics at this point, with gentle rehydration.    Watch for volume overload closely, especially while off diuretics.

## 2020-11-10 NOTE — ED NOTES
Med rec updated and complete  Allergies reviewed  Called pts daughter Candy) @ [phone] to verify all prescription medications.   Per daughter reports that she was told by her doctor to stop taking her KDUR 20MEQ about 2 weeks ago.   Per daughter reports that she is not taking her vitamins.    Pt reports no antibiotics in the last 2 weeks

## 2020-11-11 NOTE — CARE PLAN
Problem: Communication  Goal: The ability to communicate needs accurately and effectively will improve  Outcome: PROGRESSING AS EXPECTED     Problem: Safety  Goal: Will remain free from injury  Outcome: PROGRESSING AS EXPECTED     Problem: Infection  Goal: Will remain free from infection  Outcome: PROGRESSING AS EXPECTED     Problem: Respiratory:  Goal: Respiratory status will improve  Outcome: PROGRESSING AS EXPECTED     Problem: Skin Integrity  Goal: Risk for impaired skin integrity will decrease  Outcome: PROGRESSING AS EXPECTED

## 2020-11-11 NOTE — PROGRESS NOTES
[facility] Medicine Daily Progress Note    Date of Service  11/11/2020    Chief Complaint  87 y.o. female admitted 11/10/2020 with dehydration due to UTI    Hospital Course  Admitted 11/10/2020 UA positive on ceftriaxone    Interval Problem Update  Seen and examined. Chart reviewed, including labs and any pertinent imaging.  Pleasant, currently denies any issues outside of weakness  Creatinine improving  On ceftriaxone, follow-up cultures    Consultants/Specialty  None    Code Status  Full Code    Disposition  Observation, expect home on discharge    Review of Systems  Review of Systems   Constitutional: Negative for chills and fever.   Eyes: Negative for blurred vision.   Cardiovascular: Negative for chest pain.   Gastrointestinal: Negative for heartburn, nausea and vomiting.   Musculoskeletal: Positive for myalgias.   Skin: Negative for itching and rash.   Neurological: Positive for weakness. Negative for tingling, tremors and sensory change.   All other systems reviewed and are negative.       Physical Exam  Temp:  [36.6 °C (97.8 °F)-36.8 °C (98.2 °F)] 36.6 °C (97.8 °F)  Pulse:  [69-83] 83  Resp:  [15-28] 18  BP: ()/(43-72) 91/49  SpO2:  [93 %-100 %] 93 %    Physical Exam  Vitals signs and nursing note reviewed.   Constitutional:       General: She is not in acute distress.     Appearance: She is not ill-appearing.   HENT:      Head: Normocephalic and atraumatic.      Nose: No congestion.      Mouth/Throat:      Mouth: Mucous membranes are moist.      Pharynx: Oropharynx is clear.   Eyes:      General:         Right eye: No discharge.         Left eye: No discharge.      Extraocular Movements: Extraocular movements intact.      Conjunctiva/sclera: Conjunctivae normal.   Neck:      Musculoskeletal: Normal range of motion and neck supple.   Cardiovascular:      Rate and Rhythm: Normal rate.      Pulses: Normal pulses.      Heart sounds: Normal heart sounds.   Pulmonary:      Effort: Pulmonary effort is normal.  No respiratory distress.      Breath sounds: Normal breath sounds. No wheezing.   Abdominal:      General: Bowel sounds are normal. There is no distension.      Palpations: Abdomen is soft.      Tenderness: There is no abdominal tenderness. There is no guarding.   Skin:     General: Skin is warm and dry.   Neurological:      General: No focal deficit present.      Mental Status: She is alert and oriented to person, place, and time.      Cranial Nerves: No cranial nerve deficit.   Psychiatric:         Mood and Affect: Mood normal.         Thought Content: Thought content normal.         Fluids    Intake/Output Summary (Last 24 hours) at 11/11/2020 1323  Last data filed at 11/11/2020 1100  Gross per 24 hour   Intake 2029.91 ml   Output 300 ml   Net 1729.91 ml       Laboratory  Recent Labs     11/10/20  0945 11/11/20  0205   WBC 11.3* 10.1   RBC 5.42* 4.53   HEMOGLOBIN 16.9* 14.1   HEMATOCRIT 52.2* 43.8   MCV 96.3 96.7   MCH 31.2 31.1   MCHC 32.4* 32.2*   RDW 47.9 48.4   PLATELETCT 177 138*   MPV 10.3 9.9     Recent Labs     11/10/20  0945 11/11/20  0205   SODIUM 136 139   POTASSIUM 3.9 4.6   CHLORIDE 100 109   CO2 20 19*   GLUCOSE 121* 107*   BUN 38* 30*   CREATININE 1.75* 1.37   CALCIUM 9.6 8.6                   Imaging  DX-CHEST-PORTABLE (1 VIEW)   Final Result      Mild cardiomegaly.           Assessment/Plan  Urinary tract infection with hx of previous rerurrent UTIs- (present on admission)  Assessment & Plan  She was started on ceftriaxone in the emergency department  Latest urinary cultures, December 2019, E. coli ceftriaxone sensitive  Continue ceftriaxone for now follow-up cultures and sensitivities.    Lactic acidosis- (present on admission)  Assessment & Plan  Likely secondary to intravascular volume depletion secondary to reduced oral intake and overdiuresis.  Expect to improve with rehydration    Elevated troponin- (present on admission)  Assessment & Plan  In the indeterminate range, in the setting of  chronic kidney disease stage IV  Denies chest pain.  EKG shows V paced rhythm and most beats T wave inversion in lead II, III which are seen on previous EKGs.  No ST elevation.  QTc 452.  Stat EKG, troponin for chest pain or shortness of breath     Chronic diastolic congestive heart failure (HCC)- (present on admission)  Assessment & Plan  Does not appear to be an heart failure exacerbation, in fact appears on the dry side with lactic acidosis.  I am holding home diuretics at this point, with gentle rehydration.    Watch for volume overload closely, especially while off diuretics.    CKD (chronic kidney disease) stage 3, GFR 30-59 ml/min- (present on admission)  Assessment & Plan  Avoid nephrotoxins as much as possible, renally dose medications, monitor inputs and outputs      PAF (paroxysmal atrial fibrillation) ([facility])- (present on admission)  Assessment & Plan  Currently rate controlled with metoprolol.  Resume home apixaban.    Essential hypertension- (present on admission)  Assessment & Plan  Resume home metoprolol with hold parameters.       VTE prophylaxis: Apixaban

## 2020-11-11 NOTE — DIETARY
"Nutrition services: Day 0 of admit.  Meron Rice is a 87 y.o. female with admitting DX of UTI, Fatigue.  Consult received for tube feeding.    Assessment:  Height: 172.7 cm (5' 8\")  Weight: 76.9 kg (169 lb 8.5 oz)  Body mass index is 25.78 kg/m²., BMI classification: Overweight  Diet/Intake: Cardiac, Renal. PO intake 11/11 breakfast 25-50%, lunch 50-75%.    Evaluation:   1. Consult received for tube feeding, but pt with diet order. Discussed with MD. Consult likely in error.  2. Pt has had poor appetite. She states food had been tasting funny. However, this morning she reported that breakfast was delicious and the food tasted fine. States she ate all the apples and one pancake. Recorded PO intake at lunch improved from breakfast.   3. Pt denies unplanned weight loss.  4. Pt declined offer of oral nutrition supplements.  5. No skin breakdown per flow sheets.  6. Labs and medications reviewed.    Malnutrition Risk: criteria not met.    Recommendations/Plan:  1. Cardiac, Renal diet as ordered.   2. Encourage intake of meals.  3. Document intake of all meals as % taken in ADL's to provide interdisciplinary communication across all shifts.   4. Monitor weight.  5. Nutrition rep will continue to see patient for ongoing meal and snack preferences.     RD following.            "

## 2020-11-12 NOTE — DISCHARGE SUMMARY
"Discharge Summary    CHIEF COMPLAINT ON ADMISSION  Chief Complaint   Patient presents with   • Fatigue     x 1 month, reports is getting more difficult to get out of bed   • Shortness of Breath     Pt reports has felt short of breath x 1 month   • Diarrhea     x 1 week   • Loss of Appetite     \"food tastes funny\"       Reason for Admission  sob, fatigue, not eating      Admission Date  11/10/2020    CODE STATUS  Full Code    HPI & HOSPITAL COURSE  Pleasant 87-year-old female admitted with potential UTI and dehydration, JAMES. Started on CFTX and IVF and improved. Creatinine normal as of 11/12. Lasix dose at home cut in 1/2 to 20mg daily. Remainder to stay the same.  Discharged home to continue antibiotic course patient follow-up with primary care physician.    Therefore, she is discharged in good and stable condition to home with close outpatient follow-up.    Discharge Date  11/12/2020    FOLLOW UP ITEMS POST DISCHARGE  Finish Cefdinir  Follow up PCP    DISCHARGE DIAGNOSES  Active Problems:    Urinary tract infection with hx of previous rerurrent UTIs POA: Yes      Overview: She reports several issues with recurrent UTIs which have made her septic       and put her in the hospital.  Also has had hematuria on several recent       urinalysis.  She was recommended to follow-up with urology back in April 2018 but declined at that time.  She has grown multiple bacteria in the       past and is resistant to several antibiotics.    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  She does continue to follow       with cardiology and has approximately 4 years of life left on her       pacemaker, approximate expiration 2023.  " Current rate control approach       includes metoprolol tartrate 50 mg twice daily.  She is also taking       apixaban 5 mg twice daily for stroke prophylaxis.    CKD (chronic kidney disease) stage 3, GFR 30-59 ml/min POA: Yes      Overview:        Ref. Range 3/12/2019 18:55 3/13/2019 05:17 12/11/2019 11:05       Creatinine Latest Ref Range: 0.50 - 1.40 mg/dL 1.33 1.28 1.57 (H)       GFR If Non  Latest Ref Range: >60 mL/min/1.73 m 2 38 (A)       40 (A) 31 (A)             She had normal kidney function in April 2018 and since March 2019 she has       had ongoing JAMES with fluctuations of GFR between 30 and 40.  She was       diagnosed with heart failure exacerbation due to worsening lower extremity       edema and was initiated on diuretics in the form of Lasix 40 mg daily       which she is continued on.  She is on potassium supplementation while       taking the Lasix and is also on Eliquis 5 mg twice daily for stroke       prophylaxis.  No other renally excreted medications at this time.    Chronic diastolic congestive heart failure (HCC) POA: Yes      Overview: Echo (March 2019):      CONCLUSIONS      Grossly normal left ventricular systolic function. Left ventricular       ejection fraction is visually estimated to be 55%. Indeterminate diastolic       function. Aortic sclerosis without stenosis. Estimated right ventricular       systolic pressure is 41 mmHg + JVP.            Echo (May 2017):      Left ventricular ejection fraction is visually estimated to be 55%. Normal       regional wall motion. Grade III diastolic dysfunction.            Clinical diagnosis of congestive heart failure determined by cardiology in       November 2019.  She is initiated on furosemide 80 mg x 3 days followed by       furosemide 40 mg thereafter with potassium supplementation.  She is       established with a congestive heart failure clinic.  She has an echo       ordered and this is pending to be completed on  January 1.  She initially       had a weight loss of 13 pounds from December 11 until December 17 however       on December 30 she gained back 7 pounds.  She is not following a       low-sodium diet at this time and admits to eating Chinese food on       [place] as well as drinking 1 to 2 glasses of wine regularly and eating       salty foods such as top Ramen and other canned/boxed foods at home.  She       does appear to be compliant with her diuretics.  She admits that she       continues to add salt to her food and is currently using the pink       Himalayan salt that is in the house.  She is deconditioned and more winded       when ambulating.  Ambulating is become quite difficult with the edema in       the lower extremities specially when she is at home alone.  She is on       metoprolol tartrate 50 mg twice daily.  She did develop JAMES likely from       initiation of the diuretics and last check on kidney function was about 2       weeks ago.    Elevated troponin POA: Yes    Lactic acidosis POA: Yes    Essential hypertension POA: Yes      Overview: Has a documented history of hypertension however her blood pressure has       been running on the low end the month of December and 2019.  She is frail       and a fall risk due to the edema and ulcerations in her bilateral feet.        Her current medication includes metoprolol tartrate 50 mg twice daily       which is being used for rate control with her paroxysmal A. fib.  Lasix 40       mg daily was added about a month ago due to clinical symptoms of       congestive heart failure.  I would be hesitant to initiate other       antihypertensives due to her soft blood pressure and risk for inducing       orthostasis.  Resolved Problems:    * No resolved hospital problems. *      FOLLOW UP  Future Appointments   Date Time Provider Department Center   12/15/2020  3:00 PM ARIANNA Rodriguez None     No follow-up provider specified.    MEDICATIONS ON  DISCHARGE     Medication List      START taking these medications      Instructions   cefdinir 300 MG Caps  Commonly known as: OMNICEF   Take 1 Cap by mouth every 12 hours for 3 days.  Dose: 300 mg        CHANGE how you take these medications      Instructions   furosemide 40 MG Tabs  What changed: how much to take  Commonly known as: LASIX   Take 0.5 Tabs by mouth every day.  Dose: 20 mg        CONTINUE taking these medications      Instructions   apixaban 2.5mg Tabs  Commonly known as: ELIQUIS   Take 1 Tab by mouth 2 Times a Day.  Dose: 2.5 mg     IMODIUM PO   Take 1 Tab by mouth as needed (For diarrhea).  Dose: 1 Tab     metoprolol 50 MG Tabs  Commonly known as: LOPRESSOR   Take 1 Tab by mouth 2 times a day.  Dose: 50 mg     spironolactone 25 MG Tabs  Commonly known as: ALDACTONE   Take 0.5 Tabs by mouth every day.  Dose: 12.5 mg     therapeutic multivitamin-minerals Tabs   Take 1 Tab by mouth every morning.  Dose: 1 Tab            Allergies  Allergies   Allergen Reactions   • Alendronate-Butylpar-Propylpar-Saccharin [Fosamax] Diarrhea and Nausea     .   • Atorvastatin      Causes low quality       DIET  Orders Placed This Encounter   Procedures   • Diet Order Diet: Cardiac; Second Modifier: (optional): Renal     Standing Status:   Standing     Number of Occurrences:   1     Order Specific Question:   Diet:     Answer:   Cardiac [6]     Order Specific Question:   Second Modifier: (optional)     Answer:   Renal [8]       ACTIVITY  As tolerated.  Weight bearing as tolerated    CONSULTATIONS  None    PROCEDURES  None    LABORATORY  Lab Results   Component Value Date    SODIUM 137 11/12/2020    POTASSIUM 4.1 11/12/2020    CHLORIDE 107 11/12/2020    CO2 20 11/12/2020    GLUCOSE 96 11/12/2020    BUN 20 11/12/2020    CREATININE 1.02 11/12/2020    CREATININE 1.2 05/09/2009        Lab Results   Component Value Date    WBC 10.1 11/11/2020    HEMOGLOBIN 14.1 11/11/2020    HEMATOCRIT 43.8 11/11/2020    PLATELETCT 138 (L)  11/11/2020        Total time of the discharge process exceeds 35 minutes.

## 2020-11-13 NOTE — PROGRESS NOTES
Telemetry Shift Summary     Rhythm: V-paced/a-fib  HR Range: 70-80  Ectopy: Per Monitor Tech Leighann: Rare PVCs       Measurements for strip printed 11/12/20 at 14:19:   HR 69    0.0/0.0/0.0           Normal Values  Rhythm SR  HR Range    Measurements 0.12-0.20 / 0.06-0.10  / 0.30-0.52

## 2020-11-13 NOTE — DISCHARGE INSTRUCTIONS
Lasix dose has been cut in half  Continue remainder blood pressure medications  Finish antibiotic regimen  Follow-up primary care physician    Discharge Instructions    Discharged to home by car with relative. Discharged via wheelchair, hospital escort: Yes.  Special equipment needed: Not Applicable    Be sure to schedule a follow-up appointment with your primary care doctor or any specialists as instructed.     Discharge Plan:   Influenza Vaccine Indication: Indicated: 65 years and older    I understand that a diet low in cholesterol, fat, and sodium is recommended for good health. Unless I have been given specific instructions below for another diet, I accept this instruction as my diet prescription.   Other diet: REG    Special Instructions:   Sepsis, Diagnosis, Adult  Sepsis is a serious bodily reaction to an infection. The infection that triggers sepsis may be from a bacteria, virus, or fungus. Sepsis can result from an infection in any part of your body. Infections that commonly lead to sepsis include skin, lung, and urinary tract infections.  Sepsis is a medical emergency that must be treated right away in a hospital. In severe cases, it can lead to septic shock. Septic shock can weaken your heart and cause your blood pressure to drop. This can cause your central nervous system and your body's organs to stop working.  What are the causes?  This condition is caused by a severe reaction to infections from bacteria, viruses, or fungus. The germs that most often lead to sepsis include:  · Escherichia coli (E. coli) bacteria.  · Staphylococcus aureus (staph) bacteria.  · Some types of Streptococcus bacteria.  The most common infections affect these organs:  · The lung (pneumonia).  · The kidneys or bladder (urinary tract infection).  · The skin (cellulitis).  · The bowel, gallbladder, or pancreas.  What increases the risk?  You are more likely to develop this condition if:  · Your body's disease-fighting system  (immune system) is weakened.  · You are age 65 or older.  · You are male.  · You had surgery or you have been hospitalized.  · You have these devices inserted into your body:  ? A small, thin tube (catheter).  ? IV line.  ? Breathing tube.  ? Drainage tube.  · You are not getting enough nutrients from food (malnourished).  · You have a long-term (chronic) disease, such as cancer, lung disease, kidney disease, or diabetes.  · You are .  What are the signs or symptoms?  Symptoms of this condition may include:  · Fever.  · Chills or feeling very cold.  · Confusion or anxiety.  · Fatigue.  · Muscle aches.  · Shortness of breath.  · Nausea and vomiting.  · Urinating much less than usual.  · Fast heart rate (tachycardia).  · Rapid breathing (hyperventilation).  · Changes in skin color. Your skin may look blotchy, pale, or blue.  · Cool, clammy, or sweaty skin.  · Skin rash.  Other symptoms depend on the source of your infection.  How is this diagnosed?  This condition is diagnosed based on:  · Your symptoms.  · Your medical history.  · A physical exam.  Other tests may also be done to find out the cause of the infection and how severe the sepsis is. These tests may include:  · Blood tests.  · Urine tests.  · Swabs from other areas of your body that may have an infection. These samples may be tested (cultured) to find out what type of bacteria is causing the infection.  · Chest X-ray to check for pneumonia. Other imaging tests, such as a CT scan, may also be done.  · Lumbar puncture. This removes a small amount of the fluid that surrounds your brain and spinal cord. The fluid is then examined for infection.  How is this treated?  This condition must be treated in a hospital. Based on the cause of your infection, you may be given an antibiotic, antiviral, or antifungal medicine.  You may also receive:  · Fluids through an IV.  · Oxygen and breathing assistance.  · Medicines to increase your blood  pressure.  · Kidney dialysis. This process cleans your blood if your kidneys have failed.  · Surgery to remove infected tissue.  · Blood transfusion if needed.  · Medicine to prevent blood clots.  · Nutrients to correct imbalances in basic body function (metabolism). You may:  ? Receive important salts and minerals (electrolytes) through an IV.  ? Have your blood sugar level adjusted.  Follow these instructions at home:  Medicines    · Take over-the-counter and prescription medicines only as told by your health care provider.  · If you were prescribed an antibiotic, antiviral, or antifungal medicine, take it as told by your health care provider. Do not stop taking the medicine even if you start to feel better.  General instructions  · If you have a catheter or other indwelling device, ask to have it removed as soon as possible.  · Keep all follow-up visits as told by your health care provider. This is important.  Contact a health care provider if:  · You do not feel like you are getting better or regaining strength.  · You are having trouble coping with your recovery.  · You frequently feel tired.  · You feel worse or do not seem to get better after surgery.  · You think you may have an infection after surgery.  Get help right away if:  · You have any symptoms of sepsis.  · You have difficulty breathing.  · You have a rapid or skipping heartbeat.  · You become confused or disoriented.  · You have a high fever.  · Your skin becomes blotchy, pale, or blue.  · You have an infection that is getting worse or not getting better.  These symptoms may represent a serious problem that is an emergency. Do not wait to see if the symptoms will go away. Get medical help right away. Call your local emergency services (911 in the [place] Do not drive yourself to the hospital.  Summary  · Sepsis is a medical emergency that requires immediate treatment in a hospital.  · This condition is caused by a severe reaction to infections from  bacteria, viruses, or fungus.  · Based on the cause of your infection, you may be given an antibiotic, antiviral, or antifungal medicine.  · Treatment may also include IV fluids, breathing assistance, and kidney dialysis.  This information is not intended to replace advice given to you by your health care provider. Make sure you discuss any questions you have with your health care provider.  Document Released: 09/15/2004 Document Revised: 07/26/2019 Document Reviewed: 07/26/2019  [employer] Patient Education © 2020 [employer]      · Is patient discharged on Warfarin / Coumadin?   No     Depression / Suicide Risk    As you are discharged from this [facility] facility, it is important to learn how to keep safe from harming yourself.    Recognize the warning signs:  · Abrupt changes in personality, positive or negative- including increase in energy   · Giving away possessions  · Change in eating patterns- significant weight changes-  positive or negative  · Change in sleeping patterns- unable to sleep or sleeping all the time   · Unwillingness or inability to communicate  · Depression  · Unusual sadness, discouragement and loneliness  · Talk of wanting to die  · Neglect of personal appearance   · Rebelliousness- reckless behavior  · Withdrawal from people/activities they love  · Confusion- inability to concentrate     If you or a loved one observes any of these behaviors or has concerns about self-harm, here's what you can do:  · Talk about it- your feelings and reasons for harming yourself  · Remove any means that you might use to hurt yourself (examples: pills, rope, extension cords, firearm)  · Get professional help from the community (Mental Health, Substance Abuse, psychological counseling)  · Do not be alone:Call your Safe Contact- someone whom you trust who will be there for you.  · Call your local CRISIS HOTLINE 259-5956 or 815-707-4076  · Call your local Children's Mobile Crisis Response Team [place] (697) 123-2178 or www.[employer]  · Call the toll free National Suicide Prevention Hotlines   · National Suicide Prevention Lifeline 231-481-WVPW (8433)  · [facility] Line Network 800-SUICIDE (386-6828)      Urinary Tract Infection, Adult  A urinary tract infection (UTI) is an infection of any part of the urinary tract. The urinary tract includes:  · The kidneys.  · The ureters.  · The bladder.  · The urethra.  These organs make, store, and get rid of pee (urine) in the body.  What are the causes?  This is caused by germs (bacteria) in your genital area. These germs grow and cause swelling (inflammation) of your urinary tract.  What increases the risk?  You are more likely to develop this condition if:  · You have a small, thin tube (catheter) to drain pee.  · You cannot control when you pee or poop (incontinence).  · You are female, and:  ? You use these methods to prevent pregnancy:  ? A medicine that kills sperm (spermicide).  ? A device that blocks sperm (diaphragm).  ? You have low levels of a female hormone (estrogen).  ? You are pregnant.  · You have genes that add to your risk.  · You are sexually active.  · You take antibiotic medicines.  · You have trouble peeing because of:  ? A prostate that is bigger than normal, if you are male.  ? A blockage in the part of your body that drains pee from the bladder (urethra).  ? A kidney stone.  ? A nerve condition that affects your bladder (neurogenic bladder).  ? Not getting enough to drink.  ? Not peeing often enough.  · You have other conditions, such as:  ? Diabetes.  ? A weak disease-fighting system (immune system).  ? Sickle cell disease.  ? Gout.  ? Injury of the spine.  What are the signs or symptoms?  Symptoms of this condition include:  · Needing to pee right away (urgently).  · Peeing often.  · Peeing small amounts often.  · Pain or burning when peeing.  · Blood in the pee.  · Pee that smells bad or not like normal.  · Trouble peeing.  · Pee  that is cloudy.  · Fluid coming from the vagina, if you are female.  · Pain in the belly or lower back.  Other symptoms include:  · Throwing up (vomiting).  · No urge to eat.  · Feeling mixed up (confused).  · Being tired and grouchy (irritable).  · A fever.  · Watery poop (diarrhea).  How is this treated?  This condition may be treated with:  · Antibiotic medicine.  · Other medicines.  · Drinking enough water.  Follow these instructions at home:    Medicines  · Take over-the-counter and prescription medicines only as told by your doctor.  · If you were prescribed an antibiotic medicine, take it as told by your doctor. Do not stop taking it even if you start to feel better.  General instructions  · Make sure you:  ? Pee until your bladder is empty.  ? Do not hold pee for a long time.  ? Empty your bladder after sex.  ? Wipe from front to back after pooping if you are a female. Use each tissue one time when you wipe.  · Drink enough fluid to keep your pee pale yellow.  · Keep all follow-up visits as told by your doctor. This is important.  Contact a doctor if:  · You do not get better after 1-2 days.  · Your symptoms go away and then come back.  Get help right away if:  · You have very bad back pain.  · You have very bad pain in your lower belly.  · You have a fever.  · You are sick to your stomach (nauseous).  · You are throwing up.  Summary  · A urinary tract infection (UTI) is an infection of any part of the urinary tract.  · This condition is caused by germs in your genital area.  · There are many risk factors for a UTI. These include having a small, thin tube to drain pee and not being able to control when you pee or poop.  · Treatment includes antibiotic medicines for germs.  · Drink enough fluid to keep your pee pale yellow.  This information is not intended to replace advice given to you by your health care provider. Make sure you discuss any questions you have with your health care provider.  Document  Released: 06/05/2009 Document Revised: 12/05/2019 Document Reviewed: 06/27/2019  [employer]vier Patient Education © 2020 [employer]

## 2020-11-13 NOTE — CARE PLAN
Problem: Nutritional:  Goal: Achieve adequate nutritional intake  Description: Patient will consume >50% of meals  Outcome: MET   Pt with good PO intake eating 50-75% and % of meals. Encourage continued good PO intake.

## 2020-11-13 NOTE — PROGRESS NOTES
Received report from previous shift nurse. Assumed patient's cares Pt is up in bed. Pt denies any pain, discomfort, or any needs at this time. Encouraged Pt to call for assistance if needed. Call light within reach. Bed alarm is on.

## 2020-11-13 NOTE — DISCHARGE PLANNING
Anticipated Discharge Disposition: Home     Action: Pt discussed during IDT rounds. No d/c needs.   Pt has transportation upon d/c.     Barriers to Discharge: None    Plan: LSW to assist as needed         Care Transition Team Assessment    Information Source  Orientation : Oriented x 4  Information Given By: Other (Comments)  Who is responsible for making decisions for patient? : Patient    Elopement Risk  Legal Hold: No  Ambulatory or Self Mobile in Wheelchair: Yes  Disoriented: No  Psychiatric Symptoms: None  History of Wandering: No  Elopement this Admit: No  Vocalizing Wanting to Leave: No  Displays Behaviors, Body Language Wanting to Leave: No-Not at Risk for Elopement  Elopement Risk: Not at Risk for Elopement    Discharge Preparedness  What is your plan after discharge?: Home with help  What are your discharge supports?: Child    Finances  Financial Barriers to Discharge: No  Prescription Coverage: Yes    Vision / Hearing Impairment  Right Eye Vision: Impaired, Wears Glasses  Left Eye Vision: Impaired, Wears Glasses    Domestic Abuse  Have you ever been the victim of abuse or violence?: No    Discharge Risks or Barriers  Discharge risks or barriers?: No  Patient risk factors: Vulnerable adult    Anticipated Discharge Information  Discharge Disposition: Discharged to home/self care (01)

## 2020-11-13 NOTE — PROGRESS NOTES
Patient d/c orders in, d/c paperwork completed, per patient daughter is a  and should be here this evening, patient lives with daughter, d/c paperwork complete, patient will need IV removed prior to d/c, educated at bedside, no further questions or concerns

## 2020-11-14 NOTE — PROGRESS NOTES
Tele strip at 1416 shows Afib/POD.      Measurements from am strip were as follows:  MT=0  QRS=0.08  QT=0    Tele Shift Summary:    Rhythm : Afib/V POD  Rate : 70-80s  Ectopy : Per CCT Leighann, pt had rare PVCs.     Telemetry monitoring strips placed in pt's chart.

## 2020-12-15 NOTE — PROGRESS NOTES
Chief Complaint   Patient presents with   • Atrial Fibrillation   • Hypertension   • Premature Ventricular Contractions (PVCs)   • Shortness of Breath       Subjective:   Meron Rice is a 88 y.o. female who presents today for follow-up with her daughter and her daughter, Nyasia.    Patient of Dr. Garrett, and has been followed by Dr. Mancia in the heart failure clinic.  She was last seen in clinic on 10/12/2020.  During that visit, she was started on spironolactone 12.5 mg daily.    Patient reports no problems with the new medication, but did have a hospitalization from 11/10/2020 through 11/13/2020.  Patient was treated for UTI, dehydration and JAMES.    Patient has not been ambulating much recently, and states she had a follow-up with her podiatrist and he recommended patient to follow-up for left lower extremity cyanosis.    Patient reports a history of left dropfoot, but states recently her lower extremity has more cool/cold and turning blue and swelling, she denies any significant pain, but does report occasional pains in her toes.  She does not ambulate more than about 20 feet in her room.    She otherwise has shortness of breath with ADLs and exertion.    Patient's daughter reports patient snores and has apneic periods.    Additonally, patient has the following medical problems:     -Atrial fibrillation: Taking Eliquis     -Pacemaker: Last device check 12/18/2019     -Hypertension     -Depression: Currently not taking any antidepressive medications     -Anemia     -Asthma     -History of chronic UTIs     -History of breast and uterus cancer, with hysterectomy and mastectomy     Past Medical History:   Diagnosis Date   • Anemia    • Anxiety 2/4/2014   • Arrhythmia     atrial fibrillation   • Arthritis     generalized   • ASTHMA     has not needed an inhaler since 2010   • Cancer (HCC) [id number]    Uterus and Breast   • CATARACT     beginning   • Chronic diastolic congestive heart failure (HCC) 2/10/2020    Echo  (March 2019): CONCLUSIONS Grossly normal left ventricular systolic function. Left ventricular ejection fraction is visually estimated to be 55%. Indeterminate diastolic function. Aortic sclerosis without stenosis. Estimated right ventricular systolic pressure is 41 mmHg + JVP.  Echo (May 2017): Left ventricular ejection fraction is visually estimated to be 55%. Normal regional wall motion. Gr   • Chronic hip pain 5/19/2017   • Chronic left shoulder pain 12/11/2018   • Chronic midline low back pain without sciatica 5/14/2012   • CKD (chronic kidney disease) stage 3, GFR 30-59 ml/min 2/10/2020      Ref. Range 3/12/2019 18:55 3/13/2019 05:17 12/11/2019 11:05 Creatinine Latest Ref Range: 0.50 - 1.40 mg/dL 1.33 1.28 1.57 (H) GFR If Non  Latest Ref Range: >60 mL/min/1.73 m 2 38 (A) 40 (A) 31 (A)  She had normal kidney function in April 2018 and since March 2019 she has had ongoing JAMES with fluctuations of GFR between 30 and 40.  She was diagnosed with heart failure exacerbation   • Depression 5/19/2017   • Diarrhea 3/12/2019   • Foot-drop    • GERD (gastroesophageal reflux disease) 5/14/2012   • Hypertension    • Neurogenic bladder 3/6/2017   • Other insomnia 2/4/2014   • Pacemaker     in past, removed due to cellulitis   • Presbycusis 3/22/2018   • Thyroid nodule    • Unspecified hemorrhagic conditions     post op hyst. Currently on Eliquis    • Unspecified urinary incontinence     lazy bladder; doesn't empty completely   • UTI (urinary tract infection)     frequent      Past Surgical History:   Procedure Laterality Date   • OTHER  09/2017    pacemaker removal    • FEMUR NAILING INTRAMEDULLARY Right 6/3/2015    Procedure: FEMUR NAILING INTRAMEDULLARY;  Surgeon: Deshaun Denis M.D.;  Location: SURGERY [facility];  Service:    • WOUND CLOSURE GENERAL  4/10/2013    Performed by Nano Riley M.D. at SURGERY SAME DAY [facility]   • BREAST IMPLANT REMOVAL  4/10/2013    Performed by Ele Meza,  M.D. at SURGERY SAME DAY [facility]   • BREAST RECONSTRUCTION  11/26/2012    Performed by Jak Rosales M.D. at SURGERY [facility]   • HIP HEMIARTHROPLASTY  5/14/2012    left; Performed by KEITH COWART at SURGERY [facility]   • BREAST IMPLANT REVISION  7/11/2011    Performed by JAK ROSALES at SURGERY [facility]   • NIPPLE RECONSTRUCTION  7/11/2011    Performed by JAK ROSALES at SURGERY [facility]   • MASTECTOMY  12/15/2010    right   • CATH REMOVAL  12/15/2010    Performed by DIPAK VARELA at SURGERY [facility]   • BREAST RECONSTRUCTION  12/15/2010    Performed by JAK ROSALES at SURGERY [facility]   • LATISSIMUS FLAP  12/15/2010    Performed by JAK ROSALES at SURGERY [facility]   • CATH PLACEMENT  10/28/2009    Performed by DIPAK VARELA at SURGERY SAME DAY [facility]   • MASTECTOMY  9/30/2009    left   • AXILLARY NODE DISSECTION  9/15/2009    Performed by DIPAK VARELA at SURGERY SAME DAY [facility]   • OTHER SURGICAL PROCEDURE  2004    bladder repair   • OTHER ORTHOPEDIC SURGERY  2002    laminectomy   • HYSTERECTOMY, TOTAL ABDOMINAL  2000   • GYN SURGERY  1998    excision of fibroids   • APPENDECTOMY  1968   • DILATION AND CURETTAGE  1961   • PACEMAKER INSERTION     • VEIN LIGAT & STRIP  1972, 1966    x2 bilateral     Family History   Problem Relation Age of Onset   • Diabetes Mother    • Heart Disease Mother    • Heart Disease Father    • Stroke Father    • Hypertension Father    • Diabetes Sister    • Heart Disease Sister    • Diabetes Other    • Heart Disease Other    • Stroke Other    • Cancer Other    • Hypertension Other    • Diabetes Other    • Diabetes Child    • Psychiatric Illness Child    • Hypertension Sister    • Kidney Disease Sister    • Stroke Sister    • Lung Cancer Brother    • Bipolar disorder Brother      Social History     Socioeconomic History   • Marital status:      Spouse name: Not on file   •  Number of children: Not on file   • Years of education: Not on file   • Highest education level: Not on file   Occupational History   • Not on file   Social Needs   • Financial resource strain: Not on file   • Food insecurity     Worry: Not on file     Inability: Not on file   • Transportation needs     Medical: Not on file     Non-medical: Not on file   Tobacco Use   • Smoking status: Former Smoker     Packs/day: 0.10     Years: 1.00     Pack years: 0.10     Types: Cigarettes     Quit date: 1964     Years since quittin.9   • Smokeless tobacco: Never Used   • Tobacco comment: 50 years ago in 1960  SOCIAL    Substance and Sexual Activity   • Alcohol use: Yes   • Drug use: No   • 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     Comment: Retired Nurse   Lifestyle   • Physical activity     Days per week: Not on file     Minutes per session: Not on file   • Stress: Not on file   Relationships   • Social connections     Talks on phone: Not on file     Gets together: Not on file     Attends [employer] service: Not on file     Active member of club or organization: Not on file     Attends meetings of clubs or organizations: Not on file     Relationship status: Not on file   • Intimate partner violence     Fear of current or ex partner: Not on file     Emotionally abused: Not on file     Physically abused: Not on file     Forced sexual activity: Not on file   Other Topics Concern   • Not on file   Social History Narrative    Retired nurse, 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 She was  for 49 years before her  passed away in 2017. She is from [place] originally. She has worked in the inpatient setting and in hospice care.     Allergies   Allergen Reactions   • Alendronate-Butylpar-Propylpar-Saccharin [Fosamax] Diarrhea and Nausea     .   • Atorvastatin      Causes low quality     Outpatient Encounter Medications as of 12/15/2020   Medication Sig Dispense Refill   •  "furosemide (LASIX) 40 MG Tab Take 0.5 Tabs by mouth every day. 180 Tab 3   • apixaban (ELIQUIS) 2.5mg Tab Take 1 Tab by mouth 2 Times a Day. 180 Tab 3   • metoprolol (LOPRESSOR) 50 MG Tab Take 1 Tab by mouth 2 times a day. 60 Tab 11   • spironolactone (ALDACTONE) 25 MG Tab Take 0.5 Tabs by mouth every day. 30 Tab 11   • [DISCONTINUED] Loperamide HCl (IMODIUM PO) Take 1 Tab by mouth as needed (For diarrhea).     • [DISCONTINUED] therapeutic multivitamin-minerals (THERAGRAN-M) Tab Take 1 Tab by mouth every morning.       No facility-administered encounter medications on file as of 12/15/2020.      Review of Systems   Constitutional: Negative for fever and malaise/fatigue.   Respiratory: Positive for shortness of breath. Negative for cough.    Cardiovascular: Positive for leg swelling. Negative for chest pain, palpitations, orthopnea, claudication and PND.   Gastrointestinal: Negative for abdominal pain.   Musculoskeletal: Negative for myalgias.   Skin:        Left LE cyanosis and cold   Neurological: Negative for dizziness.   All other systems reviewed and are negative.       Objective:   /72 (BP Location: Left arm, Patient Position: Sitting, BP Cuff Size: Adult)   Pulse 64   Resp 14   Ht 1.753 m (5' 9\")   SpO2 94%   BMI 25.04 kg/m²     Physical Exam   Constitutional: She is oriented to person, place, and time. She appears well-developed and well-nourished.   HENT:   Head: Normocephalic and atraumatic.   Eyes: Pupils are equal, round, and reactive to light. EOM are normal.   Neck: Normal range of motion. Neck supple. No JVD present.   Cardiovascular: Normal rate and normal heart sounds. An irregularly irregular rhythm present.   Pulses:       Dorsalis pedis pulses are 1+ on the right side and 0 on the left side.        Posterior tibial pulses are 1+ on the right side and 0 on the left side.   Pulmonary/Chest: Effort normal and breath sounds normal. No respiratory distress. She has no wheezes. She has no " rales.   Abdominal: Soft. Bowel sounds are normal.   Musculoskeletal:         General: No edema.   Neurological: She is alert and oriented to person, place, and time.   Skin: Skin is warm and dry.   Psychiatric: She has a normal mood and affect. Her behavior is normal.   Vitals reviewed.    Lab Results   Component Value Date/Time    CHOLSTRLTOT 176 02/06/2013 12:44 PM     (H) 02/06/2013 12:44 PM    HDL 58 02/06/2013 12:44 PM    TRIGLYCERIDE 91 02/06/2013 12:44 PM       Lab Results   Component Value Date/Time    SODIUM 137 11/12/2020 11:08 AM    POTASSIUM 4.1 11/12/2020 11:08 AM    CHLORIDE 107 11/12/2020 11:08 AM    CO2 20 11/12/2020 11:08 AM    GLUCOSE 96 11/12/2020 11:08 AM    BUN 20 11/12/2020 11:08 AM    CREATININE 1.02 11/12/2020 11:08 AM    CREATININE 1.2 05/09/2009 04:10 AM     Lab Results   Component Value Date/Time    ALKPHOSPHAT 79 11/11/2020 02:05 AM    ASTSGOT 18 11/11/2020 02:05 AM    ALTSGPT 12 11/11/2020 02:05 AM    TBILIRUBIN 0.5 11/11/2020 02:05 AM      Transthoracic Echo Report 5/19/2017  1. The anterior mitral valve leaflet that is prolapsing against the   posterior causing eccentric posteriorly directed mitral regurgitation.   This is not seen in most of the views but could be consistent with severe mitral regurgitation. If clinically suspected recommend UNA.     2. Severely dilated left atrium.     3. Enlarged right atrium.     4. Left ventricular ejection fraction is visually estimated to be 55%.   Normal regional wall motion. Grade III diastolic dysfunction.      5. Estimated right ventricular systolic pressure  is 50 mmHg.        Transesophageal Echo Report 6/1/2017  No left atrial thrombus present.  No thrombus detected in the left atrial appendage.  Normal left ventricular size and systolic function.     Transthoracic Echo Report 3/13/2019  Grossly normal left ventricular systolic function. Left ventricular   ejection fraction is visually estimated to be 55%.  Indeterminate diastolic  function.  Aortic sclerosis without stenosis.  Estimated right ventricular systolic pressure is 41 mmHg + JVP.    Transthoracic Echo Report 1/16/2020  Compared to the images of the prior study done on 03/13/19 minimal change.  Normal left ventricular size and systolic function.  Mild concentric left ventricular hypertrophy.  Left ventricular ejection fraction is visually estimated to be 55%.  Enlarged right atrium.  Moderately dilated left atrium.  Moderate mitral regurgitation.  Mild aortic insufficiency.  Moderate to severe tricuspid regurgitation.  Estimated right ventricular systolic pressure is 45 mmHg.    Assessment:     1. Extremity cyanosis  US-EXTREMITY ARTERY LOWER UNILAT W/FILI (COMBO) LEFT    US-EXTREMITY VENOUS LOWER UNILAT LEFT    REFERRAL TO VASCULAR SURGERY    Comp Metabolic Panel    Lipid Profile   2. Snores  REFERRAL TO PULMONARY AND SLEEP MEDICINE   3. Apneic episode  REFERRAL TO PULMONARY AND SLEEP MEDICINE   4. PAF (paroxysmal atrial fibrillation) ([facility])  Comp Metabolic Panel    Lipid Profile   5. Anticoagulant long-term use  Comp Metabolic Panel    Lipid Profile   6. High risk medication use  Comp Metabolic Panel    Lipid Profile   7. PAD (peripheral artery disease) ([facility])  Lipid Profile   8. Pulmonary hypertension ([facility])  Comp Metabolic Panel    Lipid Profile       Medical Decision Making:  Today's Assessment / Status / Plan:   1. Left LE cyanosis: No palpable pulse on left foot.  -Referral to Vascular surgery, if not scheduled in 1 week, call clinic to follow up  -Arterial and Venous Ultrasound  -CMP and Lipid panel in next few weeks   -ER precautions     2.  Atrial fibrillation:  -Continue Eliquis 2.5 mg twice a day  -Continue metoprolol 50 mg twice a day  -Has pacemaker, will get patient schedule for next device check    3. HFpEF, Stage C, Class 2-3:  Pt does have LE edema   -Continue Furosemide 20 mg daily   -Continue Spironolactone 25 mg daily   -labs in next few weeks     4. CKD, Stage  3:  -Follow kidney function    5. Snores, apneic periods:  -Refer to sleep medicine for evaluation   -Pt unsure if she will be compliant with treatment    FU in clinic in 3 months. Sooner if needed.    Patient verbalizes understanding and agrees with the plan of care.     PLEASE NOTE: This Note was created using voice recognition Software. I have made every reasonable attempt to correct obvious errors, but I expect that there are errors of grammar and possibly content that I did not discover before finalizing the note

## 2020-12-29 NOTE — TELEPHONE ENCOUNTER
----- Message from ARIANNA Rodriguez sent at 12/24/2020  2:23 PM PST -----  Please let patient know her arterial duplex shows she has moderate arterial insufficiency of her left LE.  Please make sure she makes a follow-up appointment with vascular surgeon.

## 2021-01-01 ENCOUNTER — IMMUNIZATION (OUTPATIENT)
Dept: FAMILY PLANNING/WOMEN'S HEALTH CLINIC | Facility: IMMUNIZATION CENTER | Age: 86
End: 2021-01-01
Attending: INTERNAL MEDICINE
Payer: MEDICARE

## 2021-01-01 ENCOUNTER — APPOINTMENT (OUTPATIENT)
Dept: RADIOLOGY | Facility: MEDICAL CENTER | Age: 86
End: 2021-01-01
Attending: EMERGENCY MEDICINE
Payer: MEDICARE

## 2021-01-01 ENCOUNTER — TELEPHONE (OUTPATIENT)
Dept: CARDIOLOGY | Facility: MEDICAL CENTER | Age: 86
End: 2021-01-01

## 2021-01-01 ENCOUNTER — HOSPITAL ENCOUNTER (EMERGENCY)
Facility: MEDICAL CENTER | Age: 86
End: 2021-07-11
Attending: EMERGENCY MEDICINE
Payer: MEDICARE

## 2021-01-01 ENCOUNTER — OFFICE VISIT (OUTPATIENT)
Dept: MEDICAL GROUP | Facility: PHYSICIAN GROUP | Age: 86
End: 2021-01-01
Payer: MEDICARE

## 2021-01-01 ENCOUNTER — TELEPHONE (OUTPATIENT)
Dept: MEDICAL GROUP | Facility: PHYSICIAN GROUP | Age: 86
End: 2021-01-01

## 2021-01-01 ENCOUNTER — HOSPITAL ENCOUNTER (EMERGENCY)
Facility: MEDICAL CENTER | Age: 86
End: 2021-09-29
Attending: EMERGENCY MEDICINE
Payer: MEDICARE

## 2021-01-01 ENCOUNTER — TELEPHONE (OUTPATIENT)
Dept: SLEEP MEDICINE | Facility: MEDICAL CENTER | Age: 86
End: 2021-01-01

## 2021-01-01 ENCOUNTER — HOSPITAL ENCOUNTER (EMERGENCY)
Facility: MEDICAL CENTER | Age: 86
End: 2021-09-22
Attending: EMERGENCY MEDICINE
Payer: MEDICARE

## 2021-01-01 VITALS
OXYGEN SATURATION: 96 % | TEMPERATURE: 97.1 F | HEIGHT: 69 IN | WEIGHT: 159.8 LBS | HEART RATE: 115 BPM | RESPIRATION RATE: 18 BRPM | DIASTOLIC BLOOD PRESSURE: 56 MMHG | SYSTOLIC BLOOD PRESSURE: 114 MMHG | BODY MASS INDEX: 23.67 KG/M2

## 2021-01-01 VITALS
BODY MASS INDEX: 24.42 KG/M2 | OXYGEN SATURATION: 94 % | HEIGHT: 69 IN | TEMPERATURE: 97.8 F | DIASTOLIC BLOOD PRESSURE: 58 MMHG | RESPIRATION RATE: 28 BRPM | SYSTOLIC BLOOD PRESSURE: 92 MMHG | HEART RATE: 84 BPM | WEIGHT: 164.9 LBS

## 2021-01-01 VITALS
BODY MASS INDEX: 26.1 KG/M2 | HEART RATE: 75 BPM | TEMPERATURE: 97.2 F | SYSTOLIC BLOOD PRESSURE: 122 MMHG | OXYGEN SATURATION: 94 % | HEIGHT: 69 IN | DIASTOLIC BLOOD PRESSURE: 70 MMHG | WEIGHT: 176.2 LBS

## 2021-01-01 VITALS
HEIGHT: 69 IN | HEART RATE: 72 BPM | DIASTOLIC BLOOD PRESSURE: 75 MMHG | SYSTOLIC BLOOD PRESSURE: 138 MMHG | OXYGEN SATURATION: 99 % | TEMPERATURE: 97.8 F | RESPIRATION RATE: 16 BRPM | WEIGHT: 164.9 LBS | BODY MASS INDEX: 24.42 KG/M2

## 2021-01-01 VITALS
WEIGHT: 160 LBS | RESPIRATION RATE: 18 BRPM | SYSTOLIC BLOOD PRESSURE: 142 MMHG | HEART RATE: 98 BPM | TEMPERATURE: 98 F | BODY MASS INDEX: 23.7 KG/M2 | DIASTOLIC BLOOD PRESSURE: 84 MMHG | HEIGHT: 69 IN | OXYGEN SATURATION: 96 %

## 2021-01-01 DIAGNOSIS — Z51.5 COMFORT MEASURES ONLY STATUS: ICD-10-CM

## 2021-01-01 DIAGNOSIS — N39.0 ACUTE UTI: ICD-10-CM

## 2021-01-01 DIAGNOSIS — Z23 ENCOUNTER FOR VACCINATION: Primary | ICD-10-CM

## 2021-01-01 DIAGNOSIS — F33.1 MODERATE EPISODE OF RECURRENT MAJOR DEPRESSIVE DISORDER (HCC): ICD-10-CM
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
## 2021-01-01 DIAGNOSIS — N39.0 RECURRENT UTI: ICD-10-CM

## 2021-01-01 DIAGNOSIS — I10 ESSENTIAL HYPERTENSION: ICD-10-CM

## 2021-01-01 DIAGNOSIS — R60.0 LEG EDEMA: ICD-10-CM

## 2021-01-01 DIAGNOSIS — Z23 NEED FOR VACCINATION: ICD-10-CM

## 2021-01-01 DIAGNOSIS — Z78.9 SELF-CARE DEFICIT: ICD-10-CM

## 2021-01-01 DIAGNOSIS — I50.32 CHRONIC DIASTOLIC CONGESTIVE HEART FAILURE (HCC): ICD-10-CM

## 2021-01-01 DIAGNOSIS — M75.02 ADHESIVE CAPSULITIS OF LEFT SHOULDER: ICD-10-CM

## 2021-01-01 DIAGNOSIS — M19.012 LOCALIZED OSTEOARTHRITIS OF LEFT SHOULDER: ICD-10-CM

## 2021-01-01 DIAGNOSIS — N18.32 STAGE 3B CHRONIC KIDNEY DISEASE: ICD-10-CM

## 2021-01-01 DIAGNOSIS — I48.0 PAF (PAROXYSMAL ATRIAL FIBRILLATION) (HCC): ICD-10-CM

## 2021-01-01 LAB
ALBUMIN SERPL BCP-MCNC: 3.3 G/DL (ref 3.2–4.9)
ALBUMIN SERPL BCP-MCNC: 4.5 G/DL (ref 3.2–4.9)
ALBUMIN/GLOB SERPL: 1 G/DL
ALBUMIN/GLOB SERPL: 1.4 G/DL
ALP SERPL-CCNC: 102 U/L (ref 30–99)
ALP SERPL-CCNC: 85 U/L (ref 30–99)
ALT SERPL-CCNC: 23 U/L (ref 2–50)
ALT SERPL-CCNC: 6 U/L (ref 2–50)
ANION GAP SERPL CALC-SCNC: 11 MMOL/L (ref 7–16)
ANION GAP SERPL CALC-SCNC: 14 MMOL/L (ref 7–16)
APPEARANCE UR: ABNORMAL
APPEARANCE UR: ABNORMAL
AST SERPL-CCNC: 18 U/L (ref 12–45)
AST SERPL-CCNC: 29 U/L (ref 12–45)
BACTERIA #/AREA URNS HPF: ABNORMAL /HPF
BACTERIA #/AREA URNS HPF: ABNORMAL /HPF
BACTERIA BLD CULT: NORMAL
BACTERIA BLD CULT: NORMAL
BACTERIA UR CULT: ABNORMAL
BASOPHILS # BLD AUTO: 0.9 % (ref 0–1.8)
BASOPHILS # BLD AUTO: 1.1 % (ref 0–1.8)
BASOPHILS # BLD: 0.08 K/UL (ref 0–0.12)
BASOPHILS # BLD: 0.1 K/UL (ref 0–0.12)
BILIRUB SERPL-MCNC: 0.6 MG/DL (ref 0.1–1.5)
BILIRUB SERPL-MCNC: 0.7 MG/DL (ref 0.1–1.5)
BILIRUB UR QL STRIP.AUTO: NEGATIVE
BILIRUB UR QL STRIP.AUTO: NEGATIVE
BUN SERPL-MCNC: 26 MG/DL (ref 8–22)
BUN SERPL-MCNC: 48 MG/DL (ref 8–22)
CALCIUM SERPL-MCNC: 9.2 MG/DL (ref 8.4–10.2)
CALCIUM SERPL-MCNC: 9.8 MG/DL (ref 8.4–10.2)
CHLORIDE SERPL-SCNC: 101 MMOL/L (ref 96–112)
CHLORIDE SERPL-SCNC: 102 MMOL/L (ref 96–112)
CO2 SERPL-SCNC: 20 MMOL/L (ref 20–33)
CO2 SERPL-SCNC: 20 MMOL/L (ref 20–33)
COLOR UR: YELLOW
COLOR UR: YELLOW
CREAT SERPL-MCNC: 1.35 MG/DL (ref 0.5–1.4)
CREAT SERPL-MCNC: 1.62 MG/DL (ref 0.5–1.4)
EKG IMPRESSION: NORMAL
EOSINOPHIL # BLD AUTO: 0.06 K/UL (ref 0–0.51)
EOSINOPHIL # BLD AUTO: 0.1 K/UL (ref 0–0.51)
EOSINOPHIL NFR BLD: 0.7 % (ref 0–6.9)
EOSINOPHIL NFR BLD: 1.1 % (ref 0–6.9)
EPI CELLS #/AREA URNS HPF: ABNORMAL /HPF
EPI CELLS #/AREA URNS HPF: ABNORMAL /HPF
ERYTHROCYTE [DISTWIDTH] IN BLOOD BY AUTOMATED COUNT: 52.6 FL (ref 35.9–50)
ERYTHROCYTE [DISTWIDTH] IN BLOOD BY AUTOMATED COUNT: 58.4 FL (ref 35.9–50)
GLOBULIN SER CALC-MCNC: 3.2 G/DL (ref 1.9–3.5)
GLOBULIN SER CALC-MCNC: 3.4 G/DL (ref 1.9–3.5)
GLUCOSE SERPL-MCNC: 103 MG/DL (ref 65–99)
GLUCOSE SERPL-MCNC: 133 MG/DL (ref 65–99)
GLUCOSE UR STRIP.AUTO-MCNC: NEGATIVE MG/DL
GLUCOSE UR STRIP.AUTO-MCNC: NEGATIVE MG/DL
HCT VFR BLD AUTO: 47 % (ref 37–47)
HCT VFR BLD AUTO: 50.5 % (ref 37–47)
HGB BLD-MCNC: 15.1 G/DL (ref 12–16)
HGB BLD-MCNC: 16.4 G/DL (ref 12–16)
HYALINE CASTS #/AREA URNS LPF: ABNORMAL /LPF
IMM GRANULOCYTES # BLD AUTO: 0.03 K/UL (ref 0–0.11)
IMM GRANULOCYTES # BLD AUTO: 0.04 K/UL (ref 0–0.11)
IMM GRANULOCYTES NFR BLD AUTO: 0.3 % (ref 0–0.9)
IMM GRANULOCYTES NFR BLD AUTO: 0.5 % (ref 0–0.9)
KETONES UR STRIP.AUTO-MCNC: NEGATIVE MG/DL
KETONES UR STRIP.AUTO-MCNC: NEGATIVE MG/DL
LACTATE BLD-SCNC: 2 MMOL/L (ref 0.5–2)
LACTATE BLD-SCNC: 2.6 MMOL/L (ref 0.5–2)
LEUKOCYTE ESTERASE UR QL STRIP.AUTO: ABNORMAL
LEUKOCYTE ESTERASE UR QL STRIP.AUTO: ABNORMAL
LYMPHOCYTES # BLD AUTO: 1.7 K/UL (ref 1–4.8)
LYMPHOCYTES # BLD AUTO: 2.42 K/UL (ref 1–4.8)
LYMPHOCYTES NFR BLD: 20 % (ref 22–41)
LYMPHOCYTES NFR BLD: 26.4 % (ref 22–41)
MCH RBC QN AUTO: 30.7 PG (ref 27–33)
MCH RBC QN AUTO: 31.5 PG (ref 27–33)
MCHC RBC AUTO-ENTMCNC: 32.1 G/DL (ref 33.6–35)
MCHC RBC AUTO-ENTMCNC: 32.5 G/DL (ref 33.6–35)
MCV RBC AUTO: 95.5 FL (ref 81.4–97.8)
MCV RBC AUTO: 96.9 FL (ref 81.4–97.8)
MICRO URNS: ABNORMAL
MICRO URNS: ABNORMAL
MONOCYTES # BLD AUTO: 0.93 K/UL (ref 0–0.85)
MONOCYTES # BLD AUTO: 0.99 K/UL (ref 0–0.85)
MONOCYTES NFR BLD AUTO: 10.8 % (ref 0–13.4)
MONOCYTES NFR BLD AUTO: 11 % (ref 0–13.4)
NEUTROPHILS # BLD AUTO: 5.51 K/UL (ref 2–7.15)
NEUTROPHILS # BLD AUTO: 5.68 K/UL (ref 2–7.15)
NEUTROPHILS NFR BLD: 60.3 % (ref 44–72)
NEUTROPHILS NFR BLD: 66.9 % (ref 44–72)
NITRITE UR QL STRIP.AUTO: NEGATIVE
NITRITE UR QL STRIP.AUTO: NEGATIVE
NRBC # BLD AUTO: 0 K/UL
NRBC # BLD AUTO: 0 K/UL
NRBC BLD-RTO: 0 /100 WBC
NRBC BLD-RTO: 0 /100 WBC
PH UR STRIP.AUTO: 5.5 [PH] (ref 5–8)
PH UR STRIP.AUTO: 6 [PH] (ref 5–8)
PLATELET # BLD AUTO: 171 K/UL (ref 164–446)
PLATELET # BLD AUTO: 199 K/UL (ref 164–446)
PMV BLD AUTO: 9.5 FL (ref 9–12.9)
PMV BLD AUTO: 9.7 FL (ref 9–12.9)
POTASSIUM SERPL-SCNC: 3.7 MMOL/L (ref 3.6–5.5)
POTASSIUM SERPL-SCNC: 4 MMOL/L (ref 3.6–5.5)
PROT SERPL-MCNC: 6.7 G/DL (ref 6–8.2)
PROT SERPL-MCNC: 7.7 G/DL (ref 6–8.2)
PROT UR QL STRIP: 30 MG/DL
PROT UR QL STRIP: NEGATIVE MG/DL
RBC # BLD AUTO: 4.92 M/UL (ref 4.2–5.4)
RBC # BLD AUTO: 5.21 M/UL (ref 4.2–5.4)
RBC # URNS HPF: ABNORMAL /HPF
RBC # URNS HPF: ABNORMAL /HPF
RBC UR QL AUTO: ABNORMAL
RBC UR QL AUTO: ABNORMAL
SIGNIFICANT IND 70042: ABNORMAL
SIGNIFICANT IND 70042: ABNORMAL
SIGNIFICANT IND 70042: NORMAL
SIGNIFICANT IND 70042: NORMAL
SITE SITE: ABNORMAL
SITE SITE: ABNORMAL
SITE SITE: NORMAL
SITE SITE: NORMAL
SODIUM SERPL-SCNC: 133 MMOL/L (ref 135–145)
SODIUM SERPL-SCNC: 135 MMOL/L (ref 135–145)
SOURCE SOURCE: ABNORMAL
SOURCE SOURCE: ABNORMAL
SOURCE SOURCE: NORMAL
SOURCE SOURCE: NORMAL
SP GR UR STRIP.AUTO: 1.01
SP GR UR STRIP.AUTO: 1.02
WBC # BLD AUTO: 8.5 K/UL (ref 4.8–10.8)
WBC # BLD AUTO: 9.2 K/UL (ref 4.8–10.8)
WBC #/AREA URNS HPF: ABNORMAL /HPF
WBC #/AREA URNS HPF: ABNORMAL /HPF

## 2021-01-01 PROCEDURE — 0002A PFIZER SARS-COV-2 VACCINE: CPT

## 2021-01-01 PROCEDURE — 87186 SC STD MICRODIL/AGAR DIL: CPT

## 2021-01-01 PROCEDURE — 85025 COMPLETE CBC W/AUTO DIFF WBC: CPT

## 2021-01-01 PROCEDURE — 83605 ASSAY OF LACTIC ACID: CPT

## 2021-01-01 PROCEDURE — 81001 URINALYSIS AUTO W/SCOPE: CPT

## 2021-01-01 PROCEDURE — 700105 HCHG RX REV CODE 258: Performed by: EMERGENCY MEDICINE

## 2021-01-01 PROCEDURE — 99214 OFFICE O/P EST MOD 30 MIN: CPT | Performed by: INTERNAL MEDICINE

## 2021-01-01 PROCEDURE — 80053 COMPREHEN METABOLIC PANEL: CPT

## 2021-01-01 PROCEDURE — 87086 URINE CULTURE/COLONY COUNT: CPT

## 2021-01-01 PROCEDURE — 99285 EMERGENCY DEPT VISIT HI MDM: CPT

## 2021-01-01 PROCEDURE — 91300 PFIZER SARS-COV-2 VACCINE: CPT

## 2021-01-01 PROCEDURE — 99284 EMERGENCY DEPT VISIT MOD MDM: CPT

## 2021-01-01 PROCEDURE — 700102 HCHG RX REV CODE 250 W/ 637 OVERRIDE(OP): Performed by: EMERGENCY MEDICINE

## 2021-01-01 PROCEDURE — 96365 THER/PROPH/DIAG IV INF INIT: CPT

## 2021-01-01 PROCEDURE — 96374 THER/PROPH/DIAG INJ IV PUSH: CPT

## 2021-01-01 PROCEDURE — 700111 HCHG RX REV CODE 636 W/ 250 OVERRIDE (IP): Performed by: EMERGENCY MEDICINE

## 2021-01-01 PROCEDURE — 36415 COLL VENOUS BLD VENIPUNCTURE: CPT

## 2021-01-01 PROCEDURE — 71045 X-RAY EXAM CHEST 1 VIEW: CPT

## 2021-01-01 PROCEDURE — 97162 PT EVAL MOD COMPLEX 30 MIN: CPT

## 2021-01-01 PROCEDURE — A9270 NON-COVERED ITEM OR SERVICE: HCPCS | Performed by: EMERGENCY MEDICINE

## 2021-01-01 PROCEDURE — 87077 CULTURE AEROBIC IDENTIFY: CPT

## 2021-01-01 PROCEDURE — 97166 OT EVAL MOD COMPLEX 45 MIN: CPT

## 2021-01-01 PROCEDURE — 93005 ELECTROCARDIOGRAM TRACING: CPT | Performed by: EMERGENCY MEDICINE

## 2021-01-01 PROCEDURE — 0001A PFIZER SARS-COV-2 VACCINE: CPT

## 2021-01-01 PROCEDURE — 87040 BLOOD CULTURE FOR BACTERIA: CPT

## 2021-01-01 RX ORDER — CEPHALEXIN 500 MG/1
500 CAPSULE ORAL 2 TIMES DAILY
Qty: 14 CAPSULE | Refills: 0 | Status: SHIPPED | OUTPATIENT
Start: 2021-01-01 | End: 2021-01-01

## 2021-01-01 RX ORDER — FLUOXETINE 10 MG/1
10 CAPSULE ORAL DAILY
COMMUNITY
End: 2021-01-01 | Stop reason: SDUPTHER

## 2021-01-01 RX ORDER — METOPROLOL TARTRATE 50 MG/1
TABLET, FILM COATED ORAL
Qty: 60 TABLET | Refills: 11 | Status: SHIPPED | OUTPATIENT
Start: 2021-01-01 | End: 2021-01-01

## 2021-01-01 RX ORDER — CEFTRIAXONE 1 G/1
1 INJECTION, POWDER, FOR SOLUTION INTRAMUSCULAR; INTRAVENOUS ONCE
Status: COMPLETED | OUTPATIENT
Start: 2021-01-01 | End: 2021-01-01

## 2021-01-01 RX ORDER — SODIUM CHLORIDE, SODIUM LACTATE, POTASSIUM CHLORIDE, AND CALCIUM CHLORIDE .6; .31; .03; .02 G/100ML; G/100ML; G/100ML; G/100ML
30 INJECTION, SOLUTION INTRAVENOUS ONCE
Status: COMPLETED | OUTPATIENT
Start: 2021-01-01 | End: 2021-01-01

## 2021-01-01 RX ORDER — CEFDINIR 300 MG/1
300 CAPSULE ORAL 2 TIMES DAILY
Qty: 14 CAPSULE | Refills: 0 | Status: SHIPPED | OUTPATIENT
Start: 2021-01-01 | End: 2021-01-01

## 2021-01-01 RX ORDER — FLUOXETINE 10 MG/1
10 CAPSULE ORAL DAILY
Qty: 30 CAP | Refills: 1 | Status: SHIPPED | OUTPATIENT
Start: 2021-01-01 | End: 2021-01-01 | Stop reason: SDUPTHER

## 2021-01-01 RX ORDER — CEFDINIR 300 MG/1
300 CAPSULE ORAL 2 TIMES DAILY
Status: DISCONTINUED | OUTPATIENT
Start: 2021-01-01 | End: 2021-01-01 | Stop reason: HOSPADM

## 2021-01-01 RX ORDER — SODIUM CHLORIDE 9 MG/ML
1000 INJECTION, SOLUTION INTRAVENOUS ONCE
Status: COMPLETED | OUTPATIENT
Start: 2021-01-01 | End: 2021-01-01

## 2021-01-01 RX ORDER — FUROSEMIDE 40 MG/1
40 TABLET ORAL 2 TIMES DAILY
Qty: 20 TABLET | Refills: 1 | Status: SHIPPED | OUTPATIENT
Start: 2021-01-01 | End: 2021-01-01

## 2021-01-01 RX ORDER — FLUOXETINE HYDROCHLORIDE 40 MG/1
40 CAPSULE ORAL DAILY
Qty: 90 CAPSULE | Refills: 3 | Status: SHIPPED | OUTPATIENT
Start: 2021-01-01

## 2021-01-01 RX ORDER — FUROSEMIDE 40 MG/1
40 TABLET ORAL 2 TIMES DAILY PRN
Status: DISCONTINUED | OUTPATIENT
Start: 2021-01-01 | End: 2021-01-01 | Stop reason: HOSPADM

## 2021-01-01 RX ORDER — FLUOXETINE HYDROCHLORIDE 20 MG/1
20-40 CAPSULE ORAL DAILY
Qty: 90 CAPSULE | Refills: 1 | Status: SHIPPED | OUTPATIENT
Start: 2021-01-01 | End: 2021-01-01 | Stop reason: SDUPTHER

## 2021-01-01 RX ADMIN — CEFDINIR 300 MG: 300 CAPSULE ORAL at 16:00

## 2021-01-01 RX ADMIN — SODIUM CHLORIDE 1000 ML: 9 INJECTION, SOLUTION INTRAVENOUS at 13:00

## 2021-01-01 RX ADMIN — SODIUM CHLORIDE, POTASSIUM CHLORIDE, SODIUM LACTATE AND CALCIUM CHLORIDE 2178 ML: 600; 310; 30; 20 INJECTION, SOLUTION INTRAVENOUS at 19:18

## 2021-01-01 RX ADMIN — CEFTRIAXONE SODIUM 1 G: 1 INJECTION, POWDER, FOR SOLUTION INTRAMUSCULAR; INTRAVENOUS at 04:16

## 2021-01-01 RX ADMIN — CEFTRIAXONE SODIUM 2 G: 2 INJECTION, POWDER, FOR SOLUTION INTRAMUSCULAR; INTRAVENOUS at 19:03

## 2021-01-01 ASSESSMENT — COGNITIVE AND FUNCTIONAL STATUS - GENERAL
TURNING FROM BACK TO SIDE WHILE IN FLAT BAD: A LOT
STANDING UP FROM CHAIR USING ARMS: A LOT
WALKING IN HOSPITAL ROOM: TOTAL
SUGGESTED CMS G CODE MODIFIER MOBILITY: CM
MOVING TO AND FROM BED TO CHAIR: A LOT
DAILY ACTIVITIY SCORE: 12
MOBILITY SCORE: 9
TOILETING: TOTAL
HELP NEEDED FOR BATHING: A LOT
CLIMB 3 TO 5 STEPS WITH RAILING: TOTAL
SUGGESTED CMS G CODE MODIFIER DAILY ACTIVITY: CL
EATING MEALS: A LITTLE
PERSONAL GROOMING: A LOT
DRESSING REGULAR UPPER BODY CLOTHING: A LOT
MOVING FROM LYING ON BACK TO SITTING ON SIDE OF FLAT BED: UNABLE
DRESSING REGULAR LOWER BODY CLOTHING: A LOT

## 2021-01-01 ASSESSMENT — PATIENT HEALTH QUESTIONNAIRE - PHQ9
6. FEELING BAD ABOUT YOURSELF - OR THAT YOU ARE A FAILURE OR HAVE LET YOURSELF OR YOUR FAMILY DOWN: NEARLY EVERY DAY
7. TROUBLE CONCENTRATING ON THINGS, SUCH AS READING THE NEWSPAPER OR WATCHING TELEVISION: SEVERAL DAYS
9. THOUGHTS THAT YOU WOULD BE BETTER OFF DEAD, OR OF HURTING YOURSELF: SEVERAL DAYS
1. LITTLE INTEREST OR PLEASURE IN DOING THINGS: NEARLY EVERY DAY
3. TROUBLE FALLING OR STAYING ASLEEP OR SLEEPING TOO MUCH: NEARLY EVERY DAY
SUM OF ALL RESPONSES TO PHQ9 QUESTIONS 1 AND 2: 6
5. POOR APPETITE OR OVEREATING: MORE THAN HALF THE DAYS
2. FEELING DOWN, DEPRESSED, IRRITABLE, OR HOPELESS: NEARLY EVERY DAY
SUM OF ALL RESPONSES TO PHQ QUESTIONS 1-9: 20
4. FEELING TIRED OR HAVING LITTLE ENERGY: NEARLY EVERY DAY
8. MOVING OR SPEAKING SO SLOWLY THAT OTHER PEOPLE COULD HAVE NOTICED. OR THE OPPOSITE, BEING SO FIGETY OR RESTLESS THAT YOU HAVE BEEN MOVING AROUND A LOT MORE THAN USUAL: SEVERAL DAYS

## 2021-01-01 ASSESSMENT — FIBROSIS 4 INDEX
FIB4 SCORE: 3.25
FIB4 SCORE: 3.11
FIB4 SCORE: 3.25
FIB4 SCORE: 2.3
FIB4 SCORE: 3.31

## 2021-01-01 ASSESSMENT — PAIN DESCRIPTION - DESCRIPTORS: DESCRIPTORS: ACHING

## 2021-01-01 ASSESSMENT — GAIT ASSESSMENTS: GAIT LEVEL OF ASSIST: UNABLE TO PARTICIPATE

## 2021-01-01 ASSESSMENT — ACTIVITIES OF DAILY LIVING (ADL): TOILETING: UNABLE TO DETERMINE AT THIS TIME

## 2021-01-07 PROBLEM — G47.33 OSA (OBSTRUCTIVE SLEEP APNEA): Status: ACTIVE | Noted: 2021-01-01

## 2021-01-18 NOTE — TELEPHONE ENCOUNTER
DIRECT SLEEP REFERRAL    PT CAN NOT HAVE TESTING DONE PRIOR TO NP APPT.    PT had NP appt but it was no showed.     If pt decides to r/s, she must do her consult prior to any sleep testing.

## 2021-02-04 NOTE — TELEPHONE ENCOUNTER
Received request via: Patient    Was the patient seen in the last year in this department? Yes    Does the patient have an active prescription (recently filled or refills available) for medication(s) requested? No     Nyasiagenaro Huntley called about her mother being depressed and laying in bed not wanting to do anything Has lost zest for life.  After the ED stay they did not give her medicine.  She has been on Prozac before and it has helped.  Does not want to see another doctor.  I will put her on schedule as soon as Dr. Cole is back and available.  Called Dr. Cole on cell... she will handle this patient

## 2021-02-23 PROBLEM — M19.012 LOCALIZED OSTEOARTHRITIS OF LEFT SHOULDER: Status: ACTIVE | Noted: 2021-01-01

## 2021-02-24 NOTE — ASSESSMENT & PLAN NOTE
Chronic and decompensated problem.  Will reinitiate Prozac 20 mg daily for 2 weeks and then may increase to 40 mg and continue on this dose thereafter.  She is going to check with her insurance to see if she can continue coming to see me in my clinic and if so we will have her follow-up in 6 to 8 weeks to follow-up on efficacy of the medicine and titrate up as needed.

## 2021-02-24 NOTE — ASSESSMENT & PLAN NOTE
Previous problem, decompensated.  We will update her left shoulder x-ray and try to set her up quickly with sports medicine.  I will send a message to Dr. Johnson informing him on the patient to see if he is able to complete the shoulder x-ray in his clinic or if she needs to go to an imaging center and also to see if she be a good candidate for a steroid injection.  She will then have occupational therapy via home health following the injection once her pain is under better control.  Advised her to stop taking anti-inflammatories due to her advancing chronic kidney disease.

## 2021-02-24 NOTE — PATIENT INSTRUCTIONS
Restart Prozac. Start 20 mg (1 tab) for 2 weeks and then increase to 40 mg (2 tabs) thereafter.    For left shoulder pain you can take Tylenol 1000 mg three times daily. Try to avoid anti-inflammatories (ibuprofen, advil, motrin, aleve, etc) due to chronic kidney disease.    Complete left shoulder xray and make appointment with Dr. Johnson off [facility], he is a sports medicine doctor and can give you an injection and follow up any pain you have to treat with infections moving forward.    After injection in the left shoulder we can consider order home health care with occupational therapy if you would like.    Last cardiology note mentioned to make an appointment with a vascular surgeon to follow up with abnormal arterial studies from December 2020.

## 2021-02-24 NOTE — PROGRESS NOTES
Subjective:   Chief Complaint/History of Present Illness:  Meron Rice is a 88 y.o. female established patient who presents today to discuss medical problems as listed below. Meron Worley is unaccompanied in the room but her daughter is in the waiting room.    Problem   Depression    She reports longstanding personal and family history of mood disorder.  She has suffered from anxiety after the passing of her  as well as depression.  Her PHQ-9 is quite elevated and she endorses passive suicidality but notes she would never act upon and does not have a plan.  She took sertraline in the past and previous that was on Prozac.  She would like to get back on SSRI therapy and notes 40 mg was her previous dose which seemed to be most effective.    Depression Screening (2/23/2021):    Little interest or pleasure in doing things?   Nearly every day  Feeling down, depressed , or hopeless?  Nearly every day  Trouble falling or staying asleep, or sleeping too much?   Nearly every day  Feeling tired or having little energy?   Nearly every day  Poor appetite or overeating?   More than half the days  Feeling bad about yourself - or that you are a failure or have let yourself or your family down?  Nearly every day  Trouble concentrating on things, such as reading the newspaper or watching television?  Several days  Moving or speaking so slowly that other people could have noticed.  Or the opposite - being so fidgety or restless that you have been moving around a lot more than usual?   Several days  Thoughts that you would be better off dead, or of hurting yourself?   Several days  Patient Health Questionnaire Score:  (!) 20         Localized Osteoarthritis of Left Shoulder    Left shoulder xray (10/2018):  Diffuse osseous demineralization, limiting evaluation for nondisplaced fracture. No acute fracture or dislocation. Severe osteoarthritis of the glenohumeral joint.    IMPRESSION: 1. No acute osseous abnormality.    Patient  "is a fair historian but recounts prior issues with shoulder pain which has recently flared up in the past 1-2 months.  In reviewing her records she had a left shoulder x-ray in 2018 which demonstrated severe osteoarthritis as noted above and exam at that time had concern for frozen shoulder.  Patient has seen orthopedic surgery over the years but does not recall what she has had done of the shoulder.  She would be interested in having an injection.  She has been taking ibuprofen which does help with the pain.  Reiterated to her that ibuprofen is not safe due to her advanced chronic kidney disease and use of apixaban.  Would also be interested in resuming occupational therapy via home health care once her pain is under better control.          Current Medications:  Current Outpatient Medications Ordered in Epic   Medication Sig Dispense Refill   • FLUoxetine (PROZAC) 20 MG Cap Take 1-2 Capsules by mouth every day. Take 1 tab daily for 2 weeks and then increase to 2 tabs daily thereafter 90 capsule 1   • furosemide (LASIX) 40 MG Tab Take 0.5 Tabs by mouth every day. 180 Tab 3   • apixaban (ELIQUIS) 2.5mg Tab Take 1 Tab by mouth 2 Times a Day. 180 Tab 3   • metoprolol (LOPRESSOR) 50 MG Tab Take 1 Tab by mouth 2 times a day. 60 Tab 11   • spironolactone (ALDACTONE) 25 MG Tab Take 0.5 Tabs by mouth every day. 30 Tab 11     No current [facility]-ordered facility-administered medications on file.          Objective:   Physical Exam:    Vitals: /70 (BP Location: Left arm, Patient Position: Sitting, BP Cuff Size: Adult)   Pulse 75   Temp 36.2 °C (97.2 °F) (Temporal)   Ht 1.753 m (5' 9\")   Wt 79.9 kg (176 lb 3.2 oz)   SpO2 94%    BMI: Body mass index is 26.02 kg/m².  Physical Exam   Constitutional:   Frail phenotype.   Pulmonary/Chest: Effort normal. No respiratory distress.   Musculoskeletal:      Comments: Left shoulder pain, limited in abduction and forward flexion at 90 degrees due to pain and stiffness. "   Neurological:   Ambulates in clinic via wheelchair, stands and pivots.   Skin:   Purple discoloration to lower legs related to venous stasis dermatitis and arterial insufficiency.   Psychiatric:   Fair memory in recounting past medical illnesses. Smiles spontaneously, engaged in conversation.        Assessment and Plan:   Meron Worley is a 88 y.o. female with the following:  Problem List Items Addressed This Visit     Localized osteoarthritis of left shoulder     Previous problem, decompensated.  We will update her left shoulder x-ray and try to set her up quickly with sports medicine.  I will send a message to Dr. Johnson informing him on the patient to see if he is able to complete the shoulder x-ray in his clinic or if she needs to go to an imaging center and also to see if she be a good candidate for a steroid injection.  She will then have occupational therapy via home health following the injection once her pain is under better control.  Advised her to stop taking anti-inflammatories due to her advancing chronic kidney disease.         Relevant Orders    DX-SHOULDER 2+ LEFT    REFERRAL TO SPORTS MEDICINE    Depression     Chronic and decompensated problem.  Will reinitiate Prozac 20 mg daily for 2 weeks and then may increase to 40 mg and continue on this dose thereafter.  She is going to check with her insurance to see if she can continue coming to see me in my clinic and if so we will have her follow-up in 6 to 8 weeks to follow-up on efficacy of the medicine and titrate up as needed.         Relevant Medications    FLUoxetine (PROZAC) 20 MG Cap      Other Visit Diagnoses     Adhesive capsulitis of left shoulder        Relevant Orders    DX-SHOULDER 2+ LEFT    REFERRAL TO SPORTS MEDICINE           RTC: Return if symptoms worsen or fail to improve, for needs to establish new PCP due to insurance issues.    I spent a total of 40 minutes with record review, exam, communication with the patient, communication with  other providers, and documentation of this encounter.    PLEASE NOTE: This dictation was created using voice recognition software. I have made every reasonable attempt to correct obvious errors, but I expect that there are errors of grammar and possibly content that I did not discover before finalizing the note.      Shyann Cole, DO  Geriatric and Internal Medicine  [facility]

## 2021-03-11 NOTE — TELEPHONE ENCOUNTER
Chart Prep       LVM for patient regarding fasting labs per ADA needing to be done before 3/17/2021. Verbalized via VM that patient does need to be FASTING and can utilize a [facility] lab without any paperwork.  Left call back number if anything is needed.

## 2021-04-09 NOTE — TELEPHONE ENCOUNTER
1. Caller Name: Meron Rice                          Call Back Number: [phone] (home)         How would the patient prefer to be contacted with a response: Phone call OK to leave a detailed message    Nyasia called and said her mom had an outburst and then she gave her a bath and put her back in bed so she calmed down.  She does not know what to do with these outbursts.       Told her per Dr Cole

## 2021-04-26 NOTE — TELEPHONE ENCOUNTER
1. Caller Name: Rena Chance's daughter                        Call Back Number: [phone]      How would the patient prefer to be contacted with a response: Phone call OK to leave a detailed message    Rena called regarding her mother.  She sleeps all day and when she wakes up she is very confused.  Can she get Home Health in to evaluate her mom.

## 2021-04-26 NOTE — TELEPHONE ENCOUNTER
I can order home health or if she would prefer to have a physician come out to evaluate we could refer them to [facility]?

## 2021-05-03 NOTE — TELEPHONE ENCOUNTER
Rena Meron Rice's daughter called  and wants to know more about in home health care  She says her mom does not leave the bedroom   1932  Her phone number [phone]   She will be calling back today.

## 2021-06-07 NOTE — TELEPHONE ENCOUNTER
1. Caller Name: Meron Rice                          Call Back Number: [phone] (home)         How would the patient prefer to be contacted with a response: Phone call OK to leave a detailed message    Called Nyasia Jaleesa patient's daughter        Left message for her to call me back on my back line

## 2021-06-15 NOTE — TELEPHONE ENCOUNTER
----- Message from Hilary Baca sent at 6/15/2021  2:51 PM PDT -----  PCP: [facility]     Received a call from Nyasia who states Meron has only been in bed and her bedroom since last seen in clinic. She states she is playing phone Tag with Em and would like a call back. Thank you.

## 2021-06-15 NOTE — TELEPHONE ENCOUNTER
1. Caller Name: Nyasia Lazcano                        Call Back Number: [phone] (home)           How would the patient prefer to be contacted with a response: Phone call OK to leave a detailed message    Called and left a vm

## 2021-07-06 NOTE — TELEPHONE ENCOUNTER
Phone Number Called: [phone] (home)   Meron Rice daughter Nyasia Lazcano      Call outcome: Spoke to patient regarding message below.    Message: Pt's daughter left message stating her mother's dementia is getting worse. She stated Cora stated there are two options. Cash pay or possible Home Health. I called back and stated I can follow up see if there is a way we can get a referral for Home Health. I also gave them the number for [facility] so they can possibly enroll and continue to see Dr. Cole.

## 2021-07-06 NOTE — TELEPHONE ENCOUNTER
Called [phone] and spoke with Nyasia (daughter) and gave her telephone number to [facility]- [phone]. Advised she call [facility] right away to establish care. Nyasia said she would.

## 2021-07-12 NOTE — ED TRIAGE NOTES
Chief Complaint   Patient presents with   • Abdominal Pain   • Blood in Urine      pt BIB [facility] with complaints of generalized lower abdominal pain and blood in urine. Per report, pt's daughter reports foul smelling urine and increased weakness. \ Pt is AOX3, baseline. 1Liter of LR administered in route.

## 2021-07-12 NOTE — ED NOTES
Discharge instructions provided.  Pt verbalized the understanding of discharge instructions to follow up with PCP and to return to ER if condition worsens.  Pt ambulated out of ER without difficulty.   Daughter here to pick pt up.

## 2021-07-12 NOTE — DISCHARGE INSTRUCTIONS
You have a urinary tract infection. Your blood work was all very reassuring. There is no sign of sepsis. Take the antibiotics we've prescribed, starting tomorrow. You got your first dose here. Focus on drinking lots of clear, sugar-free fluids. Return if you are getting worse instead of gradually better over the next few days.

## 2021-07-12 NOTE — ED PROVIDER NOTES
ED Provider Note    Scribed for Sarkis Duong M.D. by Anna Worley. 7/11/2021,  6:26 PM.    CHIEF COMPLAINT  Chief Complaint   Patient presents with   • Abdominal Pain   • Blood in Urine       HPI  Meron Rice is a 88 y.o. female, with a history of cardiac pacemaker, chronic atrial flutter, who presents to the Emergency Department via EMS for ongoing generalized weakness with an onset of today. She describes she is feeling low in energy compared to her normal. Per daughter, the patient is slightly confused, but this is her baseline. En route, she was given a liter of LR, but is still tachycardic in the 130's. Her last blood sugar prior to arrival was 88 mg/dl. Daughter reports associated hematuria and foul smelling urine x5 days ago. Patient also reports associated suprapubic abdominal pain. She denies any associated vomiting, diarrhea, fever, chills, or cough. No alleviating or exacerbating factors were identified. She is vaccinated against COVID-19. Patient does not use supplemental home oxygen.     Mild suprapubic tenderness otherwise normal   REVIEW OF SYSTEMS  See HPI for further details. All other systems are negative.     PAST MEDICAL HISTORY   has a past medical history of Anemia, Anxiety (2/4/2014), Arrhythmia, Arthritis, ASTHMA, Cancer ([facility]) ([id number]), CATARACT, Chronic diastolic congestive heart failure ([facility]) (2/10/2020), Chronic hip pain (5/19/2017), Chronic left shoulder pain (12/11/2018), Chronic midline low back pain without sciatica (5/14/2012), CKD (chronic kidney disease) stage 3, GFR 30-59 ml/min ([facility]) (2/10/2020), Depression (5/19/2017), Diarrhea (3/12/2019), Foot-drop, GERD (gastroesophageal reflux disease) (5/14/2012), Hypertension, Neurogenic bladder (3/6/2017), Other insomnia (2/4/2014), Pacemaker, Presbycusis (3/22/2018), Thyroid nodule, Unspecified hemorrhagic conditions, Unspecified urinary incontinence, and UTI (urinary tract infection).    SOCIAL HISTORY  Social History  "    Tobacco Use   • Smoking status: Former Smoker     Packs/day: 0.10     Years: 1.00     Pack years: 0.10     Types: Cigarettes     Quit date: 1964     Years since quittin.5   • Smokeless tobacco: Never Used   • Tobacco comment: 50 years ago in 1960  SOCIAL    Vaping Use   • Vaping Use: Never used   Substance and Sexual Activity   • Alcohol use: Yes   • Drug use: No   • 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     Comment: Retired Nurse     Social History     Substance and Sexual Activity   Drug Use No       SURGICAL HISTORY   has a past surgical history that includes hysterectomy, total abdominal (); vein ligat & strip (, ); axillary node dissection (9/15/2009); mastectomy (2009); other orthopedic surgery (); gyn surgery (); cath placement (10/28/2009); mastectomy (12/15/2010); cath removal (12/15/2010); breast reconstruction (12/15/2010); latissimus flap (12/15/2010); appendectomy (); dilation and curettage (); breast implant revision (2011); nipple reconstruction (2011); hip hemiarthroplasty (2012); other surgical procedure (); breast reconstruction (2012); wound closure general (4/10/2013); breast implant removal (4/10/2013); femur nailing intramedullary (Right, 6/3/2015); other (2017); and pacemaker insertion.    CURRENT MEDICATIONS  Home Medications    **Home medications have not yet been reviewed for this encounter**         ALLERGIES  Allergies   Allergen Reactions   • Alendronate-Butylpar-Propylpar-Saccharin [Fosamax] Diarrhea and Nausea     .   • Atorvastatin      Causes low quality       PHYSICAL EXAM  VITAL SIGNS: /92   Pulse (!) 112   Temp 36.7 °C (98 °F) (Temporal)   Resp 18   Ht 1.753 m (5' 9\")   Wt 72.6 kg (160 lb)   SpO2 93%   BMI 23.63 kg/m²   Pulse ox interpretation: I interpret this pulse ox as normal.  Constitutional: Alert in no apparent distress.  HENT: No signs of trauma, Bilateral external ears normal, Nose normal. "   Eyes: Conjunctiva normal, Non-icteric.   Neck: Normal range of motion, Supple, No stridor.   Lymphatic: No lymphadenopathy noted.   Cardiovascular: Regular rate and rhythm, no murmurs.   Thorax & Lungs: Normal breath sounds, No respiratory distress, No wheezing, No chest tenderness.   Abdomen: Mild suprapubic tenderness, Bowel sounds normal, Soft  Skin: Warm, Dry, No erythema, No rash.   Extremities: Intact distal pulses, No edema, No cyanosis.  Musculoskeletal: Good range of motion in all major joints. No or major deformities noted.   Neurologic: Alert , Normal motor function, Normal sensory function, No focal deficits noted.   Psychiatric: Affect normal, Judgment normal, Mood normal.     DIAGNOSTIC STUDIES / PROCEDURES    EKG  Interpreted by me  Results for orders placed or performed during the hospital encounter of 11/10/20   EKG   Result Value Ref Range    Report       [facility] Emergency Dept.    Test Date:  2020-11-10  Pt Name:    CHANNING LARSON        Department: [facility]  MRN:        [id number]                      Room:  Gender:     Female                       Technician: ALDO  :        [id number]                   Requested By:ER TRIAGE PROTOCOL  Order #:    [id number]                    Reading MD:    Measurements  Intervals                                Axis  Rate:       79                           P:  CO:                                      QRS:        0  QRSD:       96                           T:          -48  QT:         394  QTc:        452    Interpretive Statements  Afib/flut and V-paced complexes  No further rhythm analysis attempted due to paced rhythm  Probable left ventricular hypertrophy  Abnormal T, consider ischemia, inferior leads  Compared to ECG 2019 18:40:33  T-wave abnormality now present  Possible ischemia now present  Ventricular premature complex(es) no longer present           LABS  Labs Reviewed   CBC WITH DIFFERENTIAL - Abnormal; Notable for  "the following components:       Result Value    MCHC 32.1 (*)     RDW 52.6 (*)     Monos (Absolute) 0.99 (*)     All other components within normal limits   COMP METABOLIC PANEL - Abnormal; Notable for the following components:    Sodium 133 (*)     Glucose 103 (*)     Bun 26 (*)     All other components within normal limits   URINALYSIS - Abnormal; Notable for the following components:    Character Hazy (*)     Leukocyte Esterase Trace (*)     Occult Blood Small (*)     All other components within normal limits    Narrative:     Indication for culture:->Emergency Room Patient   URINE MICROSCOPIC (W/UA) - Abnormal; Notable for the following components:    WBC 20-50 (*)     Bacteria Many (*)     All other components within normal limits    Narrative:     Indication for culture:->Emergency Room Patient   ESTIMATED GFR - Abnormal; Notable for the following components:    GFR If  45 (*)     GFR If Non  37 (*)     All other components within normal limits   URINE CULTURE(NEW)    Narrative:     Indication for culture:->Emergency Room Patient   BLOOD CULTURE    Narrative:     Per Hospital Policy: Only change Specimen Src: to \"Line\" if  specified by physician order.   LACTIC ACID   LACTIC ACID   LACTIC ACID   BLOOD CULTURE     All labs reviewed by me.    RADIOLOGY  DX-CHEST-PORTABLE (1 VIEW)   Final Result      1.  No acute cardiopulmonary abnormality identified.      2.  Chronic pulmonary interstitial densities that are likely fibrosis        The radiologist's interpretation of all radiological studies have been reviewed by me.    COURSE & MEDICAL DECISION MAKING  Nursing notes, VS, PMSFHx reviewed in chart.     6:21 PM Patient seen and examined at bedside. Discussed plan of care with patient. I informed them that labs and imaging will be ordered to evaluate symptoms. Patient is understanding and agreeable with plan. Differential diagnosis includes but is not limited to UTI. Ordered for EKG, " Blood culture, Urine culture, UA, CMP, CBC with diff, Lactic Acid, and DX chest to evaluate. Patient will be treated with Rocephin 2 g in 100 ml IVPB, and lactated ringers infusion bolus for her symptoms.     HYDRATION: Based on the patient's presentation of Tachycardia the patient was given IV fluids. IV Hydration was used because oral hydration was not adequate alone. Upon recheck following hydration, the patient was improved.    7:13 PM this patient's heart rate has responded well to fluids.  This was not sinus tachycardia, but rather A. fib/a flutter, and the patient already takes metoprolol, and her rate is no under 110 bpm after fluids.  Her blood work is reassuring, without any evidence of sepsis/systemic infection.  She has never been particularly uncomfortable.  She does have a urinary tract infection, and received ceftriaxone on arrival empirically due to report of strong smelling urine.  She is stable and appropriate for discharge with prescription for Keflex.     The patient will return for new or worsening symptoms and is stable at the time of discharge.    The patient is referred to a primary physician for blood pressure management, diabetic screening, and for all other preventative health concerns.    DISPOSITION:  Patient will be discharged home in stable condition.    FOLLOW UP:  Shyann Cole D.O.  [address] [id number] [phone]      As needed      OUTPATIENT MEDICATIONS:  New Prescriptions    CEPHALEXIN (KEFLEX) 500 MG CAP    Take 1 capsule by mouth 2 times a day for 7 days.         FINAL IMPRESSION  1. Acute UTI         Anna SHANNON (Benjamin), am scribing for, and in the presence of, Sarkis Duong M.D..    Electronically signed by: Anna Worley (Benjamin), 7/11/2021    Sarkis SHANNON M.D. personally performed the services described in this documentation, as scribed by Anna Worley in my presence, and it is both accurate and complete.    C    The  note accurately reflects work and decisions made by me.  Sarkis Duong M.D.  7/11/2021  7:14 PM

## 2021-07-20 NOTE — ED NOTES
"ED Positive Culture Follow-up/Notification Note:    Date: 7/20/2021     Patient seen in the ED on 7/11/2021 for generalized weakness, hematuria and foul smelling urine.   1. Acute UTI       Discharge Medication List as of 7/11/2021  8:57 PM      START taking these medications    Details   cephALEXin (KEFLEX) 500 MG Cap Take 1 capsule by mouth 2 times a day for 7 days., Disp-14 capsule, R-0, Normal             Allergies: Alendronate-butylpar-propylpar-saccharin [fosamax] and Atorvastatin     Vitals:    07/11/21 1850 07/11/21 1851 07/11/21 1920 07/11/21 1936   BP: 151/92  142/84    Pulse:    98   Resp:  18     Temp:       TempSrc:       SpO2:    96%   Weight:       Height:           Final cultures:   Results     Procedure Component Value Units Date/Time    BLOOD CULTURE [[id number]] Collected: 07/11/21 1915    Order Status: Completed Specimen: Blood from Peripheral Updated: 07/16/21 2017     Significant Indicator NEG     Source BLD     Site PERIPHERAL     Culture Result No growth after 5 days of incubation.  Blood culture testing and Gram stain, if indicated, are  performed at [facility], [address] [place] [address].[place], [place].  Positive blood cultures are  sent to [facility], [address] [place] [address], for organism identification and  susceptibility testing.      Narrative:      Per Hospital Policy: Only change Specimen Src: to \"Line\" if  specified by physician order.  Right Hand    BLOOD CULTURE [[id number]] Collected: 07/11/21 1831    Order Status: Completed Specimen: Blood from Peripheral Updated: 07/16/21 2017     Significant Indicator NEG     Source BLD     Site PERIPHERAL     Culture Result No growth after 5 days of incubation.  Blood culture testing and Gram stain, if indicated, are  performed at [facility], [facility]  [facility].[place], [place].  Positive blood cultures are  sent to [facility], [address] [place] [address], " "[place], for organism identification and  susceptibility testing.      Narrative:      Per Hospital Policy: Only change Specimen Src: to \"Line\" if  specified by physician order.  No site indicated    URINE CULTURE(NEW) [[id number]]  (Abnormal)  (Susceptibility) Collected: 07/11/21 1842    Order Status: Completed Specimen: Urine Updated: 07/14/21 0942     Significant Indicator POS     Source UR     Site -     Culture Result Usual skin eric 10-50,000 cfu/mL      Escherichia coli  >100,000 cfu/mL      Narrative:      Indication for culture:->Emergency Room Patient  Indication for culture:->Emergency Room Patient    Susceptibility     Escherichia coli (1)     Antibiotic Interpretation Microscan Method Status    Amikacin  [*]  Sensitive <=16 mcg/mL ALEX Final    Ampicillin Resistant >16 mcg/mL ALEX Final    Amoxicillin/CA  [*]  Sensitive <=8/4 mcg/mL ALEX Final    Aztreonam  [*]  Sensitive <=4 mcg/mL ALEX Final    Ceftolozane+Tazobactam  [*]  Sensitive <=2 mcg/mL ALEX Final    Ceftriaxone Sensitive <=1 mcg/mL ALEX Final    Ceftazidime  [*]  Sensitive <=1 mcg/mL ALEX Final    Cefazolin Sensitive <=2 mcg/mL ALEX Final    Ciprofloxacin Resistant >2 mcg/mL ALEX Final    Cefepime Sensitive <=2 mcg/mL ALEX Final    Cefuroxime Sensitive <=4 mcg/mL ALEX Final    Ampicillin/sulbactam Intermediate 16/8 mcg/mL ALEX Final    Ceftazidime+Avibactam  [*]  Sensitive <=4 mcg/mL ALEX Final    Tobramycin Resistant >8 mcg/mL ALEX Final    Ertapenem  [*]  Sensitive <=0.5 mcg/mL ALEX Final    Nitrofurantoin Sensitive <=32 mcg/mL ALEX Final    Gentamicin Resistant >8 mcg/mL ALEX Final    Imipenem  [*]  Sensitive <=1 mcg/mL ALEX Final    Levofloxacin Resistant >4 mcg/mL ALEX Final    Meropenem  [*]  Sensitive <=1 mcg/mL ALEX Final    Meropenem/Vaborbactam  [*]  Sensitive <=2 mcg/mL ALEX Final    Pip/Tazobactam Sensitive <=8 mcg/mL ALEX Final    Trimeth/Sulfa Resistant >2/38 mcg/mL ALEX Final    Tetracycline  [*]  Resistant >8 mcg/mL ALEX Final    Tigecycline Sensitive " <=2 mcg/mL ALEX Final           [*]  Suppressed Antibiotic                       Plan:   Appropriate antibiotic therapy prescribed. No changes required based upon culture result.       Rox Walsh, PharmD

## 2021-08-16 NOTE — ASSESSMENT & PLAN NOTE
Chronic and ongoing problem. Still with tense lower extremity edema and significant purple discoloration to her feet. She stopped taking her diuretics. She is down 18 lbs from our last visit in February 2021, reports she is still eating but I suspect much of this is muscle and fat. Will order home health to assist with mobilization. Did bring up palliative care/hospice which they will think about. Referred them to [facility] to establish with them as a new PCP due to difficulty of them getting to the office and arriving to appointments on time.

## 2021-08-16 NOTE — PROGRESS NOTES
Subjective:   Chief Complaint/History of Present Illness:  Meron Rice is a 88 y.o. female established patient who presents today to discuss medical problems as listed below. Meron Worley is accompanied by her daughter, Nyasia.    Problem   CKD (chronic kidney disease) stage 3, GFR 30-59 ml/min ([facility])       Ref. Range 7/11/2021 18:31   Bun Latest Ref Range: 8 - 22 mg/dL 26 (H)   Creatinine Latest Ref Range: 0.50 - 1.40 mg/dL 1.35   GFR If Non  Latest Ref Range: >60 mL/min/1.73 m 2 37 (A)     She had normal kidney function in April 2018 and since March 2019 she has had ongoing JAMES with fluctuations of GFR between 30 and 40.      She was diagnosed with heart failure exacerbation due to worsening lower extremity edema and was initiated on diuretics with lasix and spironolactone but stopped taking them as they were not helping.     Chronic Diastolic Congestive Heart Failure ([facility])    Echo (March 2019):  CONCLUSIONS  Grossly normal left ventricular systolic function. Left ventricular ejection fraction is visually estimated to be 55%. Indeterminate diastolic function. Aortic sclerosis without stenosis. Estimated right ventricular systolic pressure is 41 mmHg + JVP.    Echo (May 2017):  Left ventricular ejection fraction is visually estimated to be 55%. Normal regional wall motion. Grade III diastolic dysfunction.    Clinical diagnosis of congestive heart failure determined by cardiology in November 2019.  She is initiated on furosemide 80 mg x 3 days followed by furosemide 40 mg thereafter with potassium supplementation.  She is established with a congestive heart failure clinic.  She has an echo ordered and this is pending to be completed on January 1.  She initially had a weight loss of 13 pounds from December 11 until December 17 however on December 30 she gained back 7 pounds.  She is not following a low-sodium diet at this time and admits to eating Chinese food on [place] as well as drinking 1 to  2 glasses of wine regularly and eating salty foods such as top Ramen and other canned/boxed foods at home.  She does appear to be compliant with her diuretics.  She admits that she continues to add salt to her food and is currently using the pink Himalayan salt that is in the house.  She is deconditioned and more winded when ambulating.  Ambulating is become quite difficult with the edema in the lower extremities specially when she is at home alone.  She is on metoprolol tartrate 50 mg twice daily.      Paf (Paroxysmal Atrial Fibrillation) ([facility])    He has a prior history of paroxysmal atrial fibrillation as well as sick sinus syndrome and underwent pacemaker placement in 2017.  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  She does continue to follow with cardiology and has approximately 4 years of life left on her pacemaker, approximate expiration 2023.      Current rate control approach includes metoprolol tartrate 50 mg twice daily.  She is also taking apixaban 2.5 mg twice daily for stroke prophylaxis.     Urinary tract infection with hx of previous rerurrent UTIs    She reports several issues with recurrent UTIs which have made her septic and put her in the hospital.  Also has had hematuria on several recent urinalysis.  She was recommended to follow-up with urology back in April 2018 but declined at that time.  She has grown multiple bacteria in the past and is resistant to several antibiotics.    Most recent UTI treated in [facility] ED in July 2021, grew Ecoli with resistance to FLQ and bactrim.     Depression    She reports longstanding personal and family history of mood disorder.  She has suffered from anxiety after the passing of her  as well as depression.  Her PHQ-9 is quite elevated and she endorses passive suicidality but notes she would never act upon and does not have a plan.  She took sertraline in the past and  "previous that was on Prozac.  She would like to get back on SSRI therapy and notes 40 mg was her previous dose which seemed to be most effective. Still spending most of her time in her room and often in bed sleeping.    Current regimen: fluoxetine 40 mg daily          Current Medications:  Current Outpatient Medications Ordered in Epic   Medication Sig Dispense Refill   • metoprolol tartrate (LOPRESSOR) 50 MG Tab TAKE 1 TABLET BY MOUTH TWICE DAILY 60 tablet 11   • FLUoxetine (PROZAC) 40 MG capsule Take 1 capsule by mouth every day. 90 capsule 3   • apixaban (ELIQUIS) 2.5mg Tab Take 1 Tab by mouth 2 Times a Day. 180 Tab 3     No current Epic-ordered facility-administered medications on file.          Objective:   Physical Exam:    Vitals: /56 (BP Location: Left arm, Patient Position: Sitting, BP Cuff Size: Adult)   Pulse (!) 115   Temp 36.2 °C (97.1 °F) (Temporal)   Resp 18   Ht 1.753 m (5' 9\")   Wt 72.5 kg (159 lb 12.8 oz)   SpO2 96%    BMI: Body mass index is 23.6 kg/m².  Physical Exam  Constitutional:       General: She is not in acute distress.     Comments: Frail appearance, generalized weakness   Eyes:      Extraocular Movements: Extraocular movements intact.      Conjunctiva/sclera: Conjunctivae normal.   Cardiovascular:      Rate and Rhythm: Normal rate. Rhythm irregular.      Pulses: Normal pulses.   Pulmonary:      Breath sounds: No wheezing or rhonchi.      Comments: Conversational dyspnea. Increased effort. Decreased aeration bibasilar.  Abdominal:      General: Bowel sounds are normal.      Palpations: Abdomen is soft.   Musculoskeletal:      Right lower leg: Edema present.      Left lower leg: Edema present.   Skin:     Comments: Bleeding abrasion on top of right hand. Deep purple discoloration of bilateral feet.   Neurological:      Gait: Gait abnormal.      Comments: Ambulates with a wheelchair.   Psychiatric:      Comments: Depressed mood, memory loss.          Assessment and Plan: "   eMron Worley is a 88 y.o. female with the following:  Problem List Items Addressed This Visit     Urinary tract infection with hx of previous rerurrent UTIs     Recent problem, seen in ED last month and treated for E.coli UTI with IV rocephin and then oral cefdinir.         Depression     Chronic and ongoing issue, continue fluoxetine 40 mg daily. Engaged and answering my questions but daughter reports worsening dementia and that patient is spending more time in her room and all her time in bed. Will order home health to see if we can get her out of bed more regularly and double check she is taking her medications as prescribed.         Relevant Orders    REFERRAL TO HOME HEALTH    PAF (paroxysmal atrial fibrillation) ([facility])     Chronic and ongoing problem, continue metoprolol for rate control and eliquis for stroke prophylaxis.         Relevant Orders    REFERRAL TO HOME HEALTH    CKD (chronic kidney disease) stage 3, GFR 30-59 ml/min ([facility])     Chronic and ongoing issue, still with reduced GFR. Patient admits to taking ibuprofen for left arm and right leg pain, discussed increased bleeding risk while on Eliquis and can worsening GFR. Will refer her to home health.         Relevant Orders    REFERRAL TO HOME HEALTH    Chronic diastolic congestive heart failure ([facility])     Chronic and ongoing problem. Still with tense lower extremity edema and significant purple discoloration to her feet. She stopped taking her diuretics. She is down 18 lbs from our last visit in February 2021, reports she is still eating but I suspect much of this is muscle and fat. Will order home health to assist with mobilization. Did bring up palliative care/hospice which they will think about. Referred them to geriatric specialty care to establish with them as a new PCP due to difficulty of them getting to the office and arriving to appointments on time.         Relevant Orders    REFERRAL TO HOME HEALTH           RTC: Return if symptoms worsen or  fail to improve.    I spent a total of 35 minutes with record review, exam, communication with the patient, communication with other providers, and documentation of this encounter.    PLEASE NOTE: This dictation was created using voice recognition software. I have made every reasonable attempt to correct obvious errors, but I expect that there are errors of grammar and possibly content that I did not discover before finalizing the note.      Shyann Cole, DO  Geriatric and Internal Medicine  [facility]

## 2021-08-16 NOTE — ASSESSMENT & PLAN NOTE
Chronic and ongoing problem, continue metoprolol for rate control and eliquis for stroke prophylaxis.

## 2021-08-16 NOTE — ASSESSMENT & PLAN NOTE
Recent problem, seen in ED last month and treated for E.coli UTI with IV rocephin and then oral cefdinir.

## 2021-08-16 NOTE — ASSESSMENT & PLAN NOTE
Chronic and ongoing issue, continue fluoxetine 40 mg daily. Engaged and answering my questions but daughter reports worsening dementia and that patient is spending more time in her room and all her time in bed. Will order home health to see if we can get her out of bed more regularly and double check she is taking her medications as prescribed.

## 2021-08-16 NOTE — ASSESSMENT & PLAN NOTE
Chronic and ongoing issue, still with reduced GFR. Patient admits to taking ibuprofen for left arm and right leg pain, discussed increased bleeding risk while on Eliquis and can worsening GFR. Will refer her to home health.

## 2021-09-22 NOTE — ED NOTES
Call made to patient's daughter to update on patient plan of care and plan for discharge  Verbalized understanding and will be returning to provide ride home

## 2021-09-22 NOTE — ED NOTES
Straight cath performed to obtain urine specimen due to the fact that patient reports incontinence issues  Specimen collected and taken to lab

## 2021-09-22 NOTE — ED TRIAGE NOTES
"Pt here with c/o    Chief Complaint   Patient presents with   • Leg Swelling     bilateral leg swelling, left lower leg weeping   • Diarrhea     x 2 today   • N/V     vomiting daily for 1 month   • Loss of Appetite       /55   Pulse 70   Temp 36.4 °C (97.6 °F) (Oral)   Resp 14   Ht 1.753 m (5' 9\")   Wt 74.8 kg (164 lb 14.5 oz)   SpO2 96%   BMI 24.35 kg/m²      Pt vaccinated for COVID  "

## 2021-09-22 NOTE — ED PROVIDER NOTES
ED Provider Note    CHIEF COMPLAINT  Concerned about UTI  Worried about leg swelling and weeping    HPI  Meron Rice is a 88 y.o. female who presents really here to take care of 2 issues the first is that she is worried that she might have a UTI.  She states that she has noticed that her urine has become more foul-smelling duration has been for a few days.  Associate some abdominal cramping.  She has had vomiting intermittently for 1 month but she will go days without vomiting sometimes this is a sign of a UTI.  There is been no flank pain no urgency or frequency associated with this.    She has a past medical history of frequent UTIs some have caused her to be septic as per her previous primary care doctor's note.    In addition the patient all states that her legs are weeping she is noncompliant with her diuretic and her potassium.  Therefore the patient states she has not been taking it intermittently and she knows of leg weeping which is brand-new.  To the left leg is been weeping no associated fever or chills.      Her PCPs last note patient does have possible congestive heart failure to the pacemaker she is on anticoagulation she is also on diuretic in which she is noncompliant.  Patient confirmed that she is not compliant with her diuretic she states she is taking her blood thinner.    REVIEW OF SYSTEMS  General: No fever or chills.  Eyes: No eye discharge. No eye pain.  Ear nose throat: No sore throat or  trouble swallowing.  Pulmonary: No shortness of breath or cough.  Cardiovascular: No chest pain or chest pressure.  GI: No abdominal pain nausea or vomiting.  : No dysuria or hematuria  Dermatologic: No rashes. No abrasions.  Neurologic: No weakness or numbness.      All other systems are negative      PAST MEDICAL HISTORY  Past Medical History:   Diagnosis Date   • Anemia    • Anxiety 2/4/2014   • Arrhythmia     atrial fibrillation   • Arthritis     generalized   • ASTHMA     has not needed an  inhaler since 2010   • Cancer ([facility]) [id number]    Uterus and Breast   • CATARACT     beginning   • Chronic diastolic congestive heart failure ([facility]) 2/10/2020    Echo (March 2019): CONCLUSIONS Grossly normal left ventricular systolic function. Left ventricular ejection fraction is visually estimated to be 55%. Indeterminate diastolic function. Aortic sclerosis without stenosis. Estimated right ventricular systolic pressure is 41 mmHg + JVP.  Echo (May 2017): Left ventricular ejection fraction is visually estimated to be 55%. Normal regional wall motion. Gr   • Chronic hip pain 5/19/2017   • Chronic left shoulder pain 12/11/2018   • Chronic midline low back pain without sciatica 5/14/2012   • CKD (chronic kidney disease) stage 3, GFR 30-59 ml/min ([facility]) 2/10/2020      Ref. Range 3/12/2019 18:55 3/13/2019 05:17 12/11/2019 11:05 Creatinine Latest Ref Range: 0.50 - 1.40 mg/dL 1.33 1.28 1.57 (H) GFR If Non  Latest Ref Range: >60 mL/min/1.73 m 2 38 (A) 40 (A) 31 (A)  She had normal kidney function in April 2018 and since March 2019 she has had ongoing JAMES with fluctuations of GFR between 30 and 40.  She was diagnosed with heart failure exacerbation   • Depression 5/19/2017   • Diarrhea 3/12/2019   • Foot-drop    • GERD (gastroesophageal reflux disease) 5/14/2012   • Hypertension    • Neurogenic bladder 3/6/2017   • Other insomnia 2/4/2014   • Pacemaker     in past, removed due to cellulitis   • Presbycusis 3/22/2018   • Thyroid nodule    • Unspecified hemorrhagic conditions     post op hyst. Currently on Eliquis    • Unspecified urinary incontinence     lazy bladder; doesn't empty completely   • UTI (urinary tract infection)     frequent        FAMILY HISTORY  Family History   Problem Relation Age of Onset   • Diabetes Mother    • Heart Disease Mother    • Heart Disease Father    • Stroke Father    • Hypertension Father    • Diabetes Sister    • Heart Disease Sister    • Diabetes Other    • Heart Disease  Other    • Stroke Other    • Cancer Other    • Hypertension Other    • Diabetes Other    • Diabetes Child    • Psychiatric Illness Child    • Hypertension Sister    • Kidney Disease Sister    • Stroke Sister    • Lung Cancer Brother    • Bipolar disorder Brother        SOCIAL HISTORY  Social History     Socioeconomic History   • Marital status:      Spouse name: Not on file   • Number of children: Not on file   • Years of education: Not on file   • Highest education level: Not on file   Occupational History   • Not on file   Tobacco Use   • Smoking status: Former Smoker     Packs/day: 0.10     Years: 1.00     Pack years: 0.10     Types: Cigarettes     Quit date: 1964     Years since quittin.7   • Smokeless tobacco: Never Used   • Tobacco comment: 50 years ago in 1960  SOCIAL    Vaping Use   • Vaping Use: Never used   Substance and Sexual Activity   • Alcohol use: Yes   • Drug use: No   • 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     Comment: Retired Nurse   Other Topics Concern   • Not on file   Social History Narrative    Retired nurse, 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 She was  for 49 years before her  passed away in 2017. She is from [place] originally. She has worked in the inpatient setting and in hospice care.     Social Determinants of Health     Financial Resource Strain:    • Difficulty of Paying Living Expenses:    Food Insecurity:    • Worried About Running Out of Food in the Last Year:    • Ran Out of Food in the Last Year:    Transportation Needs:    • Lack of Transportation (Medical):    • Lack of Transportation (Non-Medical):    Physical Activity:    • Days of Exercise per Week:    • Minutes of Exercise per Session:    Stress:    • Feeling of Stress :    Social Connections:    • Frequency of Communication with Friends and Family:    • Frequency of Social Gatherings with Friends and Family:    • Attends Buddhist Services:    •  Active Member of Clubs or Organizations:    • Attends Club or Organization Meetings:    • Marital Status:    Intimate Partner Violence:    • Fear of Current or Ex-Partner:    • Emotionally Abused:    • Physically Abused:    • Sexually Abused:        SURGICAL HISTORY  Past Surgical History:   Procedure Laterality Date   • OTHER  09/2017    pacemaker removal    • FEMUR NAILING INTRAMEDULLARY Right 6/3/2015    Procedure: FEMUR NAILING INTRAMEDULLARY;  Surgeon: Deshaun Denis M.D.;  Location: SURGERY [facility];  Service:    • WOUND CLOSURE GENERAL  4/10/2013    Performed by Nano Varela M.D. at SURGERY SAME DAY [facility] ORS   • BREAST IMPLANT REMOVAL  4/10/2013    Performed by Jak Meza M.D. at SURGERY SAME DAY [facility]   • BREAST RECONSTRUCTION  11/26/2012    Performed by Jak Meza M.D. at SURGERY [facility]   • HIP HEMIARTHROPLASTY  5/14/2012    left; Performed by KEITH COWART at SURGERY [facility] ORS   • BREAST IMPLANT REVISION  7/11/2011    Performed by JAK MEZA at SURGERY [facility]   • NIPPLE RECONSTRUCTION  7/11/2011    Performed by JAK MEZA at SURGERY [facility] ORS   • MASTECTOMY  12/15/2010    right   • CATH REMOVAL  12/15/2010    Performed by NANO VARELA at SURGERY [facility]   • BREAST RECONSTRUCTION  12/15/2010    Performed by JAK MEZA at SURGERY [facility]   • LATISSIMUS FLAP  12/15/2010    Performed by JAK MEZA at SURGERY [facility] ORS   • CATH PLACEMENT  10/28/2009    Performed by NANO VARELA at SURGERY SAME DAY [facility] ORS   • MASTECTOMY  9/30/2009    left   • AXILLARY NODE DISSECTION  9/15/2009    Performed by NANO VARELA at SURGERY SAME DAY [facility] ORS   • OTHER SURGICAL PROCEDURE  2004    bladder repair   • OTHER ORTHOPEDIC SURGERY  2002    laminectomy   • HYSTERECTOMY, TOTAL ABDOMINAL  2000   • GYN SURGERY  1998    excision of fibroids   • APPENDECTOMY  1968   • DILATION AND  "CURETTAGE  1961   • PACEMAKER INSERTION     • VEIN LIGAT & STRIP  1972, 1966    x2 bilateral       CURRENT MEDICATIONS  Home Medications    **Home medications have not yet been reviewed for this encounter**          ALLERGIES  Allergies   Allergen Reactions   • Alendronate-Butylpar-Propylpar-Saccharin [Fosamax] Diarrhea and Nausea     .   • Atorvastatin      Causes low quality       PHYSICAL EXAM  VITAL SIGNS: /74   Pulse 79   Temp 36.3 °C (97.3 °F) (Oral)   Resp 16   Ht 1.753 m (5' 9\")   Wt 74.8 kg (164 lb 14.5 oz)   SpO2 94%   BMI 24.35 kg/m²   Constitutional: Well developed, Well nourished, No acute distress, Non-toxic appearance.   HENT: Normocephalic, Atraumatic, Bilateral external ears normal, Oropharynx moist, No oral exudates, Nose normal.   Eyes: PERRLA, EOMI, Conjunctiva normal, No discharge.   Musculoskeletal: Neck has normal range of motion, No tenderness, Supple.   Lymphatic: No cervical lymphadenopathy noted.   Cardiovascular: Positive pedal edema appreciated  Thorax & Lungs: No respiratory distress no stridor  Abdomen: Nondistended minimal suprapubic tenderness no rebound no guarding  Skin: Venous stasis issues in the lower extremities.  She also has some bullae that abruption the left leg no streaking no discharge no fluctuant masses noted.  : No CVA tenderness.   Psychiatric: Calm, not anxious  Neurologic: Alert & oriented, moves all extremities equally    RADIOLOGY/PROCEDURES  Results for orders placed or performed during the hospital encounter of 09/22/21   CBC WITH DIFFERENTIAL   Result Value Ref Range    WBC 8.5 4.8 - 10.8 K/uL    RBC 5.21 4.20 - 5.40 M/uL    Hemoglobin 16.4 (H) 12.0 - 16.0 g/dL    Hematocrit 50.5 (H) 37.0 - 47.0 %    MCV 96.9 81.4 - 97.8 fL    MCH 31.5 27.0 - 33.0 pg    MCHC 32.5 (L) 33.6 - 35.0 g/dL    RDW 58.4 (H) 35.9 - 50.0 fL    Platelet Count 171 164 - 446 K/uL    MPV 9.5 9.0 - 12.9 fL    Neutrophils-Polys 66.90 44.00 - 72.00 %    Lymphocytes 20.00 (L) " 22.00 - 41.00 %    Monocytes 11.00 0.00 - 13.40 %    Eosinophils 0.70 0.00 - 6.90 %    Basophils 0.90 0.00 - 1.80 %    Immature Granulocytes 0.50 0.00 - 0.90 %    Nucleated RBC 0.00 /100 WBC    Neutrophils (Absolute) 5.68 2.00 - 7.15 K/uL    Lymphs (Absolute) 1.70 1.00 - 4.80 K/uL    Monos (Absolute) 0.93 (H) 0.00 - 0.85 K/uL    Eos (Absolute) 0.06 0.00 - 0.51 K/uL    Baso (Absolute) 0.08 0.00 - 0.12 K/uL    Immature Granulocytes (abs) 0.04 0.00 - 0.11 K/uL    NRBC (Absolute) 0.00 K/uL   COMP METABOLIC PANEL   Result Value Ref Range    Sodium 135 135 - 145 mmol/L    Potassium 3.7 3.6 - 5.5 mmol/L    Chloride 101 96 - 112 mmol/L    Co2 20 20 - 33 mmol/L    Anion Gap 14.0 7.0 - 16.0    Glucose 133 (H) 65 - 99 mg/dL    Bun 48 (H) 8 - 22 mg/dL    Creatinine 1.62 (H) 0.50 - 1.40 mg/dL    Calcium 9.8 8.4 - 10.2 mg/dL    AST(SGOT) 29 12 - 45 U/L    ALT(SGPT) 23 2 - 50 U/L    Alkaline Phosphatase 102 (H) 30 - 99 U/L    Total Bilirubin 0.7 0.1 - 1.5 mg/dL    Albumin 4.5 3.2 - 4.9 g/dL    Total Protein 7.7 6.0 - 8.2 g/dL    Globulin 3.2 1.9 - 3.5 g/dL    A-G Ratio 1.4 g/dL   URINALYSIS    Specimen: Urine   Result Value Ref Range    Color Yellow     Character Cloudy (A)     Specific Gravity 1.025 <1.035    Ph 6.0 5.0 - 8.0    Glucose Negative Negative mg/dL    Ketones Negative Negative mg/dL    Protein 30 (A) Negative mg/dL    Bilirubin Negative Negative    Nitrite Negative Negative    Leukocyte Esterase Moderate (A) Negative    Occult Blood Moderate (A) Negative    Micro Urine Req Microscopic    LACTIC ACID   Result Value Ref Range    Lactic Acid 2.6 (H) 0.5 - 2.0 mmol/L   ESTIMATED GFR   Result Value Ref Range    GFR If  36 (A) >60 mL/min/1.73 m 2    GFR If Non African American 30 (A) >60 mL/min/1.73 m 2   URINE MICROSCOPIC (W/UA)   Result Value Ref Range    WBC  (A) /hpf    RBC 10-20 (A) /hpf    Bacteria Many (A) None /hpf    Epithelial Cells Few Few /hpf        COURSE & MEDICAL DECISION  MAKING  Pertinent Labs & Imaging studies reviewed. (See chart for details)  Is here with what appears to be some weeping edema for some broken skin on the left leg no cellulitis or abscess appreciated    In addition the patient has history of UTIs her belly is nondistended nonsurgical suspicion for appendix gallbladder diverticulitis is low we will get a urinalysis.    Unfortunately been here for about 7 hours it is now 2:30 in the morning.  We will go ahead and do the lab work.  She has no other concerns at this time.  She looks otherwise stable nontoxic vitals have been stable throughout her ED stay    3:51 AM  Is resting comfortably sleeping at this point I redressed the leg and put a dressing with Adaptic to make sure does not stick told to keep it on for the next 24 hours later he will.  In addition we will get a dose ceftriaxone for urine looks space for UTI.  We will add a culture as she had some frequent UTIs in the past.    Is a well-appearing 88-year-old female with UTIs and sclera like she is told to not being compliant with her diuretics.  See your doctor for recheck in return if she is increasing pain fever chills.    FINAL IMPRESSION  1.  UTI  2.  Leg edema  3.  Noncompliance with diuretics      Electronically signed by: Enzo Atkinson M.D., 9/22/2021 2:35 AM

## 2021-09-22 NOTE — ED NOTES
Patient discharged home in stable condition with daughter  AVS provided with recommended follow up and home care instructions and education information  Prescription for Lasix and Omnicef provided at this time  Patient and daughter verbalized understanding  Pivot to wheelchair with assistance at time of discharge  Waiting in lobby for daughter

## 2021-09-28 NOTE — ED NOTES
Pt denies SI, states would like to have PM turned off and does not want to go through w/ further treatments.

## 2021-09-28 NOTE — ED TRIAGE NOTES
"Chief Complaint   Patient presents with   • Fall     Pt BIB [facility] for lift assist x 3 this week, states pt was unable to bear any weight tonight   • Other     Per report daughter states is unable to care for pt at home     /91   Pulse 80   Temp 36.4 °C (97.6 °F) (Temporal)   Resp 17   Ht 1.753 m (5' 9\")   Wt 74.8 kg (164 lb 14.5 oz)   SpO2 97%   BMI 24.35 kg/m²     Has this patient been vaccinated for COVID YES  If not, would they like to be vaccinated while in the ER if eligible?  NA  Would the patient like to speak with the ERP about the possibility of receiving the COVID vaccine today before making a decision? NA    "

## 2021-09-28 NOTE — ED PROVIDER NOTES
"ED Provider Note      Means of Arrival: EMS  History obtained from: Patient, EMS    CHIEF COMPLAINT  Chief Complaint   Patient presents with   • Fall     Pt BIB [facility] for lift assist x 3 this week, states pt was unable to bear any weight tonight   • Other     Per report daughter states is unable to care for pt at home       HPI  Meron Rice is a 88 y.o. female who presents with weakness, inability to care for self.  The patient has had some 3 slides out of bed this week.  She is unable to get up on her own.  EMS reports that she is unable to provide any significant assistance with standing.  Her daughter reports that she is unable to care for her.  The patient has been having progressive lower extremity weakness for the past 2 weeks.  The patient reports that she believes that she is nearing the end of her life and would like comfort care.  She denies any chest pain, abdominal pain, fevers, dysuria, hematuria.  She denies any injuries or head strike.  She denies any loss of consciousness.  When asked how the patient is doing, she states \"terrible.  No one should live to be this age.\"    REVIEW OF SYSTEMS  CONSTITUTIONAL:  No fever.  CARDIOVASCULAR:  No chest discomfort.  RESPIRATORY:  No pleuritic chest pain.  GASTROINTESTINAL:  No abdominal pain.  GENITOURINARY:   No dysuria.    See HPI for further details.   All other systems are negative.     PAST MEDICAL HISTORY  Past Medical History:   Diagnosis Date   • Anemia    • Anxiety 2/4/2014   • Arrhythmia     atrial fibrillation   • Arthritis     generalized   • ASTHMA     has not needed an inhaler since 2010   • Cancer (HCC) [id number]    Uterus and Breast   • CATARACT     beginning   • Chronic diastolic congestive heart failure (HCC) 2/10/2020    Echo (March 2019): CONCLUSIONS Grossly normal left ventricular systolic function. Left ventricular ejection fraction is visually estimated to be 55%. Indeterminate diastolic function. Aortic sclerosis without stenosis. " Estimated right ventricular systolic pressure is 41 mmHg + JVP.  Echo (May 2017): Left ventricular ejection fraction is visually estimated to be 55%. Normal regional wall motion. Gr   • Chronic hip pain 5/19/2017   • Chronic left shoulder pain 12/11/2018   • Chronic midline low back pain without sciatica 5/14/2012   • CKD (chronic kidney disease) stage 3, GFR 30-59 ml/min ([facility]) 2/10/2020      Ref. Range 3/12/2019 18:55 3/13/2019 05:17 12/11/2019 11:05 Creatinine Latest Ref Range: 0.50 - 1.40 mg/dL 1.33 1.28 1.57 (H) GFR If Non  Latest Ref Range: >60 mL/min/1.73 m 2 38 (A) 40 (A) 31 (A)  She had normal kidney function in April 2018 and since March 2019 she has had ongoing JAMES with fluctuations of GFR between 30 and 40.  She was diagnosed with heart failure exacerbation   • Depression 5/19/2017   • Diarrhea 3/12/2019   • Foot-drop    • GERD (gastroesophageal reflux disease) 5/14/2012   • Hypertension    • Neurogenic bladder 3/6/2017   • Other insomnia 2/4/2014   • Pacemaker     in past, removed due to cellulitis   • Presbycusis 3/22/2018   • Thyroid nodule    • Unspecified hemorrhagic conditions     post op hyst. Currently on Eliquis    • Unspecified urinary incontinence     lazy bladder; doesn't empty completely   • UTI (urinary tract infection)     frequent        FAMILY HISTORY  Family History   Problem Relation Age of Onset   • Diabetes Mother    • Heart Disease Mother    • Heart Disease Father    • Stroke Father    • Hypertension Father    • Diabetes Sister    • Heart Disease Sister    • Diabetes Other    • Heart Disease Other    • Stroke Other    • Cancer Other    • Hypertension Other    • Diabetes Other    • Diabetes Child    • Psychiatric Illness Child    • Hypertension Sister    • Kidney Disease Sister    • Stroke Sister    • Lung Cancer Brother    • Bipolar disorder Brother        SOCIAL HISTORY   reports that she quit smoking about 57 years ago. Her smoking use included cigarettes. She has a  0.10 pack-year smoking history. She has never used smokeless tobacco. She reports previous alcohol use. She reports that she does not use drugs.    SURGICAL HISTORY  Past Surgical History:   Procedure Laterality Date   • OTHER  09/2017    pacemaker removal    • FEMUR NAILING INTRAMEDULLARY Right 6/3/2015    Procedure: FEMUR NAILING INTRAMEDULLARY;  Surgeon: Deshaun Denis M.D.;  Location: SURGERY [facility];  Service:    • WOUND CLOSURE GENERAL  4/10/2013    Performed by Nano Varela M.D. at SURGERY SAME DAY [facility] ORS   • BREAST IMPLANT REMOVAL  4/10/2013    Performed by Jak Meza M.D. at SURGERY SAME DAY [facility]   • BREAST RECONSTRUCTION  11/26/2012    Performed by Jak Meza M.D. at SURGERY [facility]   • HIP HEMIARTHROPLASTY  5/14/2012    left; Performed by KEITH COWART at SURGERY [facility]   • BREAST IMPLANT REVISION  7/11/2011    Performed by JAK MEZA at SURGERY [facility]   • NIPPLE RECONSTRUCTION  7/11/2011    Performed by JAK MEZA at SURGERY [facility] ORS   • MASTECTOMY  12/15/2010    right   • CATH REMOVAL  12/15/2010    Performed by NANO VARELA at SURGERY [facility]   • BREAST RECONSTRUCTION  12/15/2010    Performed by JAK MEZA at SURGERY [facility]   • LATISSIMUS FLAP  12/15/2010    Performed by JAK MEZA at SURGERY [facility] ORS   • CATH PLACEMENT  10/28/2009    Performed by NANO VARELA at SURGERY SAME DAY [facility] ORS   • MASTECTOMY  9/30/2009    left   • AXILLARY NODE DISSECTION  9/15/2009    Performed by NANO VARELA at SURGERY SAME DAY [facility] ORS   • OTHER SURGICAL PROCEDURE  2004    bladder repair   • OTHER ORTHOPEDIC SURGERY  2002    laminectomy   • HYSTERECTOMY, TOTAL ABDOMINAL  2000   • GYN SURGERY  1998    excision of fibroids   • APPENDECTOMY  1968   • DILATION AND CURETTAGE  1961   • PACEMAKER INSERTION     • VEIN LIGAT & STRIP  1972, 1966    x2 bilateral       CURRENT  "MEDICATIONS  Home Medications    **Home medications have not yet been reviewed for this encounter**         ALLERGIES  Allergies   Allergen Reactions   • Alendronate-Butylpar-Propylpar-Saccharin [Fosamax] Diarrhea and Nausea     .   • Atorvastatin      Causes low quality       PHYSICAL EXAM  VITAL SIGNS: /91   Pulse 80   Temp 36.4 °C (97.6 °F) (Temporal)   Resp 17   Ht 1.753 m (5' 9\")   Wt 74.8 kg (164 lb 14.5 oz)   SpO2 97%   BMI 24.35 kg/m²    Gen: Alert, no acute distress  HENT: ATNC  Eyes: Normal conjunctiva  Neck: trachea midline  Resp: no respiratory distress, clear to auscultation bilaterally.  No chest wall tenderness.  Scars from prior mastectomy.  Slight abrasion posterior left chest wall  CV: No JVD, RRR.  Left anterior chest pacemaker in place  Abd: non-distended, nontender  Ext: No deformities, moves all extremities.  Chronic venous stasis changes bilateral lower extremities.  Cool lower extremities with redness in bilateral feet.  Right great toe tip ischemic versus hematoma.  Dorsalis pedis pulses palpable on right, not on left.  Left has ultrasound identifiable posterior tibial pulse.  Delayed capillary refill bilateral feet.  3/5 strength bilateral lower extremities except 0/5 strength to toe dorsiflexion and ankle dorsiflexion left ankle.  Psych: normal mood  Neuro: speech fluent, GCS 15.  No pronator drift.  Sensation intact in all extremities.  Skin: Healing superficial anterior left shin wounds.  No surrounding erythema.        COURSE & MEDICAL DECISION MAKING  Pertinent Labs & Imaging studies reviewed. (See chart for details)    Review medical record demonstrates the patient was seen on 9/22 with concern for urinary tract infection and was discharged home with antibiotics and increasing diuretics.    Patient reports she has been taking her antibiotics.    Patient presents for progressive weakness over the past 2 weeks with repeated falls.  She is unable to care for herself and " family cannot care for her at home.  The patient expressed wishes to be transition to comfort care.  She would not want antibiotics, other medical interventions.  She reports she is okay with her pacemaker being turned off.  She denies any acute complaints.  No evidence of traumatic brain injury.  Compared to the note from nurses at the patient's last visit, she appears to have markedly worsened with her leg strength as she is no longer able to pivot to wheelchair with assist,  now requires complete assist.     I tried multiple times calling the number provided to the patient's daughter to confirm plan for transition to comfort care, however each time I received a response at the call could not be completed as dialed.  Case discussed with case management, and the patient will be evaluated for hospice placement.    In keeping with the patient's comfort measures only, will only provide her home medications that will provide for comfort and prevent dyspnea.    4:56 AM  Patient admitted to ED observation for hospice consultation and evaluation.    Appropriate PPE were worn at this encounter.     FINAL IMPRESSION  1. Fall, initial encounter    2. Self-care deficit    3. Comfort measures only status       Disposition: ED Obs for hospice consult    This dictation was created using voice recognition software. The accuracy of the dictation is limited to the abilities of the software. I expect there may be some errors of grammar and possibly content. The nursing notes were reviewed and certain aspects of this information were incorporated into this note.

## 2021-09-28 NOTE — DISCHARGE PLANNING
Received Choice form at 1238  Agency/Facility Name: [facility]   Referral sent per Choice form @ [phone]  Agency/Facility Name: [facility]   Spoke To: Paula   Outcome: Per Paula she was wondering if referral was meant to be for hospice instead of HH due pt wanting to be on comfort care.   RNCM Notified

## 2021-09-28 NOTE — ED NOTES
Med rec updated and complete  Allergies reviewed, per daughter  Pt is not sure what medications she is taking, asked for me to call her daughter (Nyasia) [phone].  Per daughter is not sure when pt took her medications last or if she started any new RX.  Pts daughter reports that pt has not taken her METOPROLOL LOPRESSOR 50MG in months. Pts daughter reports that she thinks she had and allergie to this medications but is not sure what happens.  Called Maritza @ [phone] to verify all medications.  Per Maritza pt last picked up her METOPROLOL LOPRESSOR 50MG on 5/2021 for 90 day supply.  Pt last picked up her SPIROLACTONE 25MG on 4/2021 for 30 day supply.    Pts daughter reports no vitamins or OTC's    No current facility-administered medications on file prior to encounter.     Current Outpatient Medications on File Prior to Encounter   Medication Sig Dispense Refill   • cefdinir (OMNICEF) 300 MG Cap Take 1 Capsule by mouth 2 times a day for 7 days. (Patient taking differently: Take 300 mg by mouth 2 times a day. Pt picked up on 9/23/2021 for 7 day course) 14 Capsule 0   • furosemide (LASIX) 40 MG Tab Take 1 Tablet by mouth 2 times a day for 10 days. (Patient taking differently: Take 40 mg by mouth 2 times a day. Pt picked up on 9/23/2021 for 10 day course) 20 Tablet 1   • FLUoxetine (PROZAC) 40 MG capsule Take 1 capsule by mouth every day. 90 capsule 3   • apixaban (ELIQUIS) 2.5mg Tab Take 1 Tab by mouth 2 Times a Day. 180 Tab 3
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
## 2021-09-28 NOTE — DISCHARGE PLANNING
"Anticipated Discharge Disposition: TBD    Action: CM placed call to pt's daughter to discuss discharge plan; VM left with this CM's contact info.  Per bedside RN, pt refusing any care and wants to be discharged home. Per report, pt's family unable to care for pt. Will need to discuss financial resources for placement vs caregivers/hospice/HH.     Barriers to Discharge: Placement discussion with family pending    Plan: [facility] will follow up with pt's family to discuss placement.      [id number]-MARIELA spoke with pt's daughter Nyasia.   Nyasia reports she is struggling to care for patient at home. She states pt has declined over the past few months as pt is not eating and drinking enough, doesn't leave her bedroom, and sleeps most of the day. Nyasia reports pt's bedroom is a mess (due to pt incontinence) and she struggles to keep it clean. During conversation, Nyasia displayed slurred speech and tangential thoughts.       CM turned conversation towards potential placement options. Pt has no inpatient admissions within the last 30 days, will not qualify for SNF placement. Nyasia requested we place the patient in observation and treat the patient. MARIELA explained admission process to Nyasia and reported pt's stated desire to return home with comfort care. Nyasia responded \"she doesn't know what she is saying\".    CM provided options for increased support-caregivers, HH, Hospice, Group home. MARIELA explained caregivers and GH placement is out of pocket as insurance does not cover. Nyasia was not forthcoming with financial information but declined to talk about these two options. Nyasia was open to HH at this time and obtaining information for Hospice. Nyasia provided choice for HH with [facility]. MARIELA explained if [facility] accepts, she could obtain information from them about Hospice services and transition if patient desired.     Case reviewed with [facility] supervisor. Message sent to MD for HH orders; Adult Protective " Services report filed for concerns.     1600-Spoke with Leighann at [facility] regarding referral. Leighann will have AJ from [facility] reach out to daughter to discuss Hospice services. Leighann requesting a PT OT evaluation for HH-currently no diagnosis for HH need. Request sent to MD via bedside RN.

## 2021-09-28 NOTE — FACE TO FACE
Face to Face Note  - Home Health    Sofya Tyler M.D. - NPI: [id number]  I certify that this patient is under my care and that they had a durable medical equipment(DME)/home health face to face encounter by myself that meets the physician DME face-to-face encounter requirements with this patient on:    Date of encounter:   Patient:                    MRN:                       YOB: 2021  Meron Rice  [id number]  11/18/1932     The encounter with the patient was in whole, or in part, for the following medical condition, which is the primary reason for durable medical equipment:  Other - Needs assistance at home due to frequent falls and difficulty caring for self    I certify that, based on my findings, the following durable medical equipment is medically necessary:  Other DME Equipment - home health.    HOME O2 Saturation Measurements:(Values must be present for Home Oxygen orders)         ,     ,         My Clinical findings support the need for the above equipment due to:  Frequent Falls    Supporting Symptoms: home health    If patient feels more short of breath, they can go up to 6 liters per minute and contact healthcare provider.

## 2021-09-28 NOTE — ED NOTES
Pt resting on gurney. No needs at this time. Working with case management for home toni, home hospice, PT/OT and potential ride home. Call light in reach.

## 2021-09-29 NOTE — ED NOTES
Patient had smears of stool. Pericare done, gown and sheets changed, and new purwick inserted. Patient turned on side with pillows. She is very pleasant and follows commands.

## 2021-09-29 NOTE — DISCHARGE PLANNING
Received Choice form at 1608  Agency/Facility Name: [facility]  Referral sent per Choice form @ 6016.  DTL faxed via manual fax, fax #[phone].

## 2021-09-29 NOTE — ED NOTES
Pt was not seen by PT/OT today. Pt will be spending night in ER without ride home. To be evaluated tomorrow.

## 2021-09-29 NOTE — PROGRESS NOTES
"Patient was reevaluated this morning by myself.  Social workers have been working with the patient's case over the last several days.  PT/OT was ordered yesterday afternoon.  PT OT was not able to evaluate patient yesterday afternoon so they came to evaluate the patient today.  They said the patient does have a hard time ambulating.  She needs assistance and recommended that patient go to SNF.  Social workers have determined the patient does not have adequate insurance to be able to go to SNF.  I spoke with social damion Meza this afternoon.  She said at this time if the patient would like to have blood work/work-up done here in the ER, there is a chance we might find something that would justify admitting her to the hospital here at [facility].  However, if the patient is still refusing to have any blood work or work-up done here in the ER, then our only option at this time is to send the patient home with hospice.  Susana will help arrange for hospice.  I went in and spoke with the patient.  The patient is alert and orient x4.  She is very clear that she does not want any blood work.  She does not want chest x-ray.  She does not want a urine test done.  She says \"I just had a urinalysis a couple days ago.\"  This is true.  She was diagnosed with a urinary tract infection a few days ago.  She was sent home on Omnicef.  I will go ahead and restart her Omnicef.  Asked the patient multiple times in several different ways if she would allow us to do a medical work-up here in the ER.  She adamantly refuses any blood work or imaging.  I told her that her options at this time are very limited and without allowing us to do any work-up here in the ER, her only option was to go home with hospice.  She says that is fine and she agrees to going home with hospice.  I spoke with social damion Meza, again around 1545.  At this time can we will contact the daughter and tell her that the only option at this time is to send " the patient home to her house with hospice.  Patient's blood pressure did get a little low into the 90s.  She is asymptomatic.  She is up eating.  She is not dizzy.  She has history of heart failure so she cannot tolerate large amounts of fluid.  Patient is amenable to discharge home with hospice.

## 2021-09-29 NOTE — DISCHARGE PLANNING
Voalte received from Neela Perea requesting assistance with D/C of pt. Chart reviewed.  Msg to Latha JADE For PT/OT eval for HH referral. Will Monitor and assist as able.

## 2021-09-29 NOTE — THERAPY
"Occupational Therapy   Initial Evaluation     Patient Name: Meron Rice  Age:  88 y.o., Sex:  female  Medical Record #: [id number]  Today's Date: 9/29/2021     Precautions  Precautions: Fall Risk  Comments: incontinent of bowel and bladder    Assessment  Patient is 88 y.o. female with a diagnosis of frequent falls, dtr reports being unable to care for her.  At one point during this admission pt voiced not wanting to suffer and not wanting treatment and being left alone to die.  Dtr reported this was probably inaccurate.  Pt does have h/o dementia.  During today's evaluation, she required modA for bed mobility, was incontinent prior to therapy (using purewik) and 2 person assist to stand, unable to safely pivot transfer.  Pt verbalized that she would like to work on getting stronger and be able to walk better and that \"I want to help out my daughter.\"  At this time, pt does NOT appear safe for home and would benefit from further inpt post acute therapy.  Home Health therapy would not be sufficient at this time as the burden of care ont he daughter is too high if preserving and improving quality of life is the goal.  However, if pt family does choose no further intervention, pt would be essentially bedbound at home and hospice would be a more appropriate option.  OT will follow while in house.     Plan    Recommend Occupational Therapy 3 times per week until therapy goals are met for the following treatments:  Adaptive Equipment, Neuro Re-Education / Balance, Self Care/Activities of Daily Living, Therapeutic Activities and Therapeutic Exercises.    DC Equipment Recommendations: Unable to determine at this time  Discharge Recommendations: Recommend post-acute placement for additional occupational therapy services prior to discharge home        09/29/21 1137   Prior Living Situation   Prior Services Unable To Determine At This Time   Housing / Facility Unable To Determine At This Time   Lives with - Patient's Self Care " "Capacity Adult Children  (dtr per notes)   Comments Pt is an unreliable historian, unable to accurately report PLOF or equipment avail in the home.  She states her daughter takes care of her    Prior Level of ADL Function   Self Feeding Unable To Determine At This Time   Grooming / Hygiene Unable To Determine At This Time   Bathing Unable To Determine At This Time   Dressing Unable To Determine At This Time   Toileting Unable To Determine At This Time   Comments Per notes pt as of recently has not been out of bed much and has been highly incontinent    Prior Level of IADL Function   Medication Management Requires Assist   Laundry Requires Assist   Kitchen Mobility Requires Assist   Finances Requires Assist   Home Management Requires Assist   Shopping Requires Assist   Prior Level Of Mobility Unable to Determine At This Time   Driving / Transportation Relatives / Others Provide Transportation   Occupation (Pre-Hospital Vocational) Retired Due To Age   Leisure Interests Unable To Determine At This Time   History of Falls   History of Falls Yes   Date of Last Fall   (per notes pt \"slid out of bed\" several times PTA)   Precautions   Precautions Fall Risk   Comments incontinent of bowel and bladder   Vitals   O2 Delivery Device None - Room Air   Pain 0 - 10 Group   Therapist Pain Assessment Nurse Notified;0   Cognition    Cognition / Consciousness X   Comments Pt oriented to self only, asking about her daughter.  Thought she was at home   Active ROM Upper Body   Active ROM Upper Body  WDL   Dominant Hand Right   Strength Upper Body   Upper Body Strength  WDL   Coordination Upper Body   Comments appears grossly intact   Balance Assessment   Sitting Balance (Static) Fair   Sitting Balance (Dynamic) Fair -   Standing Balance (Static) Trace +   Standing Balance (Dynamic) Trace   Weight Shift Sitting Poor   Weight Shift Standing Poor   Comments standing with FWW, leaning posteriorly bracing legs on bed   Bed Mobility  "   Supine to Sit Moderate Assist   Sit to Supine Moderate Assist   Scooting Minimal Assist   ADL Assessment   Eating Minimal Assist  (drink from cup)   Grooming   (hands with setup )   Bathing   (pt grabbed washcloth for hands and started washing nancy area)   Lower Body Dressing Maximal Assist   Toileting Total Assist   Comments pt had purewik in place, it was coated in stool as well   Functional Mobility   Sit to Stand Moderate Assist  (x2)   Bed, Chair, Wheelchair Transfer Unable to Participate   Mobility stood EOB, took a couple unsteady sidesteps with FWW   Comments standing with FWW   Activity Tolerance   Sitting Edge of Bed  5min   Standing 1 min    Comments posterior lean, bracing legs on bed   Patient / Family Goals   Patient / Family Goal #1 pt stated she wants to work on getting stronger   Short Term Goals   Short Term Goal # 1 Pt will sit EOB 15 min supervised for grooming in 3 visits   Short Term Goal # 2 Pt will groom with setup in 3 visits   Short Term Goal # 3 Pt will transfer to [facility] with modA in 6 visits for toileting

## 2021-09-29 NOTE — THERAPY
"Physical Therapy   Initial Evaluation     Patient Name: Meron Rice  Age:  88 y.o., Sex:  female  Medical Record #: [id number]  Today's Date: 9/29/2021     Precautions  Precautions: Fall Risk  Comments: incontinent of bowel and bladder    Assessment  Patient is 88 y.o. female presented to the ED with recent falls at home.  Pt is confused, oriented to name only, thought she was at home in her room. Pt is presenting with weakness and impaired balance and activity tolerance, was unable to ambulate today and required modA to stand at EOB.  Pt is not safe to DC back home, do not feel pts dtr will be able to care for her at [facility]. Pt states is agreeable for PT, did not once say she was \"giving up\", pleasant and cooperative throughout session. Recommend further therapy at SNF prior to DC home.    Plan    Recommend Physical Therapy 3 times per week until therapy goals are met for the following treatments:  Bed Mobility, Gait Training, Neuro Re-Education / Balance, Stair Training, Therapeutic Activities and Therapeutic Exercises    DC Equipment Recommendations: Unable to determine at this time  Discharge Recommendations: Recommend post-acute placement for additional physical therapy services prior to discharge home        09/29/21 1136   Prior Living Situation   Housing / Facility Unable To Determine At This Time   Lives with - Patient's Self Care Capacity Adult Children  (dtr)   Comments Pt is an unreliable hisorian, unable to determine home set up and DME. Pt does state her dtr helps her with \"everything\"   Prior Level of Functional Mobility   Bed Mobility Unable To Determine At This Time   Transfer Status Unable To Determine At This Time   Ambulation Unable To Determine At This Time   Comments Pt reports uses a walker at baseline   Cognition    Comments Pt is oriented to name only, thought she was at home   Passive ROM Lower Body   Passive ROM Lower Body Not Tested   Active ROM Lower Body    Active ROM Lower Body  X "   Comments chronic L foot drop   Strength Lower Body   Lower Body Strength  X   Comments B LE weakness 4-/5, L foot drop 0/5 (chronic)   Balance Assessment   Sitting Balance (Static) Fair   Sitting Balance (Dynamic) Fair -   Standing Balance (Static) Trace +   Standing Balance (Dynamic) Trace   Weight Shift Sitting Poor   Weight Shift Standing Poor   Comments stdg with FWW posterior lean   Gait Analysis   Gait Level Of Assist Unable to Participate   Comments Pt was able to take a few side steps up side of bed with FWW modA x2, posterior lean   Bed Mobility    Supine to Sit Moderate Assist   Sit to Supine Moderate Assist   Functional Mobility   Sit to Stand Moderate Assist  (x2)   Bed, Chair, Wheelchair Transfer Unable to Participate   Activity Tolerance   Sitting Edge of Bed 5 min   Standing 1 min with FWW   Comments posterior lean in standing    Short Term Goals    Short Term Goal # 1 Pt will be able to perform bed mobility and sup <> sit Elvis in 6 visits.   Short Term Goal # 2 Pt will be able to perform sit <> stand and transfer Elvis in 6 visits so can DC home safely.   Short Term Goal # 3 Pt will be able to ambulate 50 ft with FWW Elvis in 6 visits so can DC home safely.

## 2021-09-29 NOTE — DISCHARGE PLANNING
Anticipated Discharge Disposition: TBD    Action: Call placed to Leighann Cannon  [phone], unable to leave msg. Call placed to [place] office at [phone], msg left for return call.    1255- call from Leighann @ [facility] . Director declining this patient due to it being a poor fit. Update to [facility] leadership. msg to Barbra, DPA to send referral out to other  agencies with hospice services as a continuation of services.     1450- Discussion with ERP, who states she believes the pt is oriented enough to situation to make decisions. Plan to discuss ER W/U with pt or Discharge with hospice. Will update family after D/W pt. D/W [facility] leader KODI    1548- call received from ERP, pt continues to refuse work up and care from ER staff.     1600- call placed to pt's Falguni IVAN @ [phone] OK to send referral to 1) [facility], 2) [facility], 3) [facility].    1615- Call placed to [facility]- dispatched to [place] with ETA 30 minutes.     1700- Met with Carola form [facility], pt and SARITHA Murrell. Pt affirms she desires for comfort care measures and agrees to hospice. Nyasia asked for other hospice phone numbers, but scheduled appointment with [facility] for Friday. [facility] agrees to take pt home today. Nyasia desires pt to be able to walk again. Discussed that pt may not be able to. Updated BSRN that pt is clear for discharge. Hospice will follow up Friday @ 1pm or daughter will contact different hospice agency for follow up.   1800- upon return to  office, email sent to [email or web address] with list of hospice agencies in [place] and elder support services.     Barriers to Discharge: HH vs Hospice, transportation    Plan: Follow up with family regarding POC

## 2021-09-30 NOTE — ED NOTES
Patient discharged to home via wheelchair with tech. Patient dressed and daughter here to drive her home. Patient is awake, alert, and cooperative.

## 2021-10-22 DIAGNOSIS — N17.9 AKI (ACUTE KIDNEY INJURY) (HCC): ICD-10-CM

## 2021-10-22 DIAGNOSIS — I48.0 PAF (PAROXYSMAL ATRIAL FIBRILLATION) (HCC): ICD-10-CM

## 2022-11-11 ENCOUNTER — PATIENT MESSAGE (OUTPATIENT)
Dept: HEALTH INFORMATION MANAGEMENT | Facility: OTHER | Age: 87
End: 2022-11-11

## 2023-01-31 NOTE — TELEPHONE ENCOUNTER
----- Message from Wang Disla M.D. sent at 3/6/2019  7:51 AM PST -----  Please call and inform this patient of the following:  Her urine culture does shoe a urinary tract infection. Have her symptoms resolved after finishing the macrobid antibiotic I prescribed her?  
I have ordered a urinalysis. Patient should go to the lab to get this done. I have also referred her to urology because of her recurrent UTI's. I do continue to recommend she come in for evaluation. I can fit her in tomorrow 3/28.     If she develops fever, chills, nausea, vomiting or back pain she needs to go to the ER.   
Left pt a mess at home and her cell with Dr. Disla's message. I then called Miles at [facility] and she'll help pt collect the sample.   
Let's bring the patient in to be seen.   
Miles from [facility] cell [phone] left a mess stating that pt's about to complete her course of antibiotics however her urine still has a foul odor and is cloudy. Miles wondered if pt needs another course of antibiotics?   , please advise...  
Phone Number Called: [phone] (home)     Message: Patient advised of rX and recommendations from Dr. Disla.    Left Message for patient to call back: no, unless symptoms do not resolve.        
Phone Number Called: Meron Rice    Message: [phone] (home)     Left Message for patient to call back: no, patient informed that her culture was positive for a UTI. Patient said she finished the last macrobid pill last night and her symptoms have improved but she is still having chills and tenderness of the bladder.    Dr. Disla, please advise.        
Please inform patient I sent in an Rx for Cipro for her. If her symptoms worsen or do not improve within 48 hours, she should call. If her symptoms don't resolve within 7 days, she should call.   
Spoke with pt and informed, pt. Does not want to come in. She asked if there is a way you'd order to urine test and the nurse can collect the sample?  Pt with an jossy tomorrow to see.  Future Appointments       Provider Department Center    3/26/2019 4:00 PM GOOD [facility]cd[facility]. [facility]        they might do a test?  , please advise...    
DISPLAY PLAN FREE TEXT

## 2023-03-20 NOTE — TELEPHONE ENCOUNTER
1. Caller Name: Rena Lazcano                        Call Back Number: [phone] (home)         How would the patient prefer to be contacted with a response: Phone call OK to leave a detailed message    I called and left a message as to whether she would want Home Helth involved or have geriatric specialty come to the home.  We would no longer have her as a patient if the later was chosen.   71

## 2023-08-29 NOTE — OR NURSING
1538 recd pt to ppu on gurney w/ RN.  R chest w/ pressure dressing c/d/i. Pt awake and alert oriented x 4.  VSS, monitoring per protocol  1600 Taking po well, await pharmacy for prn meds, see flowsheet for b/p.  1620 [place] report to Barbra RN. Pt meets criteria for transfer to [facility] per orders.  drsg remains c/d/i.   1645 transport via [email or web address]/ belongings w pt, glasses in place. Pacemaker paperwork in pt belonging bag. MB   Patient presents to the ED with concerns of a sinus headache and pressure and with drainage down the back of here throat. She has tried taking tylenol for the pain. She rates pain at a 6/10. She took her prescribed medications about 1 hour ago.      Kati Barba RN  08/29/23 6567

## 2024-05-30 NOTE — DISCHARGE PLANNING
Granddaughter Shell returning call. She reports she is the caretaker for patient. Explained MRIs are back and PSP wants to see patient in clinic to review these and discuss treatment options. Explained new Consent to Communicate must be signed by patient at that appointment.   Received choice form from  [facility] [place] at 1251.  Referral sent to [facility] at 1257 on 05-20-17.   [facility] - Observation 11H  Discharge Final Note    Primary Care Provider: Mesfin Hodges II, MD    Expected Discharge Date: 5/29/2024    Patient discharged to home via ambulance transportation.     Patient's bedside nurse and patient/family notified of the above.    Discharge Plan A and Plan B have been determined by review of patient's clinical status, future medical and therapeutic needs, and coverage/benefits for post-acute care in coordination with multidisciplinary team members.        Final Discharge Note (most recent)       Final Note - 05/30/24 0934          Final Note    Assessment Type Final Discharge Note (P)      Anticipated Discharge Disposition Home or Self Care (P)         Post-Acute Status    Post-Acute Authorization Other (P)      Other Status See Comments (P)    [facility]                    Important Message from Medicare  Important Message from Medicare regarding Discharge Appeal Rights: Given to patient/caregiver, Explained to patient/caregiver, Signed/date by patient/caregiver     Date IMM was signed: 05/27/24  Time IMM was signed: 1319        Future Appointments   Date Time Provider Department Center   6/12/2024 11:45 AM Nic Carranza DPM [facility] PODIA [place]   6/20/2024  9:00 AM Nick Chilel MD [facility] PULMS[facility]   7/1/2024 11:00 AM Efe Hickey MD [facility] ENDODIA [facility]       SW scheduled post-discharge follow-up appointment and information added to AVS.     SW sent referrals for home health; however, patient did not have any accepting agencies. SW has escalated to leadership and patient's insurance reported that they will work on finding an agency. GERSON sent referral to [facility] to assist patient with her wound care needs and they will schedule appointment with patient/family.      Cher Guo, MERY  [facility]  Ext. [phone]

## 2024-11-27 NOTE — PROGRESS NOTES
Cardiology/Electrophysiology Follow-up Note      Subjective:   Chief Complaint:   Chief Complaint   Patient presents with   • Atrial Fibrillation     follow up       Merno Rice is a 86 y.o. female who presents today for follow up paroxysmal atrial fibrillation and [place] PPM.    She is followed by Dr. Garrett and Neha QUESADA.  Past medical history also significant for SSS S/P PPM, device infection with reimplantations of device,  hypertension, atrial flutter S/P ablation, pulmonary hypertension, chronic anticoagulation, history of breast cancer S/P bilateral mastectomy.       Today in follow up she tells me that she has been feeling well overall from a cardiac standpoint.  She was recently hospitalized at [facility] for UTI and believes that she continues to have UTI symptoms.  She has not seen her PCP and doesn't intend to secondary to personality differences.  She does not report any palpations, dyspnea, or increased lower extremity edema. She denies chest pain, dizziness, pre syncope or syncope, dyspnea, PND, orthopnea.      Patient endorses medication compliance        Past Medical History:   Diagnosis Date   • Anemia    • Arrhythmia     atrial fibrillation   • Arthritis     generalized   • ASTHMA     has not needed an inhaler since 2010   • Breath shortness     on exertion   • Bronchitis      in past, not current   • Cancer (HCC) [id number]    Uterus and Breast   • CATARACT     beginning   • Chronic low back pain    • Depression 5/19/2017   • Foot-drop    • Hypertension    • Other specified symptom associated with female genital organs 1998    fibroids   • Pacemaker     in past, removed due to cellulitis   • Pain     left shoulder    • Unspecified hemorrhagic conditions     post op hyst. Currently on Eliquis    • Unspecified urinary incontinence     lazy bladder; doesn't empty completely   • UTI (urinary tract infection)     frequent      Past Surgical History:   Procedure Laterality Date   • OTHER  09/2017     pacemaker removal    • FEMUR NAILING INTRAMEDULLARY Right 6/3/2015    Procedure: FEMUR NAILING INTRAMEDULLARY;  Surgeon: Deshaun Denis M.D.;  Location: SURGERY [facility];  Service:    • WOUND CLOSURE GENERAL  4/10/2013    Performed by Nano Varela M.D. at SURGERY SAME DAY [facility] ORS   • BREAST IMPLANT REMOVAL  4/10/2013    Performed by Jak Meza M.D. at SURGERY SAME DAY [facility] ORS   • BREAST RECONSTRUCTION  11/26/2012    Performed by Jak Meza M.D. at SURGERY [facility]   • HIP HEMIARTHROPLASTY  5/14/2012    left; Performed by KEITH COWART at SURGERY [facility]   • BREAST IMPLANT REVISION  7/11/2011    Performed by JAK MEZA at SURGERY [facility]   • NIPPLE RECONSTRUCTION  7/11/2011    Performed by JAK MEZA at SURGERY [facility]   • MASTECTOMY  12/15/2010    right   • CATH REMOVAL  12/15/2010    Performed by NANO VARELA at SURGERY [facility]   • BREAST RECONSTRUCTION  12/15/2010    Performed by JAK MEZA at SURGERY [facility]   • LATISSIMUS FLAP  12/15/2010    Performed by JAK MEZA at SURGERY [facility]   • CATH PLACEMENT  10/28/2009    Performed by NANO VARELA at SURGERY SAME DAY [facility] ORS   • MASTECTOMY  9/30/2009    left   • AXILLARY NODE DISSECTION  9/15/2009    Performed by NANO VARELA at SURGERY SAME DAY [facility] ORS   • OTHER SURGICAL PROCEDURE  2004    bladder repair   • OTHER ORTHOPEDIC SURGERY  2002    laminectomy   • HYSTERECTOMY, TOTAL ABDOMINAL  2000   • GYN SURGERY  1998    excision of fibroids   • APPENDECTOMY  1968   • DILATION AND CURETTAGE  1961   • VEIN LIGAT & STRIP  1972, 1966    x2 bilateral     Family History   Problem Relation Age of Onset   • Diabetes Mother    • Heart Disease Mother    • Heart Disease Father    • Stroke Father    • Hypertension Father    • Diabetes Sister    • Heart Disease Sister    • Cancer Brother         lung   • Diabetes Unknown    • Heart  Disease Unknown    • Stroke Unknown    • Cancer Unknown    • Hypertension Unknown    • Diabetes Other    • Diabetes Child    • Psychiatry Child      Social History     Social History   • Marital status:      Spouse name: N/A   • Number of children: N/A   • Years of education: N/A     Occupational History   • Not on file.     Social History Main Topics   • Smoking status: Former Smoker     Packs/day: 0.10     Years: 1.00     Types: Cigarettes     Quit date: 1/1/1964   • Smokeless tobacco: Never Used      Comment: 50 years ago in 1960  SOCIAL    • Alcohol use Yes      Comment: 3-4 drinks per week    • Drug use: No   • 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      Comment: Retired Nurse     Other Topics Concern   • Not on file     Social History Narrative   • No narrative on file     Allergies   Allergen Reactions   • Alendronate-Butylpar-Propylpar-Saccharin [Fosamax] Diarrhea and Nausea     .       Current Outpatient Prescriptions   Medication Sig Dispense Refill   • apixaban (ELIQUIS) 5mg Tab Take 5 mg by mouth 2 Times a Day.     • metoprolol (LOPRESSOR) 50 MG Tab Take 75 mg by mouth 2 times a day.     • sertraline (ZOLOFT) 50 MG Tab Take 50 mg by mouth every evening.     • Probiotic Product (PROBIOTIC PO) Take 2 Caps by mouth every day.     • therapeutic multivitamin-minerals (THERAGRAN-M) Tab Take 1 Tab by mouth every morning.     • LORazepam (ATIVAN) 1 MG Tab TAKE 1 TABLET BY MOUTH EVERY NIGHT AT BEDTIME AS NEEDED FOR SLEEP FOR UP TO 20 DAYS (Patient not taking: Reported on 3/26/2019) 20 Tab 0     No current facility-administered medications for this visit.        Review of Systems   Constitutional: Negative for chills, fever, malaise/fatigue and weight loss.   HENT: Negative for congestion, nosebleeds, sore throat and tinnitus.    Eyes: Negative for blurred vision and double vision.   Respiratory: Negative for cough, hemoptysis, sputum production, shortness of breath, wheezing and stridor.    Cardiovascular:  "Negative for chest pain, palpitations, orthopnea, leg swelling (LLE- chronic ) and PND.   Gastrointestinal: Negative for abdominal pain, blood in stool, diarrhea, heartburn, melena, nausea and vomiting.   Genitourinary: Positive for dysuria.        Reports foul smelling urine   Skin: Negative for rash.   Neurological: Negative for dizziness, tingling, tremors, sensory change, speech change, focal weakness, loss of consciousness, weakness and headaches.        Drop foot LLE     All others systems reviewed and negative.     Objective:     Blood pressure 110/70, pulse 60, height 1.753 m (5' 9\"), weight 83 kg (183 lb), SpO2 90 %, not currently breastfeeding. Body mass index is 27.02 kg/m².    Physical Exam   Constitutional: She is oriented to person, place, and time and well-developed, well-nourished, and in no distress.   HENT:   Head: Normocephalic and atraumatic.   Eyes: Pupils are equal, round, and reactive to light. Conjunctivae and EOM are normal.   Neck: Normal range of motion. Neck supple. No JVD present.   Cardiovascular: Normal rate, regular rhythm, normal heart sounds and intact distal pulses.  Exam reveals no gallop and no friction rub.    No murmur heard.  Pulmonary/Chest: Effort normal and breath sounds normal. No respiratory distress. She has no wheezes. She has no rales. She exhibits no tenderness.   Abdominal: Soft. Bowel sounds are normal. There is no tenderness.   Musculoskeletal: Normal range of motion. She exhibits edema (LLE- chronic ).   Neurological: She is alert and oriented to person, place, and time.   Skin: Skin is warm and dry. No rash noted. No erythema.   Psychiatric: Mood, memory, affect and judgment normal.         Cardiac Imaging and Procedures Review:    Echo dated 3/13/19:   Left Ventricle  Normal left ventricular chamber size. Mild concentric left ventricular   hypertrophy. Grossly normal left ventricular systolic function. Left   ventricular ejection fraction is visually estimated " to be 55%. Grossly   normal regional wall motion. Indeterminate diastolic function.    Right Ventricle  Right ventricle not well visualized.    Right Atrium  The right atrium is normal in size. Inferior vena cava is not well   visualized.    Left Atrium  The left atrium is normal in size. Left atrial volume index is 30 mL/sq   m.    Mitral Valve  Mitral annular calcification. No mitral stenosis. Trace mitral   regurgitation.    Aortic Valve  Tricuspid aortic valve. Aortic sclerosis without stenosis. Mild aortic   insufficiency. Pressure half time is  450 msec.    Tricuspid Valve  Structurally normal tricuspid valve. Mild to moderate tricuspid   regurgitation. Estimated right ventricular systolic pressure is 41 mmHg   + JVP.    Pulmonic Valve  The pulmonic valve is not well visualized.  Trace pulmonic   insufficiency.    Pericardium  Normal pericardium without effusion.    Aorta  The aortic root is normal. Ascending aorta diameter is 3.1 cm.      Labs (personally reviewed and notable for):   Lab Results   Component Value Date/Time    SODIUM 136 03/13/2019 05:17 AM    POTASSIUM 4.0 03/13/2019 05:17 AM    CHLORIDE 111 03/13/2019 05:17 AM    CO2 19 (L) 03/13/2019 05:17 AM    GLUCOSE 120 (H) 03/13/2019 05:17 AM    BUN 44 (H) 03/13/2019 05:17 AM    CREATININE 1.28 03/13/2019 05:17 AM            Lab Results   Component Value Date/Time    WBC 6.3 03/13/2019 05:17 AM    RBC 4.57 03/13/2019 05:17 AM    HEMOGLOBIN 14.6 03/13/2019 05:17 AM    HEMATOCRIT 45.0 03/13/2019 05:17 AM    MCV 98.5 (H) 03/13/2019 05:17 AM    MCH 31.9 03/13/2019 05:17 AM    MCHC 32.4 (L) 03/13/2019 05:17 AM    MPV 10.3 03/13/2019 05:17 AM    NEUTSPOLYS 29.80 (L) 03/13/2019 05:17 AM    LYMPHOCYTES 50.70 (H) 03/13/2019 05:17 AM    MONOCYTES 14.10 (H) 03/13/2019 05:17 AM    EOSINOPHILS 3.70 03/13/2019 05:17 AM    BASOPHILS 1.40 03/13/2019 05:17 AM      PT/INR:   Lab Results   Component Value Date/Time    PROTHROMBTM 15.2 (H) 04/12/2018 06:26 PM    INR 1.21  (H) 04/12/2018 06:26 PM       Assessment:     1. Paroxysmal atrial fibrillation (HCC)     2. Pacemaker     3. Essential hypertension     4. Chronic anticoagulation     5. Typical atrial flutter (HCC)     6. S/P ablation of atrial flutter         Medical Decision Making:  Today's Assessment / Status / Plan:   1. Paroxysmal Afib:  - Recent episodes of Afib post hospital admission for UTI.  She has not had any since 3/15/19.  Current AF burden about 5%.  She was not aware of her arrhythmia episodes and her ventricular rate response was well controled.  - Will continue to monitor AF burden.  Discussed if continues to have more significant arrhythmias may need to consider different medication (possibly amiodarone) vs rate control measures only.   - Continue Metoprolol at current dose.  Continue ELiquis.     2. [place] PPM:  - Lead sensing and thresholds are stable, device is working normally.  - AF episodes as above.   - Battery longevity 8 years.     3. Hypertension:  - Blood pressure is well controled.  Continue current medication regimen.  She is currently getting home BP checks from home health.     4. Anticoagulation:  - She is tolerating eliquis without reported evidence of internal bleeding.    - Continue 5 mg BID.     In regards to persistent UTI symptoms I have advised either PCP or urgent care.  We have discussed avoiding adverse consequences of prolonged infective symptoms.     Plan reviewed in detail with the patient and she verbalizes understanding and is in agreement.   RTC in 4 weeks for reivew, sooner if clinical condition changes  Collaborating MD/ADD: GOOD Newman.       Home